# Patient Record
Sex: FEMALE | Race: WHITE | NOT HISPANIC OR LATINO | Employment: OTHER | URBAN - METROPOLITAN AREA
[De-identification: names, ages, dates, MRNs, and addresses within clinical notes are randomized per-mention and may not be internally consistent; named-entity substitution may affect disease eponyms.]

---

## 2021-04-09 PROCEDURE — U0005 INFEC AGEN DETEC AMPLI PROBE: HCPCS

## 2021-04-09 PROCEDURE — U0003 INFECTIOUS AGENT DETECTION BY NUCLEIC ACID (DNA OR RNA); SEVERE ACUTE RESPIRATORY SYNDROME CORONAVIRUS 2 (SARS-COV-2) (CORONAVIRUS DISEASE [COVID-19]), AMPLIFIED PROBE TECHNIQUE, MAKING USE OF HIGH THROUGHPUT TECHNOLOGIES AS DESCRIBED BY CMS-2020-01-R: HCPCS

## 2021-04-10 ENCOUNTER — TELEPHONE (OUTPATIENT)
Dept: LAB | Facility: CLINIC | Age: 81
End: 2021-04-10

## 2021-04-10 LAB
LABORATORY COMMENT REPORT: ABNORMAL
SARS-COV-2 RNA RESP QL NAA+PROBE: NORMAL
SARS-COV-2 RNA RESP QL NAA+PROBE: POSITIVE
SPECIMEN SOURCE: ABNORMAL
SPECIMEN SOURCE: NORMAL

## 2021-04-11 NOTE — TELEPHONE ENCOUNTER
"-Coronavirus (COVID-19) Notification    Caller Name (Patient, parent, daughter/son, grandparent, etc)  Alma Rosa, patient  Reason for call  Notify of Positive Coronavirus (COVID-19) lab results, assess symptoms,  review  [employer] [place] recommendations    Lab Result    Lab test:  2019-nCoV rRt-PCR or SARS-CoV-2 PCR    Oropharyngeal AND/OR nasopharyngeal swabs is POSITIVE for 2019-nCoV RNA/SARS-COV-2 PCR (COVID-19 virus)    RN Recommendations/Instructions per [facility] Coronavirus COVID-19 recommendations    Brief introduction script  Introduce self then review script:  \"I am calling on behalf of [employer].  We were notified that your Coronavirus test (COVID-19) for was POSITIVE for the virus.  I have some information to relay to you but first I wanted to mention that the [place] Dept of Health will be contacting you shortly [it's possible MD already called Patient] to talk to you more about how you are feeling and other people you have had contact with who might now also have the virus.  Also,  [employer] [place] is Partnering with the [facility] for Covid-19 research, you may be contacted directly by research staff.\"    Assessment (Inquire about Patient's current symptoms)   Assessment   Current Symptoms at time of phone call: (if no symptoms, document No symptoms] Cough, body aches, headache,   Fatigue, shortness of breath,   Symptoms onset (if applicable) 4/8/2021     If at time of call, Patients symptoms hare worsened, the Patient should contact 911 or have someone drive them to Emergency Dept promptly:      If Patient calling 911, inform 911 personal that you have tested positive for the Coronavirus (COVID-19).  Place mask on and await 911 to arrive.    If Emergency Dept, If possible, please have another adult drive you to the Emergency Dept but you need to wear mask when in contact with other people.      Monoclonal Antibody Administration    You may be eligible to receive a new treatment with a " "monoclonal antibody for preventing hospitalization in patients at high risk for complications from COVID-19.   This medication is still experimental and available on a limited basis; it is given through an IV and must be given at an infusion center. Please note that not all people who are eligible will receive the medication since it is in limited supply.     Are you interested in being considered for this medication?  No.   Does the patient fit the criteria: Patient declined    If patient qualifies based on above criteria:  \"You will be contacted if you are selected to receive this treatment in the next 1-2 business days.   This is time sensitive and if you are not selected in the next 1-2 business days, you will not receive the medication.  If you do not receive a call to schedule, you have not been selected.\"      Review information with Patient    Your result was positive. This means you have COVID-19 (coronavirus).  We have sent you a letter that reviews the information that I'll be reviewing with you now.    How can I protect others?    If you have symptoms: stay home and away from others (self-isolate) until:    You've had no fever--and no medicine that reduces fever--for 1 full day (24 hours). And       Your other symptoms have gotten better. For example, your cough or breathing has improved. And     At least 10 days have passed since your symptoms started. (If you've been told by a doctor that you have a weak immune system, wait 20 days.)     If you don't have symptoms: Stay home and away from others (self-isolate) until at least 10 days have passed since your first positive COVID-19 test. (Date test collected)    During this time:    Stay in your own room, including for meals. Use your own bathroom if you can.    Stay away from others in your home. No hugging, kissing or shaking hands. No visitors.     Don't go to work, school or anywhere else.     Clean  high touch  surfaces often (doorknobs, counters, " handles, etc.). Use a household cleaning spray or wipes. You'll find a full list on the EPA website at www.epa.gov/pesticide-registration/list-n-disinfectants-use-against-sars-cov-2.     Cover your mouth and nose with a mask, tissue or other face covering to avoid spreading germs.    Wash your hands and face often with soap and water.    Make a list of people you have been in close contact with recently, even if either of you wore a face covering.   ; Start your list from 2 days before you became ill or had a positive test.  ; Include anyone that was within 6 feet of you for a cumulative total of 15 minutes or more in 24 hours. (Example: if you sat next to Will for 5 minutes in the morning and 10 minutes in the afternoon, then you were in close contact for 15 minutes total that day. Will would be added to your list.)    A public health worker will call or text you. It is important that you answer. They will ask you questions about possible exposures to COVID-19, such as people you have been in direct contact with and places you have visited.    Tell the people on your list that you have COVID-19; they should stay away from others for 14 days starting from the last time they were in contact with you (unless you are told something different from a public health worker).     Caregivers in these groups are at risk for severe illness due to COVID-19:  o People 65 years and older  o People who live in a nursing home or long-term care facility  o People with chronic disease (lung, heart, cancer, diabetes, kidney, liver, immunologic)  o People who have a weakened immune system, including those who:  - Are in cancer treatment  - Take medicine that weakens the immune system, such as corticosteroids  - Had a bone marrow or organ transplant  - Have an immune deficiency  - Have poorly controlled HIV or AIDS  - Are obese (body mass index of 40 or higher)  - Smoke regularly    Caregivers should wear gloves while washing dishes,  handling laundry and cleaning bedrooms and bathrooms.    Wash and dry laundry with special caution. Don't shake dirty laundry, and use the warmest water setting you can.    If you have a weakened immune system, ask your doctor about other actions you should take.    For more tips, go to www.cdc.gov/coronavirus/2019-ncov/downloads/10Things.pdf.    You should not go back to work until you meet the guidelines above for ending your home isolation. You don't need to be retested for COVID-19 before going back to work--studies show that you won't spread the virus if it's been at least 10 days since your symptoms started (or 20 days, if you have a weak immune system).    Employers: This document serves as formal notice of your employee's medical guidelines for going back to work. They must meet the above guidelines before going back to work in person.    How can I take care of myself?    1. Get lots of rest. Drink extra fluids (unless a doctor has told you not to).    2. Take Tylenol (acetaminophen) for fever or pain. If you have liver or kidney problems, ask your family doctor if it's okay to take Tylenol.     Take either:     650 mg (two 325 mg pills) every 4 to 6 hours, or     1,000 mg (two 500 mg pills) every 8 hours as needed.     Note: Don't take more than 3,000 mg in one day. Acetaminophen is found in many medicines (both prescribed and over-the-counter medicines). Read all labels to be sure you don't take too much.    For children, check the Tylenol bottle for the right dose (based on their age or weight).    3. If you have other health problems (like cancer, heart failure, an organ transplant or severe kidney disease): Call your specialty clinic if you don't feel better in the next 2 days.    4. Know when to call 911: Emergency warning signs include:    Trouble breathing or shortness of breath    Pain or pressure in the chest that doesn't go away    Feeling confused like you haven't felt before, or not being able  to wake up    Bluish-colored lips or face    5. Sign up for [employer]. We know it's scary to hear that you have COVID-19. We want to track your symptoms to make sure you're okay over the next 2 weeks. Please look for an email from [employer]--this is a free, online program that we'll use to keep in touch. To sign up, follow the link in the email. Learn more at [email or web address].    Where can I get more information?    [facility]: [email or web address]    Coronavirus Basics: [email or web address]/coronavirus/basics.html    What to Do If You're Sick: www.cdc.gov/coronavirus/2019-ncov/about/steps-when-sick.html    Ending Home Isolation: www.cdc.gov/coronavirus/2019-ncov/hcp/disposition-in-home-patients.html     Caring for Someone with COVID-19: www.cdc.gov/coronavirus/2019-ncov/if-you-are-sick/care-for-someone.html     [facility] clinical trials (COVID-19 research studies): [email or web address].[facility]-clinical-trials     A Positive COVID-19 letter will be sent via [employer] or the mail. (Exception, no letters sent to Presurgerical/Preprocedure Patients)    Susie Carter LPN

## 2021-04-19 ENCOUNTER — APPOINTMENT (OUTPATIENT)
Dept: NUCLEAR MEDICINE | Facility: CLINIC | Age: 81
DRG: 178 | End: 2021-04-19
Attending: EMERGENCY MEDICINE
Payer: MEDICARE

## 2021-04-19 ENCOUNTER — APPOINTMENT (OUTPATIENT)
Dept: GENERAL RADIOLOGY | Facility: CLINIC | Age: 81
DRG: 178 | End: 2021-04-19
Attending: EMERGENCY MEDICINE
Payer: MEDICARE

## 2021-04-19 ENCOUNTER — HOSPITAL ENCOUNTER (INPATIENT)
Facility: CLINIC | Age: 81
LOS: 2 days | Discharge: HOME | DRG: 178 | End: 2021-04-21
Attending: EMERGENCY MEDICINE | Admitting: INTERNAL MEDICINE
Payer: MEDICARE

## 2021-04-19 DIAGNOSIS — M89.9 DISORDER OF BONE AND CARTILAGE: Primary | ICD-10-CM

## 2021-04-19 DIAGNOSIS — R11.2 NON-INTRACTABLE VOMITING WITH NAUSEA, UNSPECIFIED VOMITING TYPE: ICD-10-CM

## 2021-04-19 DIAGNOSIS — I10 ESSENTIAL HYPERTENSION, BENIGN: ICD-10-CM

## 2021-04-19 DIAGNOSIS — J96.01 ACUTE RESPIRATORY FAILURE WITH HYPOXIA (H): ICD-10-CM

## 2021-04-19 DIAGNOSIS — U07.1 2019 NOVEL CORONAVIRUS DISEASE (COVID-19): ICD-10-CM

## 2021-04-19 DIAGNOSIS — M94.9 DISORDER OF BONE AND CARTILAGE: Primary | ICD-10-CM

## 2021-04-19 LAB
ALBUMIN SERPL-MCNC: 3.1 G/DL (ref 3.4–5)
ALP SERPL-CCNC: 65 U/L (ref 40–150)
ALT SERPL W P-5'-P-CCNC: 37 U/L (ref 0–50)
ANION GAP SERPL CALCULATED.3IONS-SCNC: 3 MMOL/L (ref 3–14)
AST SERPL W P-5'-P-CCNC: 29 U/L (ref 0–45)
BASOPHILS # BLD AUTO: 0.1 10E9/L (ref 0–0.2)
BASOPHILS NFR BLD AUTO: 0.3 %
BILIRUB SERPL-MCNC: 1 MG/DL (ref 0.2–1.3)
BUN SERPL-MCNC: 21 MG/DL (ref 7–30)
CALCIUM SERPL-MCNC: 8.6 MG/DL (ref 8.5–10.1)
CHLORIDE SERPL-SCNC: 110 MMOL/L (ref 94–109)
CO2 SERPL-SCNC: 28 MMOL/L (ref 20–32)
CREAT SERPL-MCNC: 1.05 MG/DL (ref 0.52–1.04)
CRP SERPL-MCNC: <2.9 MG/L (ref 0–8)
D DIMER PPP FEU-MCNC: 1.8 UG/ML FEU (ref 0–0.5)
DIFFERENTIAL METHOD BLD: ABNORMAL
EOSINOPHIL # BLD AUTO: 0.1 10E9/L (ref 0–0.7)
EOSINOPHIL NFR BLD AUTO: 0.5 %
ERYTHROCYTE [DISTWIDTH] IN BLOOD BY AUTOMATED COUNT: 16.8 % (ref 10–15)
GFR SERPL CREATININE-BSD FRML MDRD: 51 ML/MIN/{1.73_M2}
GLUCOSE BLDC GLUCOMTR-MCNC: 138 MG/DL (ref 70–99)
GLUCOSE SERPL-MCNC: 105 MG/DL (ref 70–99)
HBA1C MFR BLD: 5.7 % (ref 0–5.6)
HCT VFR BLD AUTO: 37.9 % (ref 35–47)
HGB BLD-MCNC: 11.6 G/DL (ref 11.7–15.7)
IMM GRANULOCYTES # BLD: 0.1 10E9/L (ref 0–0.4)
IMM GRANULOCYTES NFR BLD: 0.6 %
LDH SERPL L TO P-CCNC: 221 U/L (ref 81–234)
LIPASE SERPL-CCNC: 84 U/L (ref 73–393)
LYMPHOCYTES # BLD AUTO: 1.1 10E9/L (ref 0.8–5.3)
LYMPHOCYTES NFR BLD AUTO: 7.3 %
MCH RBC QN AUTO: 31 PG (ref 26.5–33)
MCHC RBC AUTO-ENTMCNC: 30.6 G/DL (ref 31.5–36.5)
MCV RBC AUTO: 101 FL (ref 78–100)
MONOCYTES # BLD AUTO: 2.1 10E9/L (ref 0–1.3)
MONOCYTES NFR BLD AUTO: 13.7 %
NEUTROPHILS # BLD AUTO: 12 10E9/L (ref 1.6–8.3)
NEUTROPHILS NFR BLD AUTO: 77.6 %
NRBC # BLD AUTO: 0 10*3/UL
NRBC BLD AUTO-RTO: 0 /100
PLATELET # BLD AUTO: 190 10E9/L (ref 150–450)
POTASSIUM SERPL-SCNC: 4.7 MMOL/L (ref 3.4–5.3)
PROCALCITONIN SERPL-MCNC: <0.05 NG/ML
PROT SERPL-MCNC: 6 G/DL (ref 6.8–8.8)
RBC # BLD AUTO: 3.74 10E12/L (ref 3.8–5.2)
SODIUM SERPL-SCNC: 141 MMOL/L (ref 133–144)
TROPONIN I SERPL-MCNC: <0.015 UG/L (ref 0–0.04)
TROPONIN I SERPL-MCNC: <0.015 UG/L (ref 0–0.04)
WBC # BLD AUTO: 15.4 10E9/L (ref 4–11)

## 2021-04-19 PROCEDURE — 343N000001 HC RX 343: Performed by: EMERGENCY MEDICINE

## 2021-04-19 PROCEDURE — 96375 TX/PRO/DX INJ NEW DRUG ADDON: CPT

## 2021-04-19 PROCEDURE — 250N000011 HC RX IP 250 OP 636: Performed by: INTERNAL MEDICINE

## 2021-04-19 PROCEDURE — 78580 LUNG PERFUSION IMAGING: CPT

## 2021-04-19 PROCEDURE — 99223 1ST HOSP IP/OBS HIGH 75: CPT | Mod: AI | Performed by: INTERNAL MEDICINE

## 2021-04-19 PROCEDURE — 80053 COMPREHEN METABOLIC PANEL: CPT | Performed by: EMERGENCY MEDICINE

## 2021-04-19 PROCEDURE — 85379 FIBRIN DEGRADATION QUANT: CPT | Performed by: EMERGENCY MEDICINE

## 2021-04-19 PROCEDURE — 250N000013 HC RX MED GY IP 250 OP 250 PS 637: Performed by: INTERNAL MEDICINE

## 2021-04-19 PROCEDURE — 85025 COMPLETE CBC W/AUTO DIFF WBC: CPT | Performed by: EMERGENCY MEDICINE

## 2021-04-19 PROCEDURE — 84145 PROCALCITONIN (PCT): CPT | Performed by: EMERGENCY MEDICINE

## 2021-04-19 PROCEDURE — 71045 X-RAY EXAM CHEST 1 VIEW: CPT

## 2021-04-19 PROCEDURE — 83036 HEMOGLOBIN GLYCOSYLATED A1C: CPT | Performed by: INTERNAL MEDICINE

## 2021-04-19 PROCEDURE — 94640 AIRWAY INHALATION TREATMENT: CPT

## 2021-04-19 PROCEDURE — 93005 ELECTROCARDIOGRAM TRACING: CPT

## 2021-04-19 PROCEDURE — 84484 ASSAY OF TROPONIN QUANT: CPT | Performed by: EMERGENCY MEDICINE

## 2021-04-19 PROCEDURE — 96361 HYDRATE IV INFUSION ADD-ON: CPT

## 2021-04-19 PROCEDURE — 83690 ASSAY OF LIPASE: CPT | Performed by: EMERGENCY MEDICINE

## 2021-04-19 PROCEDURE — 120N000001 HC R&B MED SURG/OB

## 2021-04-19 PROCEDURE — 36415 COLL VENOUS BLD VENIPUNCTURE: CPT | Performed by: INTERNAL MEDICINE

## 2021-04-19 PROCEDURE — 999N001017 HC STATISTIC GLUCOSE BY METER IP

## 2021-04-19 PROCEDURE — A9540 TC99M MAA: HCPCS | Performed by: EMERGENCY MEDICINE

## 2021-04-19 PROCEDURE — 84484 ASSAY OF TROPONIN QUANT: CPT | Performed by: INTERNAL MEDICINE

## 2021-04-19 PROCEDURE — 83615 LACTATE (LD) (LDH) ENZYME: CPT | Performed by: INTERNAL MEDICINE

## 2021-04-19 PROCEDURE — 86140 C-REACTIVE PROTEIN: CPT | Performed by: EMERGENCY MEDICINE

## 2021-04-19 PROCEDURE — 258N000003 HC RX IP 258 OP 636: Performed by: EMERGENCY MEDICINE

## 2021-04-19 PROCEDURE — 96374 THER/PROPH/DIAG INJ IV PUSH: CPT

## 2021-04-19 PROCEDURE — 250N000013 HC RX MED GY IP 250 OP 250 PS 637: Performed by: EMERGENCY MEDICINE

## 2021-04-19 PROCEDURE — 250N000009 HC RX 250: Performed by: EMERGENCY MEDICINE

## 2021-04-19 PROCEDURE — 250N000011 HC RX IP 250 OP 636: Performed by: EMERGENCY MEDICINE

## 2021-04-19 PROCEDURE — 99285 EMERGENCY DEPT VISIT HI MDM: CPT | Mod: 25

## 2021-04-19 RX ORDER — NICOTINE POLACRILEX 4 MG
15-30 LOZENGE BUCCAL
Status: DISCONTINUED | OUTPATIENT
Start: 2021-04-19 | End: 2021-04-21 | Stop reason: HOSPADM

## 2021-04-19 RX ORDER — ATORVASTATIN CALCIUM 40 MG/1
40 TABLET, FILM COATED ORAL AT BEDTIME
Status: DISCONTINUED | OUTPATIENT
Start: 2021-04-19 | End: 2021-04-21 | Stop reason: HOSPADM

## 2021-04-19 RX ORDER — ACETAMINOPHEN 325 MG/1
650 TABLET ORAL EVERY 6 HOURS PRN
Status: DISCONTINUED | OUTPATIENT
Start: 2021-04-19 | End: 2021-04-21 | Stop reason: HOSPADM

## 2021-04-19 RX ORDER — LIDOCAINE 40 MG/G
CREAM TOPICAL
Status: DISCONTINUED | OUTPATIENT
Start: 2021-04-19 | End: 2021-04-21 | Stop reason: HOSPADM

## 2021-04-19 RX ORDER — METOPROLOL SUCCINATE 50 MG/1
50 TABLET, EXTENDED RELEASE ORAL DAILY
Status: DISCONTINUED | OUTPATIENT
Start: 2021-04-20 | End: 2021-04-21 | Stop reason: HOSPADM

## 2021-04-19 RX ORDER — DEXAMETHASONE SODIUM PHOSPHATE 10 MG/ML
6 INJECTION, SOLUTION INTRAMUSCULAR; INTRAVENOUS ONCE
Status: COMPLETED | OUTPATIENT
Start: 2021-04-19 | End: 2021-04-19

## 2021-04-19 RX ORDER — CETIRIZINE HYDROCHLORIDE 10 MG/1
10 TABLET ORAL DAILY
Status: DISCONTINUED | OUTPATIENT
Start: 2021-04-20 | End: 2021-04-21 | Stop reason: HOSPADM

## 2021-04-19 RX ORDER — CITALOPRAM HYDROBROMIDE 20 MG/1
40 TABLET ORAL DAILY
Status: DISCONTINUED | OUTPATIENT
Start: 2021-04-20 | End: 2021-04-21 | Stop reason: HOSPADM

## 2021-04-19 RX ORDER — METOPROLOL SUCCINATE 50 MG/1
50 TABLET, EXTENDED RELEASE ORAL DAILY
Status: ON HOLD | COMMUNITY
End: 2021-04-21

## 2021-04-19 RX ORDER — IBUPROFEN 600 MG/1
600 TABLET, FILM COATED ORAL AT BEDTIME
COMMUNITY
End: 2021-05-16

## 2021-04-19 RX ORDER — DEXAMETHASONE SODIUM PHOSPHATE 10 MG/ML
6 INJECTION, SOLUTION INTRAMUSCULAR; INTRAVENOUS DAILY
Status: DISCONTINUED | OUTPATIENT
Start: 2021-04-20 | End: 2021-04-21 | Stop reason: HOSPADM

## 2021-04-19 RX ORDER — ONDANSETRON 2 MG/ML
4 INJECTION INTRAMUSCULAR; INTRAVENOUS EVERY 6 HOURS PRN
Status: DISCONTINUED | OUTPATIENT
Start: 2021-04-19 | End: 2021-04-21 | Stop reason: HOSPADM

## 2021-04-19 RX ORDER — CETIRIZINE HYDROCHLORIDE 10 MG/1
10 TABLET ORAL DAILY
COMMUNITY
End: 2021-07-01

## 2021-04-19 RX ORDER — ONDANSETRON 4 MG/1
4 TABLET, ORALLY DISINTEGRATING ORAL EVERY 6 HOURS PRN
Status: DISCONTINUED | OUTPATIENT
Start: 2021-04-19 | End: 2021-04-21 | Stop reason: HOSPADM

## 2021-04-19 RX ORDER — LIDOCAINE 40 MG/G
CREAM TOPICAL
Status: DISCONTINUED | OUTPATIENT
Start: 2021-04-19 | End: 2021-04-19

## 2021-04-19 RX ORDER — DONEPEZIL HYDROCHLORIDE 5 MG/1
5 TABLET, FILM COATED ORAL AT BEDTIME
Status: DISCONTINUED | OUTPATIENT
Start: 2021-04-19 | End: 2021-04-21 | Stop reason: HOSPADM

## 2021-04-19 RX ORDER — ACETAMINOPHEN 500 MG
500-1000 TABLET ORAL 3 TIMES DAILY PRN
COMMUNITY
End: 2023-12-27

## 2021-04-19 RX ORDER — CYANOCOBALAMIN 1000 UG/ML
1000 INJECTION, SOLUTION INTRAMUSCULAR; SUBCUTANEOUS
Status: DISCONTINUED | OUTPATIENT
Start: 2021-04-20 | End: 2021-04-21 | Stop reason: HOSPADM

## 2021-04-19 RX ORDER — PREDNISONE 5 MG/1
5 TABLET ORAL DAILY
Status: ON HOLD | COMMUNITY
End: 2021-04-21

## 2021-04-19 RX ORDER — AMLODIPINE BESYLATE 5 MG/1
5 TABLET ORAL DAILY
Status: DISCONTINUED | OUTPATIENT
Start: 2021-04-20 | End: 2021-04-21 | Stop reason: HOSPADM

## 2021-04-19 RX ORDER — ASPIRIN 81 MG/1
81 TABLET ORAL DAILY
Status: DISCONTINUED | OUTPATIENT
Start: 2021-04-20 | End: 2021-04-21 | Stop reason: HOSPADM

## 2021-04-19 RX ORDER — ALENDRONATE SODIUM 70 MG/1
70 TABLET ORAL WEEKLY
COMMUNITY

## 2021-04-19 RX ORDER — ALBUTEROL SULFATE 90 UG/1
6 AEROSOL, METERED RESPIRATORY (INHALATION) ONCE
Status: COMPLETED | OUTPATIENT
Start: 2021-04-19 | End: 2021-04-19

## 2021-04-19 RX ORDER — ATORVASTATIN CALCIUM 40 MG/1
40 TABLET, FILM COATED ORAL DAILY
COMMUNITY

## 2021-04-19 RX ORDER — DEXTROSE MONOHYDRATE 25 G/50ML
25-50 INJECTION, SOLUTION INTRAVENOUS
Status: DISCONTINUED | OUTPATIENT
Start: 2021-04-19 | End: 2021-04-21 | Stop reason: HOSPADM

## 2021-04-19 RX ORDER — VITAMIN B COMPLEX
100 TABLET ORAL DAILY
COMMUNITY

## 2021-04-19 RX ORDER — FOLIC ACID 1 MG/1
1 TABLET ORAL
COMMUNITY

## 2021-04-19 RX ORDER — GUAIFENESIN 200 MG/10ML
10 LIQUID ORAL EVERY 4 HOURS PRN
COMMUNITY
End: 2023-12-27

## 2021-04-19 RX ORDER — AMLODIPINE BESYLATE 5 MG/1
5 TABLET ORAL DAILY
COMMUNITY

## 2021-04-19 RX ORDER — BUSPIRONE HYDROCHLORIDE 5 MG/1
5 TABLET ORAL 2 TIMES DAILY
COMMUNITY

## 2021-04-19 RX ORDER — CYANOCOBALAMIN 1000 UG/ML
1 INJECTION, SOLUTION INTRAMUSCULAR; SUBCUTANEOUS
COMMUNITY

## 2021-04-19 RX ORDER — CITALOPRAM HYDROBROMIDE 40 MG/1
40 TABLET ORAL DAILY
COMMUNITY
End: 2021-07-01

## 2021-04-19 RX ORDER — GABAPENTIN 300 MG/1
600 CAPSULE ORAL AT BEDTIME
Status: DISCONTINUED | OUTPATIENT
Start: 2021-04-19 | End: 2021-04-21 | Stop reason: HOSPADM

## 2021-04-19 RX ORDER — DONEPEZIL HYDROCHLORIDE 5 MG/1
5 TABLET, FILM COATED ORAL
COMMUNITY

## 2021-04-19 RX ORDER — VITAMIN B COMPLEX
50 TABLET ORAL DAILY
Status: DISCONTINUED | OUTPATIENT
Start: 2021-04-20 | End: 2021-04-21 | Stop reason: HOSPADM

## 2021-04-19 RX ORDER — ONDANSETRON 2 MG/ML
4 INJECTION INTRAMUSCULAR; INTRAVENOUS ONCE
Status: COMPLETED | OUTPATIENT
Start: 2021-04-19 | End: 2021-04-19

## 2021-04-19 RX ORDER — GABAPENTIN 300 MG/1
600 CAPSULE ORAL AT BEDTIME
COMMUNITY

## 2021-04-19 RX ORDER — BUSPIRONE HYDROCHLORIDE 5 MG/1
5 TABLET ORAL 2 TIMES DAILY PRN
Status: DISCONTINUED | OUTPATIENT
Start: 2021-04-19 | End: 2021-04-21 | Stop reason: HOSPADM

## 2021-04-19 RX ORDER — ALBUTEROL SULFATE 90 UG/1
2 AEROSOL, METERED RESPIRATORY (INHALATION) EVERY 4 HOURS PRN
Status: DISCONTINUED | OUTPATIENT
Start: 2021-04-19 | End: 2021-04-21 | Stop reason: HOSPADM

## 2021-04-19 RX ADMIN — DONEPEZIL HYDROCHLORIDE 5 MG: 5 TABLET ORAL at 21:34

## 2021-04-19 RX ADMIN — DEXAMETHASONE SODIUM PHOSPHATE 6 MG: 10 INJECTION, SOLUTION INTRAMUSCULAR; INTRAVENOUS at 16:46

## 2021-04-19 RX ADMIN — ATORVASTATIN CALCIUM 40 MG: 40 TABLET, FILM COATED ORAL at 22:18

## 2021-04-19 RX ADMIN — BUSPIRONE HYDROCHLORIDE 5 MG: 5 TABLET ORAL at 21:34

## 2021-04-19 RX ADMIN — ONDANSETRON 4 MG: 2 INJECTION INTRAMUSCULAR; INTRAVENOUS at 14:37

## 2021-04-19 RX ADMIN — LIDOCAINE HYDROCHLORIDE 30 ML: 20 SOLUTION ORAL; TOPICAL at 16:50

## 2021-04-19 RX ADMIN — ONDANSETRON 4 MG: 4 TABLET, ORALLY DISINTEGRATING ORAL at 21:34

## 2021-04-19 RX ADMIN — KIT FOR THE PREPARATION OF TECHNETIUM TC 99M ALBUMIN AGGREGATED 3.3 MCI.: 2.5 INJECTION, POWDER, FOR SOLUTION INTRAVENOUS at 19:05

## 2021-04-19 RX ADMIN — GABAPENTIN 600 MG: 300 CAPSULE ORAL at 21:34

## 2021-04-19 RX ADMIN — SODIUM CHLORIDE, POTASSIUM CHLORIDE, SODIUM LACTATE AND CALCIUM CHLORIDE 1000 ML: 600; 310; 30; 20 INJECTION, SOLUTION INTRAVENOUS at 14:37

## 2021-04-19 RX ADMIN — ALBUTEROL SULFATE 6 PUFF: 90 AEROSOL, METERED RESPIRATORY (INHALATION) at 14:37

## 2021-04-19 ASSESSMENT — ENCOUNTER SYMPTOMS
VOMITING: 1
COUGH: 1
FEVER: 0
SHORTNESS OF BREATH: 1
NAUSEA: 1
BLOOD IN STOOL: 0
DYSURIA: 0

## 2021-04-19 ASSESSMENT — ACTIVITIES OF DAILY LIVING (ADL)
TOILETING_ISSUES: NO
FALL_HISTORY_WITHIN_LAST_SIX_MONTHS: NO
WEAR_GLASSES_OR_BLIND: YES
DIFFICULTY_EATING/SWALLOWING: NO
DOING_ERRANDS_INDEPENDENTLY_DIFFICULTY: NO
DIFFICULTY_COMMUNICATING: NO
VISION_MANAGEMENT: GLASSES
PATIENT_/_FAMILY_COMMUNICATION_STYLE: SPOKEN LANGUAGE (ENGLISH OR BILINGUAL)
HEARING_DIFFICULTY_OR_DEAF: NO
WALKING_OR_CLIMBING_STAIRS_DIFFICULTY: NO
DRESSING/BATHING_DIFFICULTY: NO
EQUIPMENT_CURRENTLY_USED_AT_HOME: WALKER, STANDARD;WALKER, ROLLING
CONCENTRATING,_REMEMBERING_OR_MAKING_DECISIONS_DIFFICULTY: NO

## 2021-04-19 ASSESSMENT — MIFFLIN-ST. JEOR: SCORE: 1395.33

## 2021-04-19 NOTE — ED NOTES
[facility]  ED Nurse Handoff Report    Alma Rosa Fishman is a 80 year old female   ED Chief complaint: Covid Concern  . ED Diagnosis:   Final diagnoses:   2019 novel coronavirus disease (COVID-19)   Acute respiratory failure with hypoxia (H)   Non-intractable vomiting with nausea, unspecified vomiting type     Allergies:   Allergies   Allergen Reactions     Acetaminophen      Vicodin/Darvocet/Swelling throat     Codeine      Percocet/Swelling tongue     Contrast Dye      Darvocet [Propoxyphene N-Apap]      Lisinopril      Tongue Swelling     Metformin Hydrochloride      Glucophage caused severe diarrhea     Morphine      MISAEL     Penicillins      Swelling throat     Percocet [Oxycodone-Acetaminophen]        Code Status: Full Code  Activity level - Baseline/Home:  Independent. Activity Level - Current:   Stand by Assist. Lift room needed: No. Bariatric: No   Needed: No   Isolation: Yes. Infection:COVID + 4/9/21  Patient tested for COVID 19 prior to admission: + 4/9/21  SARS-CoV-2 Virus Specimen Source Mid-turbinate    SARS-CoV-2 PCR Result POSITIVEPanic     Comment: SARS-CoV2 (COVID-19) RNA detected, presumed positive.     Vital Signs:   Vitals:    04/19/21 1615 04/19/21 1630 04/19/21 1645 04/19/21 1700   BP: (!) 146/57 137/51 (!) 142/63 (!) 157/62   Pulse: 64 65 67 65   Resp:       Temp:       TempSrc:       SpO2: 93% 96% 95% 100%     Cardiac Rhythm:  ,       Sinus bradycardia  Otherwise normal ECG  No previous ECGs available  Pain level:    Patient confused: No. Patient Falls Risk: Yes.   Elimination Status: Has not voided   Patient Report - Initial Complaint: Pt is COVID positive. Family reports that her O2 level drops below 90% when moving. Pt also reports chest pressure, nausea, vomiting and decreased intake. A+OX4, no acute signs of distress.   Focused Assessment: Cardiac - Cardiac WDL: .WDL except; chest pain   Chest Pain Assessment - Chest Pain Location: anterior chest, right; anterior  chest, left (reports dull aching to right side of chest and sharp pain on left chest that radiates to throat) Associated Signs/Symptoms: nausea   Respiratory - Respiratory WDL: .WDL except; rhythm/pattern  Rhythm/Pattern, Respiratory: shortness of breath; dyspnea on exertion  Breath Sounds: All Fields   Head To Toe Assessment - All Lung Fields Breath Sounds: Posterior:; clear   Tests Performed: 12 lead EKG, Labs, CXR  Abnormal Results:   Abnormal Labs Reviewed   CBC WITH PLATELETS DIFFERENTIAL - Abnormal; Notable for the following components:       Result Value    WBC 15.4 (*)     RBC Count 3.74 (*)     Hemoglobin 11.6 (*)      (*)     MCHC 30.6 (*)     RDW 16.8 (*)     Absolute Neutrophil 12.0 (*)     Absolute Monocytes 2.1 (*)     All other components within normal limits   COMPREHENSIVE METABOLIC PANEL - Abnormal; Notable for the following components:    Chloride 110 (*)     Glucose 105 (*)     Creatinine 1.05 (*)     GFR Estimate 51 (*)     GFR Estimate If Black 59 (*)     Albumin 3.1 (*)     Protein Total 6.0 (*)     All other components within normal limits     XR Chest Port 1 View   Final Result   IMPRESSION: The heart appears enlarged. The lungs are clear. No   pneumothorax or pleural effusion. Surgical clips in the left upper   quadrant.      TAWANDA MCLEOD MD          Treatments provided: 1L LR, Albuterol, zofran, Decadron, GI cocktail  Family Comments: Daughter updated via phone.  OBS brochure/video discussed/provided to patient:  N/A  ED Medications:   Medications   albuterol (PROAIR HFA/PROVENTIL HFA/VENTOLIN HFA) 108 (90 Base) MCG/ACT inhaler 6 puff (6 puffs Inhalation Given 4/19/21 1437)   ondansetron (ZOFRAN) injection 4 mg (4 mg Intravenous Given 4/19/21 1437)   lactated ringers BOLUS 1,000 mL (0 mLs Intravenous Stopped 4/19/21 1643)   dexamethasone PF (DECADRON) injection 6 mg (6 mg Intravenous Given 4/19/21 1646)   lidocaine (XYLOCAINE) 2 % 15 mL, alum & mag hydroxide-simethicone (MAALOX)  15 mL GI Cocktail (30 mLs Oral Given 4/19/21 3560)     Drips infusing:  No  For the majority of the shift, the patient's behavior Green. Interventions performed were N/A.    Sepsis treatment initiated: No     Patient tested for COVID 19 prior to admission: + 4/9/21  SARS-CoV-2 Virus Specimen Source Mid-turbinate    SARS-CoV-2 PCR Result POSITIVEPanic     Comment: SARS-CoV2 (COVID-19) RNA detected, presumed positive.         ED Nurse Name/Phone Number: Gayle Bhatia RN,   5:18 PM    RECEIVING UNIT ED HANDOFF REVIEW    Above ED Nurse Handoff Report was reviewed: Yes  Reviewed by: Chyna Stephens RN on April 19, 2021 at 7:03 PM

## 2021-04-19 NOTE — PHARMACY-ADMISSION MEDICATION HISTORY
Admission medication history interview status for this patient is complete. See [facility] admission navigator for allergy information, prior to admission medications and immunization status.     Medication history interview done, indicate source(s): Patient and Caregiver  Medication history resources (including written lists, pill bottles, clinic record):None  Pharmacy: -    Changes made to PTA medication list:  Added: all listed  Deleted: aciphex, amitrptyline, atenolol, avandia, avapro, zithromax, iron, maxzide, mirapex, ritalin, paxil, serevent, vioxx  Changed: B12 to monthly, lipitor to 40 mg qd    Actions taken by pharmacist (provider contacted, etc):called [facility] home [facility] and spoke to on call RN Bruna who confirmed the meds     Additional medication history information:None    Medication reconciliation/reorder completed by provider prior to medication history?  no   (Y/N)     For patients on insulin therapy:   Do you use sliding scale insulin based on blood sugars?   What is your pre-meal insulin coverage?    Do you typically eat three meals a day?   How many times do you check your blood glucose per day?   How many episodes of hypoglycemia do you typically have per month?   Do you have a Continuous Glucose Monitor (CGM)?      Prior to Admission medications    Medication Sig Last Dose Taking? Auth Provider   acetaminophen (TYLENOL) 325 MG tablet Take 650 mg by mouth every 6 hours as needed for mild pain  Yes Unknown, Entered By History   alendronate (FOSAMAX) 70 MG tablet Take 70 mg by mouth once a week On Wednesday 4/14/2021 Yes Unknown, Entered By History   amLODIPine (NORVASC) 5 MG tablet Take 5 mg by mouth daily 4/19/2021 at Unknown time Yes Unknown, Entered By History   ASPIRIN 81 MG OR TABS 1 tab po QD (Once per day) 4/19/2021 at Unknown time Yes    atorvastatin (LIPITOR) 40 MG tablet Take 40 mg by mouth daily 4/18/2021 at hs Yes Unknown, Entered By History   busPIRone (BUSPAR) 5 MG tablet Take 5 mg by mouth 2  times daily as needed  Yes Unknown, Entered By History   CALCIUM 500 MG OR TABS 1 tab tid 4/19/2021 at Unknown time Yes    cetirizine (ZYRTEC) 10 MG tablet Take 10 mg by mouth daily 4/19/2021 at Unknown time Yes Unknown, Entered By History   citalopram (CELEXA) 40 MG tablet Take 40 mg by mouth daily 4/19/2021 at Unknown time Yes Unknown, Entered By History   cyanocobalamin (CYANOCOBALAMIN) 1000 MCG/ML injection Inject 1 mL into the muscle every 30 days  Yes Unknown, Entered By History   diclofenac (VOLTAREN) 1 % topical gel Apply 4 g topically 4 times daily as needed for moderate pain  Yes Unknown, Entered By History   donepezil (ARICEPT) 5 MG tablet Take 5 mg by mouth At Bedtime 4/18/2021 at Unknown time Yes Unknown, Entered By History   EPINEPHrine (EPIPEN) 0.3 MG/0.3ML injection Inject 0.3 mLs (0.3 mg) into the muscle once as needed for anaphylaxis  Yes Trierweiler, Chad A, MD   folic acid (FOLVITE) 1 MG tablet Take 1 mg by mouth 6 times a week on M,T,Th, Fr, Sat,Sun 4/19/2021 at Unknown time Yes Unknown, Entered By History   gabapentin (NEURONTIN) 300 MG capsule Take 600 mg by mouth At Bedtime 4/18/2021 at Unknown time Yes Unknown, Entered By History   guaiFENesin (ROBITUSSIN) 100 MG/5ML liquid Take 200 mg by mouth every 4 hours as needed for cough  Yes Unknown, Entered By History   ibuprofen (ADVIL/MOTRIN) 600 MG tablet Take 600 mg by mouth At Bedtime 4/18/2021 at Unknown time Yes Unknown, Entered By History   methotrexate 2.5 MG tablet Take 12.5 mg by mouth Every Wednesday AM 4/14/2021 at AM Yes Unknown, Entered By History   methotrexate 2.5 MG tablet Take 10 mg by mouth Every Wednesday PM 4/14/2021 at PM Yes Unknown, Entered By History   metoprolol succinate ER (TOPROL-XL) 50 MG 24 hr tablet Take 50 mg by mouth daily 4/19/2021 at Unknown time Yes Unknown, Entered By History   omeprazole (PRILOSEC) 20 MG DR capsule Take 20 mg by mouth daily 4/19/2021 at Unknown time Yes Unknown, Entered By History   predniSONE  (DELTASONE) 5 MG tablet Take 5 mg by mouth daily 4/19/2021 at Unknown time Yes Unknown, Entered By History   Vitamin D3 (CHOLECALCIFEROL) 25 mcg (1000 units) tablet Take 50 mcg by mouth daily 4/19/2021 at Unknown time Yes Unknown, Entered By History
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
## 2021-04-19 NOTE — ED PROVIDER NOTES
History   Chief Complaint:  Covid Concern     HPI   Alma Rosa Fishman is a 80 year old female with history of type II diabetes, hypertension, hyperlipidemia, and anxiety who presents with chest pain and shortness of breath. The patient was diagnosed with COVID-19 on 4/9. She is here today with nausea, vomiting, and decreased P.O intake in addition to continued chest pain and shortness of breath. She was hospitalized at [facility] from 4/11 to 4/13, during that time she had a VQ scan which was read as low probability for PE. Has a home oximeter that family stated was less that 90%. Was not sent home with a home oxygen. No ongoing fever, but does have an ongoing non productive cough. Last night around 5 pm she noticed some chest discomfort on the right and left sides that has continued to be present. No black or bloody stools. Normal urinary pattern without dysuria.    Review of Systems   Constitutional: Negative for fever.   Respiratory: Positive for cough and shortness of breath.    Cardiovascular: Positive for chest pain.   Gastrointestinal: Positive for nausea and vomiting. Negative for blood in stool.   Genitourinary: Negative for dysuria.   All other systems reviewed and are negative.        Allergies:  Acetaminophen  Codeine  Contrast Dye  Darvocet   Lisinopril  Metformin Hydrochloride  Morphine  Penicillins  Percocet     Medications:  Amitriptyline  Aspirin  Atenolol  Avapro  Epipen  Lipitor  Maxzide  Mirapex  Paxil  Ritalin    Past Medical History:    Calculus of kidney  Depressive disorder  Disorder of bone and cartilage  GERD  Hypertension  Iron deficiency anemia  Cataracts  Hyperlipidemia  Extrapyramidal disease  Type 2 Dm  Asthma  Gastritis and Gastroduodenitis    COVID-19  Cognitive dysfunction  Nonmelanoma skin cancer  Osteoporosis   OA  Anxiety  PTSD  Degeneration of lumbar  CKD stage 3  Raynaud's syndrome  Narcolepsy   Obesity  Colon polyps  Atrophic vaginitis   Fibromyalgia    Past Surgical History:     Appendectomy  Cholecystectomy  ENT surgery x2  Gastric bypass surgery   Left ankle ORIF  Colonoscopy  Bilateral TKA  SUSIE/BSO  Bilateral carpal tunnel repair   Hysterectomy  Bladder suspension  MOHS surgery  Cataract removal     Family History:    MI  Blood and bone cancer  AAA   Hypertension   Type 2 DM    Social History:  Patient presents alone.    Physical Exam     Patient Vitals for the past 24 hrs:   BP Temp Temp src Pulse Resp SpO2   04/19/21 1700 (!) 157/62 -- -- 65 -- 100 %   04/19/21 1645 (!) 142/63 -- -- 67 -- 95 %   04/19/21 1630 137/51 -- -- 65 -- 96 %   04/19/21 1615 (!) 146/57 -- -- 64 -- 93 %   04/19/21 1600 (!) 153/58 -- -- 66 -- 93 %   04/19/21 1545 (!) 146/60 -- -- 65 -- 94 %   04/19/21 1500 (!) 155/51 -- -- 65 -- 92 %   04/19/21 1430 (!) 176/61 -- -- 53 -- 95 %   04/19/21 1237 130/62 98  F (36.7  C) Temporal 56 16 99 %       Physical Exam    HENT:  mmm, no rhinorrhea  Eyes: periorbital tissues and sclera normal   Neck: supple, no abnormal swelling  Lungs:  CTAB,  no resp distress  CV: rrr, no m/r/g, ppi  Abd: soft, nontender, nondistended, no rebound/masses/guarding/hsm  Ext: no peripheral edema  Skin: warm, dry, well perfused, no rashes/bruising/lesions on exposed skin  Neuro: alert, MAEE, no gross motor or sensory deficits, gait stable  Psych: Normal mood, normal affect      Emergency Department Course     ECG  ECG taken at 1426, ECG read at 1439  Sinus bradycardia. Otherwise normal ECG.   Rate 53 bpm. UT interval 144 ms. QRS duration 84 ms. QT/QTc 488/457 ms. P-R-T axes 56 24 43.     Imaging:  Chest XR Port 1 View:  IMPRESSION: The heart appears enlarged. The lungs are clear. No  pneumothorax or pleural effusion. Surgical clips in the left upper  quadrant.  Reading per radiology.      Lung vent and perf (NM)  Pending    Laboratory:  CBC: WBC: 15.4 (H), HGB: 11.6 (L), PLT: 190    CMP: Chloride: 110 (H), Glucose 105 (H), Creatinine: 1.05 (H), GFR: 51 (L), Albumin: 3.1 (L), Protein total: 6.0 (L)  o/w WNL    Troponin (Collected 1420): <0.015    Lipase: 84    D-dimer: 1.8 (H)     CRP inflammation: <2.9     Procalcitonin: In process     Emergency Department Course:    Reviewed:  I reviewed the patient's nursing notes, vitals, past medical records, Care Everywhere.     Assessments:  1402    I evaluated the patient and performed an exam as above.  1645    I updated the patient on results and discussed plan of care.    Consults:   1657  I consulted with Dr. Nicole of the hospitalist services. They are in agreement to accept the patient for admission.   1748 I consulted with Dr. Nicole of the hospitalist services.    Interventions:  1437 Lactated ringers bolus 1000 mL IV  1437 Albuterol inhaler, 6 puffs inhalation given  1437 Zofran 4 mg IV  1646 Decadron 6 mg IV  1650 GI cocktail 30 mL PO     Disposition:  The patient was admitted to the hospital under the care of Dr. Nicole.     Impression & Plan       Medical Decision Makin-year-old female with known COVID-19 presenting with continued shortness of breath and new nausea and vomiting.  Abdominal examination is benign suspicion for significant intra-abdominal pathology is low.  On ambulation trial saturations as low as 90%.  Given clinical status recommend admission start her on Decadron and continue intravenous fluids and antiemetics.  D-dimer pro calcitonin and CRP added on per hospitalist requested D-dimer is elevated compared to that done at [facility] recently we will repeat the VQ scan.    Covid-19  Alma Rosa Fishman was evaluated during a global COVID-19 pandemic, which necessitated consideration that the patient might be at risk for infection with the SARS-CoV-2 virus that causes COVID-19.   Applicable protocols for evaluation were followed during the patient's care.     Diagnosis:    ICD-10-CM    1. 2019 novel coronavirus disease (COVID-19)  U07.1 CRP inflammation     CRP inflammation     D dimer quantitative     D dimer quantitative      Procalcitonin     Procalcitonin     CANCELED: CRP inflammation     CANCELED: D dimer quantitative     CANCELED: Procalcitonin   2. Acute respiratory failure with hypoxia (H)  J96.01    3. Non-intractable vomiting with nausea, unspecified vomiting type  R11.2        Scribe Disclosure:  ELISABETH, Aundrea Keenan, am serving as a scribe at 1:57 PM on 4/19/2021 to document services personally performed by Inderjit Darling MD based on my observations and the provider's statements to me.         Inderjit Darling MD  04/19/21 [id number]

## 2021-04-19 NOTE — ED TRIAGE NOTES
Pt is COVID positive. Family reports that her O2 level drops below 90% when moving. Pt also reports chest pressure, nausea, vomiting and decreased intake. A+OX4, no acute signs of distress.
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
## 2021-04-20 LAB
ALBUMIN UR-MCNC: NEGATIVE MG/DL
APPEARANCE UR: CLEAR
BACTERIA #/AREA URNS HPF: ABNORMAL /HPF
BILIRUB UR QL STRIP: NEGATIVE
COLOR UR AUTO: ABNORMAL
ERYTHROCYTE [DISTWIDTH] IN BLOOD BY AUTOMATED COUNT: 16.4 % (ref 10–15)
GLUCOSE BLDC GLUCOMTR-MCNC: 112 MG/DL (ref 70–99)
GLUCOSE BLDC GLUCOMTR-MCNC: 112 MG/DL (ref 70–99)
GLUCOSE BLDC GLUCOMTR-MCNC: 125 MG/DL (ref 70–99)
GLUCOSE BLDC GLUCOMTR-MCNC: 132 MG/DL (ref 70–99)
GLUCOSE BLDC GLUCOMTR-MCNC: 133 MG/DL (ref 70–99)
GLUCOSE UR STRIP-MCNC: NEGATIVE MG/DL
HCT VFR BLD AUTO: 35.7 % (ref 35–47)
HGB BLD-MCNC: 11.4 G/DL (ref 11.7–15.7)
HGB UR QL STRIP: NEGATIVE
INTERPRETATION ECG - MUSE: NORMAL
KETONES UR STRIP-MCNC: NEGATIVE MG/DL
LEUKOCYTE ESTERASE UR QL STRIP: ABNORMAL
MCH RBC QN AUTO: 31.6 PG (ref 26.5–33)
MCHC RBC AUTO-ENTMCNC: 31.9 G/DL (ref 31.5–36.5)
MCV RBC AUTO: 99 FL (ref 78–100)
NITRATE UR QL: NEGATIVE
PH UR STRIP: 6 PH (ref 5–7)
PLATELET # BLD AUTO: 174 10E9/L (ref 150–450)
RBC # BLD AUTO: 3.61 10E12/L (ref 3.8–5.2)
RBC #/AREA URNS AUTO: 1 /HPF (ref 0–2)
SOURCE: ABNORMAL
SP GR UR STRIP: 1.01 (ref 1–1.03)
SQUAMOUS #/AREA URNS AUTO: 3 /HPF (ref 0–1)
TROPONIN I SERPL-MCNC: <0.015 UG/L (ref 0–0.04)
TROPONIN I SERPL-MCNC: <0.015 UG/L (ref 0–0.04)
UROBILINOGEN UR STRIP-MCNC: 2 MG/DL (ref 0–2)
WBC # BLD AUTO: 8.5 10E9/L (ref 4–11)
WBC #/AREA URNS AUTO: 3 /HPF (ref 0–5)

## 2021-04-20 PROCEDURE — 84484 ASSAY OF TROPONIN QUANT: CPT | Performed by: INTERNAL MEDICINE

## 2021-04-20 PROCEDURE — 99233 SBSQ HOSP IP/OBS HIGH 50: CPT | Performed by: INTERNAL MEDICINE

## 2021-04-20 PROCEDURE — 250N000013 HC RX MED GY IP 250 OP 250 PS 637: Performed by: INTERNAL MEDICINE

## 2021-04-20 PROCEDURE — 85027 COMPLETE CBC AUTOMATED: CPT | Performed by: INTERNAL MEDICINE

## 2021-04-20 PROCEDURE — 36415 COLL VENOUS BLD VENIPUNCTURE: CPT | Performed by: INTERNAL MEDICINE

## 2021-04-20 PROCEDURE — 250N000011 HC RX IP 250 OP 636: Performed by: INTERNAL MEDICINE

## 2021-04-20 PROCEDURE — 250N000009 HC RX 250: Performed by: INTERNAL MEDICINE

## 2021-04-20 PROCEDURE — 81001 URINALYSIS AUTO W/SCOPE: CPT | Performed by: INTERNAL MEDICINE

## 2021-04-20 PROCEDURE — 120N000001 HC R&B MED SURG/OB

## 2021-04-20 PROCEDURE — 999N001017 HC STATISTIC GLUCOSE BY METER IP

## 2021-04-20 RX ORDER — IBUPROFEN 400 MG/1
800 TABLET, FILM COATED ORAL ONCE
Status: COMPLETED | OUTPATIENT
Start: 2021-04-20 | End: 2021-04-20

## 2021-04-20 RX ADMIN — OMEPRAZOLE 20 MG: 20 CAPSULE, DELAYED RELEASE ORAL at 09:05

## 2021-04-20 RX ADMIN — METOPROLOL SUCCINATE 50 MG: 50 TABLET, EXTENDED RELEASE ORAL at 09:05

## 2021-04-20 RX ADMIN — DICLOFENAC SODIUM 4 G: 10 GEL TOPICAL at 13:18

## 2021-04-20 RX ADMIN — ENOXAPARIN SODIUM 40 MG: 40 INJECTION SUBCUTANEOUS at 03:08

## 2021-04-20 RX ADMIN — Medication 50 MCG: at 09:05

## 2021-04-20 RX ADMIN — GABAPENTIN 600 MG: 300 CAPSULE ORAL at 22:56

## 2021-04-20 RX ADMIN — ASPIRIN 81 MG: 81 TABLET ORAL at 09:04

## 2021-04-20 RX ADMIN — DONEPEZIL HYDROCHLORIDE 5 MG: 5 TABLET ORAL at 22:56

## 2021-04-20 RX ADMIN — CITALOPRAM HYDROBROMIDE 40 MG: 20 TABLET ORAL at 09:04

## 2021-04-20 RX ADMIN — AMLODIPINE BESYLATE 5 MG: 5 TABLET ORAL at 09:05

## 2021-04-20 RX ADMIN — DEXAMETHASONE SODIUM PHOSPHATE 6 MG: 10 INJECTION, SOLUTION INTRAMUSCULAR; INTRAVENOUS at 13:18

## 2021-04-20 RX ADMIN — CETIRIZINE HYDROCHLORIDE 10 MG: 10 TABLET, FILM COATED ORAL at 09:05

## 2021-04-20 RX ADMIN — LIDOCAINE HYDROCHLORIDE 30 ML: 20 SOLUTION ORAL; TOPICAL at 23:52

## 2021-04-20 RX ADMIN — CYANOCOBALAMIN 1000 MCG: 1000 INJECTION, SOLUTION INTRAMUSCULAR at 09:16

## 2021-04-20 RX ADMIN — GUAIFENESIN 200 MG: 100 SOLUTION ORAL at 00:20

## 2021-04-20 RX ADMIN — Medication 1 MG: at 23:43

## 2021-04-20 RX ADMIN — DICLOFENAC SODIUM 4 G: 10 GEL TOPICAL at 23:50

## 2021-04-20 RX ADMIN — DICLOFENAC SODIUM 4 G: 10 GEL TOPICAL at 03:08

## 2021-04-20 RX ADMIN — ATORVASTATIN CALCIUM 40 MG: 40 TABLET, FILM COATED ORAL at 22:56

## 2021-04-20 RX ADMIN — IBUPROFEN 800 MG: 400 TABLET, FILM COATED ORAL at 03:08

## 2021-04-20 ASSESSMENT — ACTIVITIES OF DAILY LIVING (ADL)
ADLS_ACUITY_SCORE: 23
ADLS_ACUITY_SCORE: 24

## 2021-04-20 NOTE — PROGRESS NOTES
[facility]    Hospitalist Progress Note  Provider : Emely Macdonald MD  Date of Service (when I saw the patient): 04/20/2021    Assessment & Plan   Alma Rosa Fishman is a 80 year old female with past medical history significant for diabetes mellitus, hypertension, hyperlipidemia, asthma, rheumatoid arthritis on methotrexate and daily prednisone, chronic kidney disease stage III, recent diagnosis of COVID-19 on 4/9/2021 presented to the emergency room with worsening nausea chest pain and shortness of breath.  In the emergency room she is desatting with minimal exertion.     She was hospitalized on 4/11 for observation for respiratory failure and was weaned off supplemental O2 at that time.  On discharge her methotrexate was held she was not treated with steroids or remdesivir at that time.  Patient continues to have fluctuating oxygen saturation but not requiring supplemental oxygen at this moment     COVID-19 viral infection  -Hypoxic with exertion maintaining sats on room air. Fluctuating O2 saturation. Has frequent cough. Not requiring supplemental oxygen for now. Will closely monitor.   -VQ scan negative  -Will continue dexamethasone.   Continue to hold methotrexate-  -holding of remdesivir as not hypoxic at this time. Will monitor O2 sat  -Continue DVT prophylaxis.   -Continue PPI for GI protection    Chest pain nausea  -Differential includes gastritis versus ACS given patient's cardiac risk factors  -Patient has cardiac risk factors diabetes hypertension hyperlipidemia  -EKG shows normal sinus bradycardia  -We will monitor on telemetry rule out ACS  Serial troponins-  -improved in ED with GI cocktail     Diabetes mellitus  -We will check hemoglobin A1c  -Sliding scale insulin now  -Concern for hypoglycemia while on Decadron     Nausea and vomiting  -Symptomatic treatment with Zofran  -We will check LFTs     Leukocytosis  -Chest x-ray with no evidence of pneumonia  -Unable to get CT  chest due to contrast allergies  -UA and CXR negative.   -Procalcitonin <0.05.  -No indication for antibiotics at this moment     Asthma  Stable. Continue home inh.      RA  Immunocompromised state  Pt on weekly MTX (wednesdays) and daily prednisone 5mg.  Continue dexamethasone for Covid we will hold prednisone 5mg for now.   -Hold MTX likely this week.   -Continue her voltaren gel, gabapentin.      HTN  Sinus bradycardia, asymptomatic  Stable, continue norvasc, toprol. Will decrease toprol dose to 50 daily given sinus bradycardia.      HLD  On statin.     Anxiety  On celexa, buspar.      Cognitive impairment  Continue aricept.      CKD stage 3  Cr at baseline.     Disposition: Anticipate discharge in 1-2 days if breathing stable and not requiring oxygen.     Spent > 35 minutes on     Code Status: Full Code    Emely Macdonald MD    Interval History   Patient seen and examined. She stated that she has frequent cough and feels short of breath with ambulation. Denies fever. Has no nausea or vomiting. No abdominal pain. No diarrhea.    -Data reviewed today: I reviewed all new labs and imaging results over the last 24 hours. I personally reviewed  Physical Exam   Temp: 97.8  F (36.6  C) Temp src: Oral BP: 124/69 Pulse: 61   Resp: 18 SpO2: 97 % O2 Device: None (Room air)    Vitals:    04/19/21 2009   Weight: 94.6 kg (208 lb 9.6 oz)     Vital Signs with Ranges  Temp:  [97.4  F (36.3  C)-98.2  F (36.8  C)] 97.8  F (36.6  C)  Pulse:  [53-67] 61  Resp:  [16-21] 18  BP: (124-176)/(51-87) 124/69  SpO2:  [92 %-100 %] 97 %  I/O last 3 completed shifts:  In: 240 [P.O.:240]  Out: 600 [Urine:600]    GEN:  Alert, oriented x 3, appears comfortable, NAD.  HEENT:  Normocephalic/atraumatic, no scleral icterus, no nasal discharge, mouth moist.  CV:  Regular rate and rhythm, no murmur or JVD.  S1 + S2 noted, no S3 or S4.  LUNGS:  Clear to auscultation bilaterally without rales/rhonchi/wheezing/retractions.  Symmetric chest rise on  inhalation noted.  ABD:  Active bowel sounds, soft, non-tender/non-distended.  No rebound/guarding/rigidity.  EXT:  No edema or cyanosis.  Hands/feet warm to touch with good signs of peripheral perfusion.  No joint synovitis noted.  SKIN:  Dry to touch, no exanthems noted in the visualized areas.  NEURO:  Symmetric muscle strength, sensation to touch grossly intact.  No new focal deficits appreciated.    Medications     - MEDICATION INSTRUCTIONS -       - MEDICATION INSTRUCTIONS -         amLODIPine  5 mg Oral Daily     aspirin  81 mg Oral Daily     atorvastatin  40 mg Oral At Bedtime     cetirizine  10 mg Oral Daily     citalopram  40 mg Oral Daily     cyanocobalamin  1,000 mcg Intramuscular Q30 Days     dexamethasone  6 mg Intravenous Daily     donepezil  5 mg Oral At Bedtime     enoxaparin ANTICOAGULANT  40 mg Subcutaneous Q24H     gabapentin  600 mg Oral At Bedtime     insulin aspart  1-7 Units Subcutaneous TID AC     insulin aspart  1-5 Units Subcutaneous At Bedtime     metoprolol succinate ER  50 mg Oral Daily     omeprazole  20 mg Oral Daily     sodium chloride (PF)  3 mL Intracatheter Q8H     Vitamin D3  50 mcg Oral Daily       Data   Recent Labs   Lab 04/20/21  0642 04/20/21  0135 04/19/21  2212 04/19/21  1420   WBC 8.5  --   --  15.4*   HGB 11.4*  --   --  11.6*   MCV 99  --   --  101*     --   --  190   NA  --   --   --  141   POTASSIUM  --   --   --  4.7   CHLORIDE  --   --   --  110*   CO2  --   --   --  28   BUN  --   --   --  21   CR  --   --   --  1.05*   ANIONGAP  --   --   --  3   ELAN  --   --   --  8.6   GLC  --   --   --  105*   ALBUMIN  --   --   --  3.1*   PROTTOTAL  --   --   --  6.0*   BILITOTAL  --   --   --  1.0   ALKPHOS  --   --   --  65   ALT  --   --   --  37   AST  --   --   --  29   LIPASE  --   --   --  84   TROPI <0.015 <0.015 <0.015 <0.015       Recent Results (from the past 24 hour(s))   XR Chest Port 1 View    Narrative    CHEST ONE VIEW PORTABLE   4/19/2021 3:27 PM      HISTORY:  Cough, COVID, shortness of breath.    COMPARISON: None.      Impression    IMPRESSION: The heart appears enlarged. The lungs are clear. No  pneumothorax or pleural effusion. Surgical clips in the left upper  quadrant.    TAWANDA MCLEOD MD   NM Lung Scan Perfusion Particulate    Narrative    EXAM: NM LUNG SCAN PERFUSION PARTICULATE  LOCATION: [facility]  DATE/TIME: 4/19/2021 6:41 PM    INDICATION: PE suspected, low/intermediate prob, positive D-dimer  COMPARISON: Chest x-ray 04/19/2021   TECHNIQUE: 3.3 mCi technetium-99m MAA, IV. Standard lung perfusion imaging.    FINDINGS: Normal perfusion to both lungs. No segmental perfusion defects.      Impression    IMPRESSION:   1.  Normal lung perfusion scan.

## 2021-04-20 NOTE — PROGRESS NOTES
Cross Cx:     Patient requesting pain medications for a headache. However, has allergies to a number of medications including acetaminophen. Currently no anticoagulation ordered (admitting rounder is awaiting results of NM lung perfusion scan which was normal) but is on steroids. Ibuprofen 800 mg once given risk factors for ALFREDO and bleeding. In addition, reviewed NM lung scan which was normal. Lovenox 40 mg daily ordered give hypercoagulability with COVID-19. Patient is on a PPI.

## 2021-04-20 NOTE — PLAN OF CARE
AO x4. VSS. TELE SR/SB. LS clear. Up with assist of 1 and gait belt with walker. Given ibuprofen for pain- see MAR. Mod carb diet-  at 0200. Blanchable dark red area on buttocks, barrier cream applied. Iso precautions maintained. Will continue to follow POC.

## 2021-04-20 NOTE — PROVIDER NOTIFICATION
md notified: pt reports unchanged chest pain (admitted from ED) 4/10 dull, was offered GI cocktail in ED with decrease in pain noted. Trop neg x1 Would you like to place orders for additional meds or serial trops?     Addendum: orders placed for trop q4 x3 and prn gi cocktail.

## 2021-04-20 NOTE — PLAN OF CARE
Pt arrived to floor from ED around 2000. AxOx4. VSS. TELE: SR/SB. Up with Ax1 gb and walker. Reports joint pain/tenderness throughout from rheumatoid arthritis; heating pad applied. Voltaren gel offered. Describes CP rated as 4/10, worsens with eating per pt, and felt relief after GI cocktail in ED. Prn zofran given x1 for nausea with relief noted. Incontinent at times, disposable brief applied. Blanchable redness with area of dark red on buttocks; pt states they first noticed this area one week ago; barrier cream applied. Special iso precautions initiated. Will continue with POC.

## 2021-04-20 NOTE — PROGRESS NOTES
[facility]  Patient is currently open to home care services with [facility]. The patient is currently receiving RN PT OT SW HHA services.  [facility]  and team have been notified of patient admission.  [facility] liaison will continue to follow patient during stay.  If appropriate provide orders to resume home care at time of discharge.

## 2021-04-20 NOTE — PLAN OF CARE
"Orientation: A/O x4  VS: /69   Pulse 61   Temp 97.8  F (36.6  C) (Oral)   Resp 18   Ht 1.6 m (5' 3\")   Wt 94.6 kg (208 lb 9.6 oz)   SpO2 97%   BMI 36.95 kg/m    Tele: SR/SB w/ long QT  LS: clear/dim bilat. Dry non-productive cough  GI: tolerating Mod CHO diet  : dribbling/incont at times  Skin: blanchable redness   Activity: 1 w/ walker/BG  Pain: constant back pain- heat applied  Lines: PIV SL  Plan: discharge date TBD  Saima Bailey RN on 4/20/2021 at 10:39 AM      "

## 2021-04-20 NOTE — PROVIDER NOTIFICATION
md notified:pt requesting medication for headache. Allergic to Tylenol; other various allergies. Please place orders as you see appropriate, thanks.    Edit: orders placed for ibuprofen. Also, orders added for lovenox.

## 2021-04-20 NOTE — UTILIZATION REVIEW
Admission Status; Secondary Review Determination       Under the authority of the Utilization Management Committee, the utilization review process indicated a secondary review on the above patient. The review outcome is based on review of the medical records, discussions with staff, and applying clinical experience noted on the date of the review.     (x) Inpatient Status Appropriate - This patient's medical care is consistent with medical management for inpatient care and reasonable inpatient medical practice.     RATIONALE FOR DETERMINATION   80 year old female with past medical history significant for diabetes mellitus, hypertension, hyperlipidemia, asthma, rheumatoid arthritis on methotrexate and daily prednisone, chronic kidney disease stage III, recent diagnosis of COVID-19 on 4/9/2021 presented to the emergency room with worsening nausea chest pain and shortness of breath. In the emergency room she is desatting with minimal exertion.   Discussed with the attending physician patient continues to have significant cough, also frequently dropping her oxygen saturation to low 90s percent, with episodes of tachypnea which per attending physician described as concerning to discharge.  The expected length of stay at the time of admission was more than 2 nights because of the severity of illness, intensity of service provided, and risk for adverse outcome. Inpatient admission is appropriate.     This document was produced using voice recognition software       The information on this document is developed by the utilization review team in order for the business office to ensure compliance. This only denotes the appropriateness of proper admission status and does not reflect the quality of care rendered.   The definitions of Inpatient Status and Observation Status used in making the determination above are those provided in the CMS Coverage Manual, Chapter 1 and Chapter 6, section 70.4.   Sincerely,   NEHA HALL  MD AVINASH   System Medical Director   Utilization Management   [facility].

## 2021-04-21 VITALS
OXYGEN SATURATION: 97 % | WEIGHT: 208.6 LBS | TEMPERATURE: 97.7 F | RESPIRATION RATE: 18 BRPM | DIASTOLIC BLOOD PRESSURE: 84 MMHG | BODY MASS INDEX: 36.96 KG/M2 | HEART RATE: 67 BPM | SYSTOLIC BLOOD PRESSURE: 147 MMHG | HEIGHT: 63 IN

## 2021-04-21 LAB
ANION GAP SERPL CALCULATED.3IONS-SCNC: 4 MMOL/L (ref 3–14)
BUN SERPL-MCNC: 26 MG/DL (ref 7–30)
CALCIUM SERPL-MCNC: 8.5 MG/DL (ref 8.5–10.1)
CHLORIDE SERPL-SCNC: 110 MMOL/L (ref 94–109)
CO2 SERPL-SCNC: 26 MMOL/L (ref 20–32)
CREAT SERPL-MCNC: 0.92 MG/DL (ref 0.52–1.04)
ERYTHROCYTE [DISTWIDTH] IN BLOOD BY AUTOMATED COUNT: 16.4 % (ref 10–15)
GFR SERPL CREATININE-BSD FRML MDRD: 59 ML/MIN/{1.73_M2}
GLUCOSE BLDC GLUCOMTR-MCNC: 100 MG/DL (ref 70–99)
GLUCOSE BLDC GLUCOMTR-MCNC: 105 MG/DL (ref 70–99)
GLUCOSE BLDC GLUCOMTR-MCNC: 121 MG/DL (ref 70–99)
GLUCOSE BLDC GLUCOMTR-MCNC: 133 MG/DL (ref 70–99)
GLUCOSE SERPL-MCNC: 110 MG/DL (ref 70–99)
HCT VFR BLD AUTO: 35.5 % (ref 35–47)
HGB BLD-MCNC: 11.4 G/DL (ref 11.7–15.7)
MCH RBC QN AUTO: 31.4 PG (ref 26.5–33)
MCHC RBC AUTO-ENTMCNC: 32.1 G/DL (ref 31.5–36.5)
MCV RBC AUTO: 98 FL (ref 78–100)
PLATELET # BLD AUTO: 195 10E9/L (ref 150–450)
POTASSIUM SERPL-SCNC: 4.3 MMOL/L (ref 3.4–5.3)
RBC # BLD AUTO: 3.63 10E12/L (ref 3.8–5.2)
SODIUM SERPL-SCNC: 140 MMOL/L (ref 133–144)
WBC # BLD AUTO: 12.1 10E9/L (ref 4–11)

## 2021-04-21 PROCEDURE — 36415 COLL VENOUS BLD VENIPUNCTURE: CPT | Performed by: INTERNAL MEDICINE

## 2021-04-21 PROCEDURE — 250N000011 HC RX IP 250 OP 636: Performed by: INTERNAL MEDICINE

## 2021-04-21 PROCEDURE — 85027 COMPLETE CBC AUTOMATED: CPT | Performed by: INTERNAL MEDICINE

## 2021-04-21 PROCEDURE — 99239 HOSP IP/OBS DSCHRG MGMT >30: CPT | Performed by: INTERNAL MEDICINE

## 2021-04-21 PROCEDURE — 250N000013 HC RX MED GY IP 250 OP 250 PS 637: Performed by: INTERNAL MEDICINE

## 2021-04-21 PROCEDURE — 999N001017 HC STATISTIC GLUCOSE BY METER IP

## 2021-04-21 PROCEDURE — 80048 BASIC METABOLIC PNL TOTAL CA: CPT | Performed by: INTERNAL MEDICINE

## 2021-04-21 RX ORDER — DEXAMETHASONE 6 MG/1
6 TABLET ORAL DAILY
Qty: 8 TABLET | Refills: 0 | Status: SHIPPED | OUTPATIENT
Start: 2021-04-21 | End: 2021-05-16

## 2021-04-21 RX ORDER — METOPROLOL SUCCINATE 50 MG/1
25 TABLET, EXTENDED RELEASE ORAL DAILY
Start: 2021-04-21

## 2021-04-21 RX ORDER — PREDNISONE 5 MG/1
5 TABLET ORAL DAILY
Status: ON HOLD
Start: 2021-04-21 | End: 2023-12-29

## 2021-04-21 RX ORDER — ALBUTEROL SULFATE 90 UG/1
2 AEROSOL, METERED RESPIRATORY (INHALATION) EVERY 4 HOURS PRN
Qty: 6.7 G | Refills: 0 | Status: SHIPPED | OUTPATIENT
Start: 2021-04-21

## 2021-04-21 RX ADMIN — CETIRIZINE HYDROCHLORIDE 10 MG: 10 TABLET, FILM COATED ORAL at 08:34

## 2021-04-21 RX ADMIN — ASPIRIN 81 MG: 81 TABLET ORAL at 08:34

## 2021-04-21 RX ADMIN — CITALOPRAM HYDROBROMIDE 40 MG: 20 TABLET ORAL at 08:34

## 2021-04-21 RX ADMIN — OMEPRAZOLE 20 MG: 20 CAPSULE, DELAYED RELEASE ORAL at 08:34

## 2021-04-21 RX ADMIN — AMLODIPINE BESYLATE 5 MG: 5 TABLET ORAL at 08:34

## 2021-04-21 RX ADMIN — ENOXAPARIN SODIUM 40 MG: 40 INJECTION SUBCUTANEOUS at 05:59

## 2021-04-21 RX ADMIN — Medication 50 MCG: at 08:34

## 2021-04-21 RX ADMIN — DEXAMETHASONE SODIUM PHOSPHATE 6 MG: 10 INJECTION, SOLUTION INTRAMUSCULAR; INTRAVENOUS at 12:30

## 2021-04-21 ASSESSMENT — ACTIVITIES OF DAILY LIVING (ADL)
ADLS_ACUITY_SCORE: 19
ADLS_ACUITY_SCORE: 20

## 2021-04-21 NOTE — DISCHARGE SUMMARY
[facility]    Discharge Summary  Hospitalist    Date of Admission:  4/19/2021  Date of Discharge:  4/21/2021  Discharging Provider: Emely Macdonald MD  Date of Service (when I saw the patient): 04/21/21    Discharge Diagnoses      COVID-19 viral infection     Chest pain nausea  -Differential includes gastritis versus ACS given patient's cardiac risk factors     Diabetes mellitus     Nausea and vomiting     Leukocytosis     Asthma     Rheumatoid arthritis     HTN    Hyperlipidemia     Anxiety    Cognitive impairment.      CKD stage 3    History of Present Illness   Alma Rosa Fishman is an 80 year old female who presented with Covid 19 infection    Hospital Course   Alma Rosa Fishman is a 80 year old female with past medical history significant for diabetes mellitus, hypertension, hyperlipidemia, asthma, rheumatoid arthritis on methotrexate and daily prednisone, chronic kidney disease stage III, recent diagnosis of COVID-19 on 4/9/2021 presented to the emergency room with worsening nausea chest pain and shortness of breath.  In the emergency room she is desatting with minimal exertion.     She was hospitalized on 4/11 for observation for respiratory failure and was weaned off supplemental O2 at that time.  On discharge her methotrexate was held she was not treated with steroids or remdesivir at that time.  Patient continues to have fluctuating oxygen saturation but not requiring supplemental oxygen at this moment     COVID-19 viral infection  -Hypoxic with exertion maintaining sats on room air. Fluctuating O2 saturation. Has frequent cough. Not requiring supplemental oxygen for now. Will closely monitor.   -VQ scan negative  -Will continue dexamethasone to complete 10 day course of treatment   Continue to hold methotrexate-  -holding of remdesivir as not hypoxic at this time. Oxygen saturation remained stable  -Continue DVT prophylaxis. She was discharged on Eliquis for 1 month  -Continued  PPI for GI protection     Chest pain nausea  -Differential includes gastritis versus ACS given patient's cardiac risk factors  -Patient has cardiac risk factors diabetes hypertension hyperlipidemia  -EKG shows normal sinus bradycardia  -We will monitor on telemetry rule out ACS  Serial troponins-  -improved in ED with GI cocktail     Diabetes mellitus  -Sliding scale insulin now  -Concern for hyperglycemia while on Decadron. Advised to monitor her blood sugar at home     Nausea and vomiting  -Symptomatic treatment with Zofran  -We will check LFTs     Leukocytosis  -Chest x-ray with no evidence of pneumonia  -Unable to get CT chest due to contrast allergies  -UA and CXR negative.   -Procalcitonin <0.05.  -No indication for antibiotics at this moment     Asthma  Stable. Continue home inh.      RA  Immunocompromised state  Pt on weekly MTX (wednesdays) and daily prednisone 5mg.  Continue dexamethasone for Covid we will hold prednisone 5mg for now.   -Hold MTX likely this week.   -Continue her voltaren gel, gabapentin.      HTN  Sinus bradycardia, asymptomatic  Stable, continue norvasc, toprol. Will decrease toprol dose to 50 daily given sinus bradycardia.      HLD  On statin.     Anxiety  On celexa, buspar.      Cognitive impairment  Continue aricept.      CKD stage 3     Spent > 35 minutes     Significant Results and Procedures   Results for orders placed or performed during the hospital encounter of 04/19/21   XR Chest Port 1 View    Narrative    CHEST ONE VIEW PORTABLE   4/19/2021 3:27 PM     HISTORY:  Cough, COVID, shortness of breath.    COMPARISON: None.      Impression    IMPRESSION: The heart appears enlarged. The lungs are clear. No  pneumothorax or pleural effusion. Surgical clips in the left upper  quadrant.    TAWANDA MCLEOD MD   NM Lung Scan Perfusion Particulate    Narrative    EXAM: NM LUNG SCAN PERFUSION PARTICULATE  LOCATION: [facility]  DATE/TIME: 4/19/2021 6:41 PM    INDICATION: PE  suspected, low/intermediate prob, positive D-dimer  COMPARISON: Chest x-ray 04/19/2021   TECHNIQUE: 3.3 mCi technetium-99m MAA, IV. Standard lung perfusion imaging.    FINDINGS: Normal perfusion to both lungs. No segmental perfusion defects.      Impression    IMPRESSION:   1.  Normal lung perfusion scan.         Pending Results   None  Code Status   Full Code       Primary Care Physician   Gabbie Whitman        Discharge Disposition   Discharged to home  Condition at discharge: Stable    Consultations This Hospital Stay   None    Time Spent on this Encounter   I, Emely Macdonald MD, MD, personally saw the patient today and spent greater than 30 minutes discharging this patient.    Discharge Orders      Reason for your hospital stay    Covid 19 infection     Follow-up and recommended labs and tests     Follow up with primary care provider, Gabbie Whitman, within 7 days     Activity    Your activity upon discharge: activity as tolerated     Discharge - Quarantine/Isolation Instruction    Date of symptom onset:     Date of first positive test:    If you tested positive COVID-19 and show symptoms (fever, cough, body aches or trouble breathing):        Stay home and away from others (self-isolate) until:        At least 10 days have passed since your symptoms started. AND...        You've had no fever-and no medicine that reduces fever for 1 full day (24 hours). AND...         Your other symptoms have resolved (gotten better).  If you tested positive for COVID-19 but don't show symptoms:       Stay home and away from others (self-isolate) until at least 10 days have passed since the date of your first positive COVID-19 test.     Diet    Follow this diet upon discharge: Orders Placed This Encounter      Moderate Consistent CHO Diet     Discharge Medications   Current Discharge Medication List      START taking these medications    Details   albuterol (PROAIR HFA/PROVENTIL HFA/VENTOLIN HFA) 108 (90 Base)  MCG/ACT inhaler Inhale 2 puffs into the lungs every 4 hours as needed for shortness of breath / dyspnea or wheezing  Qty: 6.7 g, Refills: 0    Comments: Pharmacy may dispense brand covered by insurance (Proair, or proventil or ventolin or generic albuterol inhaler)  Associated Diagnoses: Acute respiratory failure with hypoxia (H)      apixaban ANTICOAGULANT (ELIQUIS) 2.5 MG tablet Take 1 tablet (2.5 mg) by mouth 2 times daily  Qty: 60 tablet, Refills: 0    Associated Diagnoses: 2019 novel coronavirus disease (COVID-19); DVT prophylaxis      dexamethasone (DECADRON) 6 MG tablet Take 1 tablet (6 mg) by mouth daily for 8 days  Qty: 8 tablet, Refills: 0    Associated Diagnoses: 2019 novel coronavirus disease (COVID-19); Acute respiratory failure with hypoxia (H)         CONTINUE these medications which have CHANGED    Details   predniSONE (DELTASONE) 5 MG tablet Take 1 tablet (5 mg) by mouth daily  Qty:      Comments: Restart after completing dexamethasone  Associated Diagnoses: Disorder of bone and cartilage         CONTINUE these medications which have NOT CHANGED    Details   acetaminophen (TYLENOL) 325 MG tablet Take 650 mg by mouth every 6 hours as needed for mild pain      alendronate (FOSAMAX) 70 MG tablet Take 70 mg by mouth once a week On Wednesday      amLODIPine (NORVASC) 5 MG tablet Take 5 mg by mouth daily      ASPIRIN 81 MG OR TABS 1 tab po QD (Once per day)  Qty: 100, Refills: 3      atorvastatin (LIPITOR) 40 MG tablet Take 40 mg by mouth daily      busPIRone (BUSPAR) 5 MG tablet Take 5 mg by mouth 2 times daily as needed      CALCIUM 500 MG OR TABS 1 tab tid  Refills: 0      cetirizine (ZYRTEC) 10 MG tablet Take 10 mg by mouth daily      citalopram (CELEXA) 40 MG tablet Take 40 mg by mouth daily      cyanocobalamin (CYANOCOBALAMIN) 1000 MCG/ML injection Inject 1 mL into the muscle every 30 days      diclofenac (VOLTAREN) 1 % topical gel Apply 4 g topically 4 times daily as needed for moderate pain       donepezil (ARICEPT) 5 MG tablet Take 5 mg by mouth At Bedtime      EPINEPHrine (EPIPEN) 0.3 MG/0.3ML injection Inject 0.3 mLs (0.3 mg) into the muscle once as needed for anaphylaxis  Qty: 1 each, Refills: 0      folic acid (FOLVITE) 1 MG tablet Take 1 mg by mouth 6 times a week on M,T,Th, Fr, Sat,Sun      gabapentin (NEURONTIN) 300 MG capsule Take 600 mg by mouth At Bedtime      guaiFENesin (ROBITUSSIN) 100 MG/5ML liquid Take 200 mg by mouth every 4 hours as needed for cough      ibuprofen (ADVIL/MOTRIN) 600 MG tablet Take 600 mg by mouth At Bedtime      !! methotrexate 2.5 MG tablet Take 12.5 mg by mouth Every Wednesday AM      !! methotrexate 2.5 MG tablet Take 10 mg by mouth Every Wednesday PM      metoprolol succinate ER (TOPROL-XL) 50 MG 24 hr tablet Take 50 mg by mouth daily      omeprazole (PRILOSEC) 20 MG DR capsule Take 20 mg by mouth daily      Vitamin D3 (CHOLECALCIFEROL) 25 mcg (1000 units) tablet Take 50 mcg by mouth daily       !! - Potential duplicate medications found. Please discuss with provider.          Allergies   Allergies   Allergen Reactions     Acetaminophen      Vicodin/Darvocet/Swelling throat     Codeine      Percocet/Swelling tongue     Contrast Dye      Darvocet [Propoxyphene N-Apap]      Lisinopril      Tongue Swelling     Metformin Hydrochloride      Glucophage caused severe diarrhea     Morphine      MISAEL     Penicillins      Swelling throat     Percocet [Oxycodone-Acetaminophen]      Data   Most Recent 3 CBC's:  Recent Labs   Lab Test 04/21/21  0757 04/20/21  0642 04/19/21  1420   WBC 12.1* 8.5 15.4*   HGB 11.4* 11.4* 11.6*   MCV 98 99 101*    174 190      Most Recent 3 BMP's:  Recent Labs   Lab Test 04/21/21  0757 04/19/21  1420    141   POTASSIUM 4.3 4.7   CHLORIDE 110* 110*   CO2 26 28   BUN 26 21   CR 0.92 1.05*   ANIONGAP 4 3   ELAN 8.5 8.6   * 105*     Most Recent 2 LFT's:  Recent Labs   Lab Test 04/19/21  1420   AST 29   ALT 37   ALKPHOS 65   BILITOTAL 1.0      Most Recent INR's and Anticoagulation Dosing History:  Anticoagulation Dose History     There is no flowsheet data to display.        Most Recent 3 Troponin's:  Recent Labs   Lab Test 04/20/21  0642 04/20/21  0135 04/19/21  2212   TROPI <0.015 <0.015 <0.015     Most Recent Cholesterol Panel:No lab results found.  Most Recent 6 Bacteria Isolates From Any Culture (See EPIC Reports for Culture Details):No lab results found.  Most Recent TSH, T4 and A1c Labs:  Recent Labs   Lab Test 04/19/21 2032   A1C 5.7*

## 2021-04-21 NOTE — PLAN OF CARE
AO x4. VSS. TELE SR/SB Prolonged QT. LS clear. Denies pain. Denies SOB. Up with assist of 1 and gait belt with walker. Mod carb diet-  at 0200. Blanchable dark red area on buttocks, barrier cream applied. PRN melatonin and GI cocktail given- see MAR. Iso precautions maintained. Will continue to follow POC

## 2021-04-21 NOTE — PROVIDER NOTIFICATION
2.0 second pause. VSS, No complaints of CP, dizziness or nausea. Place advise. Thanks.    Addendum: no new orders placed

## 2021-04-21 NOTE — PROGRESS NOTES
Care Management Discharge Note    Discharge Date: 04/21/21       Discharge Disposition: Home Care, Home    Discharge Services: None    Discharge DME: Walker - pt has    Discharge Transportation: family or friend will provide    Private pay costs discussed: Not applicable    PAS Confirmation Code:  N/A  Patient/family educated on Medicare website which has current facility and service quality ratings: no (Pt is resuming current services.)    Education Provided on the Discharge Plan: yes   Persons Notified of Discharge Plans: [facility]- HC  Patient/Family in Agreement with the Plan: yes    Handoff Referral Completed: No    Additional Information:  [facility]/LAURENA.  Sw emailed [facility]- [facility] to update them on the pt's discharge today.  The pt's AVS was updated to include HC's contact information.    Sw will continue to be available as needed until discharge.    YASMINE Shay, [facility]  Inpatient Care Coordination  [facility]  [phone]

## 2021-04-21 NOTE — DISCHARGE INSTRUCTIONS
Your home care referral was sent to [facility].  If you haven't heard from them within the next 24-48 hours,  please call them at [phone].

## 2021-04-21 NOTE — PLAN OF CARE
[id number]    A&Ox4. VSS ex htn. HS , no insulin given. Mod carb diet. PIV saline locked. SBA with gb and walker. Blanchable redness to coccyx, frequent repo encouraged. Denies pain. Sating well on RA. Tabatha pad on for occasional dribbling/inc. Special precautions maintained for +covid. Will continue POC    Rick Espino RN on 4/20/2021 at 10:30 PM

## 2021-04-21 NOTE — PLAN OF CARE
RN discussed discharge instruction to patient, verbalizes understanding, left facility with daughter at 1820.

## 2021-04-21 NOTE — PLAN OF CARE
On RA 98%. Denies pain,CP. AxO x4. Stand by assist with gait belt and walker. Discharge instructions given. Special precautions maintained. Talked to daughter regarding discharging pt to be taken care of by other sister, talked to Dr Macdonald regarding if this was okay/decreasing metoprolol. Dr Macdonald said he would talk to pt. Continue to monitor and with plan of care.

## 2021-05-05 ENCOUNTER — APPOINTMENT (OUTPATIENT)
Dept: GENERAL RADIOLOGY | Facility: CLINIC | Age: 81
End: 2021-05-05
Attending: EMERGENCY MEDICINE
Payer: MEDICARE

## 2021-05-05 ENCOUNTER — HOSPITAL ENCOUNTER (EMERGENCY)
Facility: CLINIC | Age: 81
Discharge: ANOTHER HOSPITAL | End: 2021-05-06
Attending: EMERGENCY MEDICINE | Admitting: EMERGENCY MEDICINE
Payer: MEDICARE

## 2021-05-05 ENCOUNTER — APPOINTMENT (OUTPATIENT)
Dept: CT IMAGING | Facility: CLINIC | Age: 81
End: 2021-05-05
Attending: EMERGENCY MEDICINE
Payer: MEDICARE

## 2021-05-05 DIAGNOSIS — R11.2 INTRACTABLE VOMITING WITH NAUSEA, UNSPECIFIED VOMITING TYPE: ICD-10-CM

## 2021-05-05 DIAGNOSIS — K56.609 SMALL BOWEL OBSTRUCTION (H): Primary | ICD-10-CM

## 2021-05-05 DIAGNOSIS — Z98.84 HX OF GASTRIC BYPASS: ICD-10-CM

## 2021-05-05 LAB
ALBUMIN SERPL-MCNC: 2.9 G/DL (ref 3.4–5)
ALP SERPL-CCNC: 54 U/L (ref 40–150)
ALT SERPL W P-5'-P-CCNC: 37 U/L (ref 0–50)
ANION GAP SERPL CALCULATED.3IONS-SCNC: 4 MMOL/L (ref 3–14)
AST SERPL W P-5'-P-CCNC: 23 U/L (ref 0–45)
BASOPHILS # BLD AUTO: 0 10E9/L (ref 0–0.2)
BASOPHILS NFR BLD AUTO: 0.2 %
BILIRUB DIRECT SERPL-MCNC: 0.3 MG/DL (ref 0–0.2)
BILIRUB SERPL-MCNC: 0.7 MG/DL (ref 0.2–1.3)
BUN SERPL-MCNC: 26 MG/DL (ref 7–30)
CALCIUM SERPL-MCNC: 8.6 MG/DL (ref 8.5–10.1)
CHLORIDE SERPL-SCNC: 113 MMOL/L (ref 94–109)
CO2 SERPL-SCNC: 24 MMOL/L (ref 20–32)
CREAT SERPL-MCNC: 0.98 MG/DL (ref 0.52–1.04)
DIFFERENTIAL METHOD BLD: ABNORMAL
EOSINOPHIL # BLD AUTO: 0.2 10E9/L (ref 0–0.7)
EOSINOPHIL NFR BLD AUTO: 1.4 %
ERYTHROCYTE [DISTWIDTH] IN BLOOD BY AUTOMATED COUNT: 16.3 % (ref 10–15)
GFR SERPL CREATININE-BSD FRML MDRD: 55 ML/MIN/{1.73_M2}
GLUCOSE SERPL-MCNC: 116 MG/DL (ref 70–99)
HCT VFR BLD AUTO: 40.9 % (ref 35–47)
HGB BLD-MCNC: 12.5 G/DL (ref 11.7–15.7)
IMM GRANULOCYTES # BLD: 0.1 10E9/L (ref 0–0.4)
IMM GRANULOCYTES NFR BLD: 0.7 %
LABORATORY COMMENT REPORT: ABNORMAL
LIPASE SERPL-CCNC: 279 U/L (ref 73–393)
LYMPHOCYTES # BLD AUTO: 1 10E9/L (ref 0.8–5.3)
LYMPHOCYTES NFR BLD AUTO: 7.4 %
MCH RBC QN AUTO: 30.7 PG (ref 26.5–33)
MCHC RBC AUTO-ENTMCNC: 30.6 G/DL (ref 31.5–36.5)
MCV RBC AUTO: 101 FL (ref 78–100)
MONOCYTES # BLD AUTO: 1.1 10E9/L (ref 0–1.3)
MONOCYTES NFR BLD AUTO: 8.5 %
NEUTROPHILS # BLD AUTO: 10.8 10E9/L (ref 1.6–8.3)
NEUTROPHILS NFR BLD AUTO: 81.8 %
NRBC # BLD AUTO: 0 10*3/UL
NRBC BLD AUTO-RTO: 0 /100
PLATELET # BLD AUTO: 105 10E9/L (ref 150–450)
POTASSIUM SERPL-SCNC: 5.1 MMOL/L (ref 3.4–5.3)
PROT SERPL-MCNC: 5.6 G/DL (ref 6.8–8.8)
RBC # BLD AUTO: 4.07 10E12/L (ref 3.8–5.2)
SARS-COV-2 RNA RESP QL NAA+PROBE: POSITIVE
SODIUM SERPL-SCNC: 141 MMOL/L (ref 133–144)
SPECIMEN SOURCE: ABNORMAL
TROPONIN I SERPL-MCNC: <0.015 UG/L (ref 0–0.04)
WBC # BLD AUTO: 13.2 10E9/L (ref 4–11)

## 2021-05-05 PROCEDURE — 84484 ASSAY OF TROPONIN QUANT: CPT | Performed by: EMERGENCY MEDICINE

## 2021-05-05 PROCEDURE — 87635 SARS-COV-2 COVID-19 AMP PRB: CPT | Performed by: EMERGENCY MEDICINE

## 2021-05-05 PROCEDURE — 250N000009 HC RX 250: Performed by: EMERGENCY MEDICINE

## 2021-05-05 PROCEDURE — 74018 RADEX ABDOMEN 1 VIEW: CPT

## 2021-05-05 PROCEDURE — 96375 TX/PRO/DX INJ NEW DRUG ADDON: CPT

## 2021-05-05 PROCEDURE — 96361 HYDRATE IV INFUSION ADD-ON: CPT

## 2021-05-05 PROCEDURE — C9803 HOPD COVID-19 SPEC COLLECT: HCPCS

## 2021-05-05 PROCEDURE — 250N000011 HC RX IP 250 OP 636: Performed by: EMERGENCY MEDICINE

## 2021-05-05 PROCEDURE — 83690 ASSAY OF LIPASE: CPT | Performed by: EMERGENCY MEDICINE

## 2021-05-05 PROCEDURE — 36415 COLL VENOUS BLD VENIPUNCTURE: CPT | Performed by: EMERGENCY MEDICINE

## 2021-05-05 PROCEDURE — 74176 CT ABD & PELVIS W/O CONTRAST: CPT

## 2021-05-05 PROCEDURE — 258N000003 HC RX IP 258 OP 636: Performed by: EMERGENCY MEDICINE

## 2021-05-05 PROCEDURE — 99285 EMERGENCY DEPT VISIT HI MDM: CPT | Mod: 25

## 2021-05-05 PROCEDURE — 96376 TX/PRO/DX INJ SAME DRUG ADON: CPT

## 2021-05-05 PROCEDURE — 80048 BASIC METABOLIC PNL TOTAL CA: CPT | Performed by: EMERGENCY MEDICINE

## 2021-05-05 PROCEDURE — 80076 HEPATIC FUNCTION PANEL: CPT | Performed by: EMERGENCY MEDICINE

## 2021-05-05 PROCEDURE — 96374 THER/PROPH/DIAG INJ IV PUSH: CPT

## 2021-05-05 PROCEDURE — 93005 ELECTROCARDIOGRAM TRACING: CPT

## 2021-05-05 PROCEDURE — 85025 COMPLETE CBC W/AUTO DIFF WBC: CPT | Performed by: EMERGENCY MEDICINE

## 2021-05-05 RX ORDER — ONDANSETRON 2 MG/ML
4 INJECTION INTRAMUSCULAR; INTRAVENOUS ONCE
Status: COMPLETED | OUTPATIENT
Start: 2021-05-05 | End: 2021-05-05

## 2021-05-05 RX ORDER — FENTANYL CITRATE 50 UG/ML
50 INJECTION, SOLUTION INTRAMUSCULAR; INTRAVENOUS ONCE
Status: COMPLETED | OUTPATIENT
Start: 2021-05-05 | End: 2021-05-05

## 2021-05-05 RX ORDER — DIPHENHYDRAMINE HYDROCHLORIDE 50 MG/ML
25 INJECTION INTRAMUSCULAR; INTRAVENOUS ONCE
Status: COMPLETED | OUTPATIENT
Start: 2021-05-05 | End: 2021-05-05

## 2021-05-05 RX ADMIN — FENTANYL CITRATE 50 MCG: 50 INJECTION, SOLUTION INTRAMUSCULAR; INTRAVENOUS at 17:25

## 2021-05-05 RX ADMIN — FAMOTIDINE 20 MG: 10 INJECTION, SOLUTION INTRAVENOUS at 17:23

## 2021-05-05 RX ADMIN — ONDANSETRON 4 MG: 2 INJECTION INTRAMUSCULAR; INTRAVENOUS at 17:22

## 2021-05-05 RX ADMIN — ONDANSETRON 4 MG: 2 INJECTION INTRAMUSCULAR; INTRAVENOUS at 16:30

## 2021-05-05 RX ADMIN — DIPHENHYDRAMINE HYDROCHLORIDE 25 MG: 50 INJECTION, SOLUTION INTRAMUSCULAR; INTRAVENOUS at 17:39

## 2021-05-05 RX ADMIN — SODIUM CHLORIDE 1000 ML: 9 INJECTION, SOLUTION INTRAVENOUS at 17:04

## 2021-05-05 RX ADMIN — FENTANYL CITRATE 50 MCG: 50 INJECTION, SOLUTION INTRAMUSCULAR; INTRAVENOUS at 22:01

## 2021-05-05 ASSESSMENT — ENCOUNTER SYMPTOMS
NAUSEA: 1
VOMITING: 1
ABDOMINAL PAIN: 1
BLOOD IN STOOL: 0
DIARRHEA: 1

## 2021-05-05 NOTE — ED TRIAGE NOTES
Yesterday had pain across stomach, pain lasted all night. Today having burning mid upper abdominal pain with vomiting. Nothing makes it worse or better, unable to get comfortable. Denies blood in vomit. Diarrhea that just started on arrival.

## 2021-05-05 NOTE — ED PROVIDER NOTES
History     Chief Complaint:  Vomiting       The history is provided by the patient and a relative.     Alma Rosa Fishman is a 80 year old female currently taking Eliquis with a history of CKD, chronic pain, hypertension, rheumatoid arthritis, diabetes mellitus - type 2, and asthma among others who presents for evaluation of abdominal pain. She first began to have abdominal pain yesterday morning. This pain radiates from right to left across her central abdomen and is largely generalized. She feels that this pain originates from her substernal region. This pain feels like a burning sensation and is currently a 8-9/10 in severity. She also had an episode of diarrhea this afternoon and continues to have nausea. She denies new back pain, dysuria, urinary frequency, hematochezia, or other symptoms. Of note, she does have a buttock wound and noticed some blood on toilet paper after wiping a few weeks ago. She feels well recovered from her COVID illness in early April.       Review of Systems   Gastrointestinal: Positive for abdominal pain, diarrhea, nausea and vomiting. Negative for blood in stool.   All other systems reviewed and are negative.    Allergies:  Acetaminophen   Codeine  Contrast dye  Darvocet   Lisinopril   Metformin hydrochloride  Morphine   Penicillins   Percocet   Albuterol   Atenolol  Bacitracin-neomycin-polymyxin  Doxycyline   Hydrocodone acetaminophen   Propoxyphene     Medications:   Albuterol inhaler  Alendronate  Amlodipine  Aspirin 81 mg  Eliquis  Buspirone  Atorvastatin   Cetirizine   Citalopram   EpiPen   Gabapentin  Donepezil  Dexamethasone   Methotrexate  Metoprolol succinate  Omeprazole  Prednisone     Medical History:   Calculus of kidney   Depression   GERD  Osteopenia  Anemia  Hypertension   Cataracts  RLS   Chronic PTSD  Bilateral sensorineural hearing loss   Chronic pain syndrome   Urge incontinence  Chronic bilateral low back pain   Spinal stenosis   CKD - stage III  Hypersomnia    GERD  Memory loss  Raynaud's syndrome   Rheumatoid arthritis   Syncope and collapse  Diabetes mellitus - type 2   Asthma  Respiratory failure   Hyperlipidemia   Skin cancer     Surgical History:  Appendectomy   Cholecystectomy  Cataract removal   Mohs procedure   Bladder suspension   Gastric bypass  ORIF - left ankle  TKA - bilateral   SUSIE with BSO  Colonoscopy    Carpal tunnel repair - bilateral     Family History:   Father -  MI x3, cancer, diabetes mellitus - type 2   Mother -  AAA, hypertension   Brother - lung cancer   Sister -  Depression     Social History:  The patient was accompanied to the ED by her daughter.  PCP: Gabbie Whitman        Physical Exam     Patient Vitals for the past 24 hrs:   BP Temp Temp src Pulse Resp SpO2   05/05/21 2230 134/61 -- -- 66 16 98 %   05/05/21 2215 132/55 -- -- 65 13 98 %   05/05/21 2200 -- -- -- 65 14 --   05/05/21 2015 (!) 172/77 -- -- 61 -- 98 %   05/05/21 2000 -- -- -- -- -- 91 %   05/05/21 1945 (!) 149/65 -- -- 63 -- 93 %   05/05/21 1930 (!) 162/65 -- -- 60 -- 94 %   05/05/21 1915 (!) 165/65 -- -- 59 -- 97 %   05/05/21 1900 (!) 159/68 -- -- 63 -- (!) 82 %   05/05/21 1845 (!) 154/69 -- -- 61 -- --   05/05/21 1830 (!) 154/67 -- -- 61 -- --   05/05/21 1815 -- -- -- 60 -- --   05/05/21 1800 -- -- -- -- -- 92 %   05/05/21 1745 -- -- -- -- -- 96 %   05/05/21 1730 (!) 196/83 -- -- 58 -- 91 %   05/05/21 1715 129/75 -- -- 60 -- 97 %   05/05/21 1700 123/71 -- -- 54 -- 97 %   05/05/21 1615 130/75 97.9  F (36.6  C) Temporal 53 18 100 %   05/05/21 1613 -- 98.6  F (37  C) Temporal -- -- --      Physical Exam  General: Alert, appears elderly, otherwise well-developed and well-nourished. Cooperative.     In moderate distress, actively vomiting in bed.   HEENT:  Head:  Atraumatic  Ears:  External ears are normal  Mouth/Throat:  Oropharynx is without erythema or exudate and mucous membranes are moist.   Eyes:   Conjunctivae normal and EOM are normal. No scleral icterus.  CV:  Normal  rate, regular rhythm, normal heart sounds and radial pulses are 2+ and symmetric.  No murmur.  Resp:  Breath sounds are clear bilaterally    Non-labored, no retractions or accessory muscle use  GI:  Abdomen is soft, mild distension with diffuse abdominal tenderness, worst in epigastrum. No rebound or guarding.  No CVA tenderness bilaterally  Rectal: No sore to buttocks.  No hemorrhoids noticed.  Exam performed in presence of female ED technician.  MS:  Normal range of motion. No edema.    Normal strength in all 4 extremities.     Back atraumatic.    No midline cervical, thoracic, or lumbar tenderness  Skin:  Warm and dry.  No rash or lesions noted.  Neuro: Alert. Normal strength.  GCS: 15  Psych:  Normal mood and affect.    Emergency Department Course     ECG:  ECG taken at 1707, ECG read at 1710  Sinus bradycardia   Otherwise normal ECG   No significant changes as compared to prior, dated 04/19/2012.  Rate 54 bpm. WV interval 134 ms. QRS duration 82 ms. QT/QTc 476/451 ms. P-R-T axes 75 30 50.     Imaging:    CT Abdomen Pelvis w/o Contrast:   IMPRESSION:   1.  Evidence for high-grade small bowel obstruction in a patient with gastric bypass procedure. There is marked dilatation of the remnant portion of the stomach and the Rasheed limb extending from the gastrojejunostomy toward the level of the   jejunojejunostomy. This could be due to an underlying adhesion or internal hernia. There is decompressed bowel distally near the jejunojejunostomy with some mild bowel wall thickening and significant mesenteric edema. Surgical consultation recommended.   No perforation or free air.   2.  Distal small bowel loops decompressed. Colon demonstrates colonic diverticulosis without evidence for dilatation or inflammatory change.   3.  Remainder of findings as above.   Reading per radiology.     Abdomen XR 1 view:   IMPRESSION: NG tube extending below the level of the EG junction present in the body the stomach. Dilated loop of small  bowel in the left upper quadrant compatible with known obstruction. Surgical clips left upper quadrant. Degenerative changes in the   spine.   Reading per radiology.     Laboratory:    CBC: WBC 13.2 (H), HGB 12.5,  (L)   BMP: Chloride 113 (H), glucose 116 (H), GFR 55 (L), o/w WNL (Creatinine 0.98)   Troponin (Collected 1855): <0.015    Hepatic Panel: Bilirubin Direct 0.3 (H), Albumin 2.9 (L), Protein Total 5.6 (L), o/w WNL  Lipase: 279    Asymptomatic COVID-19 (Coronavirus) PCR by Nasopharyngeal Swab: Positive (A)    Emergency Department Course:    Reviewed:  I reviewed the patient's nursing notes, vitals, past medical records, Care Everywhere.     Assessments:  1712 I obtained history and performed an exam of the patient, as documented above.   1735 I rechecked the patient as she is having tingling to the front of her tongue after receiving interventions here in the ED. On exam she has no intraoral swelling.   1845 I rechecked the patient and we discussed imaging and laboratory results including plan for consult with surgery and plan for admission.   2008 I again updated the patient and her daughter on the plan for transfer given no bed availability within the [place] system. They prefer transfer to [facility] at this time if there is availability.   2029 I updated the patient and her daughter on continuing attempt to transfer.   2040 I rechecked the patient as NG tube is being placed by the nurse. Patient updated on plan for continuing search for hospital bed availability at a facility with bariatric surgery.     Consults:   1853 I spoke with Dr. Orellana of the general surgery service regarding patient's presentation, findings, and plan of care. They recommend not placing an NG tube at this time given the location of the bowel obstruction and her previous gastric bypass. They recommend consulting either [facility] or [facility].   1953 I spoke with Dr. Campos of the Bariatric surgery service from  [facility] regarding patient's presentation, findings, and plan of care. They do think the patient is safe for NG tube placement. However, there are no beds available and 20+ patients on the wait list. Will plan to consult elsewhere for admission.    2025 No beds at [facility].   2105 I spoke with Dr. Jorge of the hospitalist service at [facility] regarding patient's presentation, findings, and plan of care. They are in agreement to accept the patient for admission to a bed for further monitoring, care, and treatment.    2127 I spoke with Dr. Beyer of the General surgery service at [facility]. Bariatric surgery is only involved in these case when bariatric surgery occurs within last 12 months.   2330 We heard from [facility] that due to the patient's positive covid test (although recently recovered from COVID) she no longer has an available bed at [facility].    2345 I spoke with ANS at [facility].  We agreed to admit at [facility] for further pain control, NG tube, and tx for SBO, with plans for inter-hospital transfer tomorrow as needs necessitate ([facility] is aware of the patient).  No beds were available at [facility], [facility], [facility], [facility], [place], [facility], & [facility].   0015 [facility] was recontacted us alerting us that a bed would be available for the patient around 4 AM this morning.  I discontinued discussion with the hospitalist service here at [facility] and I did discuss the case again with Dr. Orellana of general surgery at [facility] who understand that the patient will be transferring to [facility].  0030 Patient care signed out to my partner Dr. Torres awaiting transfer to [facility].    Interventions:  1630 Zofran 4 mg IV   1704 Normal Saline 1000 mL IV   1722 Zofran 4 mg IV   1723 Famotidine 20 mg IV  1723 Fentanyl 50 mcg IV  1739 Benadryl 25 mg IV  2201 Fentanyl 50 mcg IV    Disposition:  The patient will be transferred and admitted under the care of Dr. Jorge at [facility] via EMS.  Signed out to my partner Dr. Torres awaiting transfer.    Impression & Plan     CMS Diagnoses:      Medical Decision Making:  Patient is a 80-year-old female with a history of a Rasheed-en-Y gastric bypass in the 1990s who presents with abdominal distention, pain, and persistent vomiting symptoms.  She has had symptoms ongoing for 48 hours of this time.  CT imaging concerning for high-grade bowel obstruction with marked dilatation of the remnant portion of the stomach and the Rasheed limb extending from the gastrojejunostomy toward the level of the jejunojejunostomy.  There may be a underlying adhesion or an internal hernia and so I did speak with general surgery here at [facility].  Unfortunately we do not have bariatric surgery at [facility] and Dr. Orellana felt patient would be better served at a hospital with bariatric surgery availability.  I spoke briefly with Dr. Campos of general surgery at [facility] who agreed to accept the patient although the [facility] currently has no available inpatient beds (>20 bed waiting list).  We spoke briefly with [facility] and they also have no current available beds.  Patient has requested to transfer to [facility], no beds available.  We spoke with Dr. Jorge of the hospitalist service at [facility] who agreed to transfer and admission at this time after speaking with Dr. Nichols with general surgery at [facility] who agreed to transfer and admission to the hospitalist service.  Unfortunately the patient's Covid swab returned positive, even though she has been asymptomatic from a Covid perspective having recovered late last month.  Due to the positive testing we initially had lost the bed at [facility] although after further multiple phone conversations between nursing supervisors nurse staff and bed placement we did secure a bed again at [facility] and patient will be transferred at approximately 4 AM  this morning for admission.  Patient care signed out to my partner Dr. Torres pending EMS transfer and admission.     Covid-19  Alma Rosa Fishman was evaluated during a global COVID-19 pandemic, which necessitated consideration that the patient might be at risk for infection with the SARS-CoV-2 virus that causes COVID-19.   Applicable protocols for evaluation were followed during the patient's care.   COVID-19 was considered as part of the patient's evaluation. The plan for testing is:  a test was obtained during this visit.     Diagnosis:     ICD-10-CM    1. Small bowel obstruction (H)  K56.609    2. Hx of gastric bypass  Z98.84 Hepatic panel     Lipase     Troponin I     Asymptomatic SARS-CoV-2 COVID-19 Virus (Coronavirus) by PCR   3. Intractable vomiting with nausea, unspecified vomiting type  R11.2         Scribe Disclosure:  Maren BARBER, am serving as a scribe at 4:55 PM on 5/5/2021 to document services personally performed by Ilan Deng MD based on my observations and the provider's statements to me.      Ilan Deng MD  05/06/21 0024

## 2021-05-06 VITALS
RESPIRATION RATE: 14 BRPM | DIASTOLIC BLOOD PRESSURE: 71 MMHG | SYSTOLIC BLOOD PRESSURE: 161 MMHG | OXYGEN SATURATION: 94 % | TEMPERATURE: 97.9 F | HEART RATE: 72 BPM

## 2021-05-06 LAB — INTERPRETATION ECG - MUSE: NORMAL

## 2021-05-06 NOTE — ED NOTES
Patient put on call light to let staff know that she was done with the bedpan.  She had voided approx 200 ml.   Patient able to assist with rolling side to side to have mireya care done.  Staff repositioned patient is bed.  She denies other needs at this time and is awaiting transfer.

## 2021-05-12 ENCOUNTER — NURSING HOME VISIT (OUTPATIENT)
Dept: GERIATRICS | Facility: CLINIC | Age: 81
End: 2021-05-12
Payer: MEDICARE

## 2021-05-12 VITALS
SYSTOLIC BLOOD PRESSURE: 148 MMHG | OXYGEN SATURATION: 94 % | BODY MASS INDEX: 38.27 KG/M2 | TEMPERATURE: 98 F | DIASTOLIC BLOOD PRESSURE: 72 MMHG | RESPIRATION RATE: 16 BRPM | HEART RATE: 76 BPM | HEIGHT: 63 IN | WEIGHT: 216 LBS

## 2021-05-12 DIAGNOSIS — E83.51 HYPOCALCEMIA: ICD-10-CM

## 2021-05-12 DIAGNOSIS — F32.A ANXIETY AND DEPRESSION: ICD-10-CM

## 2021-05-12 DIAGNOSIS — I10 ESSENTIAL HYPERTENSION: ICD-10-CM

## 2021-05-12 DIAGNOSIS — K21.9 GASTROESOPHAGEAL REFLUX DISEASE WITHOUT ESOPHAGITIS: ICD-10-CM

## 2021-05-12 DIAGNOSIS — J45.909 UNCOMPLICATED ASTHMA, UNSPECIFIED ASTHMA SEVERITY, UNSPECIFIED WHETHER PERSISTENT: ICD-10-CM

## 2021-05-12 DIAGNOSIS — E87.6 HYPOKALEMIA: ICD-10-CM

## 2021-05-12 DIAGNOSIS — E78.5 HYPERLIPIDEMIA LDL GOAL <100: ICD-10-CM

## 2021-05-12 DIAGNOSIS — K56.609 SBO (SMALL BOWEL OBSTRUCTION) (H): Primary | ICD-10-CM

## 2021-05-12 DIAGNOSIS — E87.1 HYPONATREMIA: ICD-10-CM

## 2021-05-12 DIAGNOSIS — M06.9 RHEUMATOID ARTHRITIS, INVOLVING UNSPECIFIED SITE, UNSPECIFIED WHETHER RHEUMATOID FACTOR PRESENT (H): ICD-10-CM

## 2021-05-12 DIAGNOSIS — E11.9 TYPE 2 DIABETES MELLITUS WITHOUT COMPLICATION, WITHOUT LONG-TERM CURRENT USE OF INSULIN (H): ICD-10-CM

## 2021-05-12 DIAGNOSIS — F41.9 ANXIETY AND DEPRESSION: ICD-10-CM

## 2021-05-12 PROCEDURE — 99305 1ST NF CARE MODERATE MDM 35: CPT | Performed by: NURSE PRACTITIONER

## 2021-05-12 PROCEDURE — 99207 PR CDG-CODE CATEGORY CHANGED: CPT | Performed by: NURSE PRACTITIONER

## 2021-05-12 RX ORDER — SENNOSIDES 8.6 MG
1 TABLET ORAL 2 TIMES DAILY PRN
COMMUNITY

## 2021-05-12 RX ORDER — POLYETHYLENE GLYCOL 3350 17 G/17G
1 POWDER, FOR SOLUTION ORAL 2 TIMES DAILY PRN
COMMUNITY

## 2021-05-12 RX ORDER — CEFUROXIME AXETIL 500 MG/1
500 TABLET ORAL 2 TIMES DAILY
COMMUNITY
End: 2021-05-19

## 2021-05-12 RX ORDER — NYSTATIN 100000 [USP'U]/G
POWDER TOPICAL 2 TIMES DAILY
COMMUNITY

## 2021-05-12 ASSESSMENT — MIFFLIN-ST. JEOR: SCORE: 1428.9

## 2021-05-12 NOTE — LETTER
5/12/2021        RE: Alma Rosa Fishman  [address]  [facility] [address]        [facility]  PRIMARY CARE PROVIDER AND CLINIC:  Suzanne Engstrom, [facility] [address] [address] / [place] [address]  Chief Complaint   Patient presents with     Hospital F/U     [place] Medical Record Number:  [id number]  Place of Service where encounter took place:  [facility]  ([facility]) [[id number]]    Alma Rosa Fishman  is a 80 year old  (1940), admitted to the above facility from  [facility] . Hospital stay 5/6/21 through 5/11/21..  Admitted to this facility for  rehab, medical management and nursing care.    HPI:    HPI information obtained from: facility chart records, facility staff, patient report and [facility] chart review.   Brief Summary of Hospital Course:     80  y.o female admitted to U for acute rehab and medical management following hospitalization as above for abdominal pain and w/u revealed high grade small bowel obstruction- general surgery consulted and recommended surgery.   Dense adhesions noted on laparoscopy precluding further exploration via the laparoscopic approach, so procedure was converted to open midline laparotomy. Extensive lysis of adhesions done and no ongoing area of obstruction noted after lysis of adhesions. No bowel ischemia or threatened bowel noted. 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 Intra operative decision made to remove spleen given multiple surface fractures and bleeding. Hilum suture ligated, spleen removed. Biopsy taken of spleen. Pathology showed normal splenic parenchyma with ruptured capsule  NG remained in place initially. Diet advanced without issue. Patient reported issues with swallowing and found to have supraglottic swelling on CT- ENT recommended a dose of steroids and monitoring. Patient  found to have UTI and treated with IV ceftriaxone and then oral cefuroxime. PMH reviewed.     Updates on Status Since Skilled nursing Admission:     Today patient is found in room. Alert, calm, and NAD. Denies pain a this time. Reports appetite good and tolerating diet. Denies n/v. Reports some insomnia as is a side sleeper and that has been difficulty postop. Discussed hospital course, clinical status, rehab routines and goals and discharge planning. Discussed surgical follow up and answered patient's questions.     CODE STATUS/ADVANCE DIRECTIVES DISCUSSION:   CPR/Full code   Patient's living condition: lives with spouse  ALLERGIES: Acetaminophen, Codeine, Contrast dye, Darvocet [propoxyphene n-apap], Lisinopril, Metformin hydrochloride, Morphine, Penicillins, and Percocet [oxycodone-acetaminophen]  PAST MEDICAL HISTORY:  has a past medical history of Calculus of kidney (6/02), Depressive disorder, not elsewhere classified, Disorder of bone and cartilage, unspecified (1/04), Esophageal reflux (2/18/04), Essential hypertension, benign, Iron deficiency anemia, unspecified (2000), Nonsenile cataract, Other and unspecified hyperlipidemia, Other extrapyramidal disease and abnormal movement disorder, Other sleep disturbances, Shortness of breath (12/02), Syncope and collapse (6/02), Type II or unspecified type diabetes mellitus without mention of complication, not stated as uncontrolled, Uncomplicated asthma, and Unspecified gastritis and gastroduodenitis without mention of hemorrhage.  PAST SURGICAL HISTORY:   has a past surgical history that includes NONSPECIFIC PROCEDURE; NONSPECIFIC PROCEDURE (2000); NONSPECIFIC PROCEDURE (2000); NONSPECIFIC PROCEDURE; NONSPECIFIC PROCEDURE (1990's); NONSPECIFIC PROCEDURE (1/03); Eye surgery; appendectomy; Cholecystectomy; GYN surgery; ENT surgery; orthopedic surgery; and GI surgery.  FAMILY HISTORY: family history includes Cancer in her father, maternal grandmother, and paternal  grandmother; Cardiovascular in her father; Circulatory in her mother; Diabetes in her father; Hypertension in her mother.  SOCIAL HISTORY:   reports that she has quit smoking. She does not have any smokeless tobacco history on file. She reports current alcohol use. She reports that she does not use drugs.    Post Discharge Medication Reconciliation Status: discharge medications reconciled and changed, per note/orders    Current Outpatient Medications   Medication Sig Dispense Refill     acetaminophen (TYLENOL) 325 MG tablet Take 650 mg by mouth every 6 hours as needed for mild pain       albuterol (PROAIR HFA/PROVENTIL HFA/VENTOLIN HFA) 108 (90 Base) MCG/ACT inhaler Inhale 2 puffs into the lungs every 4 hours as needed for shortness of breath / dyspnea or wheezing 6.7 g 0     alendronate (FOSAMAX) 70 MG tablet Take 70 mg by mouth once a week On Mondays       amLODIPine (NORVASC) 5 MG tablet Take 5 mg by mouth daily       aspirin (ASA) 325 MG EC tablet Take 325 mg by mouth daily With meal       atorvastatin (LIPITOR) 40 MG tablet Take 40 mg by mouth daily       BACILLUS COAGULANS-INULIN PO Take 1 capsule by mouth daily       busPIRone (BUSPAR) 5 MG tablet Take 5 mg by mouth 2 times daily as needed       Calcium Carb-Cholecalciferol 500-200 MG-UNIT PACK Take 1 tablet by mouth 3 times daily       cefuroxime (CEFTIN) 500 MG tablet Take 500 mg by mouth 2 times daily       cetirizine (ZYRTEC) 10 MG tablet Take 10 mg by mouth daily       citalopram (CELEXA) 40 MG tablet Take 40 mg by mouth daily       cyanocobalamin (CYANOCOBALAMIN) 1000 MCG/ML injection Inject 1 mL into the muscle every 30 days       diclofenac (VOLTAREN) 1 % topical gel Apply 4 g topically 4 times daily as needed for moderate pain       donepezil (ARICEPT) 5 MG tablet Take 5 mg by mouth At Bedtime       EPINEPHrine (EPIPEN) 0.3 MG/0.3ML injection Inject 0.3 mLs (0.3 mg) into the muscle once as needed for anaphylaxis 1 each 0     folic acid (FOLVITE) 1 MG  "tablet Take 1 mg by mouth 6 times a week on M,T,Th, Fr, Sat,Sun       gabapentin (NEURONTIN) 300 MG capsule Take 600 mg by mouth At Bedtime       guaiFENesin (ROBITUSSIN) 100 MG/5ML liquid Take 10 mLs by mouth every 4 hours as needed for cough        methotrexate 2.5 MG tablet Take 12.5 mg by mouth Every Wednesday AM       methotrexate 2.5 MG tablet Take 10 mg by mouth Every Wednesday PM       metoprolol succinate ER (TOPROL-XL) 50 MG 24 hr tablet Take 0.5 tablets (25 mg) by mouth daily       nystatin (MYCOSTATIN) 634215 UNIT/GM external powder Apply to affected area topically as needed for redness BID       omeprazole (PRILOSEC) 20 MG DR capsule Take 20 mg by mouth daily       polyethylene glycol (MIRALAX) 17 g packet Take 1 packet by mouth 2 times daily as needed for constipation       predniSONE (DELTASONE) 5 MG tablet Take 1 tablet (5 mg) by mouth daily       sennosides (SENOKOT) 8.6 MG tablet Take 1 tablet by mouth 2 times daily       Vitamin D3 (CHOLECALCIFEROL) 25 mcg (1000 units) tablet Take 100 mcg by mouth daily        apixaban ANTICOAGULANT (ELIQUIS) 2.5 MG tablet Take 1 tablet (2.5 mg) by mouth 2 times daily 60 tablet 0     ASPIRIN 81 MG OR TABS 1 tab po QD (Once per day) 100 3     CALCIUM 500 MG OR TABS 1 tab tid  0     dexamethasone (DECADRON) 6 MG tablet Take 1 tablet (6 mg) by mouth daily for 8 days 8 tablet 0     ibuprofen (ADVIL/MOTRIN) 600 MG tablet Take 600 mg by mouth At Bedtime             ROS:  10 point ROS of systems including Constitutional, Eyes, Respiratory, Cardiovascular, Gastroenterology, Genitourinary, Integumentary, Musculoskeletal, Psychiatric were all negative except for pertinent positives noted in my HPI.    Vitals:  BP (!) 148/72   Pulse 76   Temp 98  F (36.7  C)   Resp 16   Ht 1.6 m (5' 3\")   Wt 94.6 kg (208 lb 9.6 oz)   SpO2 94%   BMI 36.95 kg/m    Exam:  GENERAL APPEARANCE: Alert, in no distress   ENT: Mouth and posterior oropharynx normal, moist mucous membranes "   EYES: EOM, conjunctivae, lids, pupils and irises normal   NECK: No adenopathy,masses or thyromegaly   RESP: respiratory effort and palpation of chest normal, Lungs clear to auscultation  CV: VS as above peripheral edema - mild general puffiness  ABDOMEN: normal bowel sounds, soft, nontender, no hepatosplenomegaly or other masses   : palpation of bladder WNL   M/S: Gait and station normal   Digits and nails normal - ROD  SKIN: Inspection of skin and subcutaneous tissue baseline, Palpation of skin and subcutaneous tissue baseline, surgicall incision well approximated with staples, no redness or warmth noted. No drainage  NEURO: Cranial nerves 2-12 are normal tested and grossly at patient's baseline, Examination of sensation by touch normal   PSYCH: oriented X 3, affect and mood normal             Lab/Diagnostic data:  Recent labs in [facility] reviewed by me today.     ASSESSMENT/PLAN:  SBO (small bowel obstruction) (H)  - as above, s/p lysis of adhesions and splenectomy  - keep surgical incision clean and dry  - observe GI status, bowel pattern, intakes, weights  - continue prn acetaminophen for discomfort  - continue bowel regimen with Senna and miralax  - f/w surgeon for recheck 10 days as directed      Hypocalcemia  Hypokalemia  Hyponatremia  - 2/2 above, resolved  - recheck to ensure stability    Uncomplicated asthma, unspecified asthma severity, unspecified whether persistent  - no active s/s   - pulmonary toilet/IS/mobilize  - continue prn albuterol     Supraglottic edema/swelling  - ENT consulted, tx with dexamethasone  - follow clinically    Essential hypertension  - chronic- limited data  - continue on Norvasc and metoprolol  - VS per protocol    Gastroesophageal reflux disease without esophagitis  - no active s/s   - continue on PPI  - follow clinically    Type 2 diabetes mellitus without complication, without long-term current use of insulin (H)  Lab Results   Component Value Date    A1C 5.7 04/19/2021     A1C 6.5 08/06/2004    A1C 6.7 05/22/2004    A1C 6.3 01/09/2004    A1C 6.2 08/18/2003     - diet controlled      Hyperlipidemia LDL goal <100  - by history  - continue on PTA statin    RA (rheumatoid arthritis) (H)  - chronic  - methotrexate and prednisone resumed at discharge  - follow clinically    Anxiety and depression  - chronic  - situational stressors in play  - continue on Buspar and Celexa  - monitor mood and behaviors  - ACP prn    Insomnia  - add melatonin  - follow clinically     UTI  -voiding without issue  - complete Cefuroxime course  - VS per unit protocol      Orders written on unit        Electronically signed by:  PRISCILLA Kasper CNP                             Sincerely,        PRISCILLA Kasper CNP

## 2021-05-12 NOTE — PROGRESS NOTES
[facility]  PRIMARY CARE PROVIDER AND CLINIC:  Suzanne Engstrom, [facility] [address] [place]  / [place] [address]  Chief Complaint   Patient presents with     Hospital F/U     [place] Medical Record Number:  [id number]  Place of Service where encounter took place:  [facility]  ([facility]) [[id number]]    Alma Rosa Fishman  is a 80 year old  (1940), admitted to the above facility from  [facility] . Hospital stay 5/6/21 through 5/11/21..  Admitted to this facility for  rehab, medical management and nursing care.    HPI:    HPI information obtained from: facility chart records, facility staff, patient report and [facility] chart review.   Brief Summary of Hospital Course:     80  y.o female admitted to U for acute rehab and medical management following hospitalization as above for abdominal pain and w/u revealed high grade small bowel obstruction- general surgery consulted and recommended surgery.   Dense adhesions noted on laparoscopy precluding further exploration via the laparoscopic approach, so procedure was converted to open midline laparotomy. Extensive lysis of adhesions done and no ongoing area of obstruction noted after lysis of adhesions. No bowel ischemia or threatened bowel noted. 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 Intra operative decision made to remove spleen given multiple surface fractures and bleeding. Hilum suture ligated, spleen removed. Biopsy taken of spleen. Pathology showed normal splenic parenchyma with ruptured capsule  NG remained in place initially. Diet advanced without issue. Patient reported issues with swallowing and found to have supraglottic swelling on CT- ENT recommended a dose of steroids and monitoring. Patient found to have UTI and treated with IV ceftriaxone and then oral cefuroxime. PMH reviewed.      Updates on Status Since Skilled nursing Admission:     Today patient is found in room. Alert, calm, and NAD. Denies pain a this time. Reports appetite good and tolerating diet. Denies n/v. Reports some insomnia as is a side sleeper and that has been difficulty postop. Discussed hospital course, clinical status, rehab routines and goals and discharge planning. Discussed surgical follow up and answered patient's questions.     CODE STATUS/ADVANCE DIRECTIVES DISCUSSION:   CPR/Full code   Patient's living condition: lives with spouse  ALLERGIES: Acetaminophen, Codeine, Contrast dye, Darvocet [propoxyphene n-apap], Lisinopril, Metformin hydrochloride, Morphine, Penicillins, and Percocet [oxycodone-acetaminophen]  PAST MEDICAL HISTORY:  has a past medical history of Calculus of kidney (6/02), Depressive disorder, not elsewhere classified, Disorder of bone and cartilage, unspecified (1/04), Esophageal reflux (2/18/04), Essential hypertension, benign, Iron deficiency anemia, unspecified (2000), Nonsenile cataract, Other and unspecified hyperlipidemia, Other extrapyramidal disease and abnormal movement disorder, Other sleep disturbances, Shortness of breath (12/02), Syncope and collapse (6/02), Type II or unspecified type diabetes mellitus without mention of complication, not stated as uncontrolled, Uncomplicated asthma, and Unspecified gastritis and gastroduodenitis without mention of hemorrhage.  PAST SURGICAL HISTORY:   has a past surgical history that includes NONSPECIFIC PROCEDURE; NONSPECIFIC PROCEDURE (2000); NONSPECIFIC PROCEDURE (2000); NONSPECIFIC PROCEDURE; NONSPECIFIC PROCEDURE (1990's); NONSPECIFIC PROCEDURE (1/03); Eye surgery; appendectomy; Cholecystectomy; GYN surgery; ENT surgery; orthopedic surgery; and GI surgery.  FAMILY HISTORY: family history includes Cancer in her father, maternal grandmother, and paternal grandmother; Cardiovascular in her father; Circulatory in her mother; Diabetes in her father;  Hypertension in her mother.  SOCIAL HISTORY:   reports that she has quit smoking. She does not have any smokeless tobacco history on file. She reports current alcohol use. She reports that she does not use drugs.    Post Discharge Medication Reconciliation Status: discharge medications reconciled and changed, per note/orders    Current Outpatient Medications   Medication Sig Dispense Refill     acetaminophen (TYLENOL) 325 MG tablet Take 650 mg by mouth every 6 hours as needed for mild pain       albuterol (PROAIR HFA/PROVENTIL HFA/VENTOLIN HFA) 108 (90 Base) MCG/ACT inhaler Inhale 2 puffs into the lungs every 4 hours as needed for shortness of breath / dyspnea or wheezing 6.7 g 0     alendronate (FOSAMAX) 70 MG tablet Take 70 mg by mouth once a week On Mondays       amLODIPine (NORVASC) 5 MG tablet Take 5 mg by mouth daily       aspirin (ASA) 325 MG EC tablet Take 325 mg by mouth daily With meal       atorvastatin (LIPITOR) 40 MG tablet Take 40 mg by mouth daily       BACILLUS COAGULANS-INULIN PO Take 1 capsule by mouth daily       busPIRone (BUSPAR) 5 MG tablet Take 5 mg by mouth 2 times daily as needed       Calcium Carb-Cholecalciferol 500-200 MG-UNIT PACK Take 1 tablet by mouth 3 times daily       cefuroxime (CEFTIN) 500 MG tablet Take 500 mg by mouth 2 times daily       cetirizine (ZYRTEC) 10 MG tablet Take 10 mg by mouth daily       citalopram (CELEXA) 40 MG tablet Take 40 mg by mouth daily       cyanocobalamin (CYANOCOBALAMIN) 1000 MCG/ML injection Inject 1 mL into the muscle every 30 days       diclofenac (VOLTAREN) 1 % topical gel Apply 4 g topically 4 times daily as needed for moderate pain       donepezil (ARICEPT) 5 MG tablet Take 5 mg by mouth At Bedtime       EPINEPHrine (EPIPEN) 0.3 MG/0.3ML injection Inject 0.3 mLs (0.3 mg) into the muscle once as needed for anaphylaxis 1 each 0     folic acid (FOLVITE) 1 MG tablet Take 1 mg by mouth 6 times a week on M,T,Th, Fr, Sat,Sun       gabapentin (NEURONTIN)  "300 MG capsule Take 600 mg by mouth At Bedtime       guaiFENesin (ROBITUSSIN) 100 MG/5ML liquid Take 10 mLs by mouth every 4 hours as needed for cough        methotrexate 2.5 MG tablet Take 12.5 mg by mouth Every Wednesday AM       methotrexate 2.5 MG tablet Take 10 mg by mouth Every Wednesday PM       metoprolol succinate ER (TOPROL-XL) 50 MG 24 hr tablet Take 0.5 tablets (25 mg) by mouth daily       nystatin (MYCOSTATIN) 540225 UNIT/GM external powder Apply to affected area topically as needed for redness BID       omeprazole (PRILOSEC) 20 MG DR capsule Take 20 mg by mouth daily       polyethylene glycol (MIRALAX) 17 g packet Take 1 packet by mouth 2 times daily as needed for constipation       predniSONE (DELTASONE) 5 MG tablet Take 1 tablet (5 mg) by mouth daily       sennosides (SENOKOT) 8.6 MG tablet Take 1 tablet by mouth 2 times daily       Vitamin D3 (CHOLECALCIFEROL) 25 mcg (1000 units) tablet Take 100 mcg by mouth daily        apixaban ANTICOAGULANT (ELIQUIS) 2.5 MG tablet Take 1 tablet (2.5 mg) by mouth 2 times daily 60 tablet 0     ASPIRIN 81 MG OR TABS 1 tab po QD (Once per day) 100 3     CALCIUM 500 MG OR TABS 1 tab tid  0     dexamethasone (DECADRON) 6 MG tablet Take 1 tablet (6 mg) by mouth daily for 8 days 8 tablet 0     ibuprofen (ADVIL/MOTRIN) 600 MG tablet Take 600 mg by mouth At Bedtime             ROS:  10 point ROS of systems including Constitutional, Eyes, Respiratory, Cardiovascular, Gastroenterology, Genitourinary, Integumentary, Musculoskeletal, Psychiatric were all negative except for pertinent positives noted in my HPI.    Vitals:  BP (!) 148/72   Pulse 76   Temp 98  F (36.7  C)   Resp 16   Ht 1.6 m (5' 3\")   Wt 94.6 kg (208 lb 9.6 oz)   SpO2 94%   BMI 36.95 kg/m    Exam:  GENERAL APPEARANCE: Alert, in no distress   ENT: Mouth and posterior oropharynx normal, moist mucous membranes   EYES: EOM, conjunctivae, lids, pupils and irises normal   NECK: No adenopathy,masses or " thyromegaly   RESP: respiratory effort and palpation of chest normal, Lungs clear to auscultation  CV: VS as above peripheral edema - mild general puffiness  ABDOMEN: normal bowel sounds, soft, nontender, no hepatosplenomegaly or other masses   : palpation of bladder WNL   M/S: Gait and station normal   Digits and nails normal - ROD  SKIN: Inspection of skin and subcutaneous tissue baseline, Palpation of skin and subcutaneous tissue baseline, surgicall incision well approximated with staples, no redness or warmth noted. No drainage  NEURO: Cranial nerves 2-12 are normal tested and grossly at patient's baseline, Examination of sensation by touch normal   PSYCH: oriented X 3, affect and mood normal             Lab/Diagnostic data:  Recent labs in [facility] reviewed by me today.     ASSESSMENT/PLAN:  SBO (small bowel obstruction) (H)  - as above, s/p lysis of adhesions and splenectomy  - keep surgical incision clean and dry  - observe GI status, bowel pattern, intakes, weights  - continue prn acetaminophen for discomfort  - continue bowel regimen with Senna and miralax  - f/w surgeon for recheck 10 days as directed      Hypocalcemia  Hypokalemia  Hyponatremia  - 2/2 above, resolved  - recheck to ensure stability    Uncomplicated asthma, unspecified asthma severity, unspecified whether persistent  - no active s/s   - pulmonary toilet/IS/mobilize  - continue prn albuterol     Supraglottic edema/swelling  - ENT consulted, tx with dexamethasone  - follow clinically    Essential hypertension  - chronic- limited data  - continue on Norvasc and metoprolol  - VS per protocol    Gastroesophageal reflux disease without esophagitis  - no active s/s   - continue on PPI  - follow clinically    Type 2 diabetes mellitus without complication, without long-term current use of insulin (H)  Lab Results   Component Value Date    A1C 5.7 04/19/2021    A1C 6.5 08/06/2004    A1C 6.7 05/22/2004    A1C 6.3 01/09/2004    A1C 6.2 08/18/2003     -  diet controlled      Hyperlipidemia LDL goal <100  - by history  - continue on PTA statin    RA (rheumatoid arthritis) (H)  - chronic  - methotrexate and prednisone resumed at discharge  - follow clinically    Anxiety and depression  - chronic  - situational stressors in play  - continue on Buspar and Celexa  - monitor mood and behaviors  - ACP prn    Insomnia  - add melatonin  - follow clinically     UTI  -voiding without issue  - complete Cefuroxime course  - VS per unit protocol      Orders written on unit        Electronically signed by:  PRISCILLA Kasper CNP

## 2021-05-13 ENCOUNTER — HOSPITAL LABORATORY (OUTPATIENT)
Dept: OTHER | Facility: CLINIC | Age: 81
End: 2021-05-13

## 2021-05-14 LAB
ALBUMIN SERPL-MCNC: 2.6 G/DL (ref 3.4–5)
ALP SERPL-CCNC: 70 U/L (ref 40–150)
ALT SERPL W P-5'-P-CCNC: 40 U/L (ref 0–50)
ANION GAP SERPL CALCULATED.3IONS-SCNC: 7 MMOL/L (ref 3–14)
AST SERPL W P-5'-P-CCNC: 28 U/L (ref 0–45)
BILIRUB SERPL-MCNC: 0.9 MG/DL (ref 0.2–1.3)
BUN SERPL-MCNC: 16 MG/DL (ref 7–30)
CALCIUM SERPL-MCNC: 8.2 MG/DL (ref 8.5–10.1)
CHLORIDE SERPL-SCNC: 108 MMOL/L (ref 94–109)
CO2 SERPL-SCNC: 26 MMOL/L (ref 20–32)
CREAT SERPL-MCNC: 0.98 MG/DL (ref 0.52–1.04)
ERYTHROCYTE [DISTWIDTH] IN BLOOD BY AUTOMATED COUNT: 17.4 % (ref 10–15)
GFR SERPL CREATININE-BSD FRML MDRD: 55 ML/MIN/{1.73_M2}
GLUCOSE SERPL-MCNC: 88 MG/DL (ref 70–99)
HCT VFR BLD AUTO: 31.6 % (ref 35–47)
HGB BLD-MCNC: 9.3 G/DL (ref 11.7–15.7)
MCH RBC QN AUTO: 30.3 PG (ref 26.5–33)
MCHC RBC AUTO-ENTMCNC: 29.4 G/DL (ref 31.5–36.5)
MCV RBC AUTO: 103 FL (ref 78–100)
PLATELET # BLD AUTO: 302 10E9/L (ref 150–450)
POTASSIUM SERPL-SCNC: 4 MMOL/L (ref 3.4–5.3)
PROT SERPL-MCNC: 5.8 G/DL (ref 6.8–8.8)
RBC # BLD AUTO: 3.07 10E12/L (ref 3.8–5.2)
SODIUM SERPL-SCNC: 141 MMOL/L (ref 133–144)
WBC # BLD AUTO: 12.7 10E9/L (ref 4–11)

## 2021-05-18 ENCOUNTER — NURSING HOME VISIT (OUTPATIENT)
Dept: GERIATRICS | Facility: CLINIC | Age: 81
End: 2021-05-18
Payer: MEDICARE

## 2021-05-18 VITALS
DIASTOLIC BLOOD PRESSURE: 78 MMHG | HEIGHT: 63 IN | HEART RATE: 64 BPM | TEMPERATURE: 97.7 F | WEIGHT: 216.6 LBS | SYSTOLIC BLOOD PRESSURE: 133 MMHG | BODY MASS INDEX: 38.38 KG/M2 | OXYGEN SATURATION: 96 % | RESPIRATION RATE: 16 BRPM

## 2021-05-18 DIAGNOSIS — R41.89 COGNITIVE IMPAIRMENT: ICD-10-CM

## 2021-05-18 DIAGNOSIS — R53.81 PHYSICAL DECONDITIONING: ICD-10-CM

## 2021-05-18 DIAGNOSIS — I10 ESSENTIAL HYPERTENSION: ICD-10-CM

## 2021-05-18 DIAGNOSIS — K56.609 SBO (SMALL BOWEL OBSTRUCTION) (H): Primary | ICD-10-CM

## 2021-05-18 DIAGNOSIS — J45.909 UNCOMPLICATED ASTHMA, UNSPECIFIED ASTHMA SEVERITY, UNSPECIFIED WHETHER PERSISTENT: ICD-10-CM

## 2021-05-18 DIAGNOSIS — M79.89 CALF SWELLING: ICD-10-CM

## 2021-05-18 DIAGNOSIS — F41.9 ANXIETY AND DEPRESSION: ICD-10-CM

## 2021-05-18 DIAGNOSIS — R60.0 BILATERAL LEG EDEMA: ICD-10-CM

## 2021-05-18 DIAGNOSIS — D62 ANEMIA DUE TO BLOOD LOSS, ACUTE: ICD-10-CM

## 2021-05-18 DIAGNOSIS — F32.A ANXIETY AND DEPRESSION: ICD-10-CM

## 2021-05-18 DIAGNOSIS — M06.9 RHEUMATOID ARTHRITIS, INVOLVING UNSPECIFIED SITE, UNSPECIFIED WHETHER RHEUMATOID FACTOR PRESENT (H): ICD-10-CM

## 2021-05-18 PROCEDURE — 99309 SBSQ NF CARE MODERATE MDM 30: CPT | Mod: AI | Performed by: INTERNAL MEDICINE

## 2021-05-18 ASSESSMENT — MIFFLIN-ST. JEOR: SCORE: 1431.62
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
## 2021-05-18 NOTE — PATIENT INSTRUCTIONS
New order for Alma Rosa Fishman    May 18, 2021   Bilateral LE Doppler ultrasound to rule out DVT     *STAT    Voltaren gel 1% apply 2 gms to hand joints tid for pain      Vashti Ashraf MD   May 18, 2021
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
## 2021-05-19 DIAGNOSIS — I82.451 ACUTE DEEP VEIN THROMBOSIS (DVT) OF RIGHT PERONEAL VEIN (H): Primary | ICD-10-CM

## 2021-05-19 NOTE — PROGRESS NOTES
Patient: Alma Rosa Fishman     1940    ORDERS: (RLE DVT)    Discontinue Aspirin now    Start Apixiban 10 mg po BID-( first dose tonight) x 7 days then decrease dose to 5 mg po BID     Hold Methotrexate and Fosamax indefinitely    PRISCILLA Kasper CNP on 2021 at 4:38 PM

## 2021-05-20 ENCOUNTER — NURSING HOME VISIT (OUTPATIENT)
Dept: GERIATRICS | Facility: CLINIC | Age: 81
End: 2021-05-20
Payer: MEDICARE

## 2021-05-20 VITALS
HEART RATE: 64 BPM | BODY MASS INDEX: 38.41 KG/M2 | HEIGHT: 63 IN | OXYGEN SATURATION: 95 % | RESPIRATION RATE: 18 BRPM | TEMPERATURE: 97.7 F | SYSTOLIC BLOOD PRESSURE: 123 MMHG | WEIGHT: 216.8 LBS | DIASTOLIC BLOOD PRESSURE: 73 MMHG

## 2021-05-20 DIAGNOSIS — R53.81 PHYSICAL DECONDITIONING: ICD-10-CM

## 2021-05-20 DIAGNOSIS — I10 ESSENTIAL HYPERTENSION: ICD-10-CM

## 2021-05-20 DIAGNOSIS — K56.609 SBO (SMALL BOWEL OBSTRUCTION) (H): ICD-10-CM

## 2021-05-20 DIAGNOSIS — I82.451 ACUTE DEEP VEIN THROMBOSIS (DVT) OF RIGHT PERONEAL VEIN (H): Primary | ICD-10-CM

## 2021-05-20 DIAGNOSIS — M06.9 RHEUMATOID ARTHRITIS, INVOLVING UNSPECIFIED SITE, UNSPECIFIED WHETHER RHEUMATOID FACTOR PRESENT (H): ICD-10-CM

## 2021-05-20 DIAGNOSIS — D62 ANEMIA DUE TO BLOOD LOSS, ACUTE: ICD-10-CM

## 2021-05-20 PROCEDURE — 99309 SBSQ NF CARE MODERATE MDM 30: CPT | Performed by: NURSE PRACTITIONER

## 2021-05-20 ASSESSMENT — MIFFLIN-ST. JEOR: SCORE: 1432.53

## 2021-05-20 NOTE — PROGRESS NOTES
[facility]  [place] Medical Record Number:  [id number]  Place of Service where encounter took place:  [facility]  ([facility]) [[id number]]  Chief Complaint   Patient presents with     Nursing Home Acute       HPI:    Alma Rosa Fishman  is a 80 year old (1940), who is being seen today for an episodic care visit.  HPI information obtained from: facility chart records, facility staff, patient report and [facility] chart review.   She came to this facility 5/11/2021 for short term rehab and medical management following hospitalization for small bowel obstruction, s/p exp lap with lysis of adhesions. Her spleen was friable and fractured and was removed Pathology showed normal splenic parenchyma with ruptured capsule.       Today's concern is:  Bilateral LE Doppler US was obtained yesterday and was positive for DVT of the RLE. Apixaban was started last night.Patient was not told results of the US. Reports her LE edema is slightly less.Right calf is tender, no pain of left calf. Denies cough,shortness of breath or chest pain. Good appetite. Reports regular BMs. Minimal incisional pain. Feeling stronger and hoping to discharge home soon.         Past Medical and Surgical History reviewed in Epic today.    MEDICATIONS:    Current Outpatient Medications   Medication Sig Dispense Refill     acetaminophen (TYLENOL) 325 MG tablet Take 650 mg by mouth every 6 hours as needed for mild pain       albuterol (PROAIR HFA/PROVENTIL HFA/VENTOLIN HFA) 108 (90 Base) MCG/ACT inhaler Inhale 2 puffs into the lungs every 4 hours as needed for shortness of breath / dyspnea or wheezing 6.7 g 0     alendronate (FOSAMAX) 70 MG tablet Take 70 mg by mouth once a week On Mondays (ON HOLD)       amLODIPine (NORVASC) 5 MG tablet Take 5 mg by mouth daily       apixaban ANTICOAGULANT (ELIQUIS) 5 MG tablet Take 10 mg PO BID x 7 days ( first dose tonight)    Then decrease dose to 5 mg po BID. 30 tablet 0     atorvastatin  (LIPITOR) 40 MG tablet Take 40 mg by mouth daily       BACILLUS COAGULANS-INULIN PO Take 1 capsule by mouth daily       busPIRone (BUSPAR) 5 MG tablet Take 5 mg by mouth 2 times daily as needed       Calcium Carb-Cholecalciferol 500-200 MG-UNIT PACK Take 1 tablet by mouth 3 times daily       cetirizine (ZYRTEC) 10 MG tablet Take 10 mg by mouth daily       citalopram (CELEXA) 40 MG tablet Take 40 mg by mouth daily       cyanocobalamin (CYANOCOBALAMIN) 1000 MCG/ML injection Inject 1 mL into the muscle every 30 days       diclofenac (VOLTAREN) 1 % topical gel Apply 4 g topically 4 times daily as needed for moderate pain       donepezil (ARICEPT) 5 MG tablet Take 5 mg by mouth At Bedtime       EPINEPHrine (EPIPEN) 0.3 MG/0.3ML injection Inject 0.3 mLs (0.3 mg) into the muscle once as needed for anaphylaxis 1 each 0     folic acid (FOLVITE) 1 MG tablet Take 1 mg by mouth 6 times a week on M,T,Th, Fr, Sat,Sun       gabapentin (NEURONTIN) 300 MG capsule Take 600 mg by mouth At Bedtime       guaiFENesin (ROBITUSSIN) 100 MG/5ML liquid Take 10 mLs by mouth every 4 hours as needed for cough        methotrexate 2.5 MG tablet Take 12.5 mg by mouth Every Wednesday AM  (ON HOLD)       methotrexate 2.5 MG tablet Take 10 mg by mouth Every Wednesday PM (ON HOLD)       metoprolol succinate ER (TOPROL-XL) 50 MG 24 hr tablet Take 0.5 tablets (25 mg) by mouth daily       nystatin (MYCOSTATIN) 025519 UNIT/GM external powder Apply to affected area topically as needed for redness BID       omeprazole (PRILOSEC) 20 MG DR capsule Take 20 mg by mouth daily       polyethylene glycol (MIRALAX) 17 g packet Take 1 packet by mouth 2 times daily as needed for constipation       predniSONE (DELTASONE) 5 MG tablet Take 1 tablet (5 mg) by mouth daily       sennosides (SENOKOT) 8.6 MG tablet Take 1 tablet by mouth 2 times daily       Vitamin D3 (CHOLECALCIFEROL) 25 mcg (1000 units) tablet Take 100 mcg by mouth daily            REVIEW OF SYSTEMS:  4  "point ROS including Respiratory, CV, GI and , other than that noted in the HPI,  is negative    Objective:  /73   Pulse 64   Temp 97.7  F (36.5  C)   Resp 18   Ht 1.6 m (5' 3\")   Wt 98.3 kg (216 lb 12.8 oz)   SpO2 95%   BMI 38.40 kg/m    Exam:  GENERAL APPEARANCE:  Alert, in no distress  ENT:  [place], oropharynx clear  EYES:  EOM normal, conjunctiva and lids normal  NECK:  No adenopathy,masses or thyromegaly  RESP:  lungs clear to auscultation , no respiratory distress  CV:  regular rate and rhythm, no murmur, +2 pedal pulses, peripheral edema 2+ in both LE. Calves soft, no warmth or erythema. Right calf mild tenderness to palpation   ABDOMEN:  soft, non-tender, no distension, no masses  M/S:   up in chair. ROD with good strength  SKIN:  abdominal incision with staples clean, dry, intact, no erythema. No rashes or open areas  PSYCH:  oriented X 3, normal insight, judgement and memory, affect and mood normal    Labs:   Recent labs in [employer] reviewed by me today.     ASSESSMENT / PLAN:  (I82.451) Acute deep vein thrombosis (DVT) of right peroneal vein (H)  (primary encounter diagnosis)  Comment: acute DVT occurring in the post op setting. Mild calf pain, edema slightly improved per patient. Apixaban was started last night.   Plan: continue apixaban 10 mg bid for 7 days total, then 5 mg bid. Elevate legs.     (K56.609) SBO (small bowel obstruction) (H)  Comment: s/p exp lap with lysis of adhesions. Incision healing without signs of infection. Minimal surgical pain, having regular BMs.   Plan: continue tylenol. Staples to be removed at Surgical follow up appointment.     (M06.9) Rheumatoid arthritis, involving unspecified site, unspecified whether rheumatoid factor present (H)  Comment: pain appears controlled. Methotrexate on hold due to acute DVT  Plan: continue prednisone, diclofenac gel, tylenol. Continue calcium/vitamin D.     (D62) Anemia due to blood loss, acute  Comment: Hgb improved at 9.3   Plan: Hgb " 5/21/2021 as ordered     (I10) Essential hypertension  Comment: controlled with BPs: 131/74, 128/72, 128/73  HR: 64-70   Plan: continue amlodipine, metoprolol     (R53.81) Physical deconditioning  Comment: progressing in therapies   Plan: continue PHYSICAL THERAPY/OT. Goal is to return home with her  and home care services.  is involved for discharge planning       Electronically signed by:  PRISCILLA Cornell CNP

## 2021-05-20 NOTE — LETTER
5/20/2021        RE: Alma Rosa Fishman  [address]  [facility] [address]        [facility]  [place] Medical Record Number:  [id number]  Place of Service where encounter took place:  [facility]  ([facility]) [[id number]]  Chief Complaint   Patient presents with     Nursing Home Acute       HPI:    Alma Rosa Fishman  is a 80 year old (1940), who is being seen today for an episodic care visit.  HPI information obtained from: facility chart records, facility staff, patient report and [facility] chart review.   She came to this facility 5/11/2021 for short term rehab and medical management following hospitalization for small bowel obstruction, s/p exp lap with lysis of adhesions. Her spleen was friable and fractured and was removed Pathology showed normal splenic parenchyma with ruptured capsule.       Today's concern is:  Bilateral LE Doppler US was obtained yesterday and was positive for DVT of the RLE. Apixaban was started last night.Patient was not told results of the US. Reports her LE edema is slightly less.Right calf is tender, no pain of left calf. Denies cough,shortness of breath or chest pain. Good appetite. Reports regular BMs. Minimal incisional pain. Feeling stronger and hoping to discharge home soon.         Past Medical and Surgical History reviewed in Epic today.    MEDICATIONS:    Current Outpatient Medications   Medication Sig Dispense Refill     acetaminophen (TYLENOL) 325 MG tablet Take 650 mg by mouth every 6 hours as needed for mild pain       albuterol (PROAIR HFA/PROVENTIL HFA/VENTOLIN HFA) 108 (90 Base) MCG/ACT inhaler Inhale 2 puffs into the lungs every 4 hours as needed for shortness of breath / dyspnea or wheezing 6.7 g 0     alendronate (FOSAMAX) 70 MG tablet Take 70 mg by mouth once a week On Mondays (ON HOLD)       amLODIPine (NORVASC) 5 MG tablet Take 5 mg by mouth daily       apixaban ANTICOAGULANT (ELIQUIS) 5 MG tablet Take 10 mg PO BID x 7  days ( first dose tonight)    Then decrease dose to 5 mg po BID. 30 tablet 0     atorvastatin (LIPITOR) 40 MG tablet Take 40 mg by mouth daily       BACILLUS COAGULANS-INULIN PO Take 1 capsule by mouth daily       busPIRone (BUSPAR) 5 MG tablet Take 5 mg by mouth 2 times daily as needed       Calcium Carb-Cholecalciferol 500-200 MG-UNIT PACK Take 1 tablet by mouth 3 times daily       cetirizine (ZYRTEC) 10 MG tablet Take 10 mg by mouth daily       citalopram (CELEXA) 40 MG tablet Take 40 mg by mouth daily       cyanocobalamin (CYANOCOBALAMIN) 1000 MCG/ML injection Inject 1 mL into the muscle every 30 days       diclofenac (VOLTAREN) 1 % topical gel Apply 4 g topically 4 times daily as needed for moderate pain       donepezil (ARICEPT) 5 MG tablet Take 5 mg by mouth At Bedtime       EPINEPHrine (EPIPEN) 0.3 MG/0.3ML injection Inject 0.3 mLs (0.3 mg) into the muscle once as needed for anaphylaxis 1 each 0     folic acid (FOLVITE) 1 MG tablet Take 1 mg by mouth 6 times a week on M,T,Th, Fr, Sat,Sun       gabapentin (NEURONTIN) 300 MG capsule Take 600 mg by mouth At Bedtime       guaiFENesin (ROBITUSSIN) 100 MG/5ML liquid Take 10 mLs by mouth every 4 hours as needed for cough        methotrexate 2.5 MG tablet Take 12.5 mg by mouth Every Wednesday AM  (ON HOLD)       methotrexate 2.5 MG tablet Take 10 mg by mouth Every Wednesday PM (ON HOLD)       metoprolol succinate ER (TOPROL-XL) 50 MG 24 hr tablet Take 0.5 tablets (25 mg) by mouth daily       nystatin (MYCOSTATIN) 561429 UNIT/GM external powder Apply to affected area topically as needed for redness BID       omeprazole (PRILOSEC) 20 MG DR capsule Take 20 mg by mouth daily       polyethylene glycol (MIRALAX) 17 g packet Take 1 packet by mouth 2 times daily as needed for constipation       predniSONE (DELTASONE) 5 MG tablet Take 1 tablet (5 mg) by mouth daily       sennosides (SENOKOT) 8.6 MG tablet Take 1 tablet by mouth 2 times daily       Vitamin D3  "(CHOLECALCIFEROL) 25 mcg (1000 units) tablet Take 100 mcg by mouth daily            REVIEW OF SYSTEMS:  4 point ROS including Respiratory, CV, GI and , other than that noted in the HPI,  is negative    Objective:  /73   Pulse 64   Temp 97.7  F (36.5  C)   Resp 18   Ht 1.6 m (5' 3\")   Wt 98.3 kg (216 lb 12.8 oz)   SpO2 95%   BMI 38.40 kg/m    Exam:  GENERAL APPEARANCE:  Alert, in no distress  ENT:  [place], oropharynx clear  EYES:  EOM normal, conjunctiva and lids normal  NECK:  No adenopathy,masses or thyromegaly  RESP:  lungs clear to auscultation , no respiratory distress  CV:  regular rate and rhythm, no murmur, +2 pedal pulses, peripheral edema 2+ in both LE. Calves soft, no warmth or erythema. Right calf mild tenderness to palpation   ABDOMEN:  soft, non-tender, no distension, no masses  M/S:   up in chair. ROD with good strength  SKIN:  abdominal incision with staples clean, dry, intact, no erythema. No rashes or open areas  PSYCH:  oriented X 3, normal insight, judgement and memory, affect and mood normal    Labs:   Recent labs in [employer] reviewed by me today.     ASSESSMENT / PLAN:  (I82.451) Acute deep vein thrombosis (DVT) of right peroneal vein (H)  (primary encounter diagnosis)  Comment: acute DVT occurring in the post op setting. Mild calf pain, edema slightly improved per patient. Apixaban was started last night.   Plan: continue apixaban 10 mg bid for 7 days total, then 5 mg bid. Elevate legs.     (K56.609) SBO (small bowel obstruction) (H)  Comment: s/p exp lap with lysis of adhesions. Incision healing without signs of infection. Minimal surgical pain, having regular BMs.   Plan: continue tylenol. Staples to be removed at Surgical follow up appointment.     (M06.9) Rheumatoid arthritis, involving unspecified site, unspecified whether rheumatoid factor present (H)  Comment: pain appears controlled. Methotrexate on hold due to acute DVT  Plan: continue prednisone, diclofenac gel, tylenol. " Continue calcium/vitamin D.     (D62) Anemia due to blood loss, acute  Comment: Hgb improved at 9.3   Plan: Hgb 5/21/2021 as ordered     (I10) Essential hypertension  Comment: controlled with BPs: 131/74, 128/72, 128/73  HR: 64-70   Plan: continue amlodipine, metoprolol     (R53.81) Physical deconditioning  Comment: progressing in therapies   Plan: continue PHYSICAL THERAPY/OT. Goal is to return home with her  and home care services.  is involved for discharge planning       Electronically signed by:  PRISCILLA Cornell CNP                 Sincerely,        PRISCILLA Cornell CNP

## 2021-05-21 ENCOUNTER — DISCHARGE SUMMARY NURSING HOME (OUTPATIENT)
Dept: GERIATRICS | Facility: CLINIC | Age: 81
End: 2021-05-21
Payer: MEDICARE

## 2021-05-21 ENCOUNTER — HOSPITAL LABORATORY (OUTPATIENT)
Dept: OTHER | Facility: CLINIC | Age: 81
End: 2021-05-21

## 2021-05-21 VITALS
HEIGHT: 63 IN | SYSTOLIC BLOOD PRESSURE: 115 MMHG | TEMPERATURE: 97.9 F | OXYGEN SATURATION: 96 % | WEIGHT: 216.8 LBS | RESPIRATION RATE: 16 BRPM | HEART RATE: 69 BPM | DIASTOLIC BLOOD PRESSURE: 75 MMHG | BODY MASS INDEX: 38.41 KG/M2

## 2021-05-21 DIAGNOSIS — K56.609 SBO (SMALL BOWEL OBSTRUCTION) (H): Primary | ICD-10-CM

## 2021-05-21 DIAGNOSIS — F32.A ANXIETY AND DEPRESSION: ICD-10-CM

## 2021-05-21 DIAGNOSIS — I10 ESSENTIAL HYPERTENSION: ICD-10-CM

## 2021-05-21 DIAGNOSIS — E83.51 HYPOCALCEMIA: ICD-10-CM

## 2021-05-21 DIAGNOSIS — J45.909 UNCOMPLICATED ASTHMA, UNSPECIFIED ASTHMA SEVERITY, UNSPECIFIED WHETHER PERSISTENT: ICD-10-CM

## 2021-05-21 DIAGNOSIS — F41.9 ANXIETY AND DEPRESSION: ICD-10-CM

## 2021-05-21 DIAGNOSIS — E87.6 HYPOKALEMIA: ICD-10-CM

## 2021-05-21 DIAGNOSIS — K21.9 GASTROESOPHAGEAL REFLUX DISEASE WITHOUT ESOPHAGITIS: ICD-10-CM

## 2021-05-21 DIAGNOSIS — E87.1 HYPONATREMIA: ICD-10-CM

## 2021-05-21 DIAGNOSIS — D62 ANEMIA DUE TO BLOOD LOSS, ACUTE: ICD-10-CM

## 2021-05-21 DIAGNOSIS — E11.9 TYPE 2 DIABETES MELLITUS WITHOUT COMPLICATION, WITHOUT LONG-TERM CURRENT USE OF INSULIN (H): ICD-10-CM

## 2021-05-21 DIAGNOSIS — I82.451 ACUTE DEEP VEIN THROMBOSIS (DVT) OF RIGHT PERONEAL VEIN (H): ICD-10-CM

## 2021-05-21 DIAGNOSIS — M06.9 RHEUMATOID ARTHRITIS, INVOLVING UNSPECIFIED SITE, UNSPECIFIED WHETHER RHEUMATOID FACTOR PRESENT (H): ICD-10-CM

## 2021-05-21 DIAGNOSIS — E78.5 HYPERLIPIDEMIA LDL GOAL <100: ICD-10-CM

## 2021-05-21 LAB
ERYTHROCYTE [DISTWIDTH] IN BLOOD BY AUTOMATED COUNT: 17.2 % (ref 10–15)
HCT VFR BLD AUTO: 27.6 % (ref 35–47)
HGB BLD-MCNC: 8.4 G/DL (ref 11.7–15.7)
MCH RBC QN AUTO: 30.2 PG (ref 26.5–33)
MCHC RBC AUTO-ENTMCNC: 30.4 G/DL (ref 31.5–36.5)
MCV RBC AUTO: 99 FL (ref 78–100)
PLATELET # BLD AUTO: 538 10E9/L (ref 150–450)
RBC # BLD AUTO: 2.78 10E12/L (ref 3.8–5.2)
WBC # BLD AUTO: 9.5 10E9/L (ref 4–11)

## 2021-05-21 PROCEDURE — 99316 NF DSCHRG MGMT 30 MIN+: CPT | Performed by: NURSE PRACTITIONER

## 2021-05-21 ASSESSMENT — MIFFLIN-ST. JEOR: SCORE: 1432.53

## 2021-05-21 NOTE — LETTER
5/21/2021        RE: Alma Rosa Fishman  [address]  [facility] [address]        [place] [facility] DISCHARGE SUMMARY  PATIENT'S NAME: Alma Rosa Fishman  YOB: 1940  MEDICAL RECORD NUMBER:  [id number]  Place of Service where encounter took place:  [facility]  ([facility]) [[id number]]    PRIMARY CARE PROVIDER AND CLINIC RESPONSIBLE AFTER TRANSFER:   Suzanne Engstrom, [facility] [address] [facility] [address] [place] [address]    Non-[facility] Provider     Transferring providers: Nora Biswas, PRISCILLA DSOUZA, Caryl Stewart MD  Recent Hospitalization/ED:  [facility]  stay 5/6/21 to 5/11/21.  Date of SNF Admission: May 11, 2021  Date of SNF (anticipated) Discharge: May 22, 2021  Discharged to: previous independent home  Cognitive Scores: SLUMS: 26/30  Physical Function: .Ambulating 150 feet with 4WW with contact guard assist. Independent with dressing and grooming.       CODE STATUS/ADVANCE DIRECTIVES DISCUSSION:  Full Code     ALLERGIES: Acetaminophen, Codeine, Contrast dye, Darvocet [propoxyphene n-apap], Lisinopril, Metformin hydrochloride, Morphine, Penicillins, and Percocet [oxycodone-acetaminophen]    DISCHARGE DIAGNOSIS/NURSING FACILITY COURSE:     80  y.o female admitted to [facility] for acute rehab and medical management following hospitalization as above for abdominal pain and w/u revealed high grade small bowel obstruction- general surgery consulted and recommended surgery.   Dense adhesions noted on laparoscopy precluding further exploration via the laparoscopic approach, so procedure was converted to open midline laparotomy. Extensive lysis of adhesions done and no ongoing area of obstruction noted after lysis of adhesions. No bowel ischemia or threatened bowel noted. 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 Intra operative decision made to  remove spleen given multiple surface fractures and bleeding. Hilum suture ligated, spleen removed. Biopsy taken of spleen. Pathology showed normal splenic parenchyma with ruptured capsule  NG remained in place initially. Diet advanced without issue. Patient reported issues with swallowing and found to have supraglottic swelling on CT- ENT recommended a dose of steroids and monitoring. Patient found to have UTI and treated with IV ceftriaxone and then oral cefuroxime. Patient has remained medically stable and has participated in rehab and made good functional gains. Discharge functional status is as above.     Issues addressed in TCU:    SBO (small bowel obstruction) (H)  - as above, s/p lysis of adhesions and splenectomy  - surgical incision is healing without issues- appt with surgeon today for recheck and to have staples removed.   - stable GI status - I.e bowel pattern, intakes, weights   - continue prn acetaminophen for discomfort  - continue bowel regimen with Senna and miralax  - f/w surgeon for as directed at today's appointment        Hypocalcemia  Hypokalemia  Hyponatremia  - 2/2 above, resolved    Acute blood loss anemia  Postop- HGB 12.5 --> 9.3 --> 8.4 on 5/21  - of note is new on apixaban for DVT  - Recommend recheck of CBC with PCP at time of f/u.        Right lower extremity DVT  -Thrombus in perioneal veins proximally  - discussed with MD and patient started on Apixaban. Full strength aspirin stopped       Uncomplicated asthma, unspecified asthma severity, unspecified whether persistent  - no active s/s   - pulmonary toilet/IS/mobilize  - continue prn albuterol      Supraglottic edema/swelling  - ENT consulted, tx with dexamethasone- no issues breathing/eating  - follow clinically     Essential hypertension  - chronic- controlled  - continue on Norvasc and metoprolol       Gastroesophageal reflux disease without esophagitis  - no active s/s   - continue on PPI       Type 2 diabetes meitus without  complication, without long-term current use of insulin (H)        Lab Results   Component Value Date     A1C 5.7 04/19/2021     A1C 6.5 08/06/2004     A1C 6.7 05/22/2004     A1C 6.3 01/09/2004     A1C 6.2 08/18/2003      - diet controlled        Hyperlipidemia LDL goal <100  - by history  - continue on PTA statin     RA (rheumatoid arthritis) (H)  - chronic  - methotrexate and prednisone resumed at discharge but methotrexate put on hold d/t new on apixaban.  - follow with PCP and Rheumatology for further recommendations    Osteoporosis  Fosamax placed on hold 2/2 starting apixaban  - would defer to PCP for further recommendations     Anxiety and depression  - chronic  - situational stressors in play- mood stable  - continue on Buspar and Celexa         Insomnia  - added melatonin  - follow clinically     UTI  -voiding without issue  - completed Cefuroxime course                  Past Medical History:  has a past medical history of Calculus of kidney (6/02), Depressive disorder, not elsewhere classified, Disorder of bone and cartilage, unspecified (1/04), Esophageal reflux (2/18/04), Essential hypertension, benign, Iron deficiency anemia, unspecified (2000), Nonsenile cataract, Other and unspecified hyperlipidemia, Other extrapyramidal disease and abnormal movement disorder, Other sleep disturbances, Shortness of breath (12/02), Syncope and collapse (6/02), Type II or unspecified type diabetes mellitus without mention of complication, not stated as uncontrolled, Uncomplicated asthma, and Unspecified gastritis and gastroduodenitis without mention of hemorrhage.    Discharge Medications:      Current Outpatient Medications   Medication Sig Dispense Refill     apixaban ANTICOAGULANT (ELIQUIS) 5 MG tablet Take 10 mg by mouth twice daily x 7 days (last dose will be 5/26 morning dose). Then starting on 5/27/21 start taking 5 mg by mouth twice daily. 60 tablet      melatonin 3 MG tablet Take 1 tablet (3 mg) by mouth At  Bedtime       acetaminophen (TYLENOL) 325 MG tablet Take 650 mg by mouth every 6 hours as needed for mild pain       albuterol (PROAIR HFA/PROVENTIL HFA/VENTOLIN HFA) 108 (90 Base) MCG/ACT inhaler Inhale 2 puffs into the lungs every 4 hours as needed for shortness of breath / dyspnea or wheezing 6.7 g 0     alendronate (FOSAMAX) 70 MG tablet Take 70 mg by mouth once a week On Mondays (ON HOLD)       amLODIPine (NORVASC) 5 MG tablet Take 5 mg by mouth daily       atorvastatin (LIPITOR) 40 MG tablet Take 40 mg by mouth daily       BACILLUS COAGULANS-INULIN PO Take 1 capsule by mouth daily       busPIRone (BUSPAR) 5 MG tablet Take 5 mg by mouth 2 times daily as needed       Calcium Carb-Cholecalciferol 500-200 MG-UNIT PACK Take 1 tablet by mouth 3 times daily       cetirizine (ZYRTEC) 10 MG tablet Take 10 mg by mouth daily       citalopram (CELEXA) 40 MG tablet Take 40 mg by mouth daily       cyanocobalamin (CYANOCOBALAMIN) 1000 MCG/ML injection Inject 1 mL into the muscle every 30 days       diclofenac (VOLTAREN) 1 % topical gel Apply 4 g topically 4 times daily as needed for moderate pain Plus 2 g to hands tid       donepezil (ARICEPT) 5 MG tablet Take 5 mg by mouth At Bedtime       EPINEPHrine (EPIPEN) 0.3 MG/0.3ML injection Inject 0.3 mLs (0.3 mg) into the muscle once as needed for anaphylaxis 1 each 0     folic acid (FOLVITE) 1 MG tablet Take 1 mg by mouth 6 times a week on M,T,Th, Fr, Sat,Sun       gabapentin (NEURONTIN) 300 MG capsule Take 600 mg by mouth At Bedtime       guaiFENesin (ROBITUSSIN) 100 MG/5ML liquid Take 10 mLs by mouth every 4 hours as needed for cough        methotrexate 2.5 MG tablet Take 12.5 mg by mouth Every Wednesday AM  (ON HOLD)       methotrexate 2.5 MG tablet Take 10 mg by mouth Every Wednesday PM (ON HOLD)       metoprolol succinate ER (TOPROL-XL) 50 MG 24 hr tablet Take 0.5 tablets (25 mg) by mouth daily       nystatin (MYCOSTATIN) 093071 UNIT/GM external powder Apply to affected area  "topically as needed for redness BID       omeprazole (PRILOSEC) 20 MG DR capsule Take 20 mg by mouth daily       polyethylene glycol (MIRALAX) 17 g packet Take 1 packet by mouth 2 times daily as needed for constipation       predniSONE (DELTASONE) 5 MG tablet Take 1 tablet (5 mg) by mouth daily       sennosides (SENOKOT) 8.6 MG tablet Take 1 tablet by mouth 2 times daily       Vitamin D3 (CHOLECALCIFEROL) 25 mcg (1000 units) tablet Take 100 mcg by mouth daily          Medication Changes/Rationale:   As above  Controlled medications sent with patient:   not applicable/none       Physical Exam:   Vitals: /75   Pulse 69   Temp 97.9  F (36.6  C)   Resp 16   Ht 1.6 m (5' 3\")   Wt 98.3 kg (216 lb 12.8 oz)   SpO2 96%   BMI 38.40 kg/m    BMI= Body mass index is 38.4 kg/m .     Resp: Effort WNL  CV: VS as above  Musc- ROD- ambulating with 4WW  Psych- alert, calm, pleasant    SNF labs:   Most Recent 3 CBC's:  Recent Labs   Lab Test 05/21/21  0800 05/13/21 2027 05/05/21  1632   WBC 9.5 12.7* 13.2*   HGB 8.4* 9.3* 12.5   MCV 99 103* 101*   * 302 105*     Most Recent 3 BMP's:  Recent Labs   Lab Test 05/13/21 2027 05/05/21  1632 04/21/21  0757    141 140   POTASSIUM 4.0 5.1 4.3   CHLORIDE 108 113* 110*   CO2 26 24 26   BUN 16 26 26   CR 0.98 0.98 0.92   ANIONGAP 7 4 4   ELAN 8.2* 8.6 8.5   GLC 88 116* 110*       DISCHARGE PLAN:    Follow up labs: No labs orders/due    Medical Follow Up:      Follow up with primary care provider in 1 weeks  Follow up with specialist as directed    MTM referral needed and placed by this provider: No    Current [place] scheduled appointments:       Discharge Services: Home Care:  Occupational Therapy, Physical Therapy, Registered Nurse, Home Health Aide and From:  [facility]    Discharge Instructions Verbalized to Patient at Discharge:     None      TOTAL DISCHARGE TIME:   Greater than 30 minutes  Electronically signed by:  PRISCILLA Kasper CNP "     Home care Face to Face documentation done in [facility] attached to Home care orders for [facility].                         Sincerely,        PRISCILLA Kasper CNP

## 2021-05-21 NOTE — PROGRESS NOTES
[facility] DISCHARGE SUMMARY  PATIENT'S NAME: Alma Rosa Fishman  YOB: 1940  MEDICAL RECORD NUMBER:  [id number]  Place of Service where encounter took place:  [facility]  ([facility]) [[id number]]    PRIMARY CARE PROVIDER AND CLINIC RESPONSIBLE AFTER TRANSFER:   Suzanne Engstrom, [facility] [address]  / [place] [address]    Non-[facility] Provider     Transferring providers: PRISCILLA Kasper CNP, Caryl Stewart MD  Recent Hospitalization/ED:  [facility]  stay 5/6/21 to 5/11/21.  Date of SNF Admission: May 11, 2021  Date of SNF (anticipated) Discharge: May 22, 2021  Discharged to: previous independent home  Cognitive Scores: SLUMS: 26/30  Physical Function: .Ambulating 150 feet with 4WW with contact guard assist. Independent with dressing and grooming.       CODE STATUS/ADVANCE DIRECTIVES DISCUSSION:  Full Code     ALLERGIES: Acetaminophen, Codeine, Contrast dye, Darvocet [propoxyphene n-apap], Lisinopril, Metformin hydrochloride, Morphine, Penicillins, and Percocet [oxycodone-acetaminophen]    DISCHARGE DIAGNOSIS/NURSING FACILITY COURSE:     80  y.o female admitted to [facility] for acute rehab and medical management following hospitalization as above for abdominal pain and w/u revealed high grade small bowel obstruction- general surgery consulted and recommended surgery.   Dense adhesions noted on laparoscopy precluding further exploration via the laparoscopic approach, so procedure was converted to open midline laparotomy. Extensive lysis of adhesions done and no ongoing area of obstruction noted after lysis of adhesions. No bowel ischemia or threatened bowel noted. 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 Intra operative decision made to remove spleen given multiple surface fractures and bleeding. Hilum suture ligated, spleen  removed. Biopsy taken of spleen. Pathology showed normal splenic parenchyma with ruptured capsule  NG remained in place initially. Diet advanced without issue. Patient reported issues with swallowing and found to have supraglottic swelling on CT- ENT recommended a dose of steroids and monitoring. Patient found to have UTI and treated with IV ceftriaxone and then oral cefuroxime. Patient has remained medically stable and has participated in rehab and made good functional gains. Discharge functional status is as above.     Issues addressed in TCU:    SBO (small bowel obstruction) (H)  - as above, s/p lysis of adhesions and splenectomy  - surgical incision is healing without issues- appt with surgeon today for recheck and to have staples removed.   - stable GI status - I.e bowel pattern, intakes, weights   - continue prn acetaminophen for discomfort  - continue bowel regimen with Senna and miralax  - f/w surgeon for as directed at today's appointment        Hypocalcemia  Hypokalemia  Hyponatremia  - 2/2 above, resolved    Acute blood loss anemia  Postop- HGB 12.5 --> 9.3 --> 8.4 on 5/21  - of note is new on apixaban for DVT  - Recommend recheck of CBC with PCP at time of f/u.        Right lower extremity DVT  -Thrombus in perioneal veins proximally  - discussed with MD and patient started on Apixaban. Full strength aspirin stopped       Uncomplicated asthma, unspecified asthma severity, unspecified whether persistent  - no active s/s   - pulmonary toilet/IS/mobilize  - continue prn albuterol      Supraglottic edema/swelling  - ENT consulted, tx with dexamethasone- no issues breathing/eating  - follow clinically     Essential hypertension  - chronic- controlled  - continue on Norvasc and metoprolol       Gastroesophageal reflux disease without esophagitis  - no active s/s   - continue on PPI       Type 2 diabetes meitus without complication, without long-term current use of insulin (H)        Lab Results   Component  Value Date     A1C 5.7 04/19/2021     A1C 6.5 08/06/2004     A1C 6.7 05/22/2004     A1C 6.3 01/09/2004     A1C 6.2 08/18/2003      - diet controlled        Hyperlipidemia LDL goal <100  - by history  - continue on PTA statin     RA (rheumatoid arthritis) (H)  - chronic  - methotrexate and prednisone resumed at discharge but methotrexate put on hold d/t new on apixaban.  - follow with PCP and Rheumatology for further recommendations    Osteoporosis  Fosamax placed on hold 2/2 starting apixaban  - would defer to PCP for further recommendations     Anxiety and depression  - chronic  - situational stressors in play- mood stable  - continue on Buspar and Celexa         Insomnia  - added melatonin  - follow clinically     UTI  -voiding without issue  - completed Cefuroxime course                  Past Medical History:  has a past medical history of Calculus of kidney (6/02), Depressive disorder, not elsewhere classified, Disorder of bone and cartilage, unspecified (1/04), Esophageal reflux (2/18/04), Essential hypertension, benign, Iron deficiency anemia, unspecified (2000), Nonsenile cataract, Other and unspecified hyperlipidemia, Other extrapyramidal disease and abnormal movement disorder, Other sleep disturbances, Shortness of breath (12/02), Syncope and collapse (6/02), Type II or unspecified type diabetes mellitus without mention of complication, not stated as uncontrolled, Uncomplicated asthma, and Unspecified gastritis and gastroduodenitis without mention of hemorrhage.    Discharge Medications:      Current Outpatient Medications   Medication Sig Dispense Refill     apixaban ANTICOAGULANT (ELIQUIS) 5 MG tablet Take 10 mg by mouth twice daily x 7 days (last dose will be 5/26 morning dose). Then starting on 5/27/21 start taking 5 mg by mouth twice daily. 60 tablet      melatonin 3 MG tablet Take 1 tablet (3 mg) by mouth At Bedtime       acetaminophen (TYLENOL) 325 MG tablet Take 650 mg by mouth every 6 hours as  needed for mild pain       albuterol (PROAIR HFA/PROVENTIL HFA/VENTOLIN HFA) 108 (90 Base) MCG/ACT inhaler Inhale 2 puffs into the lungs every 4 hours as needed for shortness of breath / dyspnea or wheezing 6.7 g 0     alendronate (FOSAMAX) 70 MG tablet Take 70 mg by mouth once a week On Mondays (ON HOLD)       amLODIPine (NORVASC) 5 MG tablet Take 5 mg by mouth daily       atorvastatin (LIPITOR) 40 MG tablet Take 40 mg by mouth daily       BACILLUS COAGULANS-INULIN PO Take 1 capsule by mouth daily       busPIRone (BUSPAR) 5 MG tablet Take 5 mg by mouth 2 times daily as needed       Calcium Carb-Cholecalciferol 500-200 MG-UNIT PACK Take 1 tablet by mouth 3 times daily       cetirizine (ZYRTEC) 10 MG tablet Take 10 mg by mouth daily       citalopram (CELEXA) 40 MG tablet Take 40 mg by mouth daily       cyanocobalamin (CYANOCOBALAMIN) 1000 MCG/ML injection Inject 1 mL into the muscle every 30 days       diclofenac (VOLTAREN) 1 % topical gel Apply 4 g topically 4 times daily as needed for moderate pain Plus 2 g to hands tid       donepezil (ARICEPT) 5 MG tablet Take 5 mg by mouth At Bedtime       EPINEPHrine (EPIPEN) 0.3 MG/0.3ML injection Inject 0.3 mLs (0.3 mg) into the muscle once as needed for anaphylaxis 1 each 0     folic acid (FOLVITE) 1 MG tablet Take 1 mg by mouth 6 times a week on M,T,Th, Fr, Sat,Sun       gabapentin (NEURONTIN) 300 MG capsule Take 600 mg by mouth At Bedtime       guaiFENesin (ROBITUSSIN) 100 MG/5ML liquid Take 10 mLs by mouth every 4 hours as needed for cough        methotrexate 2.5 MG tablet Take 12.5 mg by mouth Every Wednesday AM  (ON HOLD)       methotrexate 2.5 MG tablet Take 10 mg by mouth Every Wednesday PM (ON HOLD)       metoprolol succinate ER (TOPROL-XL) 50 MG 24 hr tablet Take 0.5 tablets (25 mg) by mouth daily       nystatin (MYCOSTATIN) 588517 UNIT/GM external powder Apply to affected area topically as needed for redness BID       omeprazole (PRILOSEC) 20 MG DR capsule Take 20  "mg by mouth daily       polyethylene glycol (MIRALAX) 17 g packet Take 1 packet by mouth 2 times daily as needed for constipation       predniSONE (DELTASONE) 5 MG tablet Take 1 tablet (5 mg) by mouth daily       sennosides (SENOKOT) 8.6 MG tablet Take 1 tablet by mouth 2 times daily       Vitamin D3 (CHOLECALCIFEROL) 25 mcg (1000 units) tablet Take 100 mcg by mouth daily          Medication Changes/Rationale:   As above  Controlled medications sent with patient:   not applicable/none       Physical Exam:   Vitals: /75   Pulse 69   Temp 97.9  F (36.6  C)   Resp 16   Ht 1.6 m (5' 3\")   Wt 98.3 kg (216 lb 12.8 oz)   SpO2 96%   BMI 38.40 kg/m    BMI= Body mass index is 38.4 kg/m .     Resp: Effort WNL  CV: VS as above  Musc- ROD- ambulating with 4WW  Psych- alert, calm, pleasant    SNF labs:   Most Recent 3 CBC's:  Recent Labs   Lab Test 05/21/21  0800 05/13/21 2027 05/05/21  1632   WBC 9.5 12.7* 13.2*   HGB 8.4* 9.3* 12.5   MCV 99 103* 101*   * 302 105*     Most Recent 3 BMP's:  Recent Labs   Lab Test 05/13/21 2027 05/05/21  1632 04/21/21  0757    141 140   POTASSIUM 4.0 5.1 4.3   CHLORIDE 108 113* 110*   CO2 26 24 26   BUN 16 26 26   CR 0.98 0.98 0.92   ANIONGAP 7 4 4   ELAN 8.2* 8.6 8.5   GLC 88 116* 110*       DISCHARGE PLAN:    Follow up labs: No labs orders/due    Medical Follow Up:      Follow up with primary care provider in 1 weeks  Follow up with specialist as directed    MTM referral needed and placed by this provider: No    Current [facility] scheduled appointments:       Discharge Services: Home Care:  Occupational Therapy, Physical Therapy, Registered Nurse, Home Health Aide and From:  [facility]    Discharge Instructions Verbalized to Patient at Discharge:     None      TOTAL DISCHARGE TIME:   Greater than 30 minutes  Electronically signed by:  PRISCILLA Kasper CNP     Home care Face to Face documentation done in [facility] attached to Home care orders for [facility].

## 2021-05-22 RX ORDER — LANOLIN ALCOHOL/MO/W.PET/CERES
3 CREAM (GRAM) TOPICAL AT BEDTIME
Start: 2021-05-22 | End: 2021-07-01

## 2021-05-22 NOTE — PATIENT INSTRUCTIONS
Alma Rosa Fishman  YOB: 1940                                 SSN: [id number]  [facility] MRN#:[id number]  Medical Center Admission Date: 5/11/2021 Discharge Date: 5/22/2021   PHYSICIAN's DISCHARGE SUMMARY / ORDER SHEET  1. Discharge to: Home  2. Medications:      Current Outpatient Medications   Medication Sig     apixaban ANTICOAGULANT (ELIQUIS) 5 MG tablet Take 10 mg by mouth twice daily x 7 days (last dose will be 5/26 morning dose). Then starting on 5/27/21 start taking 5 mg by mouth twice daily.     melatonin 3 MG tablet Take 1 tablet (3 mg) by mouth At Bedtime     acetaminophen (TYLENOL) 325 MG tablet Take 650 mg by mouth every 6 hours as needed for mild pain     albuterol (PROAIR HFA/PROVENTIL HFA/VENTOLIN HFA) 108 (90 Base) MCG/ACT inhaler Inhale 2 puffs into the lungs every 4 hours as needed for shortness of breath / dyspnea or wheezing     alendronate (FOSAMAX) 70 MG tablet Take 70 mg by mouth once a week On Mondays (ON HOLD)     amLODIPine (NORVASC) 5 MG tablet Take 5 mg by mouth daily     atorvastatin (LIPITOR) 40 MG tablet Take 40 mg by mouth daily     BACILLUS COAGULANS-INULIN PO Take 1 capsule by mouth daily     busPIRone (BUSPAR) 5 MG tablet Take 5 mg by mouth 2 times daily as needed     Calcium Carb-Cholecalciferol 500-200 MG-UNIT PACK Take 1 tablet by mouth 3 times daily     cetirizine (ZYRTEC) 10 MG tablet Take 10 mg by mouth daily     citalopram (CELEXA) 40 MG tablet Take 40 mg by mouth daily     cyanocobalamin (CYANOCOBALAMIN) 1000 MCG/ML injection Inject 1 mL into the muscle every 30 days     diclofenac (VOLTAREN) 1 % topical gel Apply 4 g topically 4 times daily as needed for moderate pain Plus 2 g to hands tid     donepezil (ARICEPT) 5 MG tablet Take 5 mg by mouth At Bedtime     EPINEPHrine (EPIPEN) 0.3 MG/0.3ML injection Inject 0.3 mLs (0.3 mg) into the muscle once as needed for anaphylaxis     folic acid (FOLVITE) 1 MG tablet Take 1 mg by mouth 6 times a week on M,T,Th, Fr, Sat,Sun      gabapentin (NEURONTIN) 300 MG capsule Take 600 mg by mouth At Bedtime     guaiFENesin (ROBITUSSIN) 100 MG/5ML liquid Take 10 mLs by mouth every 4 hours as needed for cough      methotrexate 2.5 MG tablet Take 12.5 mg by mouth Every Wednesday AM  (ON HOLD)     methotrexate 2.5 MG tablet Take 10 mg by mouth Every Wednesday PM (ON HOLD)     metoprolol succinate ER (TOPROL-XL) 50 MG 24 hr tablet Take 0.5 tablets (25 mg) by mouth daily     nystatin (MYCOSTATIN) 477425 UNIT/GM external powder Apply to affected area topically as needed for redness BID     omeprazole (PRILOSEC) 20 MG DR capsule Take 20 mg by mouth daily     polyethylene glycol (MIRALAX) 17 g packet Take 1 packet by mouth 2 times daily as needed for constipation     predniSONE (DELTASONE) 5 MG tablet Take 1 tablet (5 mg) by mouth daily     sennosides (SENOKOT) 8.6 MG tablet Take 1 tablet by mouth 2 times daily     Vitamin D3 (CHOLECALCIFEROL) 25 mcg (1000 units) tablet Take 100 mcg by mouth daily        --see Home Medication List/Physician Order    Clarify when to resume the medications that are on hold with your primary MD. They have been held due to starting new on a blood thinner (Apixaban) due to the blood clot in your right leg. Report any signs or symptoms of bleeding to your primary MD immediately    3. Diet: regular    4. Condition on Discharge: Stable    5. Level of Activity:      6. Appointment:  Follow up with primary MD within 7 days from discharge.                             Recommend CBC recheck at this appointment                                  [facility]     7. Referrals: Home Care RN, PT, OT, [facility]   8. Other orders/comments:    steri strips for 10 days, wash abdomen with warm/soapy water daily. Ok to shower.     Wash abdomen with warm/soapy water daily.     Steri strips for 10 days.    FACILITY DISCHARGE ORDERS    Discharge patient to home with current medications and  treatments  Discharge Home with Home care as above  - Nursing call in 30 days supply of needed meds to pharmacy of choice upon discharge  - please send home with original of these discharge instructions and copy for chart.     Nursing send with a few doses of apixaban and be certain to call in in the 30 day supply of this new medication.    ______________________________  PRISCILLA Kasper [facility]                   5/22/2021

## 2021-05-23 LAB
LABORATORY COMMENT REPORT: NORMAL
SARS-COV-2 RNA RESP QL NAA+PROBE: NEGATIVE
SARS-COV-2 RNA RESP QL NAA+PROBE: NORMAL
SPECIMEN SOURCE: NORMAL
SPECIMEN SOURCE: NORMAL

## 2021-06-09 ENCOUNTER — APPOINTMENT (OUTPATIENT)
Dept: CT IMAGING | Facility: CLINIC | Age: 81
End: 2021-06-09
Attending: EMERGENCY MEDICINE
Payer: MEDICARE

## 2021-06-09 ENCOUNTER — HOSPITAL ENCOUNTER (EMERGENCY)
Facility: CLINIC | Age: 81
Discharge: HOME | End: 2021-06-09
Attending: EMERGENCY MEDICINE | Admitting: EMERGENCY MEDICINE
Payer: MEDICARE

## 2021-06-09 VITALS
HEART RATE: 66 BPM | OXYGEN SATURATION: 94 % | RESPIRATION RATE: 18 BRPM | TEMPERATURE: 97.5 F | SYSTOLIC BLOOD PRESSURE: 142 MMHG | DIASTOLIC BLOOD PRESSURE: 72 MMHG

## 2021-06-09 DIAGNOSIS — R42 LIGHTHEADEDNESS: ICD-10-CM

## 2021-06-09 LAB
ALBUMIN UR-MCNC: 10 MG/DL
ANION GAP SERPL CALCULATED.3IONS-SCNC: 5 MMOL/L (ref 3–14)
APPEARANCE UR: ABNORMAL
BACTERIA #/AREA URNS HPF: ABNORMAL /HPF
BASOPHILS # BLD AUTO: 0.1 10E9/L (ref 0–0.2)
BASOPHILS NFR BLD AUTO: 0.7 %
BILIRUB UR QL STRIP: NEGATIVE
BUN SERPL-MCNC: 14 MG/DL (ref 7–30)
CALCIUM SERPL-MCNC: 8.7 MG/DL (ref 8.5–10.1)
CHLORIDE SERPL-SCNC: 112 MMOL/L (ref 94–109)
CO2 SERPL-SCNC: 26 MMOL/L (ref 20–32)
COLOR UR AUTO: ABNORMAL
CREAT BLD-MCNC: 1.2 MG/DL (ref 0.52–1.04)
CREAT SERPL-MCNC: 1.07 MG/DL (ref 0.52–1.04)
DIFFERENTIAL METHOD BLD: ABNORMAL
EOSINOPHIL # BLD AUTO: 0.6 10E9/L (ref 0–0.7)
EOSINOPHIL NFR BLD AUTO: 5 %
ERYTHROCYTE [DISTWIDTH] IN BLOOD BY AUTOMATED COUNT: 17.4 % (ref 10–15)
GFR SERPL CREATININE-BSD FRML MDRD: 43 ML/MIN/{1.73_M2}
GFR SERPL CREATININE-BSD FRML MDRD: 49 ML/MIN/{1.73_M2}
GLUCOSE SERPL-MCNC: 91 MG/DL (ref 70–99)
GLUCOSE UR STRIP-MCNC: NEGATIVE MG/DL
HCT VFR BLD AUTO: 32.9 % (ref 35–47)
HGB BLD-MCNC: 9.9 G/DL (ref 11.7–15.7)
HGB UR QL STRIP: NEGATIVE
IMM GRANULOCYTES # BLD: 0.1 10E9/L (ref 0–0.4)
IMM GRANULOCYTES NFR BLD: 0.4 %
INTERPRETATION ECG - MUSE: NORMAL
KETONES UR STRIP-MCNC: NEGATIVE MG/DL
LEUKOCYTE ESTERASE UR QL STRIP: ABNORMAL
LYMPHOCYTES # BLD AUTO: 1.7 10E9/L (ref 0.8–5.3)
LYMPHOCYTES NFR BLD AUTO: 15 %
MCH RBC QN AUTO: 28.5 PG (ref 26.5–33)
MCHC RBC AUTO-ENTMCNC: 30.1 G/DL (ref 31.5–36.5)
MCV RBC AUTO: 95 FL (ref 78–100)
MONOCYTES # BLD AUTO: 1.8 10E9/L (ref 0–1.3)
MONOCYTES NFR BLD AUTO: 16.4 %
MUCOUS THREADS #/AREA URNS LPF: PRESENT /LPF
NEUTROPHILS # BLD AUTO: 6.9 10E9/L (ref 1.6–8.3)
NEUTROPHILS NFR BLD AUTO: 62.5 %
NITRATE UR QL: NEGATIVE
NRBC # BLD AUTO: 0 10*3/UL
NRBC BLD AUTO-RTO: 0 /100
PH UR STRIP: 6 PH (ref 5–7)
PLATELET # BLD AUTO: 331 10E9/L (ref 150–450)
POTASSIUM SERPL-SCNC: 4.3 MMOL/L (ref 3.4–5.3)
RBC # BLD AUTO: 3.47 10E12/L (ref 3.8–5.2)
RBC #/AREA URNS AUTO: 6 /HPF (ref 0–2)
SODIUM SERPL-SCNC: 143 MMOL/L (ref 133–144)
SOURCE: ABNORMAL
SP GR UR STRIP: 1.03 (ref 1–1.03)
SQUAMOUS #/AREA URNS AUTO: 13 /HPF (ref 0–1)
TROPONIN I SERPL-MCNC: <0.015 UG/L (ref 0–0.04)
UROBILINOGEN UR STRIP-MCNC: NORMAL MG/DL (ref 0–2)
WBC # BLD AUTO: 11.1 10E9/L (ref 4–11)
WBC #/AREA URNS AUTO: 22 /HPF (ref 0–5)

## 2021-06-09 PROCEDURE — 81001 URINALYSIS AUTO W/SCOPE: CPT | Performed by: EMERGENCY MEDICINE

## 2021-06-09 PROCEDURE — 258N000003 HC RX IP 258 OP 636: Performed by: EMERGENCY MEDICINE

## 2021-06-09 PROCEDURE — 80048 BASIC METABOLIC PNL TOTAL CA: CPT | Performed by: EMERGENCY MEDICINE

## 2021-06-09 PROCEDURE — 99285 EMERGENCY DEPT VISIT HI MDM: CPT | Mod: 25

## 2021-06-09 PROCEDURE — 250N000011 HC RX IP 250 OP 636: Performed by: EMERGENCY MEDICINE

## 2021-06-09 PROCEDURE — 84484 ASSAY OF TROPONIN QUANT: CPT | Performed by: EMERGENCY MEDICINE

## 2021-06-09 PROCEDURE — 93005 ELECTROCARDIOGRAM TRACING: CPT

## 2021-06-09 PROCEDURE — 71275 CT ANGIOGRAPHY CHEST: CPT

## 2021-06-09 PROCEDURE — 250N000009 HC RX 250: Performed by: EMERGENCY MEDICINE

## 2021-06-09 PROCEDURE — 87086 URINE CULTURE/COLONY COUNT: CPT | Mod: GZ | Performed by: EMERGENCY MEDICINE

## 2021-06-09 PROCEDURE — 96361 HYDRATE IV INFUSION ADD-ON: CPT

## 2021-06-09 PROCEDURE — 96375 TX/PRO/DX INJ NEW DRUG ADDON: CPT | Mod: 59

## 2021-06-09 PROCEDURE — 96374 THER/PROPH/DIAG INJ IV PUSH: CPT | Mod: 59

## 2021-06-09 PROCEDURE — 82565 ASSAY OF CREATININE: CPT | Mod: 91

## 2021-06-09 PROCEDURE — 85025 COMPLETE CBC W/AUTO DIFF WBC: CPT | Performed by: EMERGENCY MEDICINE

## 2021-06-09 RX ORDER — IOPAMIDOL 755 MG/ML
500 INJECTION, SOLUTION INTRAVASCULAR ONCE
Status: COMPLETED | OUTPATIENT
Start: 2021-06-09 | End: 2021-06-09

## 2021-06-09 RX ORDER — METHYLPREDNISOLONE SODIUM SUCCINATE 125 MG/2ML
80 INJECTION, POWDER, LYOPHILIZED, FOR SOLUTION INTRAMUSCULAR; INTRAVENOUS ONCE
Status: COMPLETED | OUTPATIENT
Start: 2021-06-09 | End: 2021-06-09

## 2021-06-09 RX ORDER — DIPHENHYDRAMINE HYDROCHLORIDE 50 MG/ML
25 INJECTION INTRAMUSCULAR; INTRAVENOUS ONCE
Status: COMPLETED | OUTPATIENT
Start: 2021-06-09 | End: 2021-06-09

## 2021-06-09 RX ADMIN — SODIUM CHLORIDE 500 ML: 9 INJECTION, SOLUTION INTRAVENOUS at 14:04

## 2021-06-09 RX ADMIN — IOPAMIDOL 82 ML: 755 INJECTION, SOLUTION INTRAVENOUS at 12:52

## 2021-06-09 RX ADMIN — DIPHENHYDRAMINE HYDROCHLORIDE 25 MG: 50 INJECTION INTRAMUSCULAR; INTRAVENOUS at 12:36

## 2021-06-09 RX ADMIN — SODIUM CHLORIDE 99 ML: 9 INJECTION, SOLUTION INTRAVENOUS at 12:52

## 2021-06-09 RX ADMIN — METHYLPREDNISOLONE SODIUM SUCCINATE 81.25 MG: 125 INJECTION, POWDER, FOR SOLUTION INTRAMUSCULAR; INTRAVENOUS at 12:36

## 2021-06-09 RX ADMIN — SODIUM CHLORIDE 500 ML: 9 INJECTION, SOLUTION INTRAVENOUS at 13:15

## 2021-06-09 ASSESSMENT — ENCOUNTER SYMPTOMS
DYSURIA: 0
FATIGUE: 1
LIGHT-HEADEDNESS: 1
FEVER: 0
NUMBNESS: 1

## 2021-06-09 NOTE — ED TRIAGE NOTES
"Pt arrives via EMS after syncopal episode while doing an assessment with occupational therapy in pt's home. Pt lives at home with . EMS reports pt was going from the sitting position to the standing position when the syncopal episode occurred. H/o bowel obstruction, removal of spleen, and DVT in right calf in May. Has been taking eliquis since then. EMS reports pt states \"not taking her medications, but is taking the blood thinner regularly.\" Since the DVT, pt reports increased SOB on exertion, fatigue, right sided chest pain, and tingling in all extremities.Blood sugar 121. VSS. ABC's intact. A/Ox4.   "

## 2021-06-09 NOTE — ED NOTES
Pt is rechecked for a urine sample, still unable to give one at this time. Verbalizes acceptance to stay on bedpan a little longer. Will recheck soon.

## 2021-06-09 NOTE — ED PROVIDER NOTES
History   Chief Complaint:  Pre-Syncope    The history is provided by the patient.      Alma Rosa Fishman is a 80 year old female on Eliquis for prior DVT with history of diabetes, hypertension, syncope, and hyperlipidemia who presents with lightheadedness and chest pain. For the past three days, the patient reports having right sided chest pain. When taking in a breath, the patient reports feeling pain and a dull pain upon breath release. Today while the patient was with her occupational therapist, she was in the bathroom with her walker when she began to experience lightheadedness which prompted her ED visit today. Additionally, she reports having fatigue and tingling in the hands and feet in the last 3 days. She also reports that she has felt lightheaded in the past while walking but that she knew she would feel better if she sat down. The patient denies any pain or burning with urination, fever, or recent falls.  Recent surgery for SBO.  Recent hx of DVT on eliquis.  Had COVID over the winter.    Review of Systems   Constitutional: Positive for fatigue. Negative for fever.   Cardiovascular: Positive for chest pain.   Genitourinary: Negative for dysuria.   Neurological: Positive for light-headedness and numbness (tingling in feet and hands).   All other systems reviewed and are negative.        Allergies:  Acetaminophen  Codeine  Contrast Dye  Darvocet [Propoxyphene N-Apap]  Lisinopril  Metformin Hydrochloride  Morphine  Penicillins  Percocet [Oxycodone-Acetaminophen]    Medications:  Albuterol inhaler  Alendronate  Amlodipine  Eliquis  Atorvastatin  Bacillus coagulans  Buspar  Celexa  Aricept  Gabapentin  Methotrexate  Metoprolol succinate ER  Omeprazole  Prednisone    Past Medical History:    Kidney calculus  Depression  Esophageal reflux  Hypertension  Iron deficiency anemia  Nonsenile cataract  Hyperlipidemia  Extrapyramidal disease  T2D  Asthma  Gastritis   Pernicious anemia     Past Surgical History:     Appendectomy  Cholecystectomy  ENT surgery  Eye surgery  SUSIE and BSO  Gastric bypass  Left ankle ORIF  Bilateral TKA  Bilateral carpal tunnel repair      Family History:    Father - MI, blood and bone cancer  Mother - hypertension, stomach aneurysm    Social History:  The patient lives in an assisted living facility. The patient is currently doing work with an occupational therapist.    Physical Exam     Patient Vitals for the past 24 hrs:   BP Temp Temp src Pulse Resp SpO2   06/09/21 1610 -- -- -- 66 -- 94 %   06/09/21 1600 (!) 142/72 -- -- 64 -- 92 %   06/09/21 1535 -- -- -- 66 -- 95 %   06/09/21 1510 138/66 -- -- 77 -- --   06/09/21 1500 130/60 -- -- 63 -- 93 %   06/09/21 1445 -- -- -- 64 -- 92 %   06/09/21 1415 -- -- -- 62 -- 94 %   06/09/21 1400 134/64 -- -- 63 -- 93 %   06/09/21 1350 -- -- -- 62 -- 95 %   06/09/21 1320 -- -- -- 61 -- 94 %   06/09/21 1310 (!) 143/59 -- -- 61 -- 97 %   06/09/21 1245 -- -- -- 58 -- 93 %   06/09/21 1235 -- -- -- 57 -- 95 %   06/09/21 1230 -- -- -- 57 -- 95 %   06/09/21 1216 103/47 -- -- -- -- --   06/09/21 1210 133/59 -- -- 56 -- 93 %   06/09/21 1139 (!) 143/60 97.5  F (36.4  C) Oral 55 18 100 %       Orthostatic vitals:  Sitting Orthostatic BP: 133/59 Sitting Orthostatic Pulse: 55 bpm   Lying Orthostatic BP: 152/65 Lying Orthostatic Pulse: 57 bpm   Standing Orthostatic BP: 103/47 Standing Orthostatic Pulse: 62 bpm    Physical Exam    Gen: alert  HEENT: PERRL, oropharynx clear  Neck: normal ROM  CV: RRR, no murmurs  Pulm: breath sounds equal, lungs clear  Abd: Soft, nontender  Back: no evidence of injury, no cva tenderness  MSK: no deformity, moves all extremities, trace BLE edema  Skin: no rash  Neuro: alert, appropriate conversation and interaction    Emergency Department Course   ECG  ECG taken at 1204, ECG read at 1207  Sinus bradycardia. Otherwise normal ECG.   Rate 57 bpm. MI interval 140 ms. QRS duration 86 ms. QT/QTc 500/486 ms. P-R-T axes 68 45 51.     Imaging:  CT  chest PE abdomen pelvis w contrast:  IMPRESSION:  1.  No pulmonary emboli.  2.  Small bilateral pleural effusions, left greater than right, and  bibasilar atelectasis.   3.  Possible mild pulmonary edema.  4.  No acute findings in the abdomen and pelvis.  5.  New since 5/5/2012, are indeterminate two subcapsular deposits  along the right hepatic lobe measuring 0.8-1.0 cm. New stranding in  the peritoneum and small peritoneal nodules. 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 Clinical correlation and further workup  recommended. A follow-up short interval CT of the abdomen and pelvis  or biopsy of the peritoneal nodules can be considered.  6.  Borderline enlarged mesenteric lymph node is nonspecific.  Report per radiology     Laboratory:  BMP: chloride 112 (H), creatinine 1.07 (H), GFR estimate 49 (L) o/w WNL  CBC: WBC 11.1 (H), HGB 9.9 (L),      Troponin (Collected 1219): <0.015    Creatinine POCT (Collected 1216): creatinine 1.2 (H), GFR estimate 43 (L)    UA with Microscopic: protein albumin 10, leukocyte esterase large, WBC 22 (H), RBC 6 (H), bacteria few, squamous epithelial 13 (H), mucous present o/w WNL    Emergency Department Course:    Reviewed:  1149 I reviewed nursing notes, vitals, past medical history and care everywhere    Assessments:  1155 I obtained history and examined the patient as noted above.   1529 I rechecked the patient and explained findings.     Interventions:  1236 Solu-medrol 81.25 mg IV  1236 Benadryl 25 mg IV  1315  mL IV Bolus  1404  mL IV Bolus    Disposition:  The patient was discharged to home.       Impression & Plan       Medical Decision Making:  The patient presents for an episode of lightheadedness and near syncope. Full evaluation was completed today without nefarious cause for her symptoms. Likely slightly hypovalemic as patient is orthostatic here and does describe somewhat poor PO intake of solids. Encouraged her to continue to push fluids.  Feeling better after IV fluids here. No obvious infection. History of recent DVT on Eliquis and does have some right sided pleuritic intermittent pains. No signs of PE on CT chest.  EKG showed sinus bradycardia without ischemia and troponin negative in the setting of greater than 12 hours of ongoing right sided pain.  The peritoneal nodule is noted and discussed with the patient. Recommended close follow up with her primary care to decide if any biopsy or other evaluation is needed. Will culture urine. Recent UTI but no urinary symptoms. Plan for discharge home with close outpatient primary care follow up.       Diagnosis:    ICD-10-CM    1. Lightheadedness  R42 Urine Culture Aerobic Bacterial       Scribe Disclosure:  ELISABETH, Shirley Freeman and Kyleigh Brothers, am serving as a scribe at 11:49 AM on 6/9/2021 to document services personally performed by Melissa Singleton MD based on my observations and the provider's statements to me.          Melissa Singleton MD  06/09/21 2149

## 2021-06-09 NOTE — ED NOTES
Bed: ED28  Expected date: 6/9/21  Expected time: 11:04 AM  Means of arrival: Ambulance  Comments:  ROSETTA [phone]

## 2021-06-09 NOTE — ED NOTES
Spoke with Vadim, pt's spouse. Given update on pt's status and plan of care. Pt is notified.     's number: [phone].

## 2021-06-09 NOTE — ED NOTES
Pt is up with standby assist. Pt walked throughout hallway with walker without any dizziness. Pt passed road test. MD notified.

## 2021-06-10 ENCOUNTER — HOSPITAL ENCOUNTER (EMERGENCY)
Facility: CLINIC | Age: 81
Discharge: HOME | End: 2021-06-10
Attending: EMERGENCY MEDICINE | Admitting: EMERGENCY MEDICINE
Payer: MEDICARE

## 2021-06-10 ENCOUNTER — NURSE TRIAGE (OUTPATIENT)
Dept: NURSING | Facility: CLINIC | Age: 81
End: 2021-06-10

## 2021-06-10 VITALS
RESPIRATION RATE: 18 BRPM | HEART RATE: 66 BPM | OXYGEN SATURATION: 94 % | SYSTOLIC BLOOD PRESSURE: 153 MMHG | TEMPERATURE: 97.9 F | DIASTOLIC BLOOD PRESSURE: 70 MMHG

## 2021-06-10 DIAGNOSIS — R04.2 HEMOPTYSIS: ICD-10-CM

## 2021-06-10 DIAGNOSIS — R09.A2 FOREIGN BODY SENSATION IN THROAT: Primary | ICD-10-CM

## 2021-06-10 LAB
ANION GAP SERPL CALCULATED.3IONS-SCNC: 6 MMOL/L (ref 3–14)
BACTERIA SPEC CULT: NORMAL
BACTERIA SPEC CULT: NORMAL
BASOPHILS # BLD AUTO: 0 10E9/L (ref 0–0.2)
BASOPHILS NFR BLD AUTO: 0.2 %
BUN SERPL-MCNC: 16 MG/DL (ref 7–30)
CALCIUM SERPL-MCNC: 8.5 MG/DL (ref 8.5–10.1)
CHLORIDE SERPL-SCNC: 111 MMOL/L (ref 94–109)
CO2 SERPL-SCNC: 24 MMOL/L (ref 20–32)
CREAT SERPL-MCNC: 1.06 MG/DL (ref 0.52–1.04)
DIFFERENTIAL METHOD BLD: ABNORMAL
EOSINOPHIL # BLD AUTO: 0 10E9/L (ref 0–0.7)
EOSINOPHIL NFR BLD AUTO: 0 %
ERYTHROCYTE [DISTWIDTH] IN BLOOD BY AUTOMATED COUNT: 17 % (ref 10–15)
GFR SERPL CREATININE-BSD FRML MDRD: 50 ML/MIN/{1.73_M2}
GLUCOSE SERPL-MCNC: 108 MG/DL (ref 70–99)
HCT VFR BLD AUTO: 31.2 % (ref 35–47)
HGB BLD-MCNC: 9.2 G/DL (ref 11.7–15.7)
IMM GRANULOCYTES # BLD: 0.1 10E9/L (ref 0–0.4)
IMM GRANULOCYTES NFR BLD: 0.7 %
LYMPHOCYTES # BLD AUTO: 1.3 10E9/L (ref 0.8–5.3)
LYMPHOCYTES NFR BLD AUTO: 12.3 %
MCH RBC QN AUTO: 28 PG (ref 26.5–33)
MCHC RBC AUTO-ENTMCNC: 29.5 G/DL (ref 31.5–36.5)
MCV RBC AUTO: 95 FL (ref 78–100)
MONOCYTES # BLD AUTO: 1.2 10E9/L (ref 0–1.3)
MONOCYTES NFR BLD AUTO: 11.2 %
NEUTROPHILS # BLD AUTO: 8.2 10E9/L (ref 1.6–8.3)
NEUTROPHILS NFR BLD AUTO: 75.6 %
NRBC # BLD AUTO: 0 10*3/UL
NRBC BLD AUTO-RTO: 0 /100
PLATELET # BLD AUTO: 331 10E9/L (ref 150–450)
POTASSIUM SERPL-SCNC: 4.1 MMOL/L (ref 3.4–5.3)
RBC # BLD AUTO: 3.28 10E12/L (ref 3.8–5.2)
SODIUM SERPL-SCNC: 141 MMOL/L (ref 133–144)
SPECIMEN SOURCE: NORMAL
WBC # BLD AUTO: 10.9 10E9/L (ref 4–11)

## 2021-06-10 PROCEDURE — 80048 BASIC METABOLIC PNL TOTAL CA: CPT | Performed by: EMERGENCY MEDICINE

## 2021-06-10 PROCEDURE — 31575 DIAGNOSTIC LARYNGOSCOPY: CPT

## 2021-06-10 PROCEDURE — 99283 EMERGENCY DEPT VISIT LOW MDM: CPT | Mod: 25

## 2021-06-10 PROCEDURE — 94640 AIRWAY INHALATION TREATMENT: CPT

## 2021-06-10 PROCEDURE — 250N000009 HC RX 250: Performed by: EMERGENCY MEDICINE

## 2021-06-10 PROCEDURE — 85025 COMPLETE CBC W/AUTO DIFF WBC: CPT | Performed by: EMERGENCY MEDICINE

## 2021-06-10 RX ORDER — LIDOCAINE HYDROCHLORIDE 20 MG/ML
SOLUTION OROPHARYNGEAL
Status: DISCONTINUED
Start: 2021-06-10 | End: 2021-06-10 | Stop reason: HOSPADM

## 2021-06-10 RX ORDER — OXYMETAZOLINE HYDROCHLORIDE 0.05 G/100ML
SPRAY NASAL
Status: DISCONTINUED
Start: 2021-06-10 | End: 2021-06-10 | Stop reason: HOSPADM

## 2021-06-10 RX ADMIN — LIDOCAINE HYDROCHLORIDE 3 ML: 40 INJECTION, SOLUTION RETROBULBAR; TOPICAL at 10:33

## 2021-06-10 ASSESSMENT — ENCOUNTER SYMPTOMS
LIGHT-HEADEDNESS: 0
TROUBLE SWALLOWING: 1
NAUSEA: 1
VOMITING: 1
DIZZINESS: 0

## 2021-06-10 NOTE — TELEPHONE ENCOUNTER
"They  Found a nodule in her upper stomach. She came home, ate dinner. Now during the night, twice she got up and had upset stomach so vomited. Chunk of red blood with the first time vomiting. The second time it was stringy. They will go to the ER for evaluation.  Meeta Uribe RN  [facility]    Reason for Disposition    [1] Vomited blood AND [2] more than a few streaks of blood    (Exception: few streaks and only occurred once)    (Exception: swallowed blood from a nosebleed or cut in the mouth)    Additional Information    Negative: Shock suspected (e.g., cold/pale/clammy skin, too weak to stand, low BP, rapid pulse)    Negative: Difficult to awaken or acting confused (e.g., disoriented, slurred speech)    Negative: Unable to walk, or can only walk with assistance (e.g., requires support)    Negative: Fainted    Negative: [1] Vomited blood AND [2] large amount (example: \"a cup of blood\")    Negative: Rectal bleeding (i.e., bloody stool, blood in stool)    Negative: Sounds like a life-threatening emergency to the triager    Negative: Coughing up blood  (i.e., from lungs)    Negative: Weak, dizzy or lightheaded    Protocols used: VOMITING BLOOD-A-AH      "

## 2021-06-10 NOTE — ED TRIAGE NOTES
"Pt arrives with c/o hemoptysis since 0330 today. Pt reports a clot \"the size of a hazelnut and some stringy blood\". Patient is on eliquis. Pt denies SOB and chest pain. ABCs intact.  "

## 2021-06-10 NOTE — DISCHARGE INSTRUCTIONS
"What do you do next:   Continue your home medications unless we have specifically changed them  The bedside \"nasopharyngoscope\" (scope in your nose to the level of your vocal cords) did not reveal any specific foreign body nor specific abrasion or bleeding in your throat.  I wonder if you may have scratched your throat somewhat while taking your Tylenol last night and based on being on blood thinners, I wonder if this could have pooled and led to nausea leading to vomiting in which you noted the blood seen today.  Your blood work appears reassuring so I think outpatient follow-up is reasonable.  Follow up as indicated below (you requested to transfer care to the [place] [facility] system)    When do you return: If you have intractable vomiting, severe lightheadedness or fainting, persistent bloody emesis, trouble swallowing or breathing, or any other symptoms that concern you, please return to the ED for reevaluation.    Thank you for allowing us to care for you today.    "

## 2021-06-10 NOTE — ED PROVIDER NOTES
History   Chief Complaint:  Hematemesis       The history is provided by the patient and the spouse.      Alma Rosa Fishman is a 80 year old female, who is taking Eliquis, with history of gastric bypass, appendectomy, cholecystectomy, GI hemorrhage, hypertension, hyperlipidemia, and type II diabetes who presents with hematemesis. She says this morning around 0330 she began vomiting multiple episodes with the first being a clot with white spots the size of a hazelnut. After that, she would notice red streaks of blood in her vomit. She denies any lightheadedness or dizziness today. She additionally complains of a sensation in her throat that she describes as feeling like a lump in her throat. She also provides that she now has trouble swallowing her pills.    I reviewed labs and imaging from 6/9/2021. CT results are below:    CT CHEST PE ABDOMEN PELVIS W CONTRAST 6/9/2021:  1.  No pulmonary emboli.  2.  Small bilateral pleural effusions, left greater than right, and bibasilar atelectasis.   3.  Possible mild pulmonary edema.  4.  No acute findings in the abdomen and pelvis.  5.  New since 5/5/2012, are indeterminate two subcapsular deposits along the right hepatic lobe measuring 0.8-1.0 cm. New stranding in the peritoneum and small peritoneal nodules. 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 Clinical correlation and further workup recommended. A follow-up short interval CT of the abdomen and pelvis or biopsy of the peritoneal nodules can be considered.  6.  Borderline enlarged mesenteric lymph node is nonspecific.    Review of Systems   HENT: Positive for trouble swallowing.    Gastrointestinal: Positive for nausea and vomiting (blood).   Neurological: Negative for dizziness and light-headedness.   All other systems reviewed and are negative.      Allergies:  Acetaminophen  Albuterol  Codeine  Darvocet   Lisinopril  Metformin Hydrochloride  Morphine  Penicillins  Percocet   Contrast  Dye  Atenolol  Propoxyphene-Acetaminophen  Doxycycline  Fluticasone Propion-Salmeterol  Neomycin-Bacitracin-Polymyxin  Oxycodone-Acetaminophen    Medications:  Fosamax  Eliquis  Lipitor  Buspar  Cholecalciferol  Zyrtec  Celexa  Cyanocobalamin  Aricept  Epinephrine  Folvite  Neurontin  Robitussin  Methotrexate  Toprol-XL  Mycostatin  Prilosec  Miralax  Deltasone  Senokot    Past Medical History:    Calculus of kidney   Depression  Disorder of bone and cartilage   Esophageal reflux   Hypertension   Iron deficiency anemia  Nonsenile cataract   Hyperlipidemia   Other extrapyramidal disease and abnormal movement disorder     Syncope  Type II diabetes  Asthma   Unspecified gastritis and gastroduodenitis without mention of hemorrhage    Acute respiratory failure with hypoxia  Non-intractable vomiting  Covid-19   SBO    Past Surgical History:    Appendectomy  Cholecystectomy  ENT surgery  Eye surgery  GYN surgery  Orthopedic surgery  Gastric bypass surgery  Colonoscopy  Bilateral TKA  S/P SUSIE/BSO  Bilateral carpal tunnel repair     Family History:    Myocardial infarction  Blood cancer  Bone cancer  Stomach aneurysm  Hypertension  Bladder cancer  Stomach cancer  Type II diabetes  AAA  Depression  Bipolar disorder    Social History:  Patient was accompanied by her .  Patient came from home.    Physical Exam     Patient Vitals for the past 24 hrs:   BP Temp Temp src Pulse Resp SpO2   06/10/21 1115 -- -- -- -- -- 94 %   06/10/21 0942 (!) 171/77 97.9  F (36.6  C) Temporal 60 18 100 %       Physical Exam  Constitutional: Vital signs reviewed as above.   Head: No external signs of trauma. No lesions noted.  Eyes: PEERL, EOMI B/L, no pain or limitation of superior gaze  ENT:   Nose: Noncongested, no exudates. No rhinorrhea. No FB noted   Mouth/Throat:     Mucous membranes are moist and normal.     No Oropharyngeal exudate. No oropharyngeal erythema noted.    No tonsilar swelling noted.     No uvular deviation noted.    No  swelling noted on the floor of the mouth  Neck: FROM. Neck is supple  Lymphatic: No posterior cervical LAD noted.   Cardiovascular: Normal rate, regular rhythm and normal heart sounds.  No murmur heard. Equal B/L peripheral pulses.  Pulmonary/Chest: Effort normal and breath sounds normal. No respiratory distress. Patient has no wheezes. Patient has no rales.   Gastrointestinal: Soft. There is no tenderness.   Musculoskeletal/Extremities: No edema noted. Normal tone.  Neurological: Patient is alert and oriented to person, place, and time.   Skin: Skin is warm and dry. There is no diaphoresis noted.   Psychiatric: The patient appears calm.      Emergency Department Course       Procedures      Laryngoscopy     PHYSICIAN: Mcallister     INDICATION: Hematemesis and foreign body sensation in throat    MEDICATIONS: Afrin (both naris) and Nebulized lidocaine 4%    CONSENT: Verbal consent was obtained from the patient.  The fiberoptic scope was advanced through the left nare down to the level of the glottic structures.    FINDINGS:    1.  The epiglottis is normal  2.  The glottic tissues, cords, and pyriform recesses appear normal.  3.  No foreign body is noted.  4.  No bleeding appreciated.    OUTCOME: There were no complications to the procedure.  The patient tolerated the procedure relatively well.       Emergency Department Course:    Reviewed:  I reviewed nursing notes, vitals, past medical history and care everywhere    Assessments/Consults:     ED Course as of Rd 10 1121   Thu Rd 10, 2021   1001 Assessed the patient (obtained history and performed exam).      1033 Afrin applied and 4% lidocaine neb started      1033 Rechecked the patient.      1054 Rechecked the patient and attempted to scope the patient with a nasopharyngoscope.          Interventions:    Medications   oxymetazoline (AFRIN) 0.05 % spray (has no administration in time range)   lidocaine (XYLOCAINE) 2 % solution (has no administration in time range)  "  lidocaine inhalant 4% nebulizer solution 3 mL (3 mLs Nebulization Given 6/10/21 1033)         Disposition:  The patient was discharged to home.       Impression & Plan       CMS Diagnoses: None    Medical Decision Making:  This 80-year-old female patient presents to the ED due to hematemesis.  Please see the HPI and exam for specifics.  A broad differential was considered including potential esophageal abrasion.  The patient does note that she has trouble with pills getting stuck sometimes but does not specifically recall anything getting stuck.  She states that she does take Tylenol at night and she thinks that was a \"big pill\".    Because of her sensation of something in her throat, I elected to perform a bedside nasopharyngoscope.  Please see findings above.     As the patient's hemoglobin appears stable from yesterday, and as there is no specific foreign body noted on my limited exam (eluding bedside flexible nasopharyngoscopy), I think the patient can be discharged.  I think it would be reasonable for her to follow-up with gastroenterology and they can consider potential outpatient EGD.  Patient and her  note that they would like to consider transferring care to [facility].  I will place orders for the [facility] primary care follow-up as well as GI referral.  Anticipatory guidance given prior to discharge.    Covid-19  Alma Rosa Fishman was evaluated during a global COVID-19 pandemic, which necessitated consideration that the patient might be at risk for infection with the SARS-CoV-2 virus that causes COVID-19.   Applicable protocols for evaluation were followed during the patient's care.     Diagnosis:    ICD-10-CM    1. Foreign body sensation in throat  R09.89 Primary Care Referral     GASTROENTEROLOGY ADULT REF CONSULT ONLY   2. Hemoptysis  R04.2 Primary Care Referral     GASTROENTEROLOGY ADULT REF CONSULT ONLY       Discharge Medications:  New Prescriptions    No medications on file       Scribe " Disclosure:  I, Jacob Martins, am serving as a scribe at 9:54 AM on 6/10/2021 to document services personally performed by Oswaldo Mcallister DO based on my observations and the provider's statements to me.            Oswaldo Mcallister DO  06/10/21 1129

## 2021-06-11 NOTE — RESULT ENCOUNTER NOTE
Final urine culture report is negative.  Adult Negative Urine culture parameters per protocol: Any # Urogenital single or mixed organism, <10,000 col/ml single organism (cath specimen), and <50,000 col/ml single organism (midstream or cath specimen).  [facility] Emergency Dept discharge antibiotic prescribed (If applicable): None  Treatment recommendations per [facility] ED Lab Result Urine Culture protocol.

## 2021-06-22 ENCOUNTER — HOSPITAL ENCOUNTER (INPATIENT)
Facility: CLINIC | Age: 81
LOS: 2 days | Discharge: HOME | DRG: 391 | End: 2021-06-25
Attending: PHYSICIAN ASSISTANT | Admitting: INTERNAL MEDICINE
Payer: MEDICARE

## 2021-06-22 ENCOUNTER — APPOINTMENT (OUTPATIENT)
Dept: CT IMAGING | Facility: CLINIC | Age: 81
DRG: 391 | End: 2021-06-22
Attending: PHYSICIAN ASSISTANT
Payer: MEDICARE

## 2021-06-22 DIAGNOSIS — K22.4 ESOPHAGEAL DYSMOTILITY: ICD-10-CM

## 2021-06-22 DIAGNOSIS — K56.609 SMALL BOWEL OBSTRUCTION (H): ICD-10-CM

## 2021-06-22 LAB
ALBUMIN SERPL-MCNC: 3.1 G/DL (ref 3.4–5)
ALP SERPL-CCNC: 68 U/L (ref 40–150)
ALT SERPL W P-5'-P-CCNC: 19 U/L (ref 0–50)
ANION GAP SERPL CALCULATED.3IONS-SCNC: 7 MMOL/L (ref 3–14)
AST SERPL W P-5'-P-CCNC: 23 U/L (ref 0–45)
BASOPHILS # BLD AUTO: 0.1 10E9/L (ref 0–0.2)
BASOPHILS NFR BLD AUTO: 0.8 %
BILIRUB SERPL-MCNC: 0.5 MG/DL (ref 0.2–1.3)
BUN SERPL-MCNC: 11 MG/DL (ref 7–30)
CALCIUM SERPL-MCNC: 8.9 MG/DL (ref 8.5–10.1)
CHLORIDE SERPL-SCNC: 111 MMOL/L (ref 94–109)
CO2 SERPL-SCNC: 24 MMOL/L (ref 20–32)
CREAT SERPL-MCNC: 0.96 MG/DL (ref 0.52–1.04)
DIFFERENTIAL METHOD BLD: ABNORMAL
EOSINOPHIL # BLD AUTO: 0.4 10E9/L (ref 0–0.7)
EOSINOPHIL NFR BLD AUTO: 3 %
ERYTHROCYTE [DISTWIDTH] IN BLOOD BY AUTOMATED COUNT: 17.7 % (ref 10–15)
GFR SERPL CREATININE-BSD FRML MDRD: 56 ML/MIN/{1.73_M2}
GLUCOSE SERPL-MCNC: 107 MG/DL (ref 70–99)
HCT VFR BLD AUTO: 32.5 % (ref 35–47)
HGB BLD-MCNC: 10.2 G/DL (ref 11.7–15.7)
IMM GRANULOCYTES # BLD: 0 10E9/L (ref 0–0.4)
IMM GRANULOCYTES NFR BLD: 0.3 %
LIPASE SERPL-CCNC: 127 U/L (ref 73–393)
LYMPHOCYTES # BLD AUTO: 2.1 10E9/L (ref 0.8–5.3)
LYMPHOCYTES NFR BLD AUTO: 18.1 %
MCH RBC QN AUTO: 27.9 PG (ref 26.5–33)
MCHC RBC AUTO-ENTMCNC: 31.4 G/DL (ref 31.5–36.5)
MCV RBC AUTO: 89 FL (ref 78–100)
MONOCYTES # BLD AUTO: 1.8 10E9/L (ref 0–1.3)
MONOCYTES NFR BLD AUTO: 15.7 %
NEUTROPHILS # BLD AUTO: 7.3 10E9/L (ref 1.6–8.3)
NEUTROPHILS NFR BLD AUTO: 62.1 %
NRBC # BLD AUTO: 0 10*3/UL
NRBC BLD AUTO-RTO: 0 /100
PLATELET # BLD AUTO: 313 10E9/L (ref 150–450)
POTASSIUM SERPL-SCNC: 4.4 MMOL/L (ref 3.4–5.3)
PROT SERPL-MCNC: 6.4 G/DL (ref 6.8–8.8)
RBC # BLD AUTO: 3.65 10E12/L (ref 3.8–5.2)
SODIUM SERPL-SCNC: 142 MMOL/L (ref 133–144)
WBC # BLD AUTO: 11.7 10E9/L (ref 4–11)

## 2021-06-22 PROCEDURE — 80053 COMPREHEN METABOLIC PANEL: CPT | Performed by: PHYSICIAN ASSISTANT

## 2021-06-22 PROCEDURE — 250N000011 HC RX IP 250 OP 636: Performed by: PHYSICIAN ASSISTANT

## 2021-06-22 PROCEDURE — 85025 COMPLETE CBC W/AUTO DIFF WBC: CPT | Performed by: EMERGENCY MEDICINE

## 2021-06-22 PROCEDURE — 83690 ASSAY OF LIPASE: CPT | Performed by: PHYSICIAN ASSISTANT

## 2021-06-22 PROCEDURE — 99285 EMERGENCY DEPT VISIT HI MDM: CPT | Mod: 25

## 2021-06-22 PROCEDURE — 74176 CT ABD & PELVIS W/O CONTRAST: CPT

## 2021-06-22 PROCEDURE — 96374 THER/PROPH/DIAG INJ IV PUSH: CPT

## 2021-06-22 PROCEDURE — C9803 HOPD COVID-19 SPEC COLLECT: HCPCS

## 2021-06-22 RX ORDER — ONDANSETRON 2 MG/ML
4 INJECTION INTRAMUSCULAR; INTRAVENOUS ONCE
Status: COMPLETED | OUTPATIENT
Start: 2021-06-22 | End: 2021-06-22

## 2021-06-22 RX ADMIN — ONDANSETRON 4 MG: 2 INJECTION INTRAMUSCULAR; INTRAVENOUS at 22:11

## 2021-06-22 ASSESSMENT — ENCOUNTER SYMPTOMS
SHORTNESS OF BREATH: 0
NAUSEA: 1
WHEEZING: 0
FEVER: 0
DIARRHEA: 1
VOMITING: 1
COUGH: 0
CHILLS: 1
DYSURIA: 0

## 2021-06-22 ASSESSMENT — MIFFLIN-ST. JEOR: SCORE: 1324.57

## 2021-06-23 ENCOUNTER — APPOINTMENT (OUTPATIENT)
Dept: ULTRASOUND IMAGING | Facility: CLINIC | Age: 81
DRG: 391 | End: 2021-06-23
Attending: INTERNAL MEDICINE
Payer: MEDICARE

## 2021-06-23 ENCOUNTER — APPOINTMENT (OUTPATIENT)
Dept: GENERAL RADIOLOGY | Facility: CLINIC | Age: 81
DRG: 391 | End: 2021-06-23
Attending: INTERNAL MEDICINE
Payer: MEDICARE

## 2021-06-23 ENCOUNTER — HOSPITAL ENCOUNTER (OUTPATIENT)
Age: 81
End: 2021-06-23
Attending: INTERNAL MEDICINE | Admitting: INTERNAL MEDICINE
Payer: COMMERCIAL

## 2021-06-23 PROBLEM — K22.4 ESOPHAGEAL DYSMOTILITY: Status: ACTIVE | Noted: 2021-06-23

## 2021-06-23 PROBLEM — K56.609 SMALL BOWEL OBSTRUCTION (H): Status: ACTIVE | Noted: 2021-06-23

## 2021-06-23 LAB
GLUCOSE BLDC GLUCOMTR-MCNC: 102 MG/DL (ref 70–99)
GLUCOSE BLDC GLUCOMTR-MCNC: 85 MG/DL (ref 70–99)
GLUCOSE BLDC GLUCOMTR-MCNC: 94 MG/DL (ref 70–99)
LABORATORY COMMENT REPORT: NORMAL
SARS-COV-2 RNA RESP QL NAA+PROBE: NEGATIVE
SPECIMEN SOURCE: NORMAL

## 2021-06-23 PROCEDURE — 99207 PR NO BILLABLE SERVICE THIS VISIT: CPT | Performed by: INTERNAL MEDICINE

## 2021-06-23 PROCEDURE — 250N000011 HC RX IP 250 OP 636: Performed by: INTERNAL MEDICINE

## 2021-06-23 PROCEDURE — C9113 INJ PANTOPRAZOLE SODIUM, VIA: HCPCS | Performed by: INTERNAL MEDICINE

## 2021-06-23 PROCEDURE — 250N000012 HC RX MED GY IP 250 OP 636 PS 637: Performed by: INTERNAL MEDICINE

## 2021-06-23 PROCEDURE — 74240 X-RAY XM UPR GI TRC 1CNTRST: CPT

## 2021-06-23 PROCEDURE — 250N000013 HC RX MED GY IP 250 OP 250 PS 637: Performed by: INTERNAL MEDICINE

## 2021-06-23 PROCEDURE — 120N000001 HC R&B MED SURG/OB

## 2021-06-23 PROCEDURE — 99233 SBSQ HOSP IP/OBS HIGH 50: CPT | Performed by: INTERNAL MEDICINE

## 2021-06-23 PROCEDURE — 258N000003 HC RX IP 258 OP 636: Performed by: INTERNAL MEDICINE

## 2021-06-23 PROCEDURE — 87635 SARS-COV-2 COVID-19 AMP PRB: CPT | Performed by: PHYSICIAN ASSISTANT

## 2021-06-23 PROCEDURE — 999N001017 HC STATISTIC GLUCOSE BY METER IP

## 2021-06-23 PROCEDURE — 250N000009 HC RX 250: Performed by: INTERNAL MEDICINE

## 2021-06-23 PROCEDURE — 93970 EXTREMITY STUDY: CPT

## 2021-06-23 RX ORDER — BISACODYL 5 MG
15 TABLET, DELAYED RELEASE (ENTERIC COATED) ORAL DAILY PRN
Status: DISCONTINUED | OUTPATIENT
Start: 2021-06-23 | End: 2021-06-25 | Stop reason: HOSPADM

## 2021-06-23 RX ORDER — LIDOCAINE 40 MG/G
CREAM TOPICAL
Status: DISCONTINUED | OUTPATIENT
Start: 2021-06-23 | End: 2021-06-25 | Stop reason: HOSPADM

## 2021-06-23 RX ORDER — CITALOPRAM HYDROBROMIDE 20 MG/1
40 TABLET ORAL DAILY
Status: DISCONTINUED | OUTPATIENT
Start: 2021-06-23 | End: 2021-06-25 | Stop reason: HOSPADM

## 2021-06-23 RX ORDER — METOPROLOL TARTRATE 1 MG/ML
5 INJECTION, SOLUTION INTRAVENOUS 2 TIMES DAILY
Status: DISCONTINUED | OUTPATIENT
Start: 2021-06-23 | End: 2021-06-23

## 2021-06-23 RX ORDER — BISACODYL 5 MG
5 TABLET, DELAYED RELEASE (ENTERIC COATED) ORAL DAILY PRN
Status: DISCONTINUED | OUTPATIENT
Start: 2021-06-23 | End: 2021-06-25 | Stop reason: HOSPADM

## 2021-06-23 RX ORDER — POLYETHYLENE GLYCOL 3350 17 G/17G
17 POWDER, FOR SOLUTION ORAL 2 TIMES DAILY PRN
Status: DISCONTINUED | OUTPATIENT
Start: 2021-06-23 | End: 2021-06-25 | Stop reason: HOSPADM

## 2021-06-23 RX ORDER — ONDANSETRON 2 MG/ML
4 INJECTION INTRAMUSCULAR; INTRAVENOUS EVERY 6 HOURS PRN
Status: DISCONTINUED | OUTPATIENT
Start: 2021-06-23 | End: 2021-06-25 | Stop reason: HOSPADM

## 2021-06-23 RX ORDER — BISACODYL 5 MG
10 TABLET, DELAYED RELEASE (ENTERIC COATED) ORAL DAILY PRN
Status: DISCONTINUED | OUTPATIENT
Start: 2021-06-23 | End: 2021-06-25 | Stop reason: HOSPADM

## 2021-06-23 RX ORDER — SODIUM CHLORIDE 9 MG/ML
INJECTION, SOLUTION INTRAVENOUS CONTINUOUS
Status: ACTIVE | OUTPATIENT
Start: 2021-06-23 | End: 2021-06-23

## 2021-06-23 RX ORDER — DONEPEZIL HYDROCHLORIDE 5 MG/1
5 TABLET, FILM COATED ORAL
Status: DISCONTINUED | OUTPATIENT
Start: 2021-06-24 | End: 2021-06-25 | Stop reason: HOSPADM

## 2021-06-23 RX ORDER — BUSPIRONE HYDROCHLORIDE 5 MG/1
5 TABLET ORAL 2 TIMES DAILY
Status: DISCONTINUED | OUTPATIENT
Start: 2021-06-23 | End: 2021-06-25 | Stop reason: HOSPADM

## 2021-06-23 RX ORDER — ALBUTEROL SULFATE 90 UG/1
2 AEROSOL, METERED RESPIRATORY (INHALATION) EVERY 4 HOURS PRN
Status: DISCONTINUED | OUTPATIENT
Start: 2021-06-23 | End: 2021-06-25 | Stop reason: HOSPADM

## 2021-06-23 RX ORDER — PREDNISONE 5 MG/1
5 TABLET ORAL DAILY
Status: DISCONTINUED | OUTPATIENT
Start: 2021-06-23 | End: 2021-06-25 | Stop reason: HOSPADM

## 2021-06-23 RX ORDER — ONDANSETRON 4 MG/1
4 TABLET, ORALLY DISINTEGRATING ORAL EVERY 6 HOURS PRN
Status: DISCONTINUED | OUTPATIENT
Start: 2021-06-23 | End: 2021-06-25 | Stop reason: HOSPADM

## 2021-06-23 RX ORDER — AMLODIPINE BESYLATE 5 MG/1
5 TABLET ORAL DAILY
Status: DISCONTINUED | OUTPATIENT
Start: 2021-06-23 | End: 2021-06-25 | Stop reason: HOSPADM

## 2021-06-23 RX ORDER — METOPROLOL SUCCINATE 25 MG/1
25 TABLET, EXTENDED RELEASE ORAL DAILY
Status: DISCONTINUED | OUTPATIENT
Start: 2021-06-23 | End: 2021-06-25 | Stop reason: HOSPADM

## 2021-06-23 RX ORDER — GABAPENTIN 300 MG/1
600 CAPSULE ORAL AT BEDTIME
Status: DISCONTINUED | OUTPATIENT
Start: 2021-06-23 | End: 2021-06-25 | Stop reason: HOSPADM

## 2021-06-23 RX ORDER — DIAZEPAM 5 MG
TABLET ORAL
COMMUNITY
Start: 2021-01-26

## 2021-06-23 RX ADMIN — SODIUM CHLORIDE: 9 INJECTION, SOLUTION INTRAVENOUS at 02:30

## 2021-06-23 RX ADMIN — PANTOPRAZOLE SODIUM 40 MG: 40 INJECTION, POWDER, FOR SOLUTION INTRAVENOUS at 08:10

## 2021-06-23 RX ADMIN — CITALOPRAM HYDROBROMIDE 40 MG: 20 TABLET ORAL at 16:34

## 2021-06-23 RX ADMIN — AMLODIPINE BESYLATE 5 MG: 5 TABLET ORAL at 16:34

## 2021-06-23 RX ADMIN — METOPROLOL SUCCINATE 25 MG: 25 TABLET, FILM COATED, EXTENDED RELEASE ORAL at 16:34

## 2021-06-23 RX ADMIN — PREDNISONE 5 MG: 5 TABLET ORAL at 16:34

## 2021-06-23 RX ADMIN — METOPROLOL TARTRATE 5 MG: 5 INJECTION INTRAVENOUS at 05:33

## 2021-06-23 RX ADMIN — ENOXAPARIN SODIUM 40 MG: 40 INJECTION SUBCUTANEOUS at 20:43

## 2021-06-23 RX ADMIN — BUSPIRONE HYDROCHLORIDE 5 MG: 5 TABLET ORAL at 20:43

## 2021-06-23 RX ADMIN — SODIUM CHLORIDE: 9 INJECTION, SOLUTION INTRAVENOUS at 15:45

## 2021-06-23 RX ADMIN — GABAPENTIN 600 MG: 300 CAPSULE ORAL at 21:58

## 2021-06-23 ASSESSMENT — ACTIVITIES OF DAILY LIVING (ADL)
ADLS_ACUITY_SCORE: 17
ADLS_ACUITY_SCORE: 19
ADLS_ACUITY_SCORE: 17
ADLS_ACUITY_SCORE: 19
ADLS_ACUITY_SCORE: 16

## 2021-06-23 ASSESSMENT — MIFFLIN-ST. JEOR: SCORE: 1325.93

## 2021-06-23 NOTE — CONSULTS
Care Management Initial Consult    General Information  Assessment completed with: Patient,    Type of CM/SW Visit: Initial Assessment    Primary Care Provider verified and updated as needed: Yes   Readmission within the last 30 days:        Reason for Consult: care coordination/care conference, discharge planning  Advance Care Planning:            Communication Assessment  Patient's communication style: spoken language (English or Bilingual)    Hearing Difficulty or Deaf: no   Wear Glasses or Blind: yes    Cognitive  Cognitive/Neuro/Behavioral: WDL                      Living Environment:   People in home: spouse     Current living Arrangements: condominium      Able to return to prior arrangements: yes       Family/Social Support:  Care provided by: self, spouse/significant other  Provides care for: no one  Marital Status:   Significant Other, Children       Vadim  Description of Support System:   x4 supportive children. 3 living in [place] and she sees regularly        Current Resources:   Patient receiving home care services: Yes  Skilled Home Care Services: Skilled Nursing([facility] RN )  Community Resources: Home Care  Equipment currently used at home: cane, straight, walker, rolling, walker, standard  Supplies currently used at home:      Employment/Financial:  Employment Status: retired        Financial Concerns: No concerns identified       Lifestyle & Psychosocial Needs:        Socioeconomic History     Marital status:      Spouse name: Not on file     Number of children: Not on file     Years of education: Not on file     Highest education level: Not on file     Tobacco Use     Smoking status: Former Smoker   Substance and Sexual Activity     Alcohol use: Yes     Comment: 2 drinks per week     Drug use: No       Functional Status:  Prior to admission patient needed assistance:   Dependent ADLs:: Ambulation-walker       Mental Health Status:  Mental Health Status: No Current Concerns       Chemical  Dependency Status:  Chemical Dependency Status: No Current Concerns             Values/Beliefs:  Spiritual, Cultural Beliefs, Anglican Practices, Values that affect care: no               Additional Information:  Patient identifies with high unplanned readmission risk. Met with patient at bedside and spoke with her daughter, Rachele, via phone. She lives in [facility]  with her , no stairs, elevator accessible building. Patient is currently open with [facility]  HC RN SW HHA services. States she ambulates with 4ww, has handbars and reg walker at home. States she is independent with meals and HHA assisting with baths. She has supportive children, who visit regularly. Daughter states patient can benefit from reminders to take medications. Information discussed on medication dispensing machines or phone programs that can provide medication reminders. Pt would like to discharge home with resumption of HC services.     Patient planning to discharge home. Will need resumption of Home care services RN SW HHA.     Family plans to provide transportation.      Pt has upcoming appointment tomorrow, 6/24, establishing care with [facility] PCP, Dr. Aida Suresh.  Appointment rescheduled for Thursday, July 1st at 2:25 pm with Dr. Connie Suresh.     Ayla Brooks RN Case Manager  Inpatient Care Coordination   [facility]   [phone]      Ayla Brooks RN

## 2021-06-23 NOTE — PHARMACY-ADMISSION MEDICATION HISTORY
Admission medication history interview status for this patient is complete. See [facility] admission navigator for allergy information, prior to admission medications and immunization status.     Medication history interview done, indicate source(s): Patient  Medication history resources (including written lists, pill bottles, clinic record): Patient's own med list, [employer] dispensing records, previous Med Rec notes  Pharmacy: [id number] - [place], [place] - [address] [place] [facility] [place] [address]      Changes made to PTA medication list:  Added: diazepam   Deleted: none  Changed:   - Buspirone BID PRN to BID  - Melatonin scheduled to PRN  - Updated instruction for Eliquis    Actions taken by pharmacist (provider contacted, etc): left a sticky note for MD     Additional medication history information:  - Per patient, the 3 medications for her rheumatoid arthritis (alendronate, methotrexate, and prednisone) were on hold since beginning of June 2021.  - The current medication list was reconciled against the one brought in by patient.     Medication reconciliation/reorder completed by provider prior to medication history?  N      Prior to Admission medications    Medication Sig Last Dose Taking? Auth Provider   acetaminophen (TYLENOL) 500 MG tablet Take 500-1,000 mg by mouth 3 times daily as needed for mild pain   at PRN Yes Unknown, Entered By History   albuterol (PROAIR HFA/PROVENTIL HFA/VENTOLIN HFA) 108 (90 Base) MCG/ACT inhaler Inhale 2 puffs into the lungs every 4 hours as needed for shortness of breath / dyspnea or wheezing  at PRN Yes Emely Macdonald MD   amLODIPine (NORVASC) 5 MG tablet Take 5 mg by mouth daily 6/22/2021 at AM Yes Unknown, Entered By History   apixaban ANTICOAGULANT (ELIQUIS) 5 MG tablet Take 10 mg by mouth twice daily x 7 days (last dose will be 5/26 morning dose). Then starting on 5/27/21 start taking 5 mg by mouth twice daily.  Patient taking differently: 5 mg  by mouth twice daily 6/22/2021 at Unknown time Yes Nora Biswas APRN CNP   atorvastatin (LIPITOR) 40 MG tablet Take 40 mg by mouth daily 6/22/2021 at AM Yes Unknown, Entered By History   BACILLUS COAGULANS-INULIN PO Take 1 capsule by mouth daily 6/22/2021 at Unknown time Yes Reported, Patient   busPIRone (BUSPAR) 5 MG tablet Take 5 mg by mouth 2 times daily  6/22/2021 at Unknown time Yes Unknown, Entered By History   Calcium Carb-Cholecalciferol 500-200 MG-UNIT PACK Take 1 tablet by mouth daily  6/22/2021 at Unknown time Yes Reported, Patient   cetirizine (ZYRTEC) 10 MG tablet Take 10 mg by mouth daily 6/22/2021 at Unknown time Yes Unknown, Entered By History   citalopram (CELEXA) 40 MG tablet Take 40 mg by mouth daily 6/22/2021 at Unknown time Yes Unknown, Entered By History   cyanocobalamin (CYANOCOBALAMIN) 1000 MCG/ML injection Inject 1 mL into the muscle every 30 days Past Month at Unknown time Yes Unknown, Entered By History   diazepam (VALIUM) 5 MG tablet Take 1 tablet by mouth one time for 1 dose prior to dental work  at PRN prior to dental work Yes Unknown, Entered By History   diclofenac (VOLTAREN) 1 % topical gel Apply 4 g topically 4 times daily as needed for moderate pain Plus 2 g to hands tid  at PRN Yes Unknown, Entered By History   donepezil (ARICEPT) 5 MG tablet Take 5 mg by mouth daily before breakfast  6/22/2021 at Unknown time Yes Unknown, Entered By History   EPINEPHrine (EPIPEN) 0.3 MG/0.3ML injection Inject 0.3 mLs (0.3 mg) into the muscle once as needed for anaphylaxis  at PRN Yes Trierweiler, Chad A, MD   folic acid (FOLVITE) 1 MG tablet Take 1 mg by mouth 6 times a week on M,T,Th, Fr, Sat,Sun (NOT WED) 6/22/2021 at Unknown time Yes Unknown, Entered By History   gabapentin (NEURONTIN) 300 MG capsule Take 600 mg by mouth At Bedtime 6/21/2021 at HS Yes Unknown, Entered By History   guaiFENesin (ROBITUSSIN) 100 MG/5ML liquid Take 10 mLs by mouth every 4 hours as needed for cough   at  prn Yes Unknown, Entered By History   melatonin 3 MG tablet Take 1 tablet (3 mg) by mouth At Bedtime  Patient taking differently: Take 3 mg by mouth nightly as needed   at PRN Yes Nora Biswas APRN CNP   metoprolol succinate ER (TOPROL-XL) 50 MG 24 hr tablet Take 0.5 tablets (25 mg) by mouth daily 6/22/2021 at Unknown time Yes Emely Macdonald MD   nystatin (MYCOSTATIN) 547070 UNIT/GM external powder Apply topically 2 times daily Apply to affected area topically BID for redness 6/22/2021 at Unknown time Yes Reported, Patient   omeprazole (PRILOSEC) 20 MG DR capsule Take 20 mg by mouth daily 6/22/2021 at Unknown time Yes Unknown, Entered By History   sennosides (SENOKOT) 8.6 MG tablet Take 1 tablet by mouth 2 times daily as needed   Yes Reported, Patient   Vitamin D3 (CHOLECALCIFEROL) 25 mcg (1000 units) tablet Take 100 mcg by mouth daily  6/22/2021 at Unknown time Yes Unknown, Entered By History   alendronate (FOSAMAX) 70 MG tablet Take 70 mg by mouth once a week On Mondays (ON HOLD)  at NOT TAKING; ON HOLD FOR ABOUT A MONTH  Unknown, Entered By History   methotrexate 2.5 MG tablet Take 12.5 mg by mouth Every Wednesday AM  (ON HOLD)  at NOT TAKING; ON HOLD FOR ABOUT A MONTH  Unknown, Entered By History   methotrexate 2.5 MG tablet Take 10 mg by mouth Every Wednesday PM (ON HOLD)  at NOT TAKING; ON HOLD FOR ABOUT A MONTH  Unknown, Entered By History   polyethylene glycol (MIRALAX) 17 g packet Take 1 packet by mouth 2 times daily as needed for constipation   Reported, Patient   predniSONE (DELTASONE) 5 MG tablet Take 1 tablet (5 mg) by mouth daily  at NOT TAKING; ON HOLD FOR ABOUT A MONTH  Emely Macdonald MD

## 2021-06-23 NOTE — PLAN OF CARE
Pt admitted to 6th floor at 0135. Oriented to room and call light. Alert and oriented. Up with SBA and walker. LS clear, BS present, passing flatus. Abdominal scarring from previous surgery. Pt mentions that whenever she tried to drink or eat anything she felt like it was stuck in her throat. Pt NPO since admission. IV infusing NS@75/hr. Metoprolol (scheduled) given X 1. Voiding adequately. No nausea. Glucose checks were 107, 94 - note left on chart to address glucose checks. Plan is for a GI consult today.

## 2021-06-23 NOTE — ED PROVIDER NOTES
History   Chief Complaint:  Vomiting      HPI   Alma Rosa Fishman is a 80 year old female, who is taking Eliquis, with history of gastric bypass, appendectomy, cholecystectomy, GI hemorrhage, hypertension, hyperlipidemia, and type II diabetes who presents with nausea and vomiting. The patient reports that she has not been able to eat and drink since last night. She tried to eat jello last night and threw that up. She tried V8 juice this morning and threw up. She ate this evening and threw up. She notes that her pills get caught. She reports esophageal pain. She notes that talking makes her nauseous. She denies a fever, cough, wheezing, shortness of breath, and dysuria. She notes a stuffy nose. Her  notes that she has had chills for weeks. She notes diarrhea and notes that she has had it since her bypass surgery. She recently had small bowel obstruction surgery and had her spleen out. She notes a recent blood clots and is taking Eliquis. She is scheduled for an EGD next week.     Review of Systems   Constitutional: Positive for chills. Negative for fever.   HENT: Positive for congestion.    Respiratory: Negative for cough, shortness of breath and wheezing.    Gastrointestinal: Positive for diarrhea, nausea and vomiting.   Genitourinary: Negative for dysuria.   All other systems reviewed and are negative.    Allergies:  Acetaminophen  Atenolol   Iodinated contrast media   Codeine  Darvocet   Propoxyphene N-Apap  Lisinopril  Metformin Hydrochloride  Morphine  Neomycin-bacitracin-polymyxin  Penicillins  Percocet   Oxycodone-Acetaminophen  Contrast Dye  Fluticasone-Salmeterol   Albuterol  Doxycycline     Medications:  Senna   Xanax   Valium   Diphenhydramine   Gabapentin   Xarelto   Bactrim   Ultram  Trimpex  Tylenol   Fosamax   Norvasc   Eliquis   Lipitor   Buspar   Zyrtec  Celexa  Aricept   Robitussin   Methotrexate   Toprol-XL  Omeprazole   Miralax   Prednisone    Senokot   Melatonin  Motrin     Past Medical  "History:    Calculus of kidney   Depressive disorder   Disorder of bone and cartilage  Esophageal reflux   Hypertension  Iron deficiency anemia  Nonsenile cataract   Hyperlipidemia    Extrapyramidal disease and abnormal movement disorder   Sleep disturbances   Shortness of breath   Syncope and collapse   Type II diabetes mellitus  Asthma   Gastritis and gastroduodenitis without mention of hemorrhage   Acute respiratory failure with hypoxia   COVID-19   Pernicious anemia   Disorder of bone and cartilage   Hypocalcemia   Hypokalemia   Hyponatremia   SBO  Gastric bypass  Anxiety   Arthritis   B-complex deficiencies   Factor V Leiden   CKD  Hyperlipidemia  Memory loss   Raynaud's syndrome     Past Surgical History:    Appendectomy   Cholecystectomy   Tonsillectomy   Hysterectomy   Knee replacement   Bladder repair   Eye surgery  Gi surgery   Gyn surgery  Orthopedic surgery  Gastric bypass surgery  Left ankle procedure  Colonoscopy  Bilateral TKA   SUSIE/BSO  Bilateral carpal tunnel repair      Family History:    MI  Blood and bone cancer   Stomach aneurysm   Hypertension  Type 2 diabetes     Social History:  The patient presents with her spouse. Primary care with Dr. Gabbie Whitman.     Physical Exam     Patient Vitals for the past 24 hrs:   BP Temp Temp src Pulse Resp SpO2 Height Weight   06/22/21 2215 (!) 166/86 -- -- 68 -- 100 % -- --   06/22/21 2044 (!) 146/78 98.3  F (36.8  C) Oral 78 18 100 % 1.6 m (5' 3\") 87.5 kg (193 lb)     Physical Exam  Vitals signs and nursing note reviewed.   Constitutional:       General: She is not in acute distress.  HENT:      Head: Normocephalic and atraumatic.   Eyes:      General: No scleral icterus.     Extraocular Movements: Extraocular movements intact.   Cardiovascular:      Rate and Rhythm: Normal rate and regular rhythm.      Pulses: Normal pulses.      Heart sounds: Normal heart sounds.   Pulmonary:      Effort: Pulmonary effort is normal. No respiratory distress.      Breath " sounds: Normal breath sounds.   Abdominal:      General: Bowel sounds are normal. There is distension.      Palpations: Abdomen is soft.      Tenderness: There is abdominal tenderness.   Musculoskeletal:         General: No tenderness.   Skin:     General: Skin is warm.      Findings: No rash.   Neurological:      Mental Status: She is alert.       Emergency Department Course     Imaging:  Abdo/pelvis CT  w/o IV contrast - stone protcol:   1.  Status post gastric bypass surgery. Dilated loops of small bowel proximal to the Rasheed-en-Y anastomosis with distended stomach, query partial versus developing obstruction versus postsurgical change.  2.  Diverticulosis. No diverticulitis, colitis, or obstruction. Status post appendectomy.  3.  Status post cholecystectomy and hysterectomy.  4.  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   as per radiology.    Laboratory:   CBC: WBC 11.7 (H), HGB 10.2 (L),    CMP: Glucose 107 (H), Chloride: 111 (H), GFR: 56 (L), Albumin: 3.1 (L), Protein Total: 6.4 (L), o/w WNL (Creatinine: 0.96)  Lipase: 127    Emergency Department Course:    Reviewed:  I reviewed nursing notes, vitals, past medical history and care everywhere    Assessments:  2152 I obtained history and examined the patient as noted above.   2325 I rechecked the patient and explained findings.     Consults:   2349 I spoke with Dr. Mujica of the hospitalist services regarding admission at [facility].     Interventions:  2211 Zofran 4 mg, IV    Disposition:  The patient was transferred to [facility] via EMS. Dr. Mujica accepted the patient for transfer.       Impression & Plan   Medical Decision Making:  She presents for evaluation of throat problems, nausea/vomiting. She initially describes findings most c/w esophogeal obstruction/food impaction. She does have a history of gastric bypass and there is consideration for bowel obstruction as well. Given her lack of PO tolerance for 24 hours, CT imaging  appears reasonable for evaluation of an acute process warranting intervention. She demonstrates findings of possible early bowel obstruction versus developing obstruction versus post operative changes. Clinically there is consideration for SBO prompting decision to admit at this time. Her symptoms initially did appear most c/w esophogeal obstruction however this may be 2/2 her SBO. Patient admitted to [facility] for further care due to lack of bed capacity at this facility.       Covid-19  Alma Rosa Fishman was evaluated during a global COVID-19 pandemic, which necessitated consideration that the patient might be at risk for infection with the SARS-CoV-2 virus that causes COVID-19.   Applicable protocols for evaluation were followed during the patient's care.   COVID-19 was considered as part of the patient's evaluation. The plan for testing is:  a test was obtained during this visit.    Diagnosis:    ICD-10-CM    1. Small bowel obstruction (H)  K56.609        Scribe Disclosure:  I, Claudia Montano, am serving as a scribe at 9:46 PM on 6/22/2021 to document services personally performed by Zeferino Crowder based on my observations and the provider's statements to me.        Zeferino Crowder PA-C  06/23/21 0012    Space became available here and patient admitted for further cares at this facility.      Zeferino Crowder PA-C  06/23/21 0052
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
## 2021-06-23 NOTE — CONSULTS
"CLINICAL NUTRITION SERVICES  -  ASSESSMENT NOTE      MALNUTRITION:  % Weight Loss:  > 7.5% in 3 months (severe malnutrition)  % Intake:  </= 75% for >/= 1 month (severe malnutrition)  Subcutaneous Fat Loss:  Orbital region mild depletion and Upper arm region moderate depletion  Muscle Loss:  Temporal region mild to moderate depletion, Clavicle bone region moderate depletion and Acromion bone region moderate depletion --> did not observe LEs today  Fluid Retention:  None noted or documented    Malnutrition Diagnosis: Severe malnutrition  In Context of:  Acute illness or injury with underlying chronic illness or disease        REASON FOR ASSESSMENT  Alma Rosa Fishman is a 80 year old female seen by Registered Dietitian for Admission Nutrition Risk Screen for positive.    PMH of: DMII, RA, depression, anxiety, remote history of gastric bypass, COVID recovered, dementia, recent SBO s/p exploratory lap.    Admit 2/2: Difficulty swallowing.      NUTRITION HISTORY  - Information obtained from patient and chart.  Some initial documentation that patient is poor historian or forgetful w/ dementia hx, per this writer's discussion with RN, patient is able to give a meaningful nutrition history (and hx provided matches wt hx available).    - Resides with  in home setting.    - Diet at home: Regular reported.  Used to eat meals TID + 1 bedtime snack, now eating 2 \"good meals\" daily, though has been unable to tolerate d/t dysphagia over the past few weeks and is losing weight as below.    - Barriers to PO intakes: Dysphagia, fear of eating, ?baseline dentition.  - Use of oral supplements: None PTA.    - Allergies: NKFA.      CURRENT NUTRITION ORDERS  Diet Order:     Mechanical soft    Current Intake/Tolerance:  Previously NPO with GI consulted - refer to note regarding differential diagnosis of cervical dysphagia and regurgitation (esophagitis vs stricture vs narrowing of gastrojejunal anastomosis).  Recommended EGD on " "Friday and esophagram, SLP involvement - showed esophageal dysmotility with possible VFSS pending.  Per discussion with SLP, current diet is d/t poor dentition (many missing teeth).  Helped patient to order a lunch meal and she wants to receive Ensure (chocolate) during admit as an added protein/calorie source.      NUTRITION FOCUSED PHYSICAL ASSESSMENT FOR DIAGNOSING MALNUTRITION)  Yes     Obtained from Chart/Interdisciplinary Team:  - No documentation of PI  - Stooling patterns reviewed    ANTHROPOMETRICS  Height: 5' 3\"  Weight: 193 lbs 4.8 oz  Body mass index is 34.24 kg/m .  Weight Status:  Obesity Grade I BMI 30-34.9  Weight History:  Wt Readings from Last 10 Encounters:   06/23/21 87.7 kg (193 lb 4.8 oz)   05/21/21 98.3 kg (216 lb 12.8 oz)   05/20/21 98.3 kg (216 lb 12.8 oz)   05/18/21 98.2 kg (216 lb 9.6 oz)   05/12/21 98 kg (216 lb)   04/19/21 94.6 kg (208 lb 9.6 oz)   08/09/04 110.7 kg (244 lb)   05/20/04 112 kg (247 lb)   03/15/04 113.4 kg (250 lb)   02/09/04 111.1 kg (245 lb)     - At least 7.5% wt loss in the past 2 months?  Unclear if wt of 208# vs 216# is more accurate, as well as a little unclear timeframe.  - Patient herself reports she weighed 213# prior to her surgery in early May.  Would indicate 9% wt loss in <2 month.    LABS  Labs reviewed:  Electrolytes  Potassium (mmol/L)   Date Value   06/22/2021 4.4   06/10/2021 4.1   06/09/2021 4.3    Blood Glucose  Glucose (mg/dL)   Date Value   06/22/2021 107 (H)   06/10/2021 108 (H)   06/09/2021 91   05/13/2021 88   05/05/2021 116 (H)     Hemoglobin A1C (%)   Date Value   04/19/2021 5.7 (H)   08/06/2004 6.5 (H)   05/22/2004 6.7 (H)   01/09/2004 6.3 (H)   08/18/2003 6.2 (H)    Inflammatory Markers  CRP Inflammation (mg/L)   Date Value   04/19/2021 <2.9     WBC (10e9/L)   Date Value   06/22/2021 11.7 (H)   06/10/2021 10.9   06/09/2021 11.1 (H)     Albumin (g/dL)   Date Value   06/22/2021 3.1 (L)   05/13/2021 2.6 (L)   05/05/2021 2.9 (L)      Sodium " "(mmol/L)   Date Value   06/22/2021 142   06/10/2021 141   06/09/2021 143    Renal  Urea Nitrogen (mg/dL)   Date Value   06/22/2021 11   06/10/2021 16   06/09/2021 14     Creatinine (mg/dL)   Date Value   06/22/2021 0.96   06/10/2021 1.06 (H)   06/09/2021 1.07 (H)     Additional  Triglycerides (mg/dL)   Date Value   05/22/2004 108   01/09/2004 79   04/14/2003 73     Ketones Urine (mg/dL)   Date Value   06/09/2021 Negative        B/P: 167/59[R arm[, T: 98, P: 65, R: 18      MEDICATIONS  Medications reviewed:    metoprolol  5 mg Intravenous BID     pantoprazole (PROTONIX) IV  40 mg Intravenous Daily with breakfast     sodium chloride (PF)  3 mL Intracatheter Q8H        - MEDICATION INSTRUCTIONS -       sodium chloride 75 mL/hr at 06/23/21 0230          ASSESSED NUTRITION NEEDS PER APPROVED PRACTICE GUIDELINES:    Dosing Weight 88 kg   Estimated Energy Needs: 20-25 Kcal/Kg  Justification: maintenance  Estimated Protein Needs: 1-1.2 g pro/Kg  Justification: Repletion and preservation of lean body mass  Estimated Fluid Needs: per MD      NUTRITION DIAGNOSIS:  Inadequate oral intake related to previous acute illness requiring hospital admit (SBO) and new dysphagia with fear of eating as evidenced by meeting <75% needs in the past ~2 months PTA, 9% wt loss during this time, fat/muscle loss and coding of malnutrition.    NUTRITION INTERVENTIONS  Recommendations / Nutrition Prescription  Diet per GI/SLP.    Add chocolate Ensure at 2 pm.       Implementation  Nutrition education: Provided education on above.  Patient with many questions on what her diet will be and if she should be on soft/low fiber \"forever\".  Deferred diet recs to above teams and encouraged her likely more to come after potential VFSS tomorrow.      Medical Food Supplement: As above.     Collaboration and Referral of Nutrition care: Discussed POC with RN.    Nutrition Goals  Patient to consume at least 50-75% of meals or supplements TID while etiology of " dysphagia determined.        MONITORING AND EVALUATION:  Progress towards goals will be monitored and evaluated per protocol and Practice Guidelines          Inna Mosher RDN, LD  Clinical Dietitian  3rd floor/ICU: [phone]  All other floors: [phone]  Weekend/holiday: [phone]

## 2021-06-23 NOTE — ED TRIAGE NOTES
Here for n/v started yesterday evening, unable to keep anything down. Stated history of SBO and blood clots, currently eliquis. Has EGD schedule in about 1 week. ABCs intact.

## 2021-06-23 NOTE — PROGRESS NOTES
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 bowel distention at 30  minutes post contrast ingestion. No definite reflux into the native  stomach.      Impression    IMPRESSION:  Postoperative changes from gastric bypass surgery. No  evidence of obstruction. Esophageal dysmotility. No constricting or  obstructing esophageal lesions.    GIGI CABELLO MD

## 2021-06-23 NOTE — H&P
Admitted: 06/22/2021    PRESENTING COMPLAINT:  Pills getting stuck in her throat.    HISTORY OF PRESENT ILLNESS:  Ms. Fishman is a 80-year-old female.  She has a past medical history of type 2 diabetes diet-controlled, rheumatoid arthritis, hyperlipidemia, depression with anxiety, hypertension, remote history of a gastric bypass surgery.    Her most pertinent recent past medical history is that of a small-bowel obstruction for which she originally presented to our facility but had to be transferred to [facility] due to lack of bed availability.  She ultimately was taken to the OR a couple of days later and was found to have dense adhesions, at which time her exploratory laparotomy was converted to an open laparotomy.  They did extensive lysis of adhesions at that time.  They noted that her spleen was extremely friable and appeared to be fractured in multiple places with extensive surface bleeding and had to go ahead and complete a splenectomy.  Of note, pathology showed a ruptured splenic capsule.  Etiology for that is unclear.    On the day of that discharge, which was 05/11/2021, she began complaining of throat swelling and difficulty swallowing.  She had a CT of her neck that showed some mild swelling in the supraglottic area, and the on-call ENT physician felt that the edema was likely esophageal.  She was given a dose of dexamethasone and discharged home.    Since that time, she has had mostly difficulty in her ability to take capsules.  She feels like it catches, and it seems like it is higher up in her throat.  She actually went to the ER on 06/10/2021 with similar complaints, at which time a chest x-ray was obtained and what she describes as a pleural effusion that needed to be drained, and they referred her back down here but discharged her without management.    She comes in here, and she states that her biggest issue is that she is having worsening swallowing problems.  She states that 2 nights  ago, she was eating jello with fruit, and she could not swallow it.  It basically got stuck in her throat, and she just regurgitated it out.  Then, yesterday morning she tried to drink some V8, and the exact same thing happened.  She did have some difficulty with pain with swallowing as well.  She clearly describes these as regurgitant events and not like a vomiting episode.  She recalls having something like this many, many years ago, but she cannot remember the details, but thinks she had an EGD, although cannot tell me if she had any type of dilation with that.    She otherwise does not have any abdominal pain.  She states that she never passes gas, or it is a very, rare and thinks she probably passed gas about a week ago.  Her last BM was loose and nonbloody somewhere around 1-2 days ago.    It has also come to light that postoperatively, she developed some right leg swelling and pain and was found to have a DVT for which she is on apixaban.    In the Emergency Room, her vital signs are notable for persistent hypertension in the 160s to 170s over 80s.  Her heart rates are in the 70s.  She is afebrile at 98.3, and her respiratory status is stable.  Laboratory evaluation shows pretty normal comprehensive metabolic panel, normal lipase.  CBC shows a mild leukocytosis with an absolute monocytosis.  COVID testing is negative, and CT of the abdomen and pelvis has been completed, and it shows prior bypass surgery with dilated loops of small bowel proximal to the Rasheed-en-Y anastomosis with distended stomach, and they wonder about a partial versus developing obstruction versus post-surgical change.    I am asked to admit Ms. Fishman for possible small-bowel obstruction.    Please note that the history is obtained in discussion with the ER provider, IRWIN Crowder,as well as the patient, with fair reliability.    PAST MEDICAL HISTORY:    1.  Type 2 diabetes, apparently diet-controlled.  2.  Rheumatoid  arthritis.  3.  Hypertension.  4.  Hyperlipidemia.  5.  Depression with anxiety.  6.  postoperative DVT on 05/20/2021.  7.  COVID positivity in 04/2021.    PAST SURGICAL HISTORY:    1.  Gastric bypass in the remote past.  2.  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    FAMILY HISTORY:  Reviewed and unremarkable, not pertinent to this admission.     SOCIAL HISTORY:  She is .  She lives with her  in a condo.  She ambulates with the use of a walker with a chair.  She does not smoke.  She drinks occasionally, and her code status is FULL, but she would not want to be hooked up to life-sustaining therapy without a chance of a meaningful recovery.    REVIEW OF SYSTEMS:  A 10-point review of systems is completed and is otherwise negative except as listed in the HPI.    MEDICATIONS:  This is strictly via [facility] Everywhere.  We are awaiting formal medication reconciliation with our pharmacist.  They appear to be:  1.  Alendronate 70 mg weekly.  2.  Amlodipine 5 mg p.o. every day.  3.  Atorvastatin 40 mg p.o. every day.  4.  Buspirone 5 mg p.o. b.i.d.  5.  Citalopram 40 mg p.o. every day.  6.  Donepezil 5 mg p.o. every day.  7.  Apixaban 5 mg p.o. b.i.d.  8.  Gabapentin 600 mg p.o. at bedtime.  9.  Methotrexate 2.5 mg b.i.d. on Wednesdays.  10.  Metoprolol 25 mg p.o. every day.  11.  Omeprazole 20 mg p.o. every day.  12.  P.r.n. albuterol inhaler.    ALLERGIES:    1.  PENICILLIN, WHICH CAUSES THROAT AND TONGUE SWELLING.  2.  PERCOCET CAUSES FACE, TONGUE AND THROAT SWELLING.  3.  DARVOCET CAUSES FACE, TONGUE AND THROAT SWELLING.  4.  VICODIN CAUSES THROAT AND TONGUE SWELLING.  5.  NEOSPORIN CAUSES HIVES.  6.  CONTRAST DYE CAUSES HIVES AND THROAT SWELLING.  7.  MORPHINE CAUSES NAUSEA.  8.  DOXYCYCLINE CAUSES TONGUE SWELLING.  9.  LISINOPRIL CAUSES SHORTNESS OF BREATH.  10.  PRINIVIL CAUSES TONGUE SWELLING.    PHYSICAL  EXAMINATION:    VITAL SIGNS:  Blood pressures are in the 170s/80s, heart rates in the 70s.  She is afebrile at 98.3.  respiratory rate is 18, and saturations are 100% on room air.  GENERAL:  This is a very pleasant female.  She is in no acute distress.  HEENT:  Head is normocephalic, atraumatic, and sclerae are clear.  She looks like she has got bilateral cataracts to me.  Memory is slightly slow during my interview and examination.  Oropharynx shows mildly dry mucous membranes without posterior pharyngeal erythema or exudate.    NECK:  Supple.  LUNGS:  Clear to auscultation.  She exhibits normal respiratory effort.  HEART:  Regular rate and rhythm without murmurs, rubs or gallops, and there is no lower extremity edema.    ABDOMEN:  Her belly is soft and nontender to palpation with some mild distention.  Bowel sounds are active.  SKIN:  Warm and dry.  There is no cyanosis or clubbing.  NEUROLOGIC:  Cranial nerves II through XII are grossly intact, and she is moving all extremities.  Affect is appropriate.  She is alert and oriented.    LABORATORY EVALUATION:  CMP is essentially within normal limits.  Lipase is 127.  CBC shows mild leukocytosis of 11.7 with a monocytosis.  Hemoglobin is low at 10.2.  Platelet count is 313.  COVID testing is negative.  CT of the abdomen is as described in HPI.    IMPRESSION AND PLAN:  Ms. Fishman is a 80-year-old female.  She has a past medical history of diet-controlled type 2 diabetes, hypertension, rheumatoid arthritis, depression with anxiety, COVID-19 infection in 04/2021 requiring brief hospitalization, remote history of gastric bypass surgery.    Most recently, she presented to this facility with a small-bowel obstruction in 05/2021 and had to be transferred to [facility].  At that facility, she underwent exploratory laparoscopy that was converted to an open procedure due to need for lysis of dense adhesions, incidentally noted ruptured spleen that required splenectomy.   Postoperatively, she had suffered a deep venous thrombosis and is currently on anticoagulation with apixaban.    She comes to this facility with primary complaints of difficulty swallowing with throat pain that looks to have been present since discharge from [facility] back on 05/11/2021.  (Note:  ENT felt like it was most likely esophageal.)  She actually is set up for endoscopy the first week of 07/2021, but her symptoms were very discouraging.  She comes in with 2 episodes of regurgitation and inability to keep food down.  She is incidentally found to have some dilated loops of bowel that were concerning for the possibility of an evolving small-bowel obstruction, which I do not feel that is the case.    She is admitted for difficulty swallowing concern for evolving esophageal stenosis versus some other process.    1.  Difficulty swallowing:  Seems like it has been going on for a couple of months.  Her history, I think, is not clearly accurate.  At this point in time, we will admit her and keep her n.p.o. and ask for GI for possible endoscopy in the morning.  2.  Recent deep venous thrombosis:  On apixaban.  We will hold her apixaban for now in light of possible need for procedure and intervention tomorrow.  3.  Recent splenectomy:  She reports that she has been able to get all of the proper vaccines.  4.  Dementia, mild:  We will continue the patient on her typical donepezil once medication reconciliation is completed.  5.  Hypertension:  Per records, she appears to be on metoprolol 25 mg daily, amlodipine 5 mg daily.  Both should be resumed.  I will actually give her a dose of metoprolol now in light of her current hypertension.  6.  Hyperlipidemia:  Would resume her statin once medication reconciliation is complete.  7.  Depression with anxiety:  She appears to be on citalopram 40 mg daily along with buspirone 5 mg b.i.d., which should be resumed as soon as possible.  8.  Rheumatoid arthritis:  On methotrexate on  , and she will be due for that tomorrow.  9.  Leukocytosis with monocytosis, but no fevers or suggestion of mono per se, although she is having throat symptoms:  To be complete, we will check a Monospot test.  10.  Questionable small-bowel obstruction:  There is no evidence that this is a small-bowel obstruction.  To me, she has absolutely no symptoms.  Her belly exam is completely benign, and she has positive bowel sounds.  11.  Deep venous thrombosis prophylaxis:  She is already on apixaban.  12.  Code status:  FULL, but she would not want to be hooked up to life-sustaining therapy without a chance of meaningful recovery.  13.  Functional status:  She lives with her  in the [facility] and ambulates with the use of a wheeled walker with a seat.  14.  Disposition:  Admit to observation for difficulty swallowing.        Nette Bright MD        D: 2021   T: 2021   MT: [facility]    Name:     BRIAN JUNE  MRN:      [id number]        Account:     [id number]   :      1940           Admitted:    2021       Document: [id number]

## 2021-06-23 NOTE — ED NOTES
[facility]  ED Nurse Handoff Report    Alma Rosa Fishman is a 80 year old female   ED Chief complaint: Nausea & Vomiting  . ED Diagnosis:   Final diagnoses:   Small bowel obstruction (H)     Allergies:   Allergies   Allergen Reactions     Acetaminophen      Vicodin/Darvocet/Swelling throat     Codeine      Percocet/Swelling tongue     Darvocet [Propoxyphene N-Apap]      Lisinopril      Tongue Swelling     Metformin Hydrochloride      Glucophage caused severe diarrhea     Morphine      MISAEL     Penicillins      Swelling throat     Percocet [Oxycodone-Acetaminophen]      Contrast Dye Rash     Tolerated CT chest with contrast 6/2021 after pre-treatment with benadryl and solumedrol       Code Status: Full Code  Activity level - Baseline/Home:  Independent with walker. Activity Level - Current:   Stand by Assist with walker. Lift room needed: No. Bariatric: No   Needed: No   Isolation: No. Infection: Not Applicable.     Vital Signs:   Vitals:    06/23/21 0000 06/23/21 0015 06/23/21 0030 06/23/21 0045   BP: (!) 173/76  (!) 178/70    Pulse: 73  72    Resp:       Temp:       TempSrc:       SpO2: 98% 97% 97% 96%   Weight:       Height:           Cardiac Rhythm:  ,      Pain level:    Patient confused: No. Patient Falls Risk: Yes.   Elimination Status: Unable to void   Patient Report - Initial Complaint:   20:42 ED Triage Notes Filed Here for n/v started yesterday evening, unable to keep anything down. Stated history of SBO and blood clots, currently eliquis. Has EGD schedule in about 1 week. ABCs intact.     . Focused Assessment:    22:30 Gastrointestinal Gastrointestinal - Gastrointestinal WDL: .WDL except; nausea and vomiting  All Quadrants Abdominal Palpation: tender  Gastrointestinal Comment: nausea denies pain        Tests Performed: Labs, CT. Abnormal Results:   Abd/pelvis CT no contrast - Stone Protocol   Final Result   IMPRESSION:    1.  Status post gastric bypass surgery. Dilated loops of  small bowel proximal to the Rasheed-en-Y anastomosis with distended stomach, query partial versus developing obstruction versus postsurgical change.   2.  Diverticulosis. No diverticulitis, colitis, or obstruction. Status post appendectomy.   3.  Status post cholecystectomy and hysterectomy.   4.  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           Labs Ordered and Resulted from Time of ED Arrival Up to the Time of Departure from the ED   CBC WITH PLATELETS DIFFERENTIAL - Abnormal; Notable for the following components:       Result Value    WBC 11.7 (*)     RBC Count 3.65 (*)     Hemoglobin 10.2 (*)     Hematocrit 32.5 (*)     MCHC 31.4 (*)     RDW 17.7 (*)     Absolute Monocytes 1.8 (*)     All other components within normal limits   COMPREHENSIVE METABOLIC PANEL - Abnormal; Notable for the following components:    Chloride 111 (*)     Glucose 107 (*)     GFR Estimate 56 (*)     Albumin 3.1 (*)     Protein Total 6.4 (*)     All other components within normal limits   LIPASE   SARS-COV-2 (COVID-19) VIRUS RT-PCR     .   Treatments provided: See MAR  Family Comments: None  OBS brochure/video discussed/provided to patient:  N/A  ED Medications:   Medications   glucagon injection 1 mg (has no administration in time range)   ondansetron (ZOFRAN) injection 4 mg (4 mg Intravenous Given 6/22/21 2211)     Drips infusing:  No  For the majority of the shift, the patient's behavior Green.     Sepsis treatment initiated: No     Patient tested for COVID 19 prior to admission: Yes    ED Nurse Name/Phone Number: Hannah Baker RN,   12:52 AM    RECEIVING UNIT ED HANDOFF REVIEW    Above ED Nurse Handoff Report was reviewed: Yes  Reviewed by: Laura Holloway RN on June 23, 2021 at 1:22 AM

## 2021-06-23 NOTE — PROGRESS NOTES
[facility]  Patient is currently open to home care services with [facility]. The patient is currently receiving RN [facility] services.  [facility]  and team have been notified of patient admission.  [facility] liaison will continue to follow patient during stay.  If appropriate provide orders to resume home care at time of discharge.

## 2021-06-23 NOTE — H&P
H and P dicated    Admit with difficulty swallowing and things getting stuck in throat since SBO surgery with splenectomy 5/6/2021.  Progressed to point that she is now regurgitating things and can't swallow    Post op RLE DVT on abixaban (held for ? EGD in am)    Incidentally noted to have ? SBO vs post op changes related to prior Gastric bypass    Admit, NPO, IV meds and IVF, GI consult for possible EGD

## 2021-06-24 ENCOUNTER — APPOINTMENT (OUTPATIENT)
Dept: SPEECH THERAPY | Facility: CLINIC | Age: 81
DRG: 391 | End: 2021-06-24
Payer: MEDICARE

## 2021-06-24 ENCOUNTER — APPOINTMENT (OUTPATIENT)
Dept: SPEECH THERAPY | Facility: CLINIC | Age: 81
DRG: 391 | End: 2021-06-24
Attending: INTERNAL MEDICINE
Payer: MEDICARE

## 2021-06-24 ENCOUNTER — APPOINTMENT (OUTPATIENT)
Dept: GENERAL RADIOLOGY | Facility: CLINIC | Age: 81
DRG: 391 | End: 2021-06-24
Attending: INTERNAL MEDICINE
Payer: MEDICARE

## 2021-06-24 PROCEDURE — 92611 MOTION FLUOROSCOPY/SWALLOW: CPT | Mod: GN

## 2021-06-24 PROCEDURE — 250N000013 HC RX MED GY IP 250 OP 250 PS 637: Performed by: INTERNAL MEDICINE

## 2021-06-24 PROCEDURE — 120N000001 HC R&B MED SURG/OB

## 2021-06-24 PROCEDURE — 250N000012 HC RX MED GY IP 250 OP 636 PS 637: Performed by: INTERNAL MEDICINE

## 2021-06-24 PROCEDURE — 74230 X-RAY XM SWLNG FUNCJ C+: CPT

## 2021-06-24 PROCEDURE — 92610 EVALUATE SWALLOWING FUNCTION: CPT | Mod: GN | Performed by: SPEECH-LANGUAGE PATHOLOGIST

## 2021-06-24 PROCEDURE — 250N000011 HC RX IP 250 OP 636: Performed by: INTERNAL MEDICINE

## 2021-06-24 PROCEDURE — C9113 INJ PANTOPRAZOLE SODIUM, VIA: HCPCS | Performed by: INTERNAL MEDICINE

## 2021-06-24 PROCEDURE — 99232 SBSQ HOSP IP/OBS MODERATE 35: CPT | Performed by: INTERNAL MEDICINE

## 2021-06-24 RX ORDER — BARIUM SULFATE 400 MG/ML
SUSPENSION ORAL ONCE
Status: DISCONTINUED | OUTPATIENT
Start: 2021-06-24 | End: 2021-06-24 | Stop reason: CLARIF

## 2021-06-24 RX ORDER — PANTOPRAZOLE SODIUM 40 MG/1
40 TABLET, DELAYED RELEASE ORAL
Status: DISCONTINUED | OUTPATIENT
Start: 2021-06-25 | End: 2021-06-25 | Stop reason: HOSPADM

## 2021-06-24 RX ADMIN — PANTOPRAZOLE SODIUM 40 MG: 40 INJECTION, POWDER, FOR SOLUTION INTRAVENOUS at 07:45

## 2021-06-24 RX ADMIN — BUSPIRONE HYDROCHLORIDE 5 MG: 5 TABLET ORAL at 07:45

## 2021-06-24 RX ADMIN — CITALOPRAM HYDROBROMIDE 40 MG: 20 TABLET ORAL at 07:45

## 2021-06-24 RX ADMIN — PREDNISONE 5 MG: 5 TABLET ORAL at 07:45

## 2021-06-24 RX ADMIN — DONEPEZIL HYDROCHLORIDE 5 MG: 5 TABLET ORAL at 07:45

## 2021-06-24 RX ADMIN — AMLODIPINE BESYLATE 5 MG: 5 TABLET ORAL at 07:44

## 2021-06-24 RX ADMIN — METOPROLOL SUCCINATE 25 MG: 25 TABLET, FILM COATED, EXTENDED RELEASE ORAL at 08:21

## 2021-06-24 RX ADMIN — BUSPIRONE HYDROCHLORIDE 5 MG: 5 TABLET ORAL at 20:25

## 2021-06-24 RX ADMIN — GABAPENTIN 600 MG: 300 CAPSULE ORAL at 21:55

## 2021-06-24 ASSESSMENT — ACTIVITIES OF DAILY LIVING (ADL)
ADLS_ACUITY_SCORE: 16

## 2021-06-24 NOTE — PLAN OF CARE
High max SBPs 180s, gave BP oral meds, rechecked #s trended down.  US legs neg.  Mild interm. abd. discomfort & chronic back pain managed with repositions/rest.  No nausea, tolerated diet, speech consulted.  LS clear bilaterally, RA.  CMS+.  Up with SBA belt + walker, sat recliner, ambulating to bathroom.  Voiding.  GI following.

## 2021-06-24 NOTE — PROGRESS NOTES
"Video Fluoroscopic Swallow Study (VFSS)     06/24/21 9520   General Information   Onset of Illness/Injury or Date of Surgery 06/22/21   Referring Physician Dr. Nichols   Patient/Family Therapy Goal Statement (SLP) Figure out why I can't swallow pills.   Pertinent History of Current Problem Alma Rosa Fishman is a 80 year old female admitted on 6/22/2021 with complaints of regurgitating and dysphagia for solids that seems to be progressing.  Esophagram 6/23/21 \"Postoperative changes from gastric bypass surgery. No evidence of obstruction. Esophageal dysmotility. No constricting or obstructing esophageal lesions.\" VFSS for further assessment. Likely EGD tomorrow per GI.   General Observations alert, pleasant   Type of Evaluation   Type of Evaluation Swallow Evaluation   General Swallowing Observations   Swallowing Evaluation Videofluoroscopic swallow study (VFSS)   VFSS Evaluation   Radiologist Dr. Crane   Views Taken left lateral   Physical Location of Procedure [facility], Radiology Dept, Fluoroscopy Suite   VFSS Textures Trialed Thin Liquids;Purees;Solid  (barium tablet)   VFSS Eval: Thin Liquid Texture Trial   Mode of Presentation, Thin Liquid cup;straw;self-fed   Order of Presentation 1, 2, 3, 7 (8=barium tablet)   Preparatory Phase Poor bolus control   Oral Phase, Thin Liquid Premature pharyngeal entry  (piecemeal swallow)   Pharyngeal Phase, Thin Liquid WFL   Rosenbek's Penetration Aspiration Scale: Thin Liquid Trial Results 2 - contrast enters airway, remains above the vocal cords, no residue remains (penetration)   VFSS Evaluation: Puree Solid Texture Trial   Mode of Presentation, Puree self-fed   Order of Presentation 4   Preparatory Phase WFL   Oral Phase, Puree WFL  (piecemeal swallow)   Pharyngeal Phase, Puree WFL   Rosenbek's Penetration Aspiration Scale: Puree Food Trial Results 1 - no aspiration, contrast does not enter airway   VFSS Evaluation: Solid Food Texture Trial " "  Mode of Presentation, Solid self-fed   Order of Presentation 5, 6   Preparatory Phase WFL   Oral Phase, Solid WFL  (piecemeal swallow)   Pharyngeal Phase, Solid WFL   Rosenbek's Penetration Aspiration Scale: Solid Food Trial Results 1 - no aspiration, contrast does not enter airway   Esophageal Phase of Swallow   Patient reports or presents with symptoms of esophageal dysphagia Yes   Esophageal comments Esophagram was completed yesterday 6/23/21: \"Postoperative changes from gastric bypass surgery. No evidence of obstruction. Esophageal dysmotility. No constricting or obstructing esophageal lesions\"     On VFSS today: prominent cricopharyngeus, but it did not appear to impede bolus transfer into upper esophagus during this assessment; barium tablet caught at this level for 1-2 seconds but did transfer into esophagus with pt independent extra swallow.    Swallowing Recommendations   Diet Consistency Recommendations regular diet  (mechanical dental soft )   Supervision Level for Intake patient independent   Recommended Feeding/Eating Techniques (Swallow Eval) patient is independent, no specific recommendations   Medication Administration Recommendations, Swallowing (SLP) as tolerated   SLP Therapy Assessment/Plan   Criteria for Skilled Therapeutic Interventions Met (SLP Eval) no problems identified which require skilled intervention   SLP Diagnosis functional oropharyngeal swallow   Comment, Therapy Assessment/Plan (SLP) Pt presents with a functional oropharyngeal swallow on video fluoroscopic swallow study. Piecemeal swallows across consistencies (likely cause of the double swallow noted on clinical swallow evaluation this AM). Premature bolus spillage to the pyriform sinuses without significant delay with thin liquids. Base of tongue retraction, hyolaryngeal elevation/excursion, epiglottic inversion all WFL. Flash penetration with thin liquids (functional), no other pentration or aspiration noted. No significant " oropharyngeal bolus retention.     Pt does present with a prominent cricopharyngeus (see image below), but it did not appear to impede bolus transfer into upper esophagus during this assessment; barium tablet caught at this level for 1-2 seconds but did transfer into esophagus with pt independent extra swallow. Of note, pt with globus sensation pointing to level of sternal notch long following pill passage.            Therapy Plan Review/Discharge Plan (SLP)   Therapy Plan Review (SLP) evaluation/treatment results reviewed;participants included;patient   SLP Discharge Planning    SLP Discharge Recommendation (DC Rec) home   SLP Rationale for DC Rec defer to GI for further management of esophageal dysphagia as oropharyngeal swallow WFL.   SLP Brief overview of current status  Pt is appropriate for a regular/thin liquid diet from an oropharyngeal standpoint     Total Evaluation Time   Total Evaluation Time (Minutes) 25

## 2021-06-24 NOTE — PLAN OF CARE
NPO @ midnight, possible EGD tomorrow per GI.  Video study eval today, speech consulted.   Rajesh HRs lowest mid-50s, asymptomatic.  Denies any abd. pain, chr. lower back pain managed with rest, repositions & heat.  No nausea.  LS clear bilaterally, RA.  CMS+.  Up with SBA belt + walker, sat recliner, ambulating to bathroom.  Voiding.

## 2021-06-24 NOTE — PROGRESS NOTES
[facility]    Medicine Progress Note - Hospitalist Service       Date of Admission:  6/22/2021  Length of stay: 1 days    Assessment & Plan   Alma Rosa Fishman is a 80 year old female admitted on 6/22/2021 with complaints of regurgitating and dysphagia for solids that seems to be progressing.       Her most pertinent recent past medical history is that of a small-bowel obstruction for which she originally presented to our facility but had to be transferred to [facility] due to lack of bed availability.  She ultimately was taken to the OR a couple of days later and was found to have dense adhesions, at which time her exploratory laparotomy was converted to an open laparotomy.  They did extensive lysis of adhesions at that time.  They noted that her spleen was extremely friable and appeared to be fractured in multiple places with extensive surface bleeding and had to go ahead and complete a splenectomy.  Of note, pathology showed a ruptured splenic capsule.  Etiology for that is unclear.     On the day of that discharge, which was 05/11/2021, she began complaining of throat swelling and difficulty swallowing.  She had a CT of her neck that showed some mild swelling in the supraglottic area, and the on-call ENT physician felt that the edema was likely esophageal.  She was given a dose of dexamethasone and discharged home.     Since that time, she has had mostly difficulty in her ability to take capsules.  She feels like it catches, and it seems like it is higher up in her throat.  She actually went to the ER on 06/10/2021 with similar complaints, at which time a chest x-ray was obtained and what she describes as a pleural effusion that needed to be drained, and they referred her back down here but discharged her without management.  On admission here, GI and SLP were consulted.    Today  - Underwent upper GI with esophagram yesterday.  This showed esophageal dysmotility but no obvious obstruction  "or esophageal lesions.  - Next step is to have video swallow with SLP.    Dysphagia  - Appreciate GI and SLP consultations.  Symptoms seem to have started in the postoperative phase after her recent surgery.  Possibly related to intubation.  Does not seem to be esophageal origin at this point but await further work-up.  - Of note, patient has a hx of Rasheed-en-Y gastric bypass    Recent provoked DVT  - Records are not fully available.  Patient apparently was diagnosed with a DVT while in the nursing home after her hospitalization.  That would put the diagnosis somewhere between 5/11 and 5/22.  - Patient was on apixaban prior to admission, this is currently being held in case she needs an EGD.  - Would resume apixaban on discharge to complete three months of therapy     Other chronic conditions  Mild cognitive impairment-resume donepezil.  HTN-resume home medications of metoprolol and amlodipine.  Anxiety/depression-Resume BuSpar and Celexa.  Neuropathy-resume gabapentin.  GERD-resume Protonix.  RA-resume chronic prednisone 5 mg daily.    Diet: per SLP  DVT Prophylaxis: Enoxaparin (Lovenox) SQ  Malnutrition: Not yet assessed  Quiroz Catheter: No  Code Status: Full Code     Disposition Plan   Expected discharge:   Anticipate 1-2 more nights in the hospital.     Jamison Nichols MD  Hospitalist Service  [facility]  ______________________________________________________________________    Interval History   No acute overnight events.  Patient feels fine.  Seen with SLP who plans for video swallow.  No new issues.    Data reviewed today: I reviewed all medications, new labs and imaging results over the last 24 hours.     Physical Exam   /54 (BP Location: Right arm)   Pulse 58   Temp 97.3  F (36.3  C) (Temporal)   Resp 16   Ht 1.6 m (5' 3\")   Wt 87.7 kg (193 lb 4.8 oz)   SpO2 97%   BMI 34.24 kg/m    193 lbs 4.8 oz       General: Looks well, no distress.    HEENT: No scleral icterus. " Oropharynx moist.     Neck: Supple. Normal range of motion.     Pulmonary: Normal work of breathing. Clear to auscultation bilaterally.    Cardiovascular: Regular rate and rhythm without murmur or extra heart sounds.    Abdomen: Soft and non-tender.    Extremities: No peripheral edema. No clubbing or cyanosis.     Neurologic: Awake, alert, appropriate.    Skin: Warm and dry.    Psychiatric: Normal affect and mood.     Data    Recent Labs   Lab 06/22/21  2210   WBC 11.7*   HGB 10.2*   MCV 89         POTASSIUM 4.4   CHLORIDE 111*   CO2 24   BUN 11   CR 0.96   ANIONGAP 7   ELAN 8.9   *   ALBUMIN 3.1*   PROTTOTAL 6.4*   BILITOTAL 0.5   ALKPHOS 68   ALT 19   AST 23   LIPASE 127     Recent Results (from the past 24 hour(s))   US Lower Extremity Venous Duplex Bilateral    Narrative    VENOUS ULTRASOUND BOTH LEGS  6/23/2021 2:31 PM     HISTORY: No evidence for DVT.    COMPARISON: None.    FINDINGS:  Examination of the deep veins with graded compression and  color flow Doppler with spectral wave form analysis was performed.   There is no evidence for DVT in the lower extremities.      Impression    IMPRESSION: No evidence of deep venous thrombosis.    TANA MARIE MD   XR Esophagram w Upper GI    Narrative    ESOPHAGRAM WITH UPPER GI 6/23/2021 2:47 PM     HISTORY: Regurgitation of solids and liquids. Anticoagulated.     COMPARISON: CT chest/abdomen/pelvis 6/19/2021.     FLUOROSCOPY TIME: 1.8 minutes.    IMAGES: A total of 10 spot fluoro and/or cine loops are obtained  during exam.    FINDINGS:  Upper GI examination demonstrates a normal appearing  esophagus. There is no  hiatal hernia. No constricting or obstructing  lesions are evident. Esophageal motility is abnormal with poor primary  peristaltic wave. Contrast eventually passes with repeat swallowing.  Postoperative changes related to gastric bypass surgery. Gastric  remnant is unremarkable. Contrast readily flows into the Rasheed-en-Y  loop without  delay. No significant small bowel distention at 30  minutes post contrast ingestion. No definite reflux into the native  stomach.      Impression    IMPRESSION:  Postoperative changes from gastric bypass surgery. No  evidence of obstruction. Esophageal dysmotility. No constricting or  obstructing esophageal lesions.    GIGI CABELLO MD       Medications      Current Facility-Administered Medications   Medication Dose Route Frequency     amLODIPine  5 mg Oral Daily     busPIRone  5 mg Oral BID     citalopram  40 mg Oral Daily     donepezil  5 mg Oral QAM AC     enoxaparin ANTICOAGULANT  40 mg Subcutaneous Q24H     gabapentin  600 mg Oral At Bedtime     metoprolol succinate ER  25 mg Oral Daily     [START ON 6/25/2021] pantoprazole  40 mg Oral QAM AC     predniSONE  5 mg Oral Daily     sodium chloride (PF)  3 mL Intracatheter Q8H

## 2021-06-24 NOTE — PLAN OF CARE
Pt is alert and orient. V/S WDL. Slept throughout the night. Denies pain and nausea, tolerating soft diet okay. Up with 1 assist with walker. Whole pills with applesauce. SLP consult pending.

## 2021-06-24 NOTE — PROGRESS NOTES
"GASTROENTEROLOGY PROGRESS NOTE        SUBJECTIVE:  Continues to report dysphagia and regurgitation.  No nausea or vomiting.  No abdominal pain.  No melena.     OBJECTIVE:    /54 (BP Location: Right arm)   Pulse 58   Temp 97.3  F (36.3  C) (Temporal)   Resp 16   Ht 1.6 m (5' 3\")   Wt 87.7 kg (193 lb 4.8 oz)   SpO2 97%   BMI 34.24 kg/m    Temp (24hrs), Av.1  F (36.7  C), Min:97.3  F (36.3  C), Max:98.5  F (36.9  C)    Patient Vitals for the past 72 hrs:   Weight   21 0135 87.7 kg (193 lb 4.8 oz)   21 2044 87.5 kg (193 lb)       Intake/Output Summary (Last 24 hours) at 2021 1150  Last data filed at 2021 0916  Gross per 24 hour   Intake 1378 ml   Output --   Net 1378 ml        PHYSICAL EXAM     Constitutional: NAD    Abdomen: soft, NTTP         Additional Comments:  ROS, FH, SH: See initial GI consult for details.     I have reviewed the patient's new clinical lab results:     Recent Labs   Lab Test 06/22/21  2210 06/10/21  0953 21  1219   WBC 11.7* 10.9 11.1*   HGB 10.2* 9.2* 9.9*   MCV 89 95 95    331 331     Recent Labs   Lab Test 06/22/21  2210 06/10/21  0953 21  1219   POTASSIUM 4.4 4.1 4.3   CHLORIDE 111* 111* 112*   CO2 24 24 26   BUN 11 16 14   ANIONGAP 7 6 5     Recent Labs   Lab Test 21  1450 21  1855 21  0005 21  1420   ALBUMIN 3.1*  --  2.6* 2.9*  --  3.1*   BILITOTAL 0.5  --  0.9 0.7  --  1.0   ALT 19  --  40 37  --  37   AST 23  --  28 23  --  29   PROTEIN  --  10*  --   --  Negative  --    LIPASE 127  --   --  279  --  84        ASSESSMENT/ PLAN  Alma Rosa Fishman is a 80-year-old female with cervical dysphagia and regurgitation.      1. Dysphagia :  Esophagram with upper GI completed yesterday which reveals esophageal dysmotility.  There is no evidence of obstruction or constricting esophageal lesions.  Normal Rasheed-en-Y gastric bypass anatomy.  Awaiting video swallow study with speech pathology " this afternoon.  The tentative plan was to hold Eliquis for 3 days, this would be tomorrow, could then proceed with EGD.     -- We will await swallow study this afternoon  -- We will evaluate tomorrow morning for potential EGD tomorrow.  I do note that the patient is receiving DVT prophylaxis with Lovenox.  Eliquis has been held.  -- Continue with IV Protonix.  -- N.p.o. at midnight.     Discussed with Dr. Rama Poole PA-C  [facility] ( [facility])

## 2021-06-24 NOTE — PROGRESS NOTES
Clinical Swallow Evaluation  [facility]  06/24/21 1043   General Information   Onset of Illness/Injury or Date of Surgery 06/22/21   Referring Physician Dr. Polo   Patient/Family Therapy Goal Statement (SLP) Figure out why I can't swallow pills.   Pertinent History of Current Problem Per H&P: Ms. Fishman is a 80-year-old female.  She has a past medical history of type 2 diabetes diet-controlled, rheumatoid arthritis, hyperlipidemia, depression with anxiety, hypertension, remote history of a gastric bypass surgery.   General Observations Alert and cooperative.   Past History of Dysphagia Difficutly since recent SBO surgery, specifically trouble with pills and feeling of food sticking and regurgitation.   Type of Evaluation   Type of Evaluation Swallow Evaluation   Oral Motor   Oral Musculature generally intact   Structural Abnormalities none present   Mucosal Quality good   Dentition (Oral Motor)   Dentition (Oral Motor) natural dentition  (missing many upper and lower teeth; upcoming partials)   Facial Symmetry (Oral Motor)   Facial Symmetry (Oral Motor) WNL   Lip Function (Oral Motor)   Lip Range of Motion (Oral Motor) WNL   Lip Strength (Oral Motor) WNL   Tongue Function (Oral Motor)   Tongue ROM (Oral Motor) WNL   Tongue Strength (Oral Motor) WNL   Tongue Coordination/Speed (Oral Motor) WNL   Jaw Function (Oral Motor)   Jaw Function (Oral Motor) WNL   Cough/Swallow/Gag Reflex (Oral Motor)   Volitional Throat Clear/Cough (Oral Motor) WNL   Volitional Swallow (Oral Motor) WNL   Vocal Quality/Secretion Management (Oral Motor)   Vocal Quality (Oral Motor) WNL   Secretion Management (Oral Motor) WNL   General Swallowing Observations   Current Diet/Method of Nutritional Intake (General Swallowing Observations, NIS)   (mechanical/dental soft with thin)   Respiratory Support (General Swallowing Observations) none   Swallowing Evaluation Clinical swallow evaluation   Clinical Swallow Evaluation   Feeding Assistance no  assistance needed   Clinical Swallow Evaluation Textures Trialed Thin Liquids;Puree Textures;Solid Foods   Clinical Swallow Eval: Thin Liquid Texture Trial   Mode of Presentation, Thin Liquids cup;self-fed   Volume of Liquid or Food Presented 2 ounces water   Oral Phase of Swallow WFL   Pharyngeal Phase of Swallow impaired;repeated swallows   Diagnostic Statement No overt aspiration; double swallow with all trials   Clinical Swallow Evaluation: Puree Solid Texture Trial   Mode of Presentation, Puree spoon;self-fed   Volume of Puree Presented x5 bites of applesauce   Oral Phase, Puree WFL   Pharyngeal Phase, Puree impaired;repeated swallows   Diagnostic Statement No overt aspiration; double swallows   Clinical Swallow Evaluation: Solid Food Texture Trial   Mode of Presentation, Solid self-fed   Volume of Solid Food Presented yoan cracker dipped in apple juice   Oral Phase, Solid WFL   Pharyngeal Phase, Solid impaired;repeated swallows   Diagnostic Statement No overt aspiration; double swallows   Esophageal Phase of Swallow   Patient reports or presents with symptoms of esophageal dysphagia Yes   Swallowing Recommendations   Diet Consistency Recommendations   (mechanical dental soft with thin)   Supervision Level for Intake patient independent   Mode of Delivery Recommendations bolus size, small;food moistened;slow rate of intake   Swallowing Maneuver Recommendations alternate food and liquid intake   Recommended Feeding/Eating Techniques (Swallow Eval) patient is independent, no specific recommendations   Medication Administration Recommendations, Swallowing (SLP) whole with applesauce   Instrumental Assessment Recommendations VFSS (videofluroscopic swallowing study)   Comment, Swallowing Recommendations video swallow study to further assess the pharyngeal phase of the swallow   General Therapy Interventions   Planned Therapy Interventions Dysphagia Treatment   Dysphagia treatment Instruction of safe swallow  strategies   Intervention Comments TBD after VFSS   SLP Therapy Assessment/Plan   Criteria for Skilled Therapeutic Interventions Met (SLP Eval) yes;treatment indicated   SLP Diagnosis Suspect pharyngeal dysphagia; video swallow study today   Rehab Potential (SLP Eval) good, to achieve stated therapy goals   Therapy Frequency (SLP Eval) 3 times/wk   Predicted Duration of Therapy Intervention (SLP Eval) One week   Comment, Therapy Assessment/Plan (SLP) Clinical swallow study completed. Oral motor exam unremarkable. No overt s/sx of aspiration during assessment. Consistent double swallows noted across consistencies and a c/o pain/pressure on the left during swallowing. At this time, no c/o food sticking although that is a complaint. Video swallow study in warranted to further assess the pharyngeal phase of the swallow and is scheduled for 1430 today.   Therapy Plan Review/Discharge Plan (SLP)   Therapy Plan Review (SLP) evaluation/treatment results reviewed;care plan/treatment goals reviewed;risks/benefits reviewed;current/potential barriers reviewed;participants voiced agreement with care plan;participants included;patient   SLP Discharge Planning    SLP Discharge Recommendation (DC Rec) home   SLP Rationale for DC Rec video swallow to further assess the pharyngeal phase of the swallow   SLP Brief overview of current status  Clinical swallow completed this AM and video swallow scheduled for 1430 today. Recommendations to follow.    Total Evaluation Time   Total Evaluation Time (Minutes) 36   Santhosh Maldonado MS CCC-SLP

## 2021-06-25 VITALS
HEART RATE: 58 BPM | SYSTOLIC BLOOD PRESSURE: 128 MMHG | OXYGEN SATURATION: 98 % | HEIGHT: 63 IN | TEMPERATURE: 97.6 F | DIASTOLIC BLOOD PRESSURE: 40 MMHG | WEIGHT: 193.3 LBS | RESPIRATION RATE: 16 BRPM | BODY MASS INDEX: 34.25 KG/M2

## 2021-06-25 PROCEDURE — 250N000013 HC RX MED GY IP 250 OP 250 PS 637: Performed by: INTERNAL MEDICINE

## 2021-06-25 PROCEDURE — 250N000012 HC RX MED GY IP 250 OP 636 PS 637: Performed by: INTERNAL MEDICINE

## 2021-06-25 PROCEDURE — 99238 HOSP IP/OBS DSCHRG MGMT 30/<: CPT | Performed by: INTERNAL MEDICINE

## 2021-06-25 RX ADMIN — BUSPIRONE HYDROCHLORIDE 5 MG: 5 TABLET ORAL at 08:17

## 2021-06-25 RX ADMIN — PANTOPRAZOLE SODIUM 40 MG: 40 TABLET, DELAYED RELEASE ORAL at 06:39

## 2021-06-25 RX ADMIN — DONEPEZIL HYDROCHLORIDE 5 MG: 5 TABLET ORAL at 06:39

## 2021-06-25 RX ADMIN — CITALOPRAM HYDROBROMIDE 40 MG: 20 TABLET ORAL at 08:18

## 2021-06-25 RX ADMIN — METOPROLOL SUCCINATE 25 MG: 25 TABLET, FILM COATED, EXTENDED RELEASE ORAL at 08:17

## 2021-06-25 RX ADMIN — PREDNISONE 5 MG: 5 TABLET ORAL at 08:17

## 2021-06-25 RX ADMIN — AMLODIPINE BESYLATE 5 MG: 5 TABLET ORAL at 08:17

## 2021-06-25 ASSESSMENT — ACTIVITIES OF DAILY LIVING (ADL)
ADLS_ACUITY_SCORE: 16

## 2021-06-25 NOTE — PLAN OF CARE
Discharged with  at 1100. A/Ox4. SBA. Tolerated regular diet. Voiding adequately. Denies pain. Discharge instructions reviewed. [facility] will call patient to set up outpatient appointment for an esophageal workup. All valuables and belongings returned to patient.

## 2021-06-25 NOTE — DISCHARGE INSTRUCTIONS
GI Physician's Assistant: Bianca Poole PA-C  Her office will call you to set up a follow up appointment.

## 2021-06-25 NOTE — PLAN OF CARE
RN [id number]  A&Ox4, VSS, up SBA GB/W. NPO since 0000 for possible EGD today. Declined additional interventions for low back pain. Currently tolerating a dental soft diet, some difficulty swallowing pills whole.

## 2021-06-25 NOTE — PROGRESS NOTES
"GASTROENTEROLOGY PROGRESS NOTE        SUBJECTIVE:  Intermittent globus.  No nausea or vomiting.  No abdominal pain.  No melena.     OBJECTIVE:    /40   Pulse 58   Temp 97.6  F (36.4  C) (Temporal)   Resp 16   Ht 1.6 m (5' 3\")   Wt 87.7 kg (193 lb 4.8 oz)   SpO2 98%   BMI 34.24 kg/m    Temp (24hrs), Av.1  F (36.7  C), Min:97.3  F (36.3  C), Max:98.5  F (36.9  C)    Patient Vitals for the past 72 hrs:   Weight   21 0135 87.7 kg (193 lb 4.8 oz)   21 2044 87.5 kg (193 lb)       Intake/Output Summary (Last 24 hours) at 2021 1150  Last data filed at 2021 0916  Gross per 24 hour   Intake 1378 ml   Output --   Net 1378 ml        PHYSICAL EXAM     Constitutional: NAD  Abdomen: soft, NTTP         Additional Comments:  ROS, FH, SH: See initial GI consult for details.     I have reviewed the patient's new clinical lab results:     Recent Labs   Lab Test 06/22/21  2210 06/10/21  0953 21  1219   WBC 11.7* 10.9 11.1*   HGB 10.2* 9.2* 9.9*   MCV 89 95 95    331 331     Recent Labs   Lab Test 06/22/21  2210 06/10/21  0953 21  1219   POTASSIUM 4.4 4.1 4.3   CHLORIDE 111* 111* 112*   CO2 24 24 26   BUN 11 16 14   ANIONGAP 7 6 5     Recent Labs   Lab Test 21  1450 21  1855 21  0005 21  1420   ALBUMIN 3.1*  --  2.6* 2.9*  --  3.1*   BILITOTAL 0.5  --  0.9 0.7  --  1.0   ALT 19  --  40 37  --  37   AST 23  --  28 23  --  29   PROTEIN  --  10*  --   --  Negative  --    LIPASE 127  --   --  279  --  84        ASSESSMENT/ PLAN  Alma Rosa Fishman is a 80-year-old female with cervical dysphagia and regurgitation.      1. Dysphagia :  Esophagram with upper GI completed yesterday which reveals esophageal dysmotility.  There is no evidence of obstruction or constricting esophageal lesions.  Normal Rasheed-en-Y gastric bypass anatomy.  Video without obstructing abnormality ( prominent cricopharyngeus, but it did not appear to impede bolus " transfer into upper esophagus).      -- Reviewed esophagram / upper Gi series and speech video swallow with Dr. Ontiveros. Likely etiology, presbyesophagus. Suggest outpatient evaluation with [facility] esophageal clinic. No role for inpatient EGD at this time.   -- Continue PO Protonix.  -- Stable from a GI standpoint. My office will call the patient to schedule.      Discussed with Dr. Rama Poole PA-C  [facility] ( [facility])

## 2021-06-25 NOTE — DISCHARGE SUMMARY
[facility]  Hospitalist Discharge Summary       Date of Admission:  6/22/2021  Date of Discharge:  6/25/2021  Discharging Provider: Jamison Nichols MD      Discharge Diagnoses   Dysphagia  Severe malnutrition    Follow-ups Needed After Discharge   Follow-up Appointments     Follow-up and recommended labs and tests       Follow up with primary care provider as is already scheduled. Follow up   with esophageal specialist as directed by gastroenterology.               Discharge Disposition   Discharged to home  Condition at discharge: Stable    Hospital Course     Alma Rosa Fishman is a 80 year old female who was admitted on 6/22/2021 with complaints of regurgitating and dysphagia for solids that seems to be progressing.       Her most pertinent recent past medical history is notable for a small-bowel obstruction for which she originally presented to our facility but had to be transferred to [facility] due to lack of bed availability.  She ultimately was taken to the OR a couple of days later and was found to have dense adhesions, at which time her exploratory laparotomy was converted to an open laparotomy.  They did extensive lysis of adhesions at that time.  They noted that her spleen was extremely friable and appeared to be fractured in multiple places with extensive surface bleeding and had to go ahead and complete a splenectomy.  Of note, pathology showed a ruptured splenic capsule.  Etiology for that is unclear.     On the day of that discharge, which was 05/11/2021, she began complaining of throat swelling and difficulty swallowing.  She had a CT of her neck that showed some mild swelling in the supraglottic area, and the on-call ENT physician felt that the edema was likely esophageal.  She was given a dose of dexamethasone and discharged home.     Since that time, she has had mostly difficulty in her ability to take capsules.  She feels like it catches, and it seems like it  is higher up in her throat.  She actually went to the ER on 06/10/2021 with similar complaints, at which time a chest x-ray was obtained and what she describes as a pleural effusion that needed to be drained, and they referred her back down here but discharged her without management.  On admission here, GI and SLP were consulted.  GI recommended esophagram with upper GI which showed postoperative changes from prior gastric bypass but no evidence of obstruction.  Esophageal dysmotility was felt to be abnormal with a poor primary peristaltic wave.  She was then seen by SLP who completed a clinical swallow study with no overt signs or symptoms of aspiration.  This was followed by a video swallow study showing no evidence of penetration or aspiration but a prominent cricopharyngeal bar.  Overall no modifications to her diet were recommended.  It is felt that her issue is primary that of esophageal dysmotility such as presbyesophagus.  She will discharge to home.  She will resume home medications.  She will follow up with esophageal motility specialist at [facility].    Of note, she had lower extremity duplexes here which showed no residual clot (had recently been diagnosed with a provoked DVT after surgery) but the treatment recommendation is still the same, to continue for total of 3 months of therapy for provoked DVT.    Consultations This Hospital Stay   GASTROENTEROLOGY IP CONSULT  GASTROENTEROLOGY IP CONSULT  SPEECH LANGUAGE PATH ADULT IP CONSULT  CARE MANAGEMENT / SOCIAL WORK IP CONSULT    Code Status   Full Code    Time Spent on this Encounter   I, Jamison Nichols MD, personally saw the patient today and spent less than or equal to 30 minutes discharging this patient.       Jamison Nichols MD  [facility]  ______________________________________________________________________    Physical Exam   Vital Signs: Temp: 97.6  F (36.4  C) Temp src: Temporal BP: 128/40 Pulse: 58   Resp: 16  SpO2: 98 % O2 Device: None (Room air)    Weight: 193 lbs 4.8 oz      General: Looks well, no distress.     HEENT: No scleral icterus. Oropharynx moist.     Neck: Supple. Normal range of motion.    Pulmonary: Normal work of breathing. Clear to auscultation bilaterally.    Cardiovascular: Regular rate and rhythm without murmur or extra heart sounds.    Abdomen: Soft and non-tender.    Extremities: No peripheral edema. No clubbing.    Neurologic: Awake, alert, appropriate.    Skin: Warm and dry.    Psychiatric: Normal affect and mood.       Primary Care Physician   [facility]    Discharge Orders      Medication Therapy Management Referral      Home Care Social Service Referral for Hospital Discharge      Home care nursing referral      Reason for your hospital stay    Difficulty swallowing. Thankfully, we found no major issues with the esophagus or with the swallowing muscles.     Follow-up and recommended labs and tests     Follow up with primary care provider as is already scheduled. Follow up with esophageal specialist as directed by gastroenterology.     Activity    Your activity upon discharge: As tolerated     Diet    Follow this diet upon discharge: Regular       Significant Results and Procedures   Most Recent 3 CBC's:  Recent Labs   Lab Test 06/22/21  2210 06/10/21  0953 06/09/21  1219   WBC 11.7* 10.9 11.1*   HGB 10.2* 9.2* 9.9*   MCV 89 95 95    331 331     Most Recent 3 BMP's:  Recent Labs   Lab Test 06/22/21  2210 06/10/21  0953 06/09/21  1219    141 143   POTASSIUM 4.4 4.1 4.3   CHLORIDE 111* 111* 112*   CO2 24 24 26   BUN 11 16 14   CR 0.96 1.06* 1.07*   ANIONGAP 7 6 5   ELAN 8.9 8.5 8.7   * 108* 91     Most Recent 2 LFT's:  Recent Labs   Lab Test 06/22/21 2210 05/13/21 2027   AST 23 28   ALT 19 40   ALKPHOS 68 70   BILITOTAL 0.5 0.9     Most Recent 3 INR's:No lab results found.  Most Recent INR's and Anticoagulation Dosing History:  Anticoagulation Dose History      There is no flowsheet data to display.      ,   Results for orders placed or performed during the hospital encounter of 06/22/21   Abd/pelvis CT no contrast - Stone Protocol    Narrative    EXAM: CT ABDOMEN PELVIS W/O CONTRAST  LOCATION: [facility]  DATE/TIME: 6/22/2021 10:44 PM    INDICATION: Pain, vomiting  COMPARISON: 06/09/2021  TECHNIQUE: CT scan of the abdomen and pelvis was performed without IV contrast. Multiplanar reformats were obtained. Dose reduction techniques were used.  CONTRAST: None.    FINDINGS: Lack of IV contrast degrades sensitivity to visceral and vascular pathology.  LOWER CHEST: Normal.    HEPATOBILIARY: Status post cholecystectomy. Lack of IV contrast makes comparison of previously mentioned hypodensities, difficult to assess.    PANCREAS: Normal.    SPLEEN: Absent.    ADRENAL GLANDS: Normal.    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 No obstructing renal or ureteral stones. No hydroureteronephrosis.    BOWEL: Status post gastric bypass. Interval development of mildly dilated loop of small bowel, proximal to the Rasheed-en-Y anastomosis, query partial versus developing obstruction versus post surgical/degeneration related dilation. Due to beam hardening   and streak artifact related to surgical clips and lack of IV contrast, difficult to compare previously described omental findings.    Status post appendectomy. No obstruction, colitis, or diverticulitis. Scattered diverticulosis.    LYMPH NODES: Normal.    VASCULATURE: Atherosclerotic aorta.    PELVIC ORGANS: Absent uterus.    MUSCULOSKELETAL: Normal.      Impression    IMPRESSION:   1.  Status post gastric bypass surgery. Dilated loops of small bowel proximal to the Rasheed-en-Y anastomosis with distended stomach, query partial versus developing obstruction versus postsurgical change.  2.  Diverticulosis. No diverticulitis, colitis, or obstruction. Status post appendectomy.  3.  Status  post cholecystectomy and hysterectomy.  4.  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     XR Esophagram w Upper GI    Narrative    ESOPHAGRAM WITH UPPER GI 6/23/2021 2:47 PM     HISTORY: Regurgitation of solids and liquids. Anticoagulated.     COMPARISON: CT chest/abdomen/pelvis 6/19/2021.     FLUOROSCOPY TIME: 1.8 minutes.    IMAGES: A total of 10 spot fluoro and/or cine loops are obtained  during exam.    FINDINGS:  Upper GI examination demonstrates a normal appearing  esophagus. There is no  hiatal hernia. No constricting or obstructing  lesions are evident. Esophageal motility is abnormal with poor primary  peristaltic wave. Contrast eventually passes with repeat swallowing.  Postoperative changes related to gastric bypass surgery. Gastric  remnant is unremarkable. Contrast readily flows into the Rasheed-en-Y  loop without delay. No significant small bowel distention at 30  minutes post contrast ingestion. No definite reflux into the native  stomach.      Impression    IMPRESSION:  Postoperative changes from gastric bypass surgery. No  evidence of obstruction. Esophageal dysmotility. No constricting or  obstructing esophageal lesions.    GIGI CABELLO MD   US Lower Extremity Venous Duplex Bilateral    Narrative    VENOUS ULTRASOUND BOTH LEGS  6/23/2021 2:31 PM     HISTORY: No evidence for DVT.    COMPARISON: None.    FINDINGS:  Examination of the deep veins with graded compression and  color flow Doppler with spectral wave form analysis was performed.   There is no evidence for DVT in the lower extremities.      Impression    IMPRESSION: No evidence of deep venous thrombosis.    TANA MARIE MD   XR Video Swallow with SLP or OT    Narrative    VIDEO SWALLOW WITH SLP OR OT   6/24/2021 3:09 PM     HISTORY: Dysphagia.    COMPARISON: None available.    FINDINGS:  A swallow study was performed in coordination with a member  from the speech pathology service. Total fluoroscopy time was 0.9  minutes. 8 spot  fluoroscopic images, and/or cine clips were obtained.  1 view in lateral projection. Various consistencies of barium were  given to the patient to swallow, and the swallowing mechanism was  observed using fluoroscopy.    No evidence of penetration or aspiration with any consistencies of  barium. Prompt passage of 13 mm tablet into the cervical esophagus.  Prominent cricopharyngeal bar.      Impression    IMPRESSION:  1. No evidence of penetration or aspiration with any consistencies of  barium.  2. Prompt passage of 13 mm tablet into the cervical esophagus.  3. Prominent cricopharyngeal bar noted.  4. Please refer to the speech pathology report for further details.    This study includes only the cervical esophagus.    RUBÉN WISEMAN MD       Discharge Medications   Discharge Medication List as of 6/25/2021 10:40 AM      CONTINUE these medications which have NOT CHANGED    Details   acetaminophen (TYLENOL) 500 MG tablet Take 500-1,000 mg by mouth 3 times daily as needed for mild pain , Historical      albuterol (PROAIR HFA/PROVENTIL HFA/VENTOLIN HFA) 108 (90 Base) MCG/ACT inhaler Inhale 2 puffs into the lungs every 4 hours as needed for shortness of breath / dyspnea or wheezing, Disp-6.7 g, R-0, E-PrescribePharmacy may dispense brand covered by insurance (Proair, or proventil or ventolin or generic albuterol inhaler)      amLODIPine (NORVASC) 5 MG tablet Take 5 mg by mouth daily, Historical      apixaban ANTICOAGULANT (ELIQUIS) 5 MG tablet Take 10 mg by mouth twice daily x 7 days (last dose will be 5/26 morning dose). Then starting on 5/27/21 start taking 5 mg by mouth twice daily., Disp-60 tablet, No Print Out      atorvastatin (LIPITOR) 40 MG tablet Take 40 mg by mouth daily, Historical      BACILLUS COAGULANS-INULIN PO Take 1 capsule by mouth daily, Historical      busPIRone (BUSPAR) 5 MG tablet Take 5 mg by mouth 2 times daily , Historical      Calcium Carb-Cholecalciferol 500-200 MG-UNIT PACK Take 1 tablet  by mouth daily , Historical      cetirizine (ZYRTEC) 10 MG tablet Take 10 mg by mouth daily, Historical      citalopram (CELEXA) 40 MG tablet Take 40 mg by mouth daily, Historical      cyanocobalamin (CYANOCOBALAMIN) 1000 MCG/ML injection Inject 1 mL into the muscle every 30 days, Historical      diazepam (VALIUM) 5 MG tablet Take 1 tablet by mouth one time for 1 dose prior to dental work, Historical      diclofenac (VOLTAREN) 1 % topical gel Apply 4 g topically 4 times daily as needed for moderate pain Plus 2 g to hands tid, Historical      donepezil (ARICEPT) 5 MG tablet Take 5 mg by mouth daily before breakfast , Historical      EPINEPHrine (EPIPEN) 0.3 MG/0.3ML injection Inject 0.3 mLs (0.3 mg) into the muscle once as needed for anaphylaxis, Disp-1 each, R-0, Normal      folic acid (FOLVITE) 1 MG tablet Take 1 mg by mouth 6 times a week on M,T,Th, Fr, Sat,Sun (NOT WED), Historical      gabapentin (NEURONTIN) 300 MG capsule Take 600 mg by mouth At Bedtime, Historical      guaiFENesin (ROBITUSSIN) 100 MG/5ML liquid Take 10 mLs by mouth every 4 hours as needed for cough , Historical      melatonin 3 MG tablet Take 1 tablet (3 mg) by mouth At Bedtime, No Print Out      metoprolol succinate ER (TOPROL-XL) 50 MG 24 hr tablet Take 0.5 tablets (25 mg) by mouth daily, No Print Out      nystatin (MYCOSTATIN) 262273 UNIT/GM external powder Apply topically 2 times daily Apply to affected area topically BID for rednessHistorical      omeprazole (PRILOSEC) 20 MG DR capsule Take 20 mg by mouth daily, Historical      sennosides (SENOKOT) 8.6 MG tablet Take 1 tablet by mouth 2 times daily as needed , Historical      Vitamin D3 (CHOLECALCIFEROL) 25 mcg (1000 units) tablet Take 100 mcg by mouth daily , Historical      alendronate (FOSAMAX) 70 MG tablet Take 70 mg by mouth once a week On Mondays (ON HOLD), Historical      !! methotrexate 2.5 MG tablet Take 12.5 mg by mouth Every Wednesday AM  (ON HOLD), Historical      !!  methotrexate 2.5 MG tablet Take 10 mg by mouth Every Wednesday PM (ON HOLD), Historical      polyethylene glycol (MIRALAX) 17 g packet Take 1 packet by mouth 2 times daily as needed for constipation, Historical      predniSONE (DELTASONE) 5 MG tablet Take 1 tablet (5 mg) by mouth daily, No Print OutRestart after completing dexamethasone       !! - Potential duplicate medications found. Please discuss with provider.        Allergies   Allergies   Allergen Reactions     Acetaminophen      Vicodin/Darvocet/Swelling throat     Codeine      Percocet/Swelling tongue     Darvocet [Propoxyphene N-Apap]      Lisinopril      Tongue Swelling     Metformin Hydrochloride      Glucophage caused severe diarrhea     Morphine      MISAEL     Penicillins      Swelling throat     Percocet [Oxycodone-Acetaminophen]      Contrast Dye Rash     Tolerated CT chest with contrast 6/2021 after pre-treatment with benadryl and solumedrol

## 2021-07-01 ENCOUNTER — OFFICE VISIT (OUTPATIENT)
Dept: FAMILY MEDICINE | Facility: CLINIC | Age: 81
End: 2021-07-01
Attending: EMERGENCY MEDICINE
Payer: MEDICARE

## 2021-07-01 ENCOUNTER — TELEPHONE (OUTPATIENT)
Dept: PHARMACY | Facility: CLINIC | Age: 81
End: 2021-07-01

## 2021-07-01 VITALS
BODY MASS INDEX: 34.54 KG/M2 | OXYGEN SATURATION: 97 % | HEIGHT: 63 IN | WEIGHT: 194.9 LBS | TEMPERATURE: 98.2 F | HEART RATE: 62 BPM | SYSTOLIC BLOOD PRESSURE: 134 MMHG | DIASTOLIC BLOOD PRESSURE: 74 MMHG

## 2021-07-01 DIAGNOSIS — K22.4 ESOPHAGEAL DYSMOTILITY: ICD-10-CM

## 2021-07-01 DIAGNOSIS — I82.451 ACUTE DEEP VEIN THROMBOSIS (DVT) OF RIGHT PERONEAL VEIN (H): ICD-10-CM

## 2021-07-01 DIAGNOSIS — F33.1 MODERATE EPISODE OF RECURRENT MAJOR DEPRESSIVE DISORDER (H): ICD-10-CM

## 2021-07-01 DIAGNOSIS — J30.2 SEASONAL ALLERGIC RHINITIS, UNSPECIFIED TRIGGER: ICD-10-CM

## 2021-07-01 DIAGNOSIS — Z09 HOSPITAL DISCHARGE FOLLOW-UP: Primary | ICD-10-CM

## 2021-07-01 DIAGNOSIS — R13.19 OTHER DYSPHAGIA: ICD-10-CM

## 2021-07-01 DIAGNOSIS — Z23 NEED FOR VACCINATION: ICD-10-CM

## 2021-07-01 DIAGNOSIS — F41.9 ANXIETY AND DEPRESSION: ICD-10-CM

## 2021-07-01 DIAGNOSIS — F32.A ANXIETY AND DEPRESSION: ICD-10-CM

## 2021-07-01 PROBLEM — M81.0 AGE-RELATED OSTEOPOROSIS WITHOUT CURRENT PATHOLOGICAL FRACTURE: Status: ACTIVE | Noted: 2020-01-16

## 2021-07-01 PROBLEM — M25.511 CHRONIC RIGHT SHOULDER PAIN: Status: ACTIVE | Noted: 2020-01-14

## 2021-07-01 PROBLEM — J96.01 ACUTE RESPIRATORY FAILURE WITH HYPOXIA (H): Status: RESOLVED | Noted: 2021-04-19 | Resolved: 2021-07-01

## 2021-07-01 PROBLEM — Z79.899 HIGH RISK MEDICATION USE: Status: ACTIVE | Noted: 2020-05-14

## 2021-07-01 PROBLEM — D68.51 FACTOR V LEIDEN (H): Status: ACTIVE | Noted: 2021-05-28

## 2021-07-01 PROBLEM — M25.512 CHRONIC LEFT SHOULDER PAIN: Status: ACTIVE | Noted: 2019-12-28

## 2021-07-01 PROBLEM — G89.4 CHRONIC PAIN DISORDER: Status: ACTIVE | Noted: 2019-10-31

## 2021-07-01 PROBLEM — M06.09 RHEUMATOID ARTHRITIS OF MULTIPLE SITES WITH NEGATIVE RHEUMATOID FACTOR (H): Status: ACTIVE | Noted: 2019-08-01

## 2021-07-01 PROBLEM — F43.12 CHRONIC POST-TRAUMATIC STRESS DISORDER: Status: ACTIVE | Noted: 2019-12-09

## 2021-07-01 PROBLEM — G89.29 CHRONIC LEFT SHOULDER PAIN: Status: ACTIVE | Noted: 2019-12-28

## 2021-07-01 PROBLEM — H90.3 BILATERAL SENSORINEURAL HEARING LOSS: Status: ACTIVE | Noted: 2019-12-30

## 2021-07-01 PROBLEM — G89.29 CHRONIC RIGHT SHOULDER PAIN: Status: ACTIVE | Noted: 2020-01-14

## 2021-07-01 PROBLEM — N39.46 MIXED STRESS AND URGE URINARY INCONTINENCE: Status: ACTIVE | Noted: 2019-11-04

## 2021-07-01 PROBLEM — K56.609 SMALL BOWEL OBSTRUCTION (H): Status: RESOLVED | Noted: 2021-06-23 | Resolved: 2021-07-01

## 2021-07-01 PROBLEM — F09 COGNITIVE DYSFUNCTION: Status: ACTIVE | Noted: 2021-04-01

## 2021-07-01 PROBLEM — M75.122 COMPLETE TEAR OF LEFT ROTATOR CUFF: Status: ACTIVE | Noted: 2019-12-28

## 2021-07-01 PROBLEM — M19.012 OSTEOARTHRITIS OF LEFT SHOULDER: Status: ACTIVE | Noted: 2019-12-28

## 2021-07-01 PROCEDURE — 90471 IMMUNIZATION ADMIN: CPT | Performed by: FAMILY MEDICINE

## 2021-07-01 PROCEDURE — 99214 OFFICE O/P EST MOD 30 MIN: CPT | Mod: 25 | Performed by: FAMILY MEDICINE

## 2021-07-01 PROCEDURE — 90620 MENB-4C VACCINE IM: CPT | Performed by: FAMILY MEDICINE

## 2021-07-01 RX ORDER — CETIRIZINE HYDROCHLORIDE 10 MG/1
10 TABLET ORAL DAILY
Qty: 90 TABLET | Refills: 3 | Status: SHIPPED | OUTPATIENT
Start: 2021-07-01 | End: 2022-07-14

## 2021-07-01 RX ORDER — 1.1% SODIUM FLUORIDE PRESCRIPTION DENTAL CREAM 5 MG/G
CREAM DENTAL
COMMUNITY
Start: 2020-07-30

## 2021-07-01 RX ORDER — BLOOD SUGAR DIAGNOSTIC
STRIP MISCELLANEOUS
COMMUNITY
Start: 2021-04-19 | End: 2023-12-27

## 2021-07-01 RX ORDER — ALPRAZOLAM 0.25 MG
0.25 TABLET ORAL
COMMUNITY

## 2021-07-01 RX ORDER — ONDANSETRON 4 MG/1
TABLET, ORALLY DISINTEGRATING ORAL
COMMUNITY
Start: 2021-06-22 | End: 2023-12-27

## 2021-07-01 RX ORDER — CITALOPRAM HYDROBROMIDE 40 MG/1
40 TABLET ORAL DAILY
Qty: 90 TABLET | Refills: 1 | Status: SHIPPED | OUTPATIENT
Start: 2021-07-01 | End: 2021-12-09

## 2021-07-01 RX ORDER — LANOLIN ALCOHOL/MO/W.PET/CERES
3 CREAM (GRAM) TOPICAL
COMMUNITY
Start: 2021-07-01

## 2021-07-01 RX ORDER — LANCETS 33 GAUGE
EACH MISCELLANEOUS
COMMUNITY
Start: 2021-04-22 | End: 2023-12-27

## 2021-07-01 RX ORDER — NICOTINE POLACRILEX 2 MG
1 MINI LOZENGE BUCCAL DAILY
COMMUNITY
Start: 2021-05-22

## 2021-07-01 RX ORDER — BLOOD-GLUCOSE METER
EACH MISCELLANEOUS
COMMUNITY
Start: 2021-04-22 | End: 2023-12-27

## 2021-07-01 ASSESSMENT — MIFFLIN-ST. JEOR: SCORE: 1326.8

## 2021-07-01 ASSESSMENT — PATIENT HEALTH QUESTIONNAIRE - PHQ9: SUM OF ALL RESPONSES TO PHQ QUESTIONS 1-9: 8

## 2021-07-01 NOTE — PROGRESS NOTES
"    Assessment & Plan     Hospital discharge follow-up  Discharge summary reviewed, med reconciliation completed.  Pt will set up establish care visit for additional concerns.    Other dysphagia  Stable, unchanged.  Has specialist follow up scheduled.    Acute deep vein thrombosis (DVT) of right peroneal vein (H)  Refill medication.  - apixaban ANTICOAGULANT (ELIQUIS) 5 MG tablet; 5 mg by mouth twice daily    Moderate episode of recurrent major depressive disorder (H)  Refill Medication  - citalopram (CELEXA) 40 MG tablet; Take 1 tablet (40 mg) by mouth daily    Seasonal allergic rhinitis, unspecified trigger  Refill medication  - cetirizine (ZYRTEC) 10 MG tablet; Take 1 tablet (10 mg) by mouth daily    Need for vaccination  - MENINGOCOCCAL VACCINE 2 DOSE IM (BEXSERO) [[id number]]    Esophageal dysmotility  As above, unchanged.        Review of prior external note(s) from - CareEverywhere information from [facility]  reviewed  39 minutes spent on the date of the encounter doing chart review, history and exam, documentation and further activities per the note     BMI:   Estimated body mass index is 34.97 kg/m  as calculated from the following:    Height as of this encounter: 1.59 m (5' 2.6\").    Weight as of this encounter: 88.4 kg (194 lb 14.4 oz).       See Patient Instructions    Return in about 2 weeks (around 7/15/2021) for Establish care.    Aida Suresh MD  [facility] [address] is a 80 year old who presents for the following health issues  accompanied by her spouse:    [facility]       Hospital Follow-up Visit:    Hospital/Nursing Home/IP Rehab Facility: [facility]  Date of Admission: 6/22/2021  Date of Discharge: 6/25/2021    Reason(s) for Admission: Dysphagia  Severe malnutrition      Was your hospitalization related to COVID-19? No   Problems taking medications regularly:  Swallowing medication is difficult  Medication changes since " "discharge: None  Problems adhering to non-medication therapy:  None    Summary of hospitalization:  CareEverywhere information obtained and reviewed  Diagnostic Tests/Treatments reviewed.  Follow up needed: GI follow up  Other Healthcare Providers Involved in Patient s Care:         Specialist appointment - GI Follow up scheduled  Update since discharge: stable.    Post Discharge Medication Reconciliation: discharge medications reconciled, continue medications without change.  Plan of care communicated with patient and family        Since discharge, still has trouble swallowing, has appointment with esophageal specialist with [facility].  Was told by a provider that she needs to be on eliquis, NOT xarelto, despite xarelto being on her insurance, for her provoked blood clot after surgery.  Needs two more months of anticoagulation.    Of note, has a lot of fatigue, cold all the time, even when  is hot, house is 73-74 degrees all the time, still cold.  Get lightheaded, but thinks her blood sugar is dropping- will be checking blood sugars the next time this happens.    Due for 2nd Men B vaccine after splenectomy.    Needs Refills on Eliquis, cetirizine, citalopram.    Review of Systems   Constitutional, HEENT, cardiovascular, pulmonary, gi and gu systems are negative, except as otherwise noted.      Objective    /74 (BP Location: Left arm, Patient Position: Sitting, Cuff Size: Adult Regular)   Pulse 62   Temp 98.2  F (36.8  C) (Oral)   Ht 1.59 m (5' 2.6\")   Wt 88.4 kg (194 lb 14.4 oz)   SpO2 97%   BMI 34.97 kg/m    Body mass index is 34.97 kg/m .  Physical Exam   GENERAL: healthy, alert and no distress  PSYCH: mentation appears normal, affect normal/bright            "

## 2021-07-02 ASSESSMENT — ASTHMA QUESTIONNAIRES: ACT_TOTALSCORE: 25

## 2021-07-13 ENCOUNTER — OFFICE VISIT (OUTPATIENT)
Dept: FAMILY MEDICINE | Facility: CLINIC | Age: 81
End: 2021-07-13
Payer: MEDICARE

## 2021-07-13 VITALS
HEART RATE: 62 BPM | OXYGEN SATURATION: 99 % | DIASTOLIC BLOOD PRESSURE: 68 MMHG | SYSTOLIC BLOOD PRESSURE: 128 MMHG | BODY MASS INDEX: 35.71 KG/M2 | WEIGHT: 199 LBS | TEMPERATURE: 98 F | RESPIRATION RATE: 16 BRPM

## 2021-07-13 DIAGNOSIS — Z76.89 ENCOUNTER TO ESTABLISH CARE WITH NEW DOCTOR: Primary | ICD-10-CM

## 2021-07-13 DIAGNOSIS — R59.9 LYMPH NODE ENLARGEMENT: ICD-10-CM

## 2021-07-13 DIAGNOSIS — Z90.81 H/O SPLENECTOMY: ICD-10-CM

## 2021-07-13 DIAGNOSIS — E66.01 MORBID OBESITY (H): ICD-10-CM

## 2021-07-13 DIAGNOSIS — N18.31 STAGE 3A CHRONIC KIDNEY DISEASE (H): ICD-10-CM

## 2021-07-13 DIAGNOSIS — D68.51 FACTOR V LEIDEN (H): ICD-10-CM

## 2021-07-13 DIAGNOSIS — M79.7 FIBROMYALGIA: ICD-10-CM

## 2021-07-13 PROBLEM — R11.2 NON-INTRACTABLE VOMITING WITH NAUSEA, UNSPECIFIED VOMITING TYPE: Status: RESOLVED | Noted: 2021-04-19 | Resolved: 2021-07-13

## 2021-07-13 PROCEDURE — 99215 OFFICE O/P EST HI 40 MIN: CPT | Performed by: FAMILY MEDICINE
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
## 2021-07-19 ENCOUNTER — ALLIED HEALTH/NURSE VISIT (OUTPATIENT)
Dept: FAMILY MEDICINE | Facility: CLINIC | Age: 81
End: 2021-07-19
Payer: MEDICARE

## 2021-07-19 DIAGNOSIS — Z90.81 H/O SPLENECTOMY: Primary | ICD-10-CM

## 2021-07-19 PROCEDURE — 99207 PR NO CHARGE NURSE ONLY: CPT

## 2021-07-19 PROCEDURE — 90472 IMMUNIZATION ADMIN EACH ADD: CPT

## 2021-07-19 PROCEDURE — 90732 PPSV23 VACC 2 YRS+ SUBQ/IM: CPT

## 2021-07-19 PROCEDURE — G0009 ADMIN PNEUMOCOCCAL VACCINE: HCPCS

## 2021-07-19 PROCEDURE — 90734 MENACWYD/MENACWYCRM VACC IM: CPT

## 2021-09-28 ENCOUNTER — ALLIED HEALTH/NURSE VISIT (OUTPATIENT)
Dept: FAMILY MEDICINE | Facility: CLINIC | Age: 81
End: 2021-09-28
Payer: MEDICARE

## 2021-09-28 DIAGNOSIS — E53.8 VITAMIN B12 DEFICIENCY (NON ANEMIC): Primary | ICD-10-CM

## 2021-09-28 DIAGNOSIS — D51.0 PERNICIOUS ANEMIA: Primary | ICD-10-CM

## 2021-09-28 PROCEDURE — 99207 PR NO CHARGE NURSE ONLY: CPT

## 2021-09-28 PROCEDURE — 96372 THER/PROPH/DIAG INJ SC/IM: CPT | Performed by: FAMILY MEDICINE

## 2021-09-28 RX ORDER — CYANOCOBALAMIN 1000 UG/ML
1000 INJECTION, SOLUTION INTRAMUSCULAR; SUBCUTANEOUS
Status: DISCONTINUED | OUTPATIENT
Start: 2021-09-28 | End: 2023-12-27

## 2021-09-28 RX ADMIN — CYANOCOBALAMIN 1000 MCG: 1000 INJECTION, SOLUTION INTRAMUSCULAR; SUBCUTANEOUS at 15:38

## 2021-09-28 NOTE — PROGRESS NOTES
Clinic Administered Medication Documentation    Administrations This Visit     cyanocobalamin injection 1,000 mcg     Admin Date  09/28/2021 Action  Given Dose  1,000 mcg Route  Intramuscular Site  Right Deltoid Administered By  Alonso Lane    Ordering Provider: Aida Short MD    Patient Supplied?: No                  Injectable Medication Documentation    Patient was given Cyanocobalamin (B-12). Prior to medication administration, verified patients identity using patient s name and date of birth. Please see MAR and medication order for additional information. Patient instructed to remain in clinic for 15 minutes.      Was entire vial of medication used? Yes  Vial/Syringe: Single dose vial  Expiration Date:  03/2023  Was this medication supplied by the patient? No

## 2021-11-30 ENCOUNTER — ALLIED HEALTH/NURSE VISIT (OUTPATIENT)
Dept: FAMILY MEDICINE | Facility: CLINIC | Age: 81
End: 2021-11-30
Payer: MEDICARE

## 2021-11-30 DIAGNOSIS — D51.0 PERNICIOUS ANEMIA: Primary | ICD-10-CM

## 2021-11-30 PROCEDURE — 99207 PR NO CHARGE NURSE ONLY: CPT

## 2021-11-30 PROCEDURE — 96372 THER/PROPH/DIAG INJ SC/IM: CPT | Performed by: FAMILY MEDICINE

## 2021-11-30 RX ADMIN — CYANOCOBALAMIN 1000 MCG: 1000 INJECTION, SOLUTION INTRAMUSCULAR; SUBCUTANEOUS at 14:05

## 2021-12-08 DIAGNOSIS — F33.1 MODERATE EPISODE OF RECURRENT MAJOR DEPRESSIVE DISORDER (H): ICD-10-CM

## 2021-12-09 RX ORDER — CITALOPRAM HYDROBROMIDE 40 MG/1
TABLET ORAL
Qty: 90 TABLET | Refills: 0 | Status: SHIPPED | OUTPATIENT
Start: 2021-12-09 | End: 2022-05-09

## 2021-12-09 NOTE — TELEPHONE ENCOUNTER
Medication is being filled for 1 time refill only due to:  Patient needs to be seen because med check due.  Prescription approved per [facility] Refill Protocol.  Routed to  for scheduling  Beatriz Carlos RN, BSN  Message handled by CLINIC NURSE.

## 2021-12-09 NOTE — TELEPHONE ENCOUNTER
Contacted patient and she states will call back to schedule an appt      Savannha Morin/BRAXTON  [place]

## 2022-03-30 NOTE — DISCHARGE INSTRUCTIONS
30 day monitor was worn from 2/26/2022- 3/27/2022 s/p CVA TIA. Monitor showed NSR no arrhythmias seen. Recommendation is for her to come into office to see Dr. Ordonez Senters to discuss ILR implantation. Called patient no answer left message instructing to call back regarding monitor results. The radiologist saw some nodules in your upper abdomen.  These may require biopsy- please discuss these nodules with your primary care doctor.      Discharge Instructions  Dizziness (Lightheaded)  Today you were seen for dizziness.  Dizziness can be caused by many things and it can be very difficult to determine the cause of dizziness.  At this time, your provider has found no signs that your dizziness is due to a serious or life-threatening condition. However, sometimes there is a serious problem that does not show up right away, and it is important for you to follow up with your regular provider as instructed.  Generally, every Emergency Department visit should have a follow-up clinic visit with either a primary or a specialty clinic/provider. Please follow-up as instructed by your emergency provider today.      Return to the Emergency Department if:    You pass out (fainting or falling out), especially during exercise.    You develop chest pain, chest pressure or difficulty breathing.  Your feel an irregular heartbeat.  You have excessive vaginal bleeding, or blood in your stool or vomit (throw up).  You have a high fever.  Your symptoms get worse or more frequent.    If when you begin to feel dizzy or lightheaded, it is important to sit down or lay down immediately to prevent injury from falling.  If you were given a prescription for medicine here today, be sure to read all of the information (including the package insert) that comes with your prescription.  This will include important information about the medicine, its side effects, and any warnings that you need to know about.  The pharmacist who fills the prescription can provide more information and answer questions you may have about the medicine.  If you have questions or concerns that the pharmacist cannot address, please call or return to the Emergency Department.   Remember that you can always come back to the Emergency Department if you are not able  to see your regular provider in the amount of time listed above, if you get any new symptoms, or if there is anything that worries you.

## 2022-03-31 ENCOUNTER — PATIENT OUTREACH (OUTPATIENT)
Dept: FAMILY MEDICINE | Facility: CLINIC | Age: 82
End: 2022-03-31
Payer: MEDICARE

## 2022-03-31 NOTE — LETTER
Alma Rosa [facility]  [address]    [facility] [place] [facility][1] [id number]    Kaylee Malikly,     Thank you for choosing [facility] today for your health care needs.     [facility] is transforming primary care  At [facility], we re dedicated to constantly improve how we serve the health care needs of our patients and communities. We re currently making changes to the way we deliver care.     Changes you ll notice include:    An emphasis on building a relationship with a primary care provider    Access to a PAL (patient advocate and liaison) to help guide you with your care needs    Appointment lengths tailored to your specific needs and greater access to a care team to help you and your provider improve and maintain your health and well-being    Improved online access to your care team    Benefits of a primary care provider  If you don t have a designated primary care provider, we encourage you to get to know our care team online and find a provider you d like to see. Most of our providers have a short video on their online provider page. Visit [email or web address] to explore our providers and locations.    Benefits of having a primary care provider include:      They get to know you - your health history, family history and goals, making it easier to make a health plan together.     You get to know them - making health-related conversations and decisions easier      Primary care doctors help you when you re sick or hurt - but also focus on keeping you healthy with preventive care and screenings.      A doctor who sees you regularly is more likely to notice changes in your health.     You ll be connected to a broad care team who partners with your provider to support you.    Patient Advocate Liaison (PAL)   To help make sure you get the right care, at the right time, we include PALs, or Patient Advocate Liaisons, as part of your care team. Your PAL will be your first line of contact. They ll advocate for your needs and  help you navigate our services, connecting you with care team members and community resources to ensure your care is well coordinated. You ll be introduced to a PAL in an upcoming visit.     Expanded care team access with tailored appointment lengths  Depending on your health care needs, you may have longer or shorter appointments and see additional care team providers - including Medication Therapy Management (MTM) pharmacists, diabetes educators, behavioral health clinicians, or social workers. At times, they may be included in your visit with your provider, or you may see them individually.     Online access to your health care records and care team  [employer] is our online tool that makes it easy to see your health care information and communicate with your care team.     [employer] allows you to:     View your health maintenance plan so you know when you re due for a preventive screening    Send secure messages to your care team    View your health history and visit summaries     Schedule appointments     Complete questionnaires and eCheck-in before appointments      Get care from your provider with an e-visit      View and pay your bill     Sign up at [email or web address]. Once you have an account, you also can download the mobile nile.     Connecting to fast and convenient care  When you need fast, convenient care - consider one of the following options:       Video Visit: A convenient care option for visiting with your provider out of the comfort of your own home. Most of the things you come to the clinic to address with your provider can now be done virtually through a video. This includes your chronic medication follow up, questions or concerns you may have, and even your annual Medicare Wellness Visit.       Phone Visit: Another convenient option for follow up of common problems that may require a more in-depth discussion with your provider.       E-visit: When you need acute care quickly, or have a quick  question about your medication, an E-visit is completed through [employer] and your provider will respond within one business day.      Lizzie KO, RN, BSN, PHN - Patient Advocate Liaison - PAL RN  [facility]  [phone]

## 2022-03-31 NOTE — TELEPHONE ENCOUNTER
SB 3 PAL Welcome Letter Sent  Lizzie KO RN, BSN, PHN - Patient Advocate Liaison - PAL RN  [facility]  [phone]

## 2022-05-09 DIAGNOSIS — F33.1 MODERATE EPISODE OF RECURRENT MAJOR DEPRESSIVE DISORDER (H): ICD-10-CM

## 2022-05-09 RX ORDER — CITALOPRAM HYDROBROMIDE 40 MG/1
TABLET ORAL
Qty: 90 TABLET | Refills: 0 | OUTPATIENT
Start: 2022-05-09

## 2022-05-09 RX ORDER — CITALOPRAM HYDROBROMIDE 40 MG/1
TABLET ORAL
Qty: 90 TABLET | Refills: 0 | Status: SHIPPED | OUTPATIENT
Start: 2022-05-09

## 2022-05-09 NOTE — LETTER
May 12, 2022      [facility]  [address]   [place] [address]      Dear Alma Rosa,    We recently received a call from your pharmacy requesting a refill of your medication.    A review of your chart indicates that an appointment is required with your provider.  Please call the clinic to schedule your appointment.    We have authorized one refill of your medication to allow time for you to schedule.   If you have a history of diabetes or high cholesterol, please come in fasting for the appointment. Fasting entails nothing to eat or drink 8 hours prior to your appointment; with the exception on water. You may take your medication the day of the appointment.    Thank you,      Aida Suresh MD

## 2023-04-07 ENCOUNTER — APPOINTMENT (OUTPATIENT)
Dept: GENERAL RADIOLOGY | Facility: CLINIC | Age: 83
End: 2023-04-07
Attending: EMERGENCY MEDICINE
Payer: MEDICARE

## 2023-04-07 ENCOUNTER — HOSPITAL ENCOUNTER (EMERGENCY)
Facility: CLINIC | Age: 83
Discharge: HOME | End: 2023-04-07
Attending: EMERGENCY MEDICINE | Admitting: EMERGENCY MEDICINE
Payer: MEDICARE

## 2023-04-07 ENCOUNTER — APPOINTMENT (OUTPATIENT)
Dept: CT IMAGING | Facility: CLINIC | Age: 83
End: 2023-04-07
Attending: EMERGENCY MEDICINE
Payer: MEDICARE

## 2023-04-07 VITALS
OXYGEN SATURATION: 99 % | SYSTOLIC BLOOD PRESSURE: 131 MMHG | HEART RATE: 60 BPM | RESPIRATION RATE: 16 BRPM | DIASTOLIC BLOOD PRESSURE: 61 MMHG | TEMPERATURE: 97.4 F

## 2023-04-07 DIAGNOSIS — I95.9 HYPOTENSION, UNSPECIFIED HYPOTENSION TYPE: ICD-10-CM

## 2023-04-07 DIAGNOSIS — R55 NEAR SYNCOPE: ICD-10-CM

## 2023-04-07 LAB
ALBUMIN UR-MCNC: 10 MG/DL
ANION GAP SERPL CALCULATED.3IONS-SCNC: 11 MMOL/L (ref 7–15)
APPEARANCE UR: ABNORMAL
BACTERIA #/AREA URNS HPF: ABNORMAL /HPF
BASOPHILS # BLD AUTO: 0.1 10E3/UL (ref 0–0.2)
BASOPHILS NFR BLD AUTO: 1 %
BILIRUB UR QL STRIP: NEGATIVE
BUN SERPL-MCNC: 19.9 MG/DL (ref 8–23)
CALCIUM SERPL-MCNC: 8.6 MG/DL (ref 8.8–10.2)
CHLORIDE SERPL-SCNC: 106 MMOL/L (ref 98–107)
COLOR UR AUTO: YELLOW
CREAT SERPL-MCNC: 1.19 MG/DL (ref 0.51–0.95)
DEPRECATED HCO3 PLAS-SCNC: 25 MMOL/L (ref 22–29)
EOSINOPHIL # BLD AUTO: 0.6 10E3/UL (ref 0–0.7)
EOSINOPHIL NFR BLD AUTO: 7 %
ERYTHROCYTE [DISTWIDTH] IN BLOOD BY AUTOMATED COUNT: 15.8 % (ref 10–15)
GFR SERPL CREATININE-BSD FRML MDRD: 46 ML/MIN/1.73M2
GLUCOSE SERPL-MCNC: 94 MG/DL (ref 70–99)
GLUCOSE UR STRIP-MCNC: NEGATIVE MG/DL
HCT VFR BLD AUTO: 38.5 % (ref 35–47)
HGB BLD-MCNC: 12.3 G/DL (ref 11.7–15.7)
HGB UR QL STRIP: NEGATIVE
HOLD SPECIMEN: NORMAL
HYALINE CASTS: 40 /LPF
IMM GRANULOCYTES # BLD: 0 10E3/UL
IMM GRANULOCYTES NFR BLD: 1 %
KETONES UR STRIP-MCNC: ABNORMAL MG/DL
LACTATE SERPL-SCNC: 1.5 MMOL/L (ref 0.7–2)
LEUKOCYTE ESTERASE UR QL STRIP: ABNORMAL
LYMPHOCYTES # BLD AUTO: 1.2 10E3/UL (ref 0.8–5.3)
LYMPHOCYTES NFR BLD AUTO: 14 %
MAGNESIUM SERPL-MCNC: 1.8 MG/DL (ref 1.7–2.3)
MCH RBC QN AUTO: 34.8 PG (ref 26.5–33)
MCHC RBC AUTO-ENTMCNC: 31.9 G/DL (ref 31.5–36.5)
MCV RBC AUTO: 109 FL (ref 78–100)
MONOCYTES # BLD AUTO: 1.1 10E3/UL (ref 0–1.3)
MONOCYTES NFR BLD AUTO: 12 %
MUCOUS THREADS #/AREA URNS LPF: PRESENT /LPF
NEUTROPHILS # BLD AUTO: 5.8 10E3/UL (ref 1.6–8.3)
NEUTROPHILS NFR BLD AUTO: 65 %
NITRATE UR QL: NEGATIVE
NRBC # BLD AUTO: 0 10E3/UL
NRBC BLD AUTO-RTO: 0 /100
NT-PROBNP SERPL-MCNC: 720 PG/ML (ref 0–1800)
PH UR STRIP: 5.5 [PH] (ref 5–7)
PLATELET # BLD AUTO: 201 10E3/UL (ref 150–450)
POTASSIUM SERPL-SCNC: 5.1 MMOL/L (ref 3.4–5.3)
RBC # BLD AUTO: 3.53 10E6/UL (ref 3.8–5.2)
RBC URINE: 12 /HPF
SODIUM SERPL-SCNC: 142 MMOL/L (ref 136–145)
SP GR UR STRIP: 1.02 (ref 1–1.03)
SQUAMOUS EPITHELIAL: 21 /HPF
TRANSITIONAL EPI: <1 /HPF
TROPONIN T SERPL HS-MCNC: 15 NG/L
TROPONIN T SERPL HS-MCNC: 18 NG/L
TSH SERPL DL<=0.005 MIU/L-ACNC: 1.83 UIU/ML (ref 0.3–4.2)
UROBILINOGEN UR STRIP-MCNC: 3 MG/DL
WBC # BLD AUTO: 8.8 10E3/UL (ref 4–11)
WBC URINE: 63 /HPF

## 2023-04-07 PROCEDURE — 96361 HYDRATE IV INFUSION ADD-ON: CPT

## 2023-04-07 PROCEDURE — 70450 CT HEAD/BRAIN W/O DYE: CPT | Mod: MA

## 2023-04-07 PROCEDURE — 99285 EMERGENCY DEPT VISIT HI MDM: CPT | Mod: 25

## 2023-04-07 PROCEDURE — 84484 ASSAY OF TROPONIN QUANT: CPT | Performed by: EMERGENCY MEDICINE

## 2023-04-07 PROCEDURE — 83735 ASSAY OF MAGNESIUM: CPT | Performed by: EMERGENCY MEDICINE

## 2023-04-07 PROCEDURE — 80048 BASIC METABOLIC PNL TOTAL CA: CPT | Performed by: EMERGENCY MEDICINE

## 2023-04-07 PROCEDURE — 84484 ASSAY OF TROPONIN QUANT: CPT | Mod: 91 | Performed by: EMERGENCY MEDICINE

## 2023-04-07 PROCEDURE — 87040 BLOOD CULTURE FOR BACTERIA: CPT | Mod: 91 | Performed by: EMERGENCY MEDICINE

## 2023-04-07 PROCEDURE — 96365 THER/PROPH/DIAG IV INF INIT: CPT

## 2023-04-07 PROCEDURE — 83880 ASSAY OF NATRIURETIC PEPTIDE: CPT | Performed by: EMERGENCY MEDICINE

## 2023-04-07 PROCEDURE — 36415 COLL VENOUS BLD VENIPUNCTURE: CPT | Performed by: EMERGENCY MEDICINE

## 2023-04-07 PROCEDURE — 81001 URINALYSIS AUTO W/SCOPE: CPT | Performed by: EMERGENCY MEDICINE

## 2023-04-07 PROCEDURE — 87186 SC STD MICRODIL/AGAR DIL: CPT | Performed by: EMERGENCY MEDICINE

## 2023-04-07 PROCEDURE — 84443 ASSAY THYROID STIM HORMONE: CPT | Performed by: EMERGENCY MEDICINE

## 2023-04-07 PROCEDURE — 83605 ASSAY OF LACTIC ACID: CPT | Performed by: EMERGENCY MEDICINE

## 2023-04-07 PROCEDURE — 96366 THER/PROPH/DIAG IV INF ADDON: CPT

## 2023-04-07 PROCEDURE — 258N000003 HC RX IP 258 OP 636: Performed by: EMERGENCY MEDICINE

## 2023-04-07 PROCEDURE — 250N000011 HC RX IP 250 OP 636: Performed by: EMERGENCY MEDICINE

## 2023-04-07 PROCEDURE — 93005 ELECTROCARDIOGRAM TRACING: CPT

## 2023-04-07 PROCEDURE — 85025 COMPLETE CBC W/AUTO DIFF WBC: CPT | Performed by: EMERGENCY MEDICINE

## 2023-04-07 PROCEDURE — 71046 X-RAY EXAM CHEST 2 VIEWS: CPT

## 2023-04-07 RX ORDER — CEFTRIAXONE 1 G/1
1 INJECTION, POWDER, FOR SOLUTION INTRAMUSCULAR; INTRAVENOUS ONCE
Status: COMPLETED | OUTPATIENT
Start: 2023-04-07 | End: 2023-04-07

## 2023-04-07 RX ORDER — NITROFURANTOIN 25; 75 MG/1; MG/1
100 CAPSULE ORAL 2 TIMES DAILY
Qty: 14 CAPSULE | Refills: 0 | Status: SHIPPED | OUTPATIENT
Start: 2023-04-07 | End: 2023-12-27

## 2023-04-07 RX ADMIN — CEFTRIAXONE 1 G: 1 INJECTION, POWDER, FOR SOLUTION INTRAMUSCULAR; INTRAVENOUS at 15:40

## 2023-04-07 RX ADMIN — SODIUM CHLORIDE 1000 ML: 9 INJECTION, SOLUTION INTRAVENOUS at 14:46

## 2023-04-07 ASSESSMENT — ENCOUNTER SYMPTOMS
DYSURIA: 1
VOMITING: 0
LIGHT-HEADEDNESS: 1
BLOOD IN STOOL: 0
DIARRHEA: 1
SHORTNESS OF BREATH: 1
ABDOMINAL PAIN: 0
COUGH: 0

## 2023-04-07 ASSESSMENT — ACTIVITIES OF DAILY LIVING (ADL)
ADLS_ACUITY_SCORE: 37
ADLS_ACUITY_SCORE: 37

## 2023-04-07 NOTE — ED NOTES
Rapid Assessment Note    History:   Patient reports that she was at a clinic appointment today and became lightheaded when walking. Her blood pressure was found to be 72/41. She states weakness and fatigue in the ED. No chest pain or shortness of breath.    Exam:   General:  Alert, interactive  Cardiovascular:  Well perfused  Lungs:  No respiratory distress, no accessory muscle use  Neuro:  Moving all 4 extremities  Skin:  Warm, dry  Psych:  Normal affect    Plan of Care:   I evaluated the patient and developed an initial plan of care. I discussed this plan and explained that I, or one of my partners, would be returning to complete the evaluation.     Labs obtained.  EKG ordered.  Room emergently.  Direct handoff to Dr. Ford for ongoing care.    I, Rigoberto Beyer, am serving as a scribe to document services personally performed by Melissa Singletno MD, based on my observations and the provider's statements to me.    04/07/23   [employer]    Portions of this medical record were completed by a scribe. UPON MY REVIEW AND AUTHENTICATION BY ELECTRONIC SIGNATURE, this confirms (a) I performed the applicable clinical services, and (b) the record is accurate.      Melissa Singleton MD  04/07/23 [id number]

## 2023-04-07 NOTE — ED TRIAGE NOTES
Pt states that she went to a routine appointment at the clinic today. As she was leaving, she became lightheaded in the hallway. Pt had her BP taken and it was 72/41. Pt states that she is feeling weak/fatigued right now. Denies chest pain.

## 2023-04-07 NOTE — ED PROVIDER NOTES
History     Chief Complaint:  Near Syncope     The history is provided by the patient and the spouse. History limited by: complex ongoing symptoms.      Alma Rosa Fishman is a 82 year old female on Eliquis with a history of type II diabetes mellitus, factor V Leiden, hypertension, hyperlipidemia, asthma who presents with lightheadedness/near syncope which began suddenly just prior to arrival. She reports that she was at a routine clinic visit earlier today to follow up on a recent visit to [facility] where she was found to have a prolonged QT interval. As she was walking in the hallway in the clinic, she suddenly felt lightheaded and felt as though she was about to have a syncopal event. She was immediately helped into a wheelchair and did not fall or experience loss of consciousness. Her blood pressure was subsequently measured at 72/41, prompting her to be sent here to the ED. She does note that her blood pressures have been low at home recently, typically in the 90s/50s. Her  also mentions that she had a similar near syncopal event 2 days ago with no loss of consciousness. Alma Rosa additionally endorses one week of dysuria and some recent black stools. She is not on any antibiotics at this time. She mentions that she was recently taking some iron supplements which could have caused her stools to turn black, though they have now returned to appearing brown. She has had non-bloody diarrhea as well, but she notes that this is normal for her. Endorses 1 to 1.5 years of shortness of breath which has not worsened. No cough, chest pain, abdominal pain, vomiting.    Independent Historian:     provides additional history as above.    Review of External Notes: Office note from her primary reviewed from today where she is being seen for follow-up after specialty care at the [facility].  She was sent to urgent care from the office, but I suspect she came here instead.    ROS:  Review of Systems   Unable to  perform ROS: Other (complex ongoing symptoms)   Respiratory: Positive for shortness of breath. Negative for cough.    Cardiovascular: Negative for chest pain.   Gastrointestinal: Positive for diarrhea. Negative for abdominal pain, blood in stool (black stools, now resolved) and vomiting.   Genitourinary: Positive for dysuria.   Neurological: Positive for light-headedness. Negative for syncope.     Allergies:  Contrast dye  Doxycycline  Acetaminophen  Albuterol  Atenolol  Codeine  Propoxyphene  Fluticasone-salmeterol  Hydrocodone-acetaminophen  Lisinopril  Metformin  Morphine  Penicillins  Oxycodone-acetaminophen  Neomycin-bacitracin-polymyxin      Medications:    Albuterol inhaler  Fosamax  Xanax  Amlodipine  Eliquis  Lipitor  Buspar  Zyrtec  Celexa  Prolia  Valium  Aricept  Gabapentin  Melatonin  Methotrexate  Omeprazole  Zofran  Prednisone  Senokot  Klonopin  Protonix  Sinemet    Past Medical History:    Acute respiratory failure with hypoxia  Calculus of kidney   Depression   Hypertension   Iron deficiency anemia  Nonsenile cataract   Hyperlipidemia   Small bowel obstruction  Syncope and collapse   Type II diabetes mellitus   Asthma   Gastritis and gastroduodenitis  Pernicious anemia  COVID-19  Esophageal dysmotility  Anxiety  SCC of skin  Osteoarthrosis  Spinal stenosis of lumbar region  Seborrheic dermatitis  Rheumatoid arthritis   Raynaud's syndrome  Primary osteoarthritis  B-complex deficiencies  Osteoarthritis  Osteopenia   Morbid obesity  Lichenification and lichen simplex chronicus  Cataplexy and narcolepsy   Chronic pain disorder  Factor V Leiden  Facet arthritis of lumbar region  Diastolic dysfunction  Complete tear of left rotator cuff  Colon adenomas  Cognitive dysfunction  CKD, stage 3  Chronic PTSD  Bilateral sensorineural hearing loss  Osteoporosis   Fibromyalgia  Hypocalcemia   Hypokalemia  Hyponatremia    Sciatica  Bladder incontinence  Endometriosis  Parkinsonism  Rotator cuff arthropathy of  right shoulder  Carpal tunnel syndrome   Adenomatous colonic polyps  DJD  GERD  Stroke     Past Surgical History:    Appendectomy  Cholecystectomy  Gastric bypass  Ankle fracture surgery, left  Total knee arthroplasty, bilateral   Total abdominal hysterectomy with bilateral salpingo-oophorectomy   Carpal tunnel repair, bilateral  Tonsillectomy  D&C  Tubal ligation  Bartholin cyst excision  Bladder repair  Skin cancer excision  Cataract removal, bilateral  Trigger finger release, right  Splenectomy     Family History:    Father: MI, blood cancer, bone cancer, type II diabetes mellitus, hypertension   Mother: AAA, hypertension   Sisters: depression, asthma  Brothers: lung cancer  Daughters: depression, bipolar disorder, skin cancer    Social History:  The patient presents to the ED with her .  The patient presents to the ED via private car.   PCP: Aida Short     Physical Exam     Patient Vitals for the past 24 hrs:   BP Temp Temp src Pulse Resp SpO2   04/07/23 1513 (!) 151/56 -- -- 54 15 90 %   04/07/23 1443 -- -- -- 51 15 96 %   04/07/23 1440 -- -- -- 51 14 98 %   04/07/23 1439 -- -- -- 50 11 98 %   04/07/23 1434 -- -- -- 52 -- --   04/07/23 1433 -- -- -- 53 -- --   04/07/23 1430 106/76 -- -- 57 -- --   04/07/23 1418 -- -- -- (!) 49 10 96 %   04/07/23 1417 -- -- -- (!) 48 14 97 %   04/07/23 1416 -- -- -- (!) 48 10 97 %   04/07/23 1415 134/52 -- -- (!) 47 (!) 8 99 %   04/07/23 1414 -- -- -- (!) 47 (!) 7 99 %   04/07/23 1413 -- -- -- (!) 47 (!) 9 93 %   04/07/23 1412 -- -- -- (!) 46 10 100 %   04/07/23 1411 -- -- -- (!) 46 13 98 %   04/07/23 1410 -- -- -- (!) 46 12 99 %   04/07/23 1409 -- -- -- (!) 48 15 99 %   04/07/23 1408 -- -- -- (!) 47 14 96 %   04/07/23 1407 -- -- -- (!) 48 24 93 %   04/07/23 1406 -- -- -- (!) 47 10 100 %   04/07/23 1405 -- -- -- 50 10 96 %   04/07/23 1403 (!) 83/45 -- -- (!) 49 10 99 %   04/07/23 1349 (!) 83/45 -- -- 50 -- 94 %   04/07/23 1342 (!) 84/46 97.4  F (36.3  C)  Temporal (!) 48 16 98 %      Physical Exam  General: Laying on the ED bed, no distress  HEENT: Normocephalic, atraumatic  Cardiac: Radial pulses 2+, regular bradycardia  Pulm: Breathing comfortably, no accessory muscle usage, no conversational dyspnea, and lungs clear bilaterally  GI: Abdomen soft, nontender, no rigidity or guarding  MSK: No deformities  Skin: Warm and dry  Neuro: Moves all extremities  Psych: Normal mood and affect     Emergency Department Course     ECG  ECG taken at 1405, ECG read at 1407  Sinus bradycardia  Otherwise normal ECG  Rate 47 bpm. ME interval 146 ms. QRS duration 84 ms. QT/QTc 538/476 ms. P-R-T axes 52 23 47.     Imaging:  XR Chest 2 Views   Final Result   IMPRESSION: There are no acute infiltrates. The cardiac silhouette is   not enlarged. Pulmonary vasculature is unremarkable.      JULIO C MANCERA MD            SYSTEM ID:  [id number]      CT Head w/o Contrast   Final Result   IMPRESSION:      1. No evidence of acute intracranial hemorrhage, mass, or herniation.   2. Mild nonspecific white matter changes likely due to chronic   microvascular ischemic disease.       CARLOS HUFFMAN MD            SYSTEM ID:  [id number]      Report per radiology    Laboratory:  Labs Ordered and Resulted from Time of ED Arrival to Time of ED Departure   BASIC METABOLIC PANEL - Abnormal       Result Value    Sodium 142      Potassium 5.1      Chloride 106      Carbon Dioxide (CO2) 25      Anion Gap 11      Urea Nitrogen 19.9      Creatinine 1.19 (*)     Calcium 8.6 (*)     Glucose 94      GFR Estimate 46 (*)    TROPONIN T, HIGH SENSITIVITY - Abnormal    Troponin T, High Sensitivity 18 (*)    ROUTINE UA WITH MICROSCOPIC REFLEX TO CULTURE - Abnormal    Color Urine Yellow      Appearance Urine Slightly Cloudy (*)     Glucose Urine Negative      Bilirubin Urine Negative      Ketones Urine Trace (*)     Specific Gravity Urine 1.023      Blood Urine Negative      pH Urine 5.5      Protein Albumin Urine 10 (*)      Urobilinogen Urine 3.0 (*)     Nitrite Urine Negative      Leukocyte Esterase Urine Large (*)     Bacteria Urine Many (*)     Mucus Urine Present (*)     RBC Urine 12 (*)     WBC Urine 63 (*)     Squamous Epithelials Urine 21 (*)     Transitional Epithelials Urine <1      Hyaline Casts Urine 40 (*)    CBC WITH PLATELETS AND DIFFERENTIAL - Abnormal    WBC Count 8.8      RBC Count 3.53 (*)     Hemoglobin 12.3      Hematocrit 38.5       (*)     MCH 34.8 (*)     MCHC 31.9      RDW 15.8 (*)     Platelet Count 201      % Neutrophils 65      % Lymphocytes 14      % Monocytes 12      % Eosinophils 7      % Basophils 1      % Immature Granulocytes 1      NRBCs per 100 WBC 0      Absolute Neutrophils 5.8      Absolute Lymphocytes 1.2      Absolute Monocytes 1.1      Absolute Eosinophils 0.6      Absolute Basophils 0.1      Absolute Immature Granulocytes 0.0      Absolute NRBCs 0.0     TROPONIN T, HIGH SENSITIVITY - Abnormal    Troponin T, High Sensitivity 15 (*)    LACTIC ACID WHOLE BLOOD - Normal    Lactic Acid 1.5     NT PROBNP INPATIENT - Normal    N terminal Pro BNP Inpatient 720     MAGNESIUM - Normal    Magnesium 1.8     TSH WITH FREE T4 REFLEX - Normal    TSH 1.83     BLOOD CULTURE   BLOOD CULTURE   URINE CULTURE      Emergency Department Course & Assessments:       Interventions:  Medications   0.9% sodium chloride BOLUS (0 mLs Intravenous Stopped 4/7/23 1602)   cefTRIAXone (ROCEPHIN) 1 g vial to attach to  mL bag for ADULTS or NS 50 mL bag for PEDS (1 g Intravenous $New Bag 4/7/23 1540)      Independent Interpretation (X-rays, CTs, rhythm strip):  Chest x-ray is clear without lobar infiltrate, pneumothorax, large pleural effusion, or significant edema.      Social Determinants of Health affecting care:  None    Assessments:  1401 I obtained history and performed an examination of the patient.   1703 The patient was rechecked and updated. We discussed results and plan of care.    Disposition:  The patient  was discharged to home.     Impression & Plan      Medical Decision Making:  Alma Rosa Fishman is a 82 year old female who presents with hypotension and bradycardia from clinic.  She was indeed found to be hypotensive and bradycardic in the high 40s here.  She otherwise had no acute complaints aside from the history of near syncope at the clinic.  She was given IV fluids with good stabilization of her blood pressure.  EKG was nonischemic.  Lactate was thankfully negative.  Troponin was very slightly elevated but decreasing on repeat.  I suspect this to be a type II demand ischemia in the setting of near syncope and UTI.  She does have a history of Parkinson's and today's near syncope seems like it might be consistent with an orthostatic etiology.  She was recently taken off of all of her antihypertensives at the [facility], so I do not believe that these would be contributing to her hypotension today.  Given the patient's presentation, initial vital signs, UTI, and overall clinical presentation, I did offer an observation admission for further monitoring in the hospital.  She declines and displays appropriate capacity to do so and I think this is a reasonable option as well.  She will return to the ED for any new or worsening symptoms and will continue to take her blood pressure at home as well as be as cautious as possible moving around the house.  Plan is for discharge home with PCP follow-up.    Diagnosis:    ICD-10-CM    1. Near syncope  R55       2. Hypotension, unspecified hypotension type  I95.9          Scribe Disclosure:  I, Kaye Beltran, am serving as a scribe at 3:10 PM on 4/7/2023 to document services personally performed by Gus Mccoy MD based on my observations and the provider's statements to me.     4/7/2023   MD Darius Harper Colin, MD  04/07/23 0740

## 2023-04-08 LAB
BACTERIA UR CULT: ABNORMAL
BACTERIA UR CULT: ABNORMAL

## 2023-04-10 LAB
ATRIAL RATE - MUSE: 47 BPM
DIASTOLIC BLOOD PRESSURE - MUSE: NORMAL MMHG
INTERPRETATION ECG - MUSE: NORMAL
P AXIS - MUSE: 52 DEGREES
PR INTERVAL - MUSE: 146 MS
QRS DURATION - MUSE: 84 MS
QT - MUSE: 538 MS
QTC - MUSE: 476 MS
R AXIS - MUSE: 23 DEGREES
SYSTOLIC BLOOD PRESSURE - MUSE: NORMAL MMHG
T AXIS - MUSE: 47 DEGREES
VENTRICULAR RATE- MUSE: 47 BPM

## 2023-04-12 LAB
BACTERIA BLD CULT: NO GROWTH
BACTERIA BLD CULT: NO GROWTH

## 2023-12-27 ENCOUNTER — HOSPITAL ENCOUNTER (OUTPATIENT)
Facility: CLINIC | Age: 83
Setting detail: OBSERVATION
Discharge: HOME | End: 2023-12-29
Attending: EMERGENCY MEDICINE | Admitting: INTERNAL MEDICINE
Payer: MEDICARE

## 2023-12-27 ENCOUNTER — APPOINTMENT (OUTPATIENT)
Dept: GENERAL RADIOLOGY | Facility: CLINIC | Age: 83
End: 2023-12-27
Attending: EMERGENCY MEDICINE
Payer: MEDICARE

## 2023-12-27 DIAGNOSIS — R53.1 GENERALIZED WEAKNESS: ICD-10-CM

## 2023-12-27 DIAGNOSIS — R03.0 ELEVATED BLOOD PRESSURE READING: ICD-10-CM

## 2023-12-27 DIAGNOSIS — J98.01 BRONCHOSPASM: ICD-10-CM

## 2023-12-27 DIAGNOSIS — J10.1 INFLUENZA A: ICD-10-CM

## 2023-12-27 LAB
ANION GAP SERPL CALCULATED.3IONS-SCNC: 12 MMOL/L (ref 7–15)
BASOPHILS # BLD AUTO: 0.1 10E3/UL (ref 0–0.2)
BASOPHILS NFR BLD AUTO: 1 %
BUN SERPL-MCNC: 15.4 MG/DL (ref 8–23)
CALCIUM SERPL-MCNC: 8.5 MG/DL (ref 8.8–10.2)
CHLORIDE SERPL-SCNC: 103 MMOL/L (ref 98–107)
CREAT SERPL-MCNC: 1.13 MG/DL (ref 0.51–0.95)
DEPRECATED HCO3 PLAS-SCNC: 22 MMOL/L (ref 22–29)
EGFRCR SERPLBLD CKD-EPI 2021: 49 ML/MIN/1.73M2
EOSINOPHIL # BLD AUTO: 0.1 10E3/UL (ref 0–0.7)
EOSINOPHIL NFR BLD AUTO: 2 %
ERYTHROCYTE [DISTWIDTH] IN BLOOD BY AUTOMATED COUNT: 15.4 % (ref 10–15)
FLUAV RNA SPEC QL NAA+PROBE: POSITIVE
FLUBV RNA RESP QL NAA+PROBE: NEGATIVE
GLUCOSE SERPL-MCNC: 92 MG/DL (ref 70–99)
HCT VFR BLD AUTO: 38.2 % (ref 35–47)
HGB BLD-MCNC: 12.6 G/DL (ref 11.7–15.7)
IMM GRANULOCYTES # BLD: 0 10E3/UL
IMM GRANULOCYTES NFR BLD: 1 %
LYMPHOCYTES # BLD AUTO: 0.8 10E3/UL (ref 0.8–5.3)
LYMPHOCYTES NFR BLD AUTO: 11 %
MCH RBC QN AUTO: 35.8 PG (ref 26.5–33)
MCHC RBC AUTO-ENTMCNC: 33 G/DL (ref 31.5–36.5)
MCV RBC AUTO: 109 FL (ref 78–100)
MONOCYTES # BLD AUTO: 1.2 10E3/UL (ref 0–1.3)
MONOCYTES NFR BLD AUTO: 17 %
NEUTROPHILS # BLD AUTO: 5.1 10E3/UL (ref 1.6–8.3)
NEUTROPHILS NFR BLD AUTO: 68 %
NRBC # BLD AUTO: 0.1 10E3/UL
NRBC BLD AUTO-RTO: 1 /100
NT-PROBNP SERPL-MCNC: 1561 PG/ML (ref 0–1800)
PLATELET # BLD AUTO: 183 10E3/UL (ref 150–450)
POTASSIUM SERPL-SCNC: 4.3 MMOL/L (ref 3.4–5.3)
PROCALCITONIN SERPL IA-MCNC: 0.07 NG/ML
RBC # BLD AUTO: 3.52 10E6/UL (ref 3.8–5.2)
RSV RNA SPEC NAA+PROBE: NEGATIVE
SARS-COV-2 RNA RESP QL NAA+PROBE: NEGATIVE
SODIUM SERPL-SCNC: 137 MMOL/L (ref 135–145)
TROPONIN T SERPL HS-MCNC: 22 NG/L
WBC # BLD AUTO: 7.4 10E3/UL (ref 4–11)

## 2023-12-27 PROCEDURE — 82805 BLOOD GASES W/O2 SATURATION: CPT | Performed by: EMERGENCY MEDICINE

## 2023-12-27 PROCEDURE — 93005 ELECTROCARDIOGRAM TRACING: CPT

## 2023-12-27 PROCEDURE — 250N000012 HC RX MED GY IP 250 OP 636 PS 637: Performed by: EMERGENCY MEDICINE

## 2023-12-27 PROCEDURE — 71045 X-RAY EXAM CHEST 1 VIEW: CPT

## 2023-12-27 PROCEDURE — 84145 PROCALCITONIN (PCT): CPT | Performed by: EMERGENCY MEDICINE

## 2023-12-27 PROCEDURE — 83880 ASSAY OF NATRIURETIC PEPTIDE: CPT | Performed by: EMERGENCY MEDICINE

## 2023-12-27 PROCEDURE — 80048 BASIC METABOLIC PNL TOTAL CA: CPT | Performed by: EMERGENCY MEDICINE

## 2023-12-27 PROCEDURE — 250N000011 HC RX IP 250 OP 636: Performed by: EMERGENCY MEDICINE

## 2023-12-27 PROCEDURE — 85025 COMPLETE CBC W/AUTO DIFF WBC: CPT | Performed by: EMERGENCY MEDICINE

## 2023-12-27 PROCEDURE — 87637 SARSCOV2&INF A&B&RSV AMP PRB: CPT | Performed by: EMERGENCY MEDICINE

## 2023-12-27 PROCEDURE — 99285 EMERGENCY DEPT VISIT HI MDM: CPT | Mod: 25

## 2023-12-27 PROCEDURE — 36415 COLL VENOUS BLD VENIPUNCTURE: CPT | Performed by: EMERGENCY MEDICINE

## 2023-12-27 PROCEDURE — 96374 THER/PROPH/DIAG INJ IV PUSH: CPT

## 2023-12-27 PROCEDURE — 84484 ASSAY OF TROPONIN QUANT: CPT | Performed by: EMERGENCY MEDICINE

## 2023-12-27 PROCEDURE — 250N000009 HC RX 250

## 2023-12-27 RX ORDER — IPRATROPIUM BROMIDE AND ALBUTEROL SULFATE 2.5; .5 MG/3ML; MG/3ML
SOLUTION RESPIRATORY (INHALATION)
Status: COMPLETED
Start: 2023-12-27 | End: 2023-12-27

## 2023-12-27 RX ORDER — PREDNISONE 20 MG/1
40 TABLET ORAL ONCE
Status: COMPLETED | OUTPATIENT
Start: 2023-12-27 | End: 2023-12-27

## 2023-12-27 RX ORDER — DIPHENHYDRAMINE HYDROCHLORIDE 50 MG/ML
12.5 INJECTION INTRAMUSCULAR; INTRAVENOUS ONCE
Status: COMPLETED | OUTPATIENT
Start: 2023-12-27 | End: 2023-12-27

## 2023-12-27 RX ORDER — IPRATROPIUM BROMIDE AND ALBUTEROL SULFATE 2.5; .5 MG/3ML; MG/3ML
3 SOLUTION RESPIRATORY (INHALATION) ONCE
Status: COMPLETED | OUTPATIENT
Start: 2023-12-27 | End: 2023-12-27

## 2023-12-27 RX ADMIN — IPRATROPIUM BROMIDE AND ALBUTEROL SULFATE 3 ML: .5; 3 SOLUTION RESPIRATORY (INHALATION) at 23:12

## 2023-12-27 RX ADMIN — PREDNISONE 40 MG: 20 TABLET ORAL at 23:19

## 2023-12-27 RX ADMIN — DIPHENHYDRAMINE HYDROCHLORIDE 12.5 MG: 50 INJECTION INTRAMUSCULAR; INTRAVENOUS at 23:19

## 2023-12-27 RX ADMIN — IPRATROPIUM BROMIDE AND ALBUTEROL SULFATE 3 ML: 2.5; .5 SOLUTION RESPIRATORY (INHALATION) at 23:12

## 2023-12-27 ASSESSMENT — ACTIVITIES OF DAILY LIVING (ADL): ADLS_ACUITY_SCORE: 35

## 2023-12-28 PROBLEM — R53.1 GENERALIZED WEAKNESS: Status: ACTIVE | Noted: 2023-12-28

## 2023-12-28 PROBLEM — J98.01 BRONCHOSPASM: Status: ACTIVE | Noted: 2023-12-28

## 2023-12-28 PROBLEM — J10.1 INFLUENZA A: Status: ACTIVE | Noted: 2023-12-28

## 2023-12-28 LAB
ANION GAP SERPL CALCULATED.3IONS-SCNC: 10 MMOL/L (ref 7–15)
ATRIAL RATE - MUSE: 80 BPM
BASE EXCESS BLDV CALC-SCNC: 0.9 MMOL/L (ref -7.7–1.9)
BUN SERPL-MCNC: 18 MG/DL (ref 8–23)
CALCIUM SERPL-MCNC: 8.3 MG/DL (ref 8.8–10.2)
CHLORIDE SERPL-SCNC: 104 MMOL/L (ref 98–107)
CREAT SERPL-MCNC: 1.03 MG/DL (ref 0.51–0.95)
DEPRECATED HCO3 PLAS-SCNC: 22 MMOL/L (ref 22–29)
DIASTOLIC BLOOD PRESSURE - MUSE: NORMAL MMHG
EGFRCR SERPLBLD CKD-EPI 2021: 54 ML/MIN/1.73M2
ERYTHROCYTE [DISTWIDTH] IN BLOOD BY AUTOMATED COUNT: 15.4 % (ref 10–15)
GLUCOSE BLDC GLUCOMTR-MCNC: 160 MG/DL (ref 70–99)
GLUCOSE SERPL-MCNC: 161 MG/DL (ref 70–99)
HCO3 BLDV-SCNC: 25 MMOL/L (ref 21–28)
HCT VFR BLD AUTO: 31.8 % (ref 35–47)
HGB BLD-MCNC: 10.6 G/DL (ref 11.7–15.7)
HOLD SPECIMEN: NORMAL
HOLD SPECIMEN: NORMAL
INTERPRETATION ECG - MUSE: NORMAL
MCH RBC QN AUTO: 35.8 PG (ref 26.5–33)
MCHC RBC AUTO-ENTMCNC: 33.3 G/DL (ref 31.5–36.5)
MCV RBC AUTO: 107 FL (ref 78–100)
O2/TOTAL GAS SETTING VFR VENT: 21 %
OXYHGB MFR BLDV: 47 % (ref 70–75)
P AXIS - MUSE: 64 DEGREES
PCO2 BLDV: 40 MM HG (ref 40–50)
PH BLDV: 7.42 [PH] (ref 7.32–7.43)
PLATELET # BLD AUTO: 167 10E3/UL (ref 150–450)
PO2 BLDV: 32 MM HG (ref 25–47)
POTASSIUM SERPL-SCNC: 4.1 MMOL/L (ref 3.4–5.3)
PR INTERVAL - MUSE: 138 MS
QRS DURATION - MUSE: 84 MS
QT - MUSE: 422 MS
QTC - MUSE: 486 MS
R AXIS - MUSE: 42 DEGREES
RBC # BLD AUTO: 2.96 10E6/UL (ref 3.8–5.2)
SODIUM SERPL-SCNC: 136 MMOL/L (ref 135–145)
SYSTOLIC BLOOD PRESSURE - MUSE: NORMAL MMHG
T AXIS - MUSE: 83 DEGREES
VENTRICULAR RATE- MUSE: 80 BPM
WBC # BLD AUTO: 6.6 10E3/UL (ref 4–11)

## 2023-12-28 PROCEDURE — G0378 HOSPITAL OBSERVATION PER HR: HCPCS

## 2023-12-28 PROCEDURE — 99207 PR NO BILLABLE SERVICE THIS VISIT: CPT | Performed by: INTERNAL MEDICINE

## 2023-12-28 PROCEDURE — 96375 TX/PRO/DX INJ NEW DRUG ADDON: CPT

## 2023-12-28 PROCEDURE — 250N000013 HC RX MED GY IP 250 OP 250 PS 637: Performed by: INTERNAL MEDICINE

## 2023-12-28 PROCEDURE — 250N000009 HC RX 250: Performed by: INTERNAL MEDICINE

## 2023-12-28 PROCEDURE — 36415 COLL VENOUS BLD VENIPUNCTURE: CPT | Performed by: INTERNAL MEDICINE

## 2023-12-28 PROCEDURE — 82962 GLUCOSE BLOOD TEST: CPT

## 2023-12-28 PROCEDURE — 250N000012 HC RX MED GY IP 250 OP 636 PS 637: Performed by: INTERNAL MEDICINE

## 2023-12-28 PROCEDURE — 250N000013 HC RX MED GY IP 250 OP 250 PS 637: Performed by: EMERGENCY MEDICINE

## 2023-12-28 PROCEDURE — 250N000013 HC RX MED GY IP 250 OP 250 PS 637: Performed by: PHYSICIAN ASSISTANT

## 2023-12-28 PROCEDURE — 80048 BASIC METABOLIC PNL TOTAL CA: CPT | Performed by: INTERNAL MEDICINE

## 2023-12-28 PROCEDURE — 85041 AUTOMATED RBC COUNT: CPT | Performed by: INTERNAL MEDICINE

## 2023-12-28 PROCEDURE — 99222 1ST HOSP IP/OBS MODERATE 55: CPT | Mod: AI | Performed by: INTERNAL MEDICINE

## 2023-12-28 PROCEDURE — 250N000011 HC RX IP 250 OP 636: Performed by: EMERGENCY MEDICINE

## 2023-12-28 RX ORDER — BENZONATATE 100 MG/1
100 CAPSULE ORAL 3 TIMES DAILY
Status: DISCONTINUED | OUTPATIENT
Start: 2023-12-28 | End: 2023-12-29 | Stop reason: HOSPADM

## 2023-12-28 RX ORDER — AMOXICILLIN 250 MG
2 CAPSULE ORAL 2 TIMES DAILY PRN
Status: DISCONTINUED | OUTPATIENT
Start: 2023-12-28 | End: 2023-12-29 | Stop reason: HOSPADM

## 2023-12-28 RX ORDER — CARBIDOPA AND LEVODOPA 25; 100 MG/1; MG/1
2 TABLET ORAL 3 TIMES DAILY
COMMUNITY

## 2023-12-28 RX ORDER — PROCHLORPERAZINE MALEATE 5 MG
5 TABLET ORAL EVERY 6 HOURS PRN
Status: DISCONTINUED | OUTPATIENT
Start: 2023-12-28 | End: 2023-12-29 | Stop reason: HOSPADM

## 2023-12-28 RX ORDER — LEVALBUTEROL INHALATION SOLUTION 0.63 MG/3ML
0.63 SOLUTION RESPIRATORY (INHALATION) EVERY 4 HOURS PRN
Status: DISCONTINUED | OUTPATIENT
Start: 2023-12-28 | End: 2023-12-29 | Stop reason: HOSPADM

## 2023-12-28 RX ORDER — TRIMETHOPRIM 100 MG/1
100 TABLET ORAL DAILY
COMMUNITY
Start: 2023-09-01

## 2023-12-28 RX ORDER — PROCHLORPERAZINE 25 MG
12.5 SUPPOSITORY, RECTAL RECTAL EVERY 12 HOURS PRN
Status: DISCONTINUED | OUTPATIENT
Start: 2023-12-28 | End: 2023-12-29 | Stop reason: HOSPADM

## 2023-12-28 RX ORDER — AMOXICILLIN 250 MG
1 CAPSULE ORAL 2 TIMES DAILY PRN
Status: DISCONTINUED | OUTPATIENT
Start: 2023-12-28 | End: 2023-12-29 | Stop reason: HOSPADM

## 2023-12-28 RX ORDER — ACETAMINOPHEN 325 MG/1
650 TABLET ORAL EVERY 4 HOURS PRN
Status: DISCONTINUED | OUTPATIENT
Start: 2023-12-28 | End: 2023-12-29 | Stop reason: HOSPADM

## 2023-12-28 RX ORDER — CITALOPRAM HYDROBROMIDE 20 MG/1
20 TABLET ORAL DAILY
Status: DISCONTINUED | OUTPATIENT
Start: 2023-12-28 | End: 2023-12-29 | Stop reason: HOSPADM

## 2023-12-28 RX ORDER — ONDANSETRON 2 MG/ML
4 INJECTION INTRAMUSCULAR; INTRAVENOUS EVERY 6 HOURS PRN
Status: DISCONTINUED | OUTPATIENT
Start: 2023-12-28 | End: 2023-12-29 | Stop reason: HOSPADM

## 2023-12-28 RX ORDER — IBUPROFEN 600 MG/1
600 TABLET, FILM COATED ORAL ONCE
Status: COMPLETED | OUTPATIENT
Start: 2023-12-28 | End: 2023-12-28

## 2023-12-28 RX ORDER — OSELTAMIVIR PHOSPHATE 75 MG/1
75 CAPSULE ORAL ONCE
Status: COMPLETED | OUTPATIENT
Start: 2023-12-28 | End: 2023-12-28

## 2023-12-28 RX ORDER — LORAZEPAM 0.5 MG/1
0.25 TABLET ORAL EVERY 4 HOURS PRN
Status: DISCONTINUED | OUTPATIENT
Start: 2023-12-28 | End: 2023-12-29 | Stop reason: HOSPADM

## 2023-12-28 RX ORDER — PANTOPRAZOLE SODIUM 20 MG/1
20 TABLET, DELAYED RELEASE ORAL DAILY
Status: DISCONTINUED | OUTPATIENT
Start: 2023-12-28 | End: 2023-12-29 | Stop reason: HOSPADM

## 2023-12-28 RX ORDER — TRIMETHOPRIM 100 MG/1
100 TABLET ORAL DAILY
Status: DISCONTINUED | OUTPATIENT
Start: 2023-12-28 | End: 2023-12-29 | Stop reason: HOSPADM

## 2023-12-28 RX ORDER — PREDNISONE 20 MG/1
40 TABLET ORAL DAILY
Status: DISCONTINUED | OUTPATIENT
Start: 2023-12-28 | End: 2023-12-29

## 2023-12-28 RX ORDER — OSELTAMIVIR PHOSPHATE 30 MG/1
30 CAPSULE ORAL 2 TIMES DAILY
Status: DISCONTINUED | OUTPATIENT
Start: 2023-12-28 | End: 2023-12-29 | Stop reason: HOSPADM

## 2023-12-28 RX ORDER — POLYETHYLENE GLYCOL 3350 17 G/17G
17 POWDER, FOR SOLUTION ORAL 2 TIMES DAILY PRN
Status: DISCONTINUED | OUTPATIENT
Start: 2023-12-28 | End: 2023-12-29 | Stop reason: HOSPADM

## 2023-12-28 RX ORDER — GUAIFENESIN 200 MG/10ML
200 LIQUID ORAL EVERY 4 HOURS PRN
Status: DISCONTINUED | OUTPATIENT
Start: 2023-12-28 | End: 2023-12-29 | Stop reason: HOSPADM

## 2023-12-28 RX ORDER — FOLIC ACID 1 MG/1
1 TABLET ORAL DAILY
Status: DISCONTINUED | OUTPATIENT
Start: 2023-12-28 | End: 2023-12-29 | Stop reason: HOSPADM

## 2023-12-28 RX ORDER — CARBIDOPA AND LEVODOPA 25; 100 MG/1; MG/1
2 TABLET ORAL 3 TIMES DAILY
Status: DISCONTINUED | OUTPATIENT
Start: 2023-12-28 | End: 2023-12-29 | Stop reason: HOSPADM

## 2023-12-28 RX ORDER — IBUPROFEN 400 MG/1
400 TABLET, FILM COATED ORAL EVERY 6 HOURS PRN
Status: DISCONTINUED | OUTPATIENT
Start: 2023-12-28 | End: 2023-12-29 | Stop reason: HOSPADM

## 2023-12-28 RX ORDER — ONDANSETRON 4 MG/1
4 TABLET, ORALLY DISINTEGRATING ORAL EVERY 6 HOURS PRN
Status: DISCONTINUED | OUTPATIENT
Start: 2023-12-28 | End: 2023-12-29 | Stop reason: HOSPADM

## 2023-12-28 RX ORDER — HYDRALAZINE HYDROCHLORIDE 20 MG/ML
5 INJECTION INTRAMUSCULAR; INTRAVENOUS ONCE
Status: COMPLETED | OUTPATIENT
Start: 2023-12-28 | End: 2023-12-28

## 2023-12-28 RX ORDER — BUSPIRONE HYDROCHLORIDE 5 MG/1
5 TABLET ORAL 2 TIMES DAILY
Status: DISCONTINUED | OUTPATIENT
Start: 2023-12-28 | End: 2023-12-29 | Stop reason: HOSPADM

## 2023-12-28 RX ORDER — PANTOPRAZOLE SODIUM 20 MG/1
20 TABLET, DELAYED RELEASE ORAL DAILY
COMMUNITY
Start: 2023-04-07

## 2023-12-28 RX ORDER — HYDRALAZINE HCL 10 MG
5 TABLET ORAL EVERY 4 HOURS PRN
Status: DISCONTINUED | OUTPATIENT
Start: 2023-12-28 | End: 2023-12-29 | Stop reason: HOSPADM

## 2023-12-28 RX ORDER — CETIRIZINE HYDROCHLORIDE 10 MG/1
10 TABLET ORAL DAILY
Status: DISCONTINUED | OUTPATIENT
Start: 2023-12-28 | End: 2023-12-29 | Stop reason: HOSPADM

## 2023-12-28 RX ADMIN — BENZONATATE 100 MG: 100 CAPSULE ORAL at 09:08

## 2023-12-28 RX ADMIN — CARBIDOPA AND LEVODOPA 2 TABLET: 25; 100 TABLET ORAL at 09:09

## 2023-12-28 RX ADMIN — IBUPROFEN 400 MG: 400 TABLET, FILM COATED ORAL at 15:19

## 2023-12-28 RX ADMIN — ACETAMINOPHEN 650 MG: 325 TABLET, FILM COATED ORAL at 20:17

## 2023-12-28 RX ADMIN — LORAZEPAM 0.25 MG: 0.5 TABLET ORAL at 01:06

## 2023-12-28 RX ADMIN — FOLIC ACID 1 MG: 1 TABLET ORAL at 09:09

## 2023-12-28 RX ADMIN — CARBIDOPA AND LEVODOPA 2 TABLET: 25; 100 TABLET ORAL at 15:09

## 2023-12-28 RX ADMIN — OSELTAMIVIR PHOSPHATE 30 MG: 30 CAPSULE ORAL at 20:16

## 2023-12-28 RX ADMIN — PREDNISONE 40 MG: 20 TABLET ORAL at 09:09

## 2023-12-28 RX ADMIN — CETIRIZINE HYDROCHLORIDE 10 MG: 10 TABLET, FILM COATED ORAL at 09:09

## 2023-12-28 RX ADMIN — OSELTAMIVIR PHOSPHATE 75 MG: 75 CAPSULE ORAL at 00:18

## 2023-12-28 RX ADMIN — GUAIFENESIN 200 MG: 200 SOLUTION ORAL at 17:37

## 2023-12-28 RX ADMIN — LEVALBUTEROL HYDROCHLORIDE 0.63 MG: 0.63 SOLUTION RESPIRATORY (INHALATION) at 21:26

## 2023-12-28 RX ADMIN — BUSPIRONE HYDROCHLORIDE 5 MG: 5 TABLET ORAL at 20:17

## 2023-12-28 RX ADMIN — IBUPROFEN 400 MG: 400 TABLET, FILM COATED ORAL at 22:30

## 2023-12-28 RX ADMIN — IBUPROFEN 600 MG: 600 TABLET, FILM COATED ORAL at 00:18

## 2023-12-28 RX ADMIN — CITALOPRAM HYDROBROMIDE 20 MG: 20 TABLET, FILM COATED ORAL at 09:09

## 2023-12-28 RX ADMIN — HYDRALAZINE HYDROCHLORIDE 5 MG: 20 INJECTION INTRAMUSCULAR; INTRAVENOUS at 00:19

## 2023-12-28 RX ADMIN — BENZONATATE 100 MG: 100 CAPSULE ORAL at 15:09

## 2023-12-28 RX ADMIN — CARBIDOPA AND LEVODOPA 2 TABLET: 25; 100 TABLET ORAL at 20:15

## 2023-12-28 RX ADMIN — OSELTAMIVIR PHOSPHATE 30 MG: 30 CAPSULE ORAL at 09:08

## 2023-12-28 RX ADMIN — PANTOPRAZOLE SODIUM 20 MG: 20 TABLET, DELAYED RELEASE ORAL at 09:09

## 2023-12-28 RX ADMIN — BENZONATATE 100 MG: 100 CAPSULE ORAL at 20:17

## 2023-12-28 RX ADMIN — BUSPIRONE HYDROCHLORIDE 5 MG: 5 TABLET ORAL at 09:52

## 2023-12-28 RX ADMIN — LEVALBUTEROL HYDROCHLORIDE 0.63 MG: 0.63 SOLUTION RESPIRATORY (INHALATION) at 01:06

## 2023-12-28 ASSESSMENT — ACTIVITIES OF DAILY LIVING (ADL)
ADLS_ACUITY_SCORE: 37
ADLS_ACUITY_SCORE: 35
ADLS_ACUITY_SCORE: 33
ADLS_ACUITY_SCORE: 35
ADLS_ACUITY_SCORE: 35
ADLS_ACUITY_SCORE: 33
ADLS_ACUITY_SCORE: 33
ADLS_ACUITY_SCORE: 35
DEPENDENT_IADLS:: COOKING;CLEANING
ADLS_ACUITY_SCORE: 35
ADLS_ACUITY_SCORE: 37
ADLS_ACUITY_SCORE: 33
ADLS_ACUITY_SCORE: 37

## 2023-12-28 NOTE — CONSULTS
Care Management Initial Consult    General Information  Assessment completed with: Patient, Patient  Type of CM/SW Visit: Initial Assessment    Primary Care Provider verified and updated as needed:     Readmission within the last 30 days:        Reason for Consult: discharge planning  Advance Care Planning:            Communication Assessment  Patient's communication style: spoken language (English or Bilingual)             Cognitive  Cognitive/Neuro/Behavioral: WDL                      Living Environment:   People in home: spouse     Current living Arrangements:        Able to return to prior arrangements: yes       Family/Social Support:  Care provided by:    Provides care for: spouse  Marital Status:              Description of Support System: Supportive, Involved    Support Assessment: Adequate family and caregiver support, Patient communicates needs well met    Current Resources:   Patient receiving home care services: No     Community Resources: None  Equipment currently used at home:    Supplies currently used at home:      Employment/Financial:  Employment Status:          Financial Concerns:     Referral to Financial Worker: Yes       Does the patient's insurance plan have a 3 day qualifying hospital stay waiver?  No    Lifestyle & Psychosocial Needs:  Social Determinants of Health     Food Insecurity: Not on file   Depression: Not at risk (7/1/2021)    PHQ-2     PHQ-2 Score: 0   Housing Stability: Not on file   Tobacco Use: Medium Risk (12/27/2023)    Patient History     Smoking Tobacco Use: Former     Smokeless Tobacco Use: Never     Passive Exposure: Not on file   Financial Resource Strain: Not on file   Alcohol Use: Not on file   Transportation Needs: Not on file   Physical Activity: Not on file   Interpersonal Safety: Not on file   Stress: Not on file   Social Connections: Not on file       Functional Status:  Prior to admission patient needed assistance:      Dependent IADLs:: Cooking,  Cleaning  Assesssment of Functional Status: Not at baseline with ADL Functioning    Mental Health Status:  Mental Health Status: No Current Concerns       Chemical Dependency Status:  Chemical Dependency Status: No Current Concerns             Values/Beliefs:  Spiritual, Cultural Beliefs, Denominational Practices, Values that affect care:                 Additional Information:  SW spoke with patient via phone due to influenza. Patient has a URR of 18%. Patient lives at home with her spouse. Her spouse does the cooking. She tries to be independent. Patient thinks she may benefit from additional support. She says her kids work during the day and can't help. Patient gave SW permission to call her daughter.     Made a referral for home care RN, PT, and OT. Patient does not have TCU coverage. Patient had to quickly hang up because she had to go to the bathroom. Patient is unsure how PT would work since she is having rotator cuff surgery in February.     Patient's daughter states she has had a lot of falls. Patient's daughter states  helps with medications. Patients daughter is unsure when she showered last. She wears depends.     Will follow PT recs.     YASMINE Newman, SW  Emergency Room   Please contact the SW on the floor in which the patient is staying for any questions or concerns

## 2023-12-28 NOTE — H&P
[facility]  Hospitalist Admission Note  Name: Alma Rosa Fishman    MRN: [id number]  YOB: 1940    Age: 83 year old  Date of admission: 12/27/2023  Primary care provider: Clinic, [facility] [place]    Chief Complaint: Cough, shortness breath    Assessment and Plan:   Influenza A infection  Asthma with acute exacerbation: Presenting with 1 day of frequent cough minimally productive associate with intermittent chills and shortness of breath.  Significantly wheezy in the ER, so likely has asthma exacerbation as well.  Spouse also admitted here for influenza A which she tested positive for on PCR.  COVID-19 and RSV negative.  Chest x-ray negative.  Currently on room air.  Hyperventilating ER which is part due to her acute on chronic anxiety, VBG looks okay though.  -Tamiflu was initiated in the ER, continue 75 mg twice daily for 5 days  -Prednisone 40 mg daily  -Albuterol allergy listed with reaction of tongue swelling, although I believe she had inhaler for this in the past.  She is very jittery and anxious so I ordered Xopenex nebulizer every 4 hours as needed for wheezing or shortness of breath  -Continuous pulse ox and add supplemental oxygen as needed  -Treating anxiety as below  -Multiple different acetaminophen combined with opiate allergies listed.  Ordered ibuprofen 400 mg every 6 hours as needed for fevers or pain.  She thinks she may have tolerated acetaminophen in the past, if fevers refractory to ibuprofen we can consider trying this.    Anxiety  MDD  RLS: Chronic issue for the patient going to family.  She appears quite anxious in the ER.  I believe she is on buspirone 5 mg twice daily and citalopram 20 mg daily.  Has had benzodiazepine prescriptions in the past.  -Continue buspirone and citalopram  -Added oral lorazepam 0.25 every 4 as needed for anxiety  -Unclear if she still taking gabapentin at night, but Descovy resumed once med rec complete    Type II DM: History of  diabetes many years ago, most recent A1c 5.0 not on any medications for this.  -Since she will be on prednisone, initially monitor glucose twice daily and initiate sliding scale if blood glucose above 200    Parkinsonism: Resume PTA Sinemet  mg 2 tablets daily.    Rheumatoid arthritis: PTA methotrexate weekly and folic acid daily.  Ordered daily folic acid.  Also ordered Voltaren gel as needed.  She is also on Prolia injections.    HTN: In the past she has been on low-dose amlodipine and metoprolol, however based on the fill history I can see here not certain that she is taking both of these and she cannot tell me her medications.  Hypertensive in the ER in the setting of significant shortness of breath and anxiety with blood pressure of 206/96.  Received 5 mg IV hydralazine and blood pressures in the 150 systolic.  -Awaiting formal med rec, then resume PTA regimen  -In the meantime ordered 5 mg IV hydralazine every 4 as needed for SBP >190    History DVT: History of provoked DVT in the setting of previous small bowel obstruction surgery.  Treated for 3 months with Eliquis, no longer on anticoagulation.    History CVA: No reported deficits, but she was told she had a stroke on imaging test.  She was told to start taking aspirin 324 mg daily which will be continued.    History splenectomy: Found to have spleen laceration during her small obstruction surgery associated with splenectomy.    CKD stage IIIa: Creatinine baseline 1.1.    HLD: Current observation status, okay to hold atorvastatin.    History UTIs: Resume PTA prophylaxis trimethoprim 4 mg daily    GERD: Resume PTA pantoprazole 20 g daily.      DVT Prophylaxis: Pneumatic Compression Devices  Code Status: No CPR in event of cardiac arrest, okay for prearrest intubation for potential reversible causes.  Discussed with patient on admission in presence of daughters  FEN: Regular diet  Discharge Dispo: Lives at home with her spouse who is also admitted for  influenza.  May need to involve PT depending on ambulatory trial during the day  Estimated Disch Date / # of Days until Disch: Admit observation for influenza A and likely asthma exacerbation.  Anticipate discharge within 48 hours if symptoms improved.      History of Present Illness:  Alma Rosa Fishman is a 83 year old female with PMH including asthma, rheumatoid arthritis, splenectomy, HTN, HLD, CVA, provoked DVT not on anticoagulation, MDD, anxiety, RLS, GERD, iron deficiency anemia, SBO, CKD stage IIIa, and parkinsonism who presents with cough and shortness of breath that started earlier today.  Her  is also ill with a cough and came to the ER and is actually admitted for influenza A.  She developed a cough minimally productive throughout the day today resulting in shortness of breath and some wheezing.  She also has felt fatigued and had intermittent chills throughout the day.  Due to her symptoms and living alone she came in for evaluation.  She denies any fevers, nausea, vomiting, diarrhea, dysuria.  She does report some intermittent chest pain when you press on her chest that is not new, but denies any chest pressure.  No new leg swelling or leg pain.  She does feel very anxious which is common for her and she takes medications for this.    History obtained from patient, patient's daughters, medical record, and from Dr. Tracey in the emergency department.  Blood pressure initially 206/96.  She received 5 mg of IV hydralazine and the SBP now is in the 150s.  Heart rate 76, temperature 98.8  F, oxygen in the low to mid 90s on room air.  Her influenza A PCR is positive, COVID-19, influenza B, RSV negative.  EKG shows NSR with QTc of 486 and no ST elevation or depression.  Chest x-ray does not show any acute infiltrate.  Her WBC is 7.4, hemoglobin 12.6, platelet count 183.  Creatinine 1.13 otherwise BMP within normal limits.  VBG is within normal limits.  She received a DuoNeb due to being wheezing  initially which helped.  She also received 4 mg prednisone for asthma exacerbation and 12.5 mg IV diphenhydramine.  She will be admitted to observation for further monitoring.       Clinically Significant Risk Factors Present on Admission                  # Hypertension: Noted on problem list          # Asthma: noted on problem list                  Past Medical History reviewed:  Past Medical History:   Diagnosis Date    Asthma     Deep vein thrombosis     Depression     Esophageal reflux 02/18/2004    EGD: NL; Gastric Motility Study: small HH and GERD    Essential hypertension, benign     Hyperlipidemia     Iron deficiency anemia 2000    colonoscopy and EGD done in 2000 were negative    Kidney stone 06/2002    s/p stent placement    Nonsenile cataract     Osteopenia 01/2004    Right hip osteopenia by DEXA    Restless leg syndrome     RLS    Rheumatoid arthritis     Sleep issues     on Ritalin; managed by [facility]    Small bowel obstruction (H) 06/23/2021    Type 2 diabetes mellitus      Past Surgical History reviewed:  Past Surgical History:   Procedure Laterality Date    APPENDECTOMY      ARTHROPLASTY KNEE Bilateral     CHOLECYSTECTOMY      COLONOSCOPY      ENDOSCOPIC RELEASE CARPAL TUNNEL Bilateral     EYE SURGERY      Gastric bypass NOS      OPEN REDUCTION INTERNAL FIXATION ANKLE Left     ORTHOPEDIC SURGERY      Total abdominal hysterectomy with bilateral salpingectomy       Social History reviewed:  Social History     Tobacco Use    Smoking status: Former    Smokeless tobacco: Never   Substance Use Topics    Alcohol use: Yes     Comment: 2 drinks per week     Social History     Social History Narrative    Not on file     Family History reviewed:  Family History   Problem Relation Age of Onset    Cardiovascular Father         3 major MI's d: age 72    Cancer Father         blood and bone    Diabetes Father         Type 2    Circulatory Mother         stomach aneurysm; d: age 69     "Hypertension Mother     Cancer Maternal Grandmother         bladder    Cancer Paternal Grandmother         stomach     Allergies:  Allergies   Allergen Reactions    Contrast Dye Rash, Anaphylaxis, Hives and Swelling     Tolerated CT chest with contrast 6/2021 after pre-treatment with benadryl and solumedrol  Hives / throat swelling    Doxycycline Anaphylaxis    Acetaminophen      Vicodin/Darvocet/Swelling throat    Albuterol      \"Half my tongue swelled.\"   \"Half my tongue swelled.\"       Atenolol Unknown and Other (See Comments)     PN: LW Reaction: swelling of the tongue  (see [facility] records scanned 3/11/2014)  (see [facility] records scanned 3/11/2014)      Darvocet [Propoxyphene N-Apap]     Fluticasone-Salmeterol      PN: tongue swelling      Hydrocodone-Acetaminophen Swelling     throat      Lisinopril      Tongue Swelling    Metformin Hydrochloride      Glucophage caused severe diarrhea    Morphine      MISAEL    Penicillins      Swelling throat    Percocet [Oxycodone-Acetaminophen]     Neomycin-Bacitracin-Polymyxin Hives and Rash     Medications, formal med rec pending as this medication list is incorrect:  Prior to Admission medications    Medication Sig Last Dose Taking? Auth Provider Long Term End Date   albuterol (PROAIR HFA/PROVENTIL HFA/VENTOLIN HFA) 108 (90 Base) MCG/ACT inhaler Inhale 2 puffs into the lungs every 4 hours as needed for shortness of breath / dyspnea or wheezing   Emely Macdonald MD Yes    alendronate (FOSAMAX) 70 MG tablet Take 70 mg by mouth once a week On Mondays (ON HOLD)   Unknown, Entered By History Yes    ALPRAZolam (XANAX) 0.25 MG tablet Take 0.25 mg by mouth   Reported, Patient     amLODIPine (NORVASC) 5 MG tablet Take 5 mg by mouth daily   Unknown, Entered By History     atorvastatin (LIPITOR) 40 MG tablet Take 40 mg by mouth daily   Unknown, Entered By History     BACILLUS COAGULANS-INULIN PO Take 1 capsule by mouth daily   Reported, Patient     busPIRone (BUSPAR) 5 MG " tablet Take 5 mg by mouth 2 times daily    Unknown, Entered By History     Calcium Carb-Cholecalciferol 500-200 MG-UNIT PACK Take 1 tablet by mouth daily    Reported, Patient     cetirizine (ZYRTEC) 10 MG tablet TAKE 1 TABLET(10 MG) BY MOUTH DAILY   Coming Aida Suresh MD     citalopram (CELEXA) 40 MG tablet TAKE 1 TABLET(40 MG) BY MOUTH DAILY   Coming Aida Suresh MD Yes    cyanocobalamin (CYANOCOBALAMIN) 1000 MCG/ML injection Inject 1 mL into the muscle every 30 days   Unknown, Entered By History     denosumab (PROLIA) 60 MG/ML SOSY injection Inject 60 mg Subcutaneous every 6 months   Reported, Patient Yes    diazepam (VALIUM) 5 MG tablet Take 1 tablet by mouth one time for 1 dose prior to dental work   Unknown, Entered By History     diclofenac (VOLTAREN) 1 % topical gel Apply 4 g topically 4 times daily as needed for moderate pain Plus 2 g to hands tid   Unknown, Entered By History     donepezil (ARICEPT) 5 MG tablet Take 5 mg by mouth daily before breakfast    Unknown, Entered By History     EPINEPHrine (EPIPEN) 0.3 MG/0.3ML injection Inject 0.3 mLs (0.3 mg) into the muscle once as needed for anaphylaxis   Trierweiler, Chad A, MD     folic acid (FOLVITE) 1 MG tablet Take 1 mg by mouth 6 times a week on M,T,Th, Fr, Sat,Sun (NOT WED)   Unknown, Entered By History     gabapentin (NEURONTIN) 300 MG capsule Take 600 mg by mouth At Bedtime   Unknown, Entered By History     ketoconazole 1 % shampoo Apply topically every 72 hours   Reported, Patient     melatonin 3 MG tablet Take 1 tablet (3 mg) by mouth nightly as needed   Coming Aida Suresh MD     methotrexate 2.5 MG tablet Take 12.5 mg by mouth Every Wednesday AM  (ON HOLD)   Unknown, Entered By History Yes    methotrexate 2.5 MG tablet Take 10 mg by mouth Every Wednesday PM (ON HOLD)   Unknown, Entered By History Yes    metoprolol succinate ER (TOPROL-XL) 50 MG 24 hr tablet Take 0.5 tablets (25 mg) by mouth daily   Emely Macdonald MD  Yes    nystatin (MYCOSTATIN) 874346 UNIT/GM external powder Apply topically 2 times daily Apply to affected area topically BID for redness   Reported, Patient     omeprazole (PRILOSEC) 20 MG DR capsule Take 20 mg by mouth daily   Unknown, Entered By History     polyethylene glycol (MIRALAX) 17 g packet Take 1 packet by mouth 2 times daily as needed for constipation   Reported, Patient     predniSONE (DELTASONE) 5 MG tablet Take 1 tablet (5 mg) by mouth daily   Emely Macdonald MD     Probiotic Product (PROBIOTIC MATURE ADULT) CAPS Take 1 capsule by mouth daily   Reported, Patient     sennosides (SENOKOT) 8.6 MG tablet Take 1 tablet by mouth 2 times daily as needed    Reported, Patient     SF 5000 PLUS 1.1 % CREA    Reported, Patient     Vitamin D3 (CHOLECALCIFEROL) 25 mcg (1000 units) tablet Take 100 mcg by mouth daily    Unknown, Entered By History       Review of Systems:  A Comprehensive greater than 10 system review of systems was carried out.  Pertinent positives and negatives are noted above.  Otherwise negative.     Physical Exam:  Blood pressure (!) 197/90, pulse 93, temperature 98.8  F (37.1  C), temperature source Oral, resp. rate (!) 8, SpO2 92%.  Wt Readings from Last 1 Encounters:   07/13/21 90.3 kg (199 lb)     Exam:  Constitutional: Awake, appears quite anxious, but is not in distress  Eyes: sclera white   HEENT:   MMM  Respiratory: Mild tachypnea, no focal crackles, no wheeze at this time  Cardiovascular: RRR.  No murmur appreciated  GI: non-tender, not distended, bowel sounds present  Skin: no rash   Musculoskeletal/extremities: atraumatic, no major deformities. No lower extremity edema  Neurologic: Alert, answering symptom-based questions appropriately, diffuse tremor noted  Psychiatric: Very anxious, but cooperative with exam    Lab and imaging data personally reviewed:  Labs:  Recent Labs   Lab 12/27/23  2347   PHV 7.42   PO2V 32   PCO2V 40   HCO3V 25     Recent Labs   Lab 12/27/23  6345    WBC 7.4   HGB 12.6   HCT 38.2   *        Recent Labs   Lab 12/27/23  2251      POTASSIUM 4.3   CHLORIDE 103   CO2 22   ANIONGAP 12   GLC 92   BUN 15.4   CR 1.13*   GFRESTIMATED 49*   ELAN 8.5*     Influenza A PCR positive  COVID-19, influenza A B, RSV PCR negative    EKG: NSR, no ST elevation or depression    Imaging:  Recent Results (from the past 24 hour(s))   XR Chest Port 1 View    Narrative    EXAM: XR CHEST PORT 1 VIEW  LOCATION: [facility]  DATE: 12/27/2023    INDICATION: Shortness of breath.  COMPARISON: 04/07/2023      Impression    IMPRESSION:     No focal airspace disease. No pleural effusion or pneumothorax.    The cardiomediastinal silhouette is unremarkable.    Electronic device projects over the heart. Surgical clips project over the epigastric region.       Juve Whitaker MD  Hospitalist  [facility]'

## 2023-12-28 NOTE — PLAN OF CARE
PRIMARY DIAGNOSIS: GENERALIZED WEAKNESS/Influenza    OUTPATIENT/OBSERVATION GOALS TO BE MET BEFORE DISCHARGE  1. Orthostatic performed: N/A    2. Tolerating PO medications: Yes    3. Return to near baseline physical activity: No    4. Cleared for discharge by consultants (if involved): N/A    Discharge Planner Nurse   Safe discharge environment identified: No  Barriers to discharge: Yes       Entered by: Devan Wang RN 12/28/2023 11:25 AM     Please review provider order for any additional goals.   Nurse to notify provider when observation goals have been met and patient is ready for discharge.    Plan of Care Reviewed With: patient        RN - VSS. Pt utlizing 2L supplemental O2. Lung sounds diminished and coarse with infrequent cough. Up with 1 and gait belt walker - at baseline pt mobilizes with cane. Tolerating PO intake. Voiding adequately and 1x BM. Continues with tamiflu. Discharge plan pending PT eval and weaning O2.

## 2023-12-28 NOTE — ED NOTES
"[facility]  ED Nurse Handoff Report    ED Chief complaint: Flu Symptoms and Shortness of Breath  . ED Diagnosis:   Final diagnoses:   Influenza A   Generalized weakness   Bronchospasm       Allergies:   Allergies   Allergen Reactions    Contrast Dye Rash, Anaphylaxis, Hives and Swelling     Tolerated CT chest with contrast 6/2021 after pre-treatment with benadryl and solumedrol  Hives / throat swelling    Doxycycline Anaphylaxis    Acetaminophen      Vicodin/Darvocet/Swelling throat    Albuterol      \"Half my tongue swelled.\"   \"Half my tongue swelled.\"       Atenolol Unknown and Other (See Comments)     PN: LW Reaction: swelling of the tongue  (see [facility] records scanned 3/11/2014)  (see [facility] records scanned 3/11/2014)      Darvocet [Propoxyphene N-Apap]     Fluticasone-Salmeterol      PN: tongue swelling      Hydrocodone-Acetaminophen Swelling     throat      Lisinopril      Tongue Swelling    Metformin Hydrochloride      Glucophage caused severe diarrhea    Morphine      MISAEL    Penicillins      Swelling throat    Percocet [Oxycodone-Acetaminophen]     Neomycin-Bacitracin-Polymyxin Hives and Rash       Code Status: Full Code    Activity level - Baseline/Home:  assist of 1 and walker.  Activity Level - Current:   assist of 1 and walker.   Lift room needed: No.   Bariatric: No   Needed: No   Isolation: Yes.   Infection: Not Applicable  Influenza.     Respiratory status: Nasal cannula - 2 L for comfort but sat good in RA     Vital Signs (within 30 minutes):   Vitals:    12/27/23 2219 12/27/23 2315 12/27/23 2330 12/27/23 2345   BP:  (!) 208/93 (!) 199/103 (!) 197/90   Pulse:  85 87 93   Resp: 28 20 (!) 35 (!) 8   Temp:       TempSrc:       SpO2:  95% 92% 92%       Cardiac Rhythm:  ,      Pain level:    Patient confused: No.   Patient Falls Risk: activity supervised, mobility aid in reach, and toileting schedule implemented.   Elimination Status: Unable to void     Patient Report " - Initial Complaint: Flu like symptom and SOB .   Focused Assessment: Alma Rosa Fishman is a 83 year old female with history of asthma, type 2 diabetes, hypertension, and hyperlipidemia who presents to the ED with her daughters for evaluation of flu symptoms and shortness of breath. The patient reports that her  is also in the ED with influenza. She states that this morning, she was helping him off the floor and after helping him, she began coughing. Also endorses shortness of breath. Patient did get her flu vaccine. Notes that she had a blood clot in her leg and she has Factor V, but she is not on Eliquis anymore.       Abnormal Results:   Labs Ordered and Resulted from Time of ED Arrival to Time of ED Departure   INFLUENZA A/B, RSV, & SARS-COV2 PCR - Abnormal       Result Value    Influenza A PCR Positive (*)     Influenza B PCR Negative      RSV PCR Negative      SARS CoV2 PCR Negative     BASIC METABOLIC PANEL - Abnormal    Sodium 137      Potassium 4.3      Chloride 103      Carbon Dioxide (CO2) 22      Anion Gap 12      Urea Nitrogen 15.4      Creatinine 1.13 (*)     GFR Estimate 49 (*)     Calcium 8.5 (*)     Glucose 92     TROPONIN T, HIGH SENSITIVITY - Abnormal    Troponin T, High Sensitivity 22 (*)    CBC WITH PLATELETS AND DIFFERENTIAL - Abnormal    WBC Count 7.4      RBC Count 3.52 (*)     Hemoglobin 12.6      Hematocrit 38.2       (*)     MCH 35.8 (*)     MCHC 33.0      RDW 15.4 (*)     Platelet Count 183      % Neutrophils 68      % Lymphocytes 11      % Monocytes 17      % Eosinophils 2      % Basophils 1      % Immature Granulocytes 1      NRBCs per 100 WBC 1 (*)     Absolute Neutrophils 5.1      Absolute Lymphocytes 0.8      Absolute Monocytes 1.2      Absolute Eosinophils 0.1      Absolute Basophils 0.1      Absolute Immature Granulocytes 0.0      Absolute NRBCs 0.1     BLOOD GAS VENOUS WITH OXYHEMOGLOBIN - Abnormal    pH Venous 7.42      pCO2 Venous 40      pO2 Venous 32       Bicarbonate Venous 25      FIO2 21      Oxyhemoglobin Venous 47 (*)     Base Excess/Deficit 0.9     NT PROBNP INPATIENT - Normal    N terminal Pro BNP Inpatient 1,561     PROCALCITONIN - Normal    Procalcitonin 0.07          XR Chest Port 1 View   Final Result   IMPRESSION:       No focal airspace disease. No pleural effusion or pneumothorax.      The cardiomediastinal silhouette is unremarkable.      Electronic device projects over the heart. Surgical clips project over the epigastric region.          Treatments provided: See MAR   Family Comments:  also admitted, daughter accept calls   OBS brochure/video discussed/provided to patient:  Yes  ED Medications:   Medications   hydrALAZINE (APRESOLINE) injection 5 mg (20 mg Intravenous Incomplete 12/28/23 0019)   ipratropium - albuterol 0.5 mg/2.5 mg/3 mL (DUONEB) neb solution 3 mL (3 mLs Nebulization $Given 12/27/23 2312)   diphenhydrAMINE (BENADRYL) injection 12.5 mg (12.5 mg Intravenous $Given 12/27/23 2319)   predniSONE (DELTASONE) tablet 40 mg (40 mg Oral $Given 12/27/23 2319)   ibuprofen (ADVIL/MOTRIN) tablet 600 mg (600 mg Oral $Given 12/28/23 0018)   oseltamivir (TAMIFLU) capsule 75 mg (75 mg Oral $Given 12/28/23 0018)       Drips infusing:  No  For the majority of the shift this patient was Green.   Interventions performed were NA .    Sepsis treatment initiated: No    Cares/treatment/interventions/medications to be completed following ED care: NA    ED Nurse Name: Linda Canada RN  12:45 AM     RECEIVING UNIT ED HANDOFF REVIEW    Above ED Nurse Handoff Report was reviewed: Yes  Reviewed by: Bruna Wilkinson RN on December 28, 2023 at 1:44 PM

## 2023-12-28 NOTE — PROGRESS NOTES
Alma Rosa was seen and examined by me this morning.  She was admitted this morning with acute asthma exacerbation and influenza A infection.  History and physical and care plan by admitting physician from this morning was reviewed.    This morning she is doing relatively well with no fever.  She has generalized weakness but has no wheezing.  Unfortunately her  is also in the room admitted for influenza A and hip pain.  Will continue current care plan, consult PT for assessment and discharge need.  Care plan was discussed with bedside nurse

## 2023-12-28 NOTE — PLAN OF CARE
ROOM # 208-2    Living Situation (if not independent, order SW consult): condo with   Facility name: N/A  : Fern (daughter)    Activity level at baseline: Ind w/cane   Activity level on admit: SBA w/walker    Who will be transporting you at discharge: one of daughters    Patient registered to observation; given Patient Bill of Rights; given the opportunity to ask questions about observation status and their plan of care.  Patient has been oriented to the observation room, bathroom and call light is in place.    Discussed discharge goals and expectations with patient/family.

## 2023-12-28 NOTE — ED PROVIDER NOTES
History     Chief Complaint:  Flu Symptoms and Shortness of Breath     The history is provided by the patient.      Alma Rosa Fishman is a 83 year old female with history of asthma, type 2 diabetes, hypertension, and hyperlipidemia who presents to the ED with her daughters for evaluation of flu symptoms and shortness of breath. The patient reports that her  is also in the ED with influenza. She states that this morning, she was helping him off the floor and after helping him, she began coughing. Also endorses shortness of breath. Patient did get her flu vaccine. Notes that she had a blood clot in her leg and she has Factor V, but she is not on Eliquis anymore.     Independent Historian:    None     Review of External Notes:  Care everywhere reviewed and epic updated    Medications:    albuterol (PROAIR HFA/PROVENTIL HFA/VENTOLIN HFA) 108 (90 Base) MCG/ACT inhaler  alendronate (FOSAMAX) 70 MG tablet  ALPRAZolam (XANAX) 0.25 MG tablet  amLODIPine (NORVASC) 5 MG tablet  atorvastatin (LIPITOR) 40 MG tablet  BACILLUS COAGULANS-INULIN PO  busPIRone (BUSPAR) 5 MG tablet  Calcium Carb-Cholecalciferol 500-200 MG-UNIT PACK  cetirizine (ZYRTEC) 10 MG tablet  citalopram (CELEXA) 40 MG tablet  cyanocobalamin (CYANOCOBALAMIN) 1000 MCG/ML injection  denosumab (PROLIA) 60 MG/ML SOSY injection  diazepam (VALIUM) 5 MG tablet  diclofenac (VOLTAREN) 1 % topical gel  donepezil (ARICEPT) 5 MG tablet  EPINEPHrine (EPIPEN) 0.3 MG/0.3ML injection  folic acid (FOLVITE) 1 MG tablet  gabapentin (NEURONTIN) 300 MG capsule  ketoconazole 1 % shampoo  melatonin 3 MG tablet  methotrexate 2.5 MG tablet  methotrexate 2.5 MG tablet  metoprolol succinate ER (TOPROL-XL) 50 MG 24 hr tablet  nystatin (MYCOSTATIN) [id number] UNIT/GM external powder  omeprazole (PRILOSEC) 20 MG DR capsule  polyethylene glycol (MIRALAX) 17 g packet  predniSONE (DELTASONE) 5 MG tablet  Probiotic Product (PROBIOTIC MATURE ADULT) CAPS  sennosides (SENOKOT) 8.6 MG tablet  SF  5000 PLUS 1.1 % CREA  Vitamin D3 (CHOLECALCIFEROL) 25 mcg (1000 units) tablet    Past Medical History:    Past Medical History:   Diagnosis Date    Asthma     Deep vein thrombosis     Depression     Esophageal reflux 02/18/2004    Essential hypertension, benign     Hyperlipidemia     Iron deficiency anemia 2000    Kidney stone 06/2002    Nonsenile cataract     Osteopenia 01/2004    Restless leg syndrome     Rheumatoid arthritis     Sleep issues     Small bowel obstruction (H) 06/23/2021    Type 2 diabetes mellitus      Past Surgical History:    Past Surgical History:   Procedure Laterality Date    APPENDECTOMY      ARTHROPLASTY KNEE Bilateral     CHOLECYSTECTOMY      COLONOSCOPY      ENDOSCOPIC RELEASE CARPAL TUNNEL Bilateral     EYE SURGERY      Gastric bypass NOS      OPEN REDUCTION INTERNAL FIXATION ANKLE Left     ORTHOPEDIC SURGERY      Total abdominal hysterectomy with bilateral salpingectomy        Physical Exam   Patient Vitals for the past 24 hrs:   BP Temp Temp src Pulse Resp SpO2   12/27/23 2345  197/90 -- -- 93  8 92 %   12/27/23 2330  199/103 -- -- 87  35 92 %   12/27/23 2315  208/93 -- -- 85 20 95 %   12/27/23 2219 -- -- -- -- 28 --   12/27/23 2217  206/96 -- -- -- -- --   12/27/23 2216 -- 98.8  F (37.1  C) Oral 76 20 97 %      Physical Exam  Nursing note and vitals reviewed.  Constitutional: Cooperative.   HENT:   Mouth/Throat: Mucous membranes are slightly dry mucous membranes  Cardiovascular: Normal rate, regular rhythm and normal heart sounds.  No murmur.  Pulmonary/Chest: Slight increased work of breathing.  Diffuse expiratory wheezes in all lung fields.  No crackles.  Abdominal: Soft. Normal appearance. There is no tenderness. There is no rigidity and no guarding.   Musculoskeletal: Normal range of motion of extremities. Dependent old appearing ecchymosis to the right biceps region. No edema in the legs.  Neurological: Alert.  Oriented x 3.  Strength normal  Skin: Skin is warm and dry.  See  above  Psychiatric: Normal mood and affect.      Emergency Department Course   ECG  ECG taken at 2249, ECG read at 2316  Normal sinus rhythm.   Rate 80 bpm. FL interval 138 ms. QRS duration 84 ms. QT/QTc 422/486 ms. P-R-T axes 64 42 83.    Imaging:  XR Chest Port 1 View   Final Result   IMPRESSION:       No focal airspace disease. No pleural effusion or pneumothorax.      The cardiomediastinal silhouette is unremarkable.      Electronic device projects over the heart. Surgical clips project over the epigastric region.      Report per radiology    Laboratory:  Labs Ordered and Resulted from Time of ED Arrival to Time of ED Departure   INFLUENZA A/B, RSV, & SARS-COV2 PCR - Abnormal       Result Value    Influenza A PCR Positive (*)     Influenza B PCR Negative      RSV PCR Negative      SARS CoV2 PCR Negative     BASIC METABOLIC PANEL - Abnormal    Sodium 137      Potassium 4.3      Chloride 103      Carbon Dioxide (CO2) 22      Anion Gap 12      Urea Nitrogen 15.4      Creatinine 1.13 (*)     GFR Estimate 49 (*)     Calcium 8.5 (*)     Glucose 92     TROPONIN T, HIGH SENSITIVITY - Abnormal    Troponin T, High Sensitivity 22 (*)    CBC WITH PLATELETS AND DIFFERENTIAL - Abnormal    WBC Count 7.4      RBC Count 3.52 (*)     Hemoglobin 12.6      Hematocrit 38.2       (*)     MCH 35.8 (*)     MCHC 33.0      RDW 15.4 (*)     Platelet Count 183      % Neutrophils 68      % Lymphocytes 11      % Monocytes 17      % Eosinophils 2      % Basophils 1      % Immature Granulocytes 1      NRBCs per 100 WBC 1 (*)     Absolute Neutrophils 5.1      Absolute Lymphocytes 0.8      Absolute Monocytes 1.2      Absolute Eosinophils 0.1      Absolute Basophils 0.1      Absolute Immature Granulocytes 0.0      Absolute NRBCs 0.1     BLOOD GAS VENOUS WITH OXYHEMOGLOBIN - Abnormal    pH Venous 7.42      pCO2 Venous 40      pO2 Venous 32      Bicarbonate Venous 25      FIO2 21      Oxyhemoglobin Venous 47 (*)     Base Excess/Deficit 0.9      NT PROBNP INPATIENT - Normal    N terminal Pro BNP Inpatient 1,561     PROCALCITONIN - Normal    Procalcitonin 0.07         Interventions:  Medications   hydrALAZINE (APRESOLINE) injection 5 mg (has no administration in time range)   ipratropium - albuterol 0.5 mg/2.5 mg/3 mL (DUONEB) neb solution 3 mL (3 mLs Nebulization $Given 12/27/23 2312)   diphenhydrAMINE (BENADRYL) injection 12.5 mg (12.5 mg Intravenous $Given 12/27/23 2319)   predniSONE (DELTASONE) tablet 40 mg (40 mg Oral $Given 12/27/23 2319)   ibuprofen (ADVIL/MOTRIN) tablet 600 mg (600 mg Oral $Given 12/28/23 0018)   oseltamivir (TAMIFLU) capsule 75 mg (75 mg Oral $Given 12/28/23 0018)      Independent Interpretation (X-rays, CTs, rhythm strip):  I independently reviewed the chest XR and see no infiltrate.    Assessments/Consultations/Discussion of Management or Tests:  ED Course as of 12/28/23 0034   Wed Dec 27, 2023   2307 I obtained history and examined the patient as noted above.    u Dec 28, 2023   0006 I rechecked and updated the patient. Her breathing is improved. Less wheezing.   0016 I spoke with hospitalist Dr. Whitaker regarding admission.      Social Determinants of Health affecting care:  None      Disposition:  The patient was admitted to the hospital under the care of Dr. Whitaker.     Impression & Plan    Medical Decision Making:  This is a 83-year-old female who presents with generalized weakness and fatigue in the setting of confirmed influenza A infection.  She lives with her  who is also in the hospital with influenza A.  She does not have anybody else to care for her.  Workup here has been reassuring.  We placed her on 2 L nasal cannula for comfort but she does not have any hypoxia.  Wheezing markedly improved after DuoNeb treatment.  Despite a chart allergy to albuterol she did not have any reaction and was monitored closely.  Steroids initiated.  No evidence of bacterial pneumonia on x-ray.  Procalcitonin is reassuring.   First dose of Tamiflu administered in the ED.  Blood pressure is noted to be slightly hypertensive.  Small dose of hydralazine ordered and this can be managed on the floor when she is admitted.  Family is comfortable with this plan    Diagnosis:    ICD-10-CM    1. Influenza A  J10.1       2. Generalized weakness  R53.1       3. Bronchospasm  J98.01       4. Elevated blood pressure reading  R03.0           Scribe Disclosure:  MICHELLE BARBER, am serving as a scribe at 11:04 PM on 12/27/2023 to document services personally performed by Will Tracey MD based on my observations and the provider's statements to me.    12/27/2023   Will Tracey MD Amdahl, John, MD  12/28/23 0050

## 2023-12-28 NOTE — ED TRIAGE NOTES
Pt arrives with complaint of cough, chills, shortness of breath.  positive for Influenza A. 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 A&Ox4.      Triage Assessment (Adult)       Row Name 12/27/23 2974          Respiratory WDL    Respiratory WDL X;rhythm/pattern     Rhythm/Pattern, Respiratory tachypneic;shortness of breath

## 2023-12-29 ENCOUNTER — APPOINTMENT (OUTPATIENT)
Dept: PHYSICAL THERAPY | Facility: CLINIC | Age: 83
End: 2023-12-29
Attending: INTERNAL MEDICINE
Payer: MEDICARE

## 2023-12-29 VITALS
OXYGEN SATURATION: 96 % | HEART RATE: 76 BPM | TEMPERATURE: 98.5 F | HEIGHT: 62 IN | DIASTOLIC BLOOD PRESSURE: 84 MMHG | BODY MASS INDEX: 37.41 KG/M2 | SYSTOLIC BLOOD PRESSURE: 167 MMHG | WEIGHT: 203.26 LBS | RESPIRATION RATE: 18 BRPM

## 2023-12-29 LAB
ANION GAP SERPL CALCULATED.3IONS-SCNC: 9 MMOL/L (ref 7–15)
BUN SERPL-MCNC: 22.4 MG/DL (ref 8–23)
CALCIUM SERPL-MCNC: 8 MG/DL (ref 8.8–10.2)
CHLORIDE SERPL-SCNC: 110 MMOL/L (ref 98–107)
CREAT SERPL-MCNC: 0.94 MG/DL (ref 0.51–0.95)
DEPRECATED HCO3 PLAS-SCNC: 22 MMOL/L (ref 22–29)
EGFRCR SERPLBLD CKD-EPI 2021: 61 ML/MIN/1.73M2
ERYTHROCYTE [DISTWIDTH] IN BLOOD BY AUTOMATED COUNT: 15.5 % (ref 10–15)
GLUCOSE BLDC GLUCOMTR-MCNC: 90 MG/DL (ref 70–99)
GLUCOSE SERPL-MCNC: 97 MG/DL (ref 70–99)
HCT VFR BLD AUTO: 30.7 % (ref 35–47)
HGB BLD-MCNC: 10.1 G/DL (ref 11.7–15.7)
MCH RBC QN AUTO: 35.4 PG (ref 26.5–33)
MCHC RBC AUTO-ENTMCNC: 32.9 G/DL (ref 31.5–36.5)
MCV RBC AUTO: 108 FL (ref 78–100)
PLATELET # BLD AUTO: 154 10E3/UL (ref 150–450)
POTASSIUM SERPL-SCNC: 3.7 MMOL/L (ref 3.4–5.3)
RBC # BLD AUTO: 2.85 10E6/UL (ref 3.8–5.2)
SODIUM SERPL-SCNC: 141 MMOL/L (ref 135–145)
WBC # BLD AUTO: 11.6 10E3/UL (ref 4–11)

## 2023-12-29 PROCEDURE — 82962 GLUCOSE BLOOD TEST: CPT

## 2023-12-29 PROCEDURE — 99239 HOSP IP/OBS DSCHRG MGMT >30: CPT | Performed by: PHYSICIAN ASSISTANT

## 2023-12-29 PROCEDURE — 250N000013 HC RX MED GY IP 250 OP 250 PS 637: Performed by: INTERNAL MEDICINE

## 2023-12-29 PROCEDURE — 80048 BASIC METABOLIC PNL TOTAL CA: CPT | Performed by: INTERNAL MEDICINE

## 2023-12-29 PROCEDURE — 85041 AUTOMATED RBC COUNT: CPT | Performed by: INTERNAL MEDICINE

## 2023-12-29 PROCEDURE — G0378 HOSPITAL OBSERVATION PER HR: HCPCS

## 2023-12-29 PROCEDURE — 250N000012 HC RX MED GY IP 250 OP 636 PS 637: Performed by: INTERNAL MEDICINE

## 2023-12-29 PROCEDURE — 250N000013 HC RX MED GY IP 250 OP 250 PS 637: Performed by: PHYSICIAN ASSISTANT

## 2023-12-29 PROCEDURE — 97161 PT EVAL LOW COMPLEX 20 MIN: CPT | Mod: GP | Performed by: PHYSICAL THERAPIST

## 2023-12-29 PROCEDURE — 250N000009 HC RX 250: Performed by: INTERNAL MEDICINE

## 2023-12-29 PROCEDURE — 97530 THERAPEUTIC ACTIVITIES: CPT | Mod: GP | Performed by: PHYSICAL THERAPIST

## 2023-12-29 PROCEDURE — 36415 COLL VENOUS BLD VENIPUNCTURE: CPT | Performed by: INTERNAL MEDICINE

## 2023-12-29 RX ORDER — OSELTAMIVIR PHOSPHATE 30 MG/1
30 CAPSULE ORAL 2 TIMES DAILY
Qty: 6 CAPSULE | Refills: 0 | Status: SHIPPED | OUTPATIENT
Start: 2023-12-29

## 2023-12-29 RX ORDER — PREDNISONE 20 MG/1
40 TABLET ORAL DAILY
Status: DISCONTINUED | OUTPATIENT
Start: 2023-12-30 | End: 2023-12-29 | Stop reason: HOSPADM

## 2023-12-29 RX ORDER — PREDNISONE 20 MG/1
TABLET ORAL
Qty: 9 TABLET | Refills: 0 | Status: SHIPPED | OUTPATIENT
Start: 2023-12-30 | End: 2024-01-05

## 2023-12-29 RX ORDER — LEVALBUTEROL TARTRATE 45 UG/1
2 AEROSOL, METERED ORAL EVERY 4 HOURS PRN
Qty: 15 G | Refills: 0 | Status: SHIPPED | OUTPATIENT
Start: 2023-12-29

## 2023-12-29 RX ADMIN — CARBIDOPA AND LEVODOPA 2 TABLET: 25; 100 TABLET ORAL at 14:29

## 2023-12-29 RX ADMIN — TRIMETHOPRIM 100 MG: 100 TABLET ORAL at 08:47

## 2023-12-29 RX ADMIN — PREDNISONE 40 MG: 20 TABLET ORAL at 08:46

## 2023-12-29 RX ADMIN — CARBIDOPA AND LEVODOPA 2 TABLET: 25; 100 TABLET ORAL at 08:47

## 2023-12-29 RX ADMIN — LEVALBUTEROL HYDROCHLORIDE 0.63 MG: 0.63 SOLUTION RESPIRATORY (INHALATION) at 04:35

## 2023-12-29 RX ADMIN — CETIRIZINE HYDROCHLORIDE 10 MG: 10 TABLET, FILM COATED ORAL at 08:47

## 2023-12-29 RX ADMIN — BENZONATATE 100 MG: 100 CAPSULE ORAL at 08:46

## 2023-12-29 RX ADMIN — ACETAMINOPHEN 650 MG: 325 TABLET, FILM COATED ORAL at 04:15

## 2023-12-29 RX ADMIN — BENZONATATE 100 MG: 100 CAPSULE ORAL at 14:29

## 2023-12-29 RX ADMIN — FOLIC ACID 1 MG: 1 TABLET ORAL at 08:47

## 2023-12-29 RX ADMIN — BUSPIRONE HYDROCHLORIDE 5 MG: 5 TABLET ORAL at 08:47

## 2023-12-29 RX ADMIN — PANTOPRAZOLE SODIUM 20 MG: 20 TABLET, DELAYED RELEASE ORAL at 08:46

## 2023-12-29 RX ADMIN — OSELTAMIVIR PHOSPHATE 30 MG: 30 CAPSULE ORAL at 08:47

## 2023-12-29 RX ADMIN — CITALOPRAM HYDROBROMIDE 20 MG: 20 TABLET, FILM COATED ORAL at 08:47

## 2023-12-29 ASSESSMENT — ACTIVITIES OF DAILY LIVING (ADL)
ADLS_ACUITY_SCORE: 33

## 2023-12-29 NOTE — UTILIZATION REVIEW
Concurrent stay review; Secondary Review Determination     [facility]          Under the authority of the Utilization Management Committee, the utilization review process indicated a secondary review on the above patient.  The review outcome is based on review of the medical records, discussions with staff, and applying clinical experience noted on the date of the review.          (x) Observation Status Appropriate - Concurrent stay review    RATIONALE FOR DETERMINATION          The Patient is 84 y/o woman,  with PMHx significant for  asthma, rheumatoid arthritis, splenectomy, HTN, HLD, CVA, provoked DVT not on anticoagulation, MDD, anxiety, RLS, GERD, iron deficiency anemia, SBO, CKD stage IIIa, and parkinsonism who presents with cough and shortness of breath that started earlier today and she tested positive for influenza A.  The patient was started on Tamiflu.  She has generalized weakness, but no wheezing, no hypoxemia.  She has mild leukocytosis at 11.6, most likely related with prednisone treatment.  She has mild anemia stable at 10.1-10.6.  Admission creatinine was mildly elevated at 1.13 likely secondary dehydration and it improved to 0.94 today.  Trivial increase in troponin with a flat trend.  Chest x-ray with no acute pathology.    Patient is clinically improving and there is no clear indication to change patient's status to inpatient. The severity of illness, intensity of service provided, expected LOS and risk for adverse outcome make the care appropriate for observation.      This document was produced using voice recognition software       The information on this document is developed by the utilization review team in order for the business office to ensure compliance.  This only denotes the appropriateness of proper admission status and does not reflect the quality of care rendered.         The definitions of Inpatient Status and Observation Status used in making the  determination above are those provided in the CMS Coverage Manual, Chapter 1 and Chapter 6, section 70.4.      Sincerely,     VALERIO ORELLANA MD   Utilization Review  Physician Advisor  [facility]

## 2023-12-29 NOTE — PLAN OF CARE
PRIMARY DIAGNOSIS: PNEUMONIA  OUTPATIENT/OBSERVATION GOALS TO BE MET BEFORE DISCHARGE:  Dyspnea improved and O2 sats >88% on RA or back to baseline O2 levels: Yes   SpO2: 97 %, O2 Device: None (Room air)     Tolerating oral abx or appropriate plans made outpatient infusion: Yes     Vitals signs normal or return to baseline: No     Short term supplemental O2 needed with activity at home: No     Tolerate oral intake to maintain hydration: Yes     Return to near baseline physical activity: Yes     Discharge Planner Nurse   Safe discharge environment identified: No  Barriers to discharge: Yes       Entered by: Marcelina Chirinos RN 12/28/2023 10:42 PM     Vitals are Temp: 98.3  F (36.8  C) Temp src: Oral BP: (!) 152/72 Pulse: 80   Resp: 18 SpO2: 97 %.  Patient is Alert and Oriented x4. They are 1 Assist with Gait Belt and Walker.  Patient is on Droplet precuations for Influenza A.  Pt is a Regular diet.  They are complaining of 10/10 pain in their back and shoulder. Tylenol given for pain. Medications decreased pain.  Patient is Saline locked. Pt was given PRN neb treatment for wheezing. PT and SW following.            Please review provider order for any additional goals.   Nurse to notify provider when observation goals have been met and patient is ready for discharge.

## 2023-12-29 NOTE — DISCHARGE SUMMARY
[facility]    Discharge Summary  Hospitalist    Date of Admission:  12/27/2023  Date of Discharge:  12/29/2023  Provider:  Paloma Ferreira PA-C  Date of Service (when I last saw the patient): 12/29/23    Discharge Diagnoses   Influenza A infection  Asthma with acute exacerbation   HTN  Anxiety    Other medical issues:  Past Medical History:   Diagnosis Date    Asthma     Deep vein thrombosis     Depression     Esophageal reflux 02/18/2004    EGD: NL; Gastric Motility Study: small HH and GERD    Essential hypertension, benign     Hyperlipidemia     Iron deficiency anemia 2000    colonoscopy and EGD done in 2000 were negative    Kidney stone 06/2002    s/p stent placement    Nonsenile cataract     Osteopenia 01/2004    Right hip osteopenia by DEXA    Restless leg syndrome     RLS    Rheumatoid arthritis     Sleep issues     on Ritalin; managed by [facility]    Small bowel obstruction (H) 06/23/2021    Type 2 diabetes mellitus      History of Present Illness   Alma Rosa Fishman is a 83 year old female with PMH including asthma, rheumatoid arthritis, splenectomy, HTN, HLD, CVA, provoked DVT not on anticoagulation, MDD, anxiety, RLS, GERD, iron deficiency anemia, SBO, CKD stage IIIa, and parkinsonism who presents with cough and shortness of breath that started earlier today.  Her  is also ill with a cough and came to the ER and is actually admitted for influenza A.  She developed a cough minimally productive throughout the day today resulting in shortness of breath and some wheezing.  She also has felt fatigued and had intermittent chills throughout the day.  Due to her symptoms and living alone she came in for evaluation.  She denies any fevers, nausea, vomiting, diarrhea, dysuria.  She does report some intermittent chest pain when you press on her chest that is not new, but denies any chest pressure.  No new leg swelling or leg pain.  She does feel very anxious which  is common for her and she takes medications for this.     History obtained from patient, patient's daughters, medical record, and from Dr. Tracey in the emergency department.  Blood pressure initially 206/96.  She received 5 mg of IV hydralazine and the SBP now is in the 150s.  Heart rate 76, temperature 98.8  F, oxygen in the low to mid 90s on room air.  Her influenza A PCR is positive, COVID-19, influenza B, RSV negative.  EKG shows NSR with QTc of 486 and no ST elevation or depression.  Chest x-ray does not show any acute infiltrate.  Her WBC is 7.4, hemoglobin 12.6, platelet count 183.  Creatinine 1.13 otherwise BMP within normal limits.  VBG is within normal limits.  She received a DuoNeb due to being wheezing initially which helped.  She also received 4 mg prednisone for asthma exacerbation and 12.5 mg IV diphenhydramine.  She will be admitted to observation for further monitoring. Please see the admission history and physical for full details.    Hospital Course   Alma Rosa Fishman was admitted on 12/27/2023.  The following problems were addressed during her hospitalization:    Influenza A infection  Asthma with acute exacerbation: presenting with 1 day of frequent cough minimally productive associated with intermittent chills and shortness of breath.  Significantly wheezy when seen in ER, so likely has asthma exacerbation as well.  Spouse also admitted here for influenza A which she tested positive for on PCR.  COVID-19 and RSV negative. Chest x-ray negative. Currently on room air. Noted to be hyperventilating in ED which is part due to her acute on chronic anxiety, VBG looks okay though.  -Tamiflu was initiated in the ER, continue 75 mg twice daily for 5 days   -Prednisone 40 mg daily for 5 days then taper back to 5 mg daily (on PTA for RA)  -Albuterol allergy listed with reaction of tongue swelling, PRN Levalbuaterol inhaler upon discharge   -Continuous pulse ox and add supplemental oxygen as needed  -PRN  IBU at discharge if fevers, none here while admitted  -PT consulted and saw this stay with plans for discharge home with home RN/PT/OT    Anxiety  MDD  RLS: chronic issue for the patient and noted to appear quite anxious in the ER. Has had benzodiazepine prescriptions in the past.  -ongoing anxiety here, prednisone likely contributing, will not prescribe ativan at discharge with cognitive impairment   -Continue buspirone, vibegron, gabapentin, and citalopram      Type II DM: History of diabetes many years ago, most recent A1c 5.0 not on any medications for this.  -BG stable here with receiving prednisone      Parkinsonism: Resume PTA Sinemet  mg 2 tablets daily     Rheumatoid arthritis: PTA methotrexate weekly on Wednesdays and folic acid daily.  Ordered daily folic acid.  Also ordered Voltaren gel as needed. She is also on Prolia injections every 6 months, resume as outpatient.     HTN: in the past she has been on low-dose amlodipine and metoprolol, unclear based on patient's inability to give history if still taking. Hypertensive in the ER in the setting of significant shortness of breath and anxiety with blood pressure of 206/96.  Received 5 mg IV hydralazine and blood pressures in the 150 systolic.   -per  she is taking Amlodipine and Metoprolol, resume at discharge  -BP higher here due to not receiving PTA medications here, resume home regimen and monitor with PCP     History DVT: h/o provoked DVT in the setting of previous small bowel obstruction surgery.  Treated for 3 months with Eliquis, no longer on anticoagulation.     History CVA: no reported deficits, but she was told she had a stroke on imaging test.  She was told to start taking aspirin 324 mg daily which will be continued.     History splenectomy: found to have spleen laceration during her small obstruction surgery associated with splenectomy.     CKD stage IIIa: creatinine stable at 0.94     HLD: resume PTA atorvastatin at discharge     "  History UTIs: resume PTA prophylaxis trimethoprim 4 mg daily     GERD: Resume PTA pantoprazole 20 g daily    Cognitive impairment: continue PTA Aricept. Unable to recall mediations she takes and reports her  assists with this. Concern for ability to care for self, will not accept help from daughters in area.     Called and updated patient's daughter Rachele with all questions answered.     Pending Results   None    Code Status   DNR       Primary Care Physician   Joselin Tai    Exam:    BP (!) 167/84 (BP Location: Left arm)   Pulse 76   Temp 98.5  F (36.9  C) (Oral)   Resp 18   Ht 1.575 m (5' 2\")   Wt 92.2 kg (203 lb 4.2 oz)   SpO2 96%   BMI 37.18 kg/m    General: forgetful, alert, interactive, appears anxious, NAD  Lungs: CTAB without significant wheezing  CV: RRR without murmur or rub  GI: soft, normoactive bowel sounds, nontender, nondistended  Skin: warm, dry  Neuro: diffuse tremor noted   Psych: anxious     Discharge Disposition   Discharged to home with home care    Consultations This Hospital Stay   PHYSICAL THERAPY ADULT IP CONSULT  CARE MANAGEMENT / SOCIAL WORK IP CONSULT    Time Spent on this Encounter   INuvia PA-C, personally saw the patient today and spent greater than 30 minutes discharging this patient.    Discharge Orders      Home Care Referral      Reason for your hospital stay    You were admitted due to concerns for influenza A infection causing an asthma exacerbation.  Your workup in the emergency room showed that you were positive for influenza A but had a negative chest x-ray.  Thankfully= throughout your stay you did not require any supplemental oxygen.  You were treated with Tamiflu, increased prednisone dosing, and levalbuterol nebulizers with symptoms improvement.  You will continue your increased dose of prednisone with a quick taper bacterial 5 mg daily.  You will complete a total of a 5-day course of Tamiflu at discharge. You will be provided with " a prescription for levalbuterol to continue at discharge due to reported allergy to albuterol.  Of note your blood pressure was elevated here but this was due to pharmacy having difficulty performing your med rec and you should resume your home amlodipine and metoprolol at discharge.  If your blood pressure continues to be elevated upon discharge after resuming these then discuss with primary care about adjustments to your regimen.  You were evaluated by physical therapy during your stay and felt you are safe to go home with additional home services in the meantime.     Follow-up and recommended labs and tests     Follow up with primary care provider, Joselin Tai, within 7-10 days for hospital follow- up.  No follow up labs or test are needed.     Activity    Your activity upon discharge: activity as tolerated     Diet    Follow this diet upon discharge: Orders Placed This Encounter      Regular Diet Adult     Discharge Medications   Current Discharge Medication List        START taking these medications    Details   levalbuterol (XOPENEX HFA) 45 MCG/ACT inhaler Inhale 2 puffs into the lungs every 4 hours as needed for shortness of breath or wheezing  Qty: 15 g, Refills: 0    Associated Diagnoses: Influenza A      oseltamivir (TAMIFLU) 30 MG capsule Take 1 capsule (30 mg) by mouth 2 times daily  Qty: 6 capsule, Refills: 0    Associated Diagnoses: Influenza A           CONTINUE these medications which have CHANGED    Details   predniSONE (DELTASONE) 20 MG tablet Take 2 tablets (40 mg) by mouth daily for 3 days, THEN 1 tablet (20 mg) daily for 2 days, THEN 0.5 tablets (10 mg) daily for 1 day.  Qty: 9 tablet, Refills: 0    Associated Diagnoses: Influenza A; Bronchospasm           CONTINUE these medications which have NOT CHANGED    Details   pantoprazole (PROTONIX) 20 MG EC tablet Take 20 mg by mouth daily      trimethoprim (TRIMPEX) 100 MG tablet Take 100 mg by mouth daily      vibegron (GEMTESA) 75 MG TABS  tablet Take 75 mg by mouth daily      albuterol (PROAIR HFA/PROVENTIL HFA/VENTOLIN HFA) 108 (90 Base) MCG/ACT inhaler Inhale 2 puffs into the lungs every 4 hours as needed for shortness of breath / dyspnea or wheezing  Qty: 6.7 g, Refills: 0    Comments: Pharmacy may dispense brand covered by insurance (Proair, or proventil or ventolin or generic albuterol inhaler)  Associated Diagnoses: Acute respiratory failure with hypoxia (H)      alendronate (FOSAMAX) 70 MG tablet Take 70 mg by mouth once a week On Mondays (ON HOLD)      ALPRAZolam (XANAX) 0.25 MG tablet Take 0.25 mg by mouth      amLODIPine (NORVASC) 5 MG tablet Take 5 mg by mouth daily      atorvastatin (LIPITOR) 40 MG tablet Take 40 mg by mouth daily      BACILLUS COAGULANS-INULIN PO Take 1 capsule by mouth daily      busPIRone (BUSPAR) 5 MG tablet Take 5 mg by mouth 2 times daily       Calcium Carb-Cholecalciferol 500-200 MG-UNIT PACK Take 1 tablet by mouth daily       carbidopa-levodopa (SINEMET)  MG tablet Take 2 tablets by mouth 3 times daily      cetirizine (ZYRTEC) 10 MG tablet TAKE 1 TABLET(10 MG) BY MOUTH DAILY  Qty: 30 tablet, Refills: 1    Comments: Needs visit for refills  Associated Diagnoses: Seasonal allergic rhinitis, unspecified trigger      citalopram (CELEXA) 40 MG tablet TAKE 1 TABLET(40 MG) BY MOUTH DAILY  Qty: 90 tablet, Refills: 0    Comments: Needs appt before further refills.  Associated Diagnoses: Moderate episode of recurrent major depressive disorder (H)      cyanocobalamin (CYANOCOBALAMIN) 1000 MCG/ML injection Inject 1 mL into the muscle every 30 days      denosumab (PROLIA) 60 MG/ML SOSY injection Inject 60 mg Subcutaneous every 6 months      diazepam (VALIUM) 5 MG tablet Take 1 tablet by mouth one time for 1 dose prior to dental work      diclofenac (VOLTAREN) 1 % topical gel Apply 4 g topically 4 times daily as needed for moderate pain Plus 2 g to hands tid      donepezil (ARICEPT) 5 MG tablet Take 5 mg by mouth daily  "before breakfast       EPINEPHrine (EPIPEN) 0.3 MG/0.3ML injection Inject 0.3 mLs (0.3 mg) into the muscle once as needed for anaphylaxis  Qty: 1 each, Refills: 0      folic acid (FOLVITE) 1 MG tablet Take 1 mg by mouth 6 times a week on M,T,Th, Fr, Sat,Sun (NOT WED)      gabapentin (NEURONTIN) 300 MG capsule Take 600 mg by mouth At Bedtime      ketoconazole 1 % shampoo Apply topically every 72 hours      melatonin 3 MG tablet Take 1 tablet (3 mg) by mouth nightly as needed    Associated Diagnoses: Anxiety and depression      !! methotrexate 2.5 MG tablet Take 12.5 mg by mouth Every Wednesday AM  (ON HOLD)      !! methotrexate 2.5 MG tablet Take 10 mg by mouth Every Wednesday PM (ON HOLD)      metoprolol succinate ER (TOPROL-XL) 50 MG 24 hr tablet Take 0.5 tablets (25 mg) by mouth daily    Associated Diagnoses: Essential hypertension, benign      nystatin (MYCOSTATIN) 864614 UNIT/GM external powder Apply topically 2 times daily Apply to affected area topically BID for redness      polyethylene glycol (MIRALAX) 17 g packet Take 1 packet by mouth 2 times daily as needed for constipation      Probiotic Product (PROBIOTIC MATURE ADULT) CAPS Take 1 capsule by mouth daily      sennosides (SENOKOT) 8.6 MG tablet Take 1 tablet by mouth 2 times daily as needed       SF 5000 PLUS 1.1 % CREA       Vitamin D3 (CHOLECALCIFEROL) 25 mcg (1000 units) tablet Take 100 mcg by mouth daily        !! - Potential duplicate medications found. Please discuss with provider.        Allergies   Allergies   Allergen Reactions    Contrast Dye Rash, Anaphylaxis, Hives and Swelling     Tolerated CT chest with contrast 6/2021 after pre-treatment with benadryl and solumedrol  Hives / throat swelling    Doxycycline Anaphylaxis    Acetaminophen      Vicodin/Darvocet/Swelling throat    Albuterol      \"Half my tongue swelled.\"   \"Half my tongue swelled.\"       Atenolol Unknown and Other (See Comments)     PN: LW Reaction: swelling of the tongue  (see " [place] records scanned 3/11/2014)  (see [place] records scanned 3/11/2014)      Darvocet [Propoxyphene N-Apap]     Fluticasone-Salmeterol      PN: tongue swelling      Hydrocodone-Acetaminophen Swelling     throat      Lisinopril      Tongue Swelling    Metformin Hydrochloride      Glucophage caused severe diarrhea    Morphine      MISAEL    Penicillins      Swelling throat    Percocet [Oxycodone-Acetaminophen]     Neomycin-Bacitracin-Polymyxin Hives and Rash     Data   Results for orders placed or performed during the hospital encounter of 12/27/23   XR Chest Port 1 View     Status: None    Narrative    EXAM: XR CHEST PORT 1 VIEW  LOCATION: [facility]  DATE: 12/27/2023    INDICATION: Shortness of breath.  COMPARISON: 04/07/2023      Impression    IMPRESSION:     No focal airspace disease. No pleural effusion or pneumothorax.    The cardiomediastinal silhouette is unremarkable.    Electronic device projects over the heart. Surgical clips project over the epigastric region.   [place] Draw     Status: None    Narrative    The following orders were created for panel order [place] Draw.  Procedure                               Abnormality         Status                     ---------                               -----------         ------                     Extra Blue Top Tube[[id number]]                              Final result               Extra Red Top Tube[[id number]]                               Final result                 Please view results for these tests on the individual orders.   Symptomatic Influenza A/B, RSV, & SARS-CoV2 PCR (COVID-19) Nasopharyngeal     Status: Abnormal    Specimen: Nasopharyngeal; Swab   Result Value Ref Range    Influenza A PCR Positive (A) Negative    Influenza B PCR Negative Negative    RSV PCR Negative Negative    SARS CoV2 PCR Negative Negative    Narrative    Testing was performed using the Xpert Xpress CoV2/Flu/RSV Assay on the [employer]  Instrument. This test should be ordered for the detection of SARS-CoV-2, influenza, and RSV viruses in individuals who meet clinical and/or epidemiological criteria. Test performance is unknown in asymptomatic patients. This test is for in vitro diagnostic use under the FDA EUA for laboratories certified under CLIA to perform high or moderate complexity testing. This test has not been FDA cleared or approved. A negative result does not rule out the presence of PCR inhibitors in the specimen or target RNA in concentration below the limit of detection for the assay. If only one viral target is positive but coinfection with multiple targets is suspected, the sample should be re-tested with another FDA cleared, approved, or authorized test, if coinfection would change clinical management. This test was validated by the [facility]. These laboratories are certified under the Clinical Laboratory Improvement Amendments of 1988 (CLIA-88) as qualified to perform high complexity laboratory testing.   Basic metabolic panel     Status: Abnormal   Result Value Ref Range    Sodium 137 135 - 145 mmol/L    Potassium 4.3 3.4 - 5.3 mmol/L    Chloride 103 98 - 107 mmol/L    Carbon Dioxide (CO2) 22 22 - 29 mmol/L    Anion Gap 12 7 - 15 mmol/L    Urea Nitrogen 15.4 8.0 - 23.0 mg/dL    Creatinine 1.13 (H) 0.51 - 0.95 mg/dL    GFR Estimate 49 (L) >60 mL/min/1.73m2    Calcium 8.5 (L) 8.8 - 10.2 mg/dL    Glucose 92 70 - 99 mg/dL   Troponin T, High Sensitivity     Status: Abnormal   Result Value Ref Range    Troponin T, High Sensitivity 22 (H) <=14 ng/L   Extra Blue Top Tube     Status: None   Result Value Ref Range    Hold Specimen JIC    Extra Red Top Tube     Status: None   Result Value Ref Range    Hold Specimen JIC    CBC with platelets and differential     Status: Abnormal   Result Value Ref Range    WBC Count 7.4 4.0 - 11.0 10e3/uL    RBC Count 3.52 (L) 3.80 - 5.20 10e6/uL    Hemoglobin 12.6 11.7 - 15.7 g/dL     Hematocrit 38.2 35.0 - 47.0 %     (H) 78 - 100 fL    MCH 35.8 (H) 26.5 - 33.0 pg    MCHC 33.0 31.5 - 36.5 g/dL    RDW 15.4 (H) 10.0 - 15.0 %    Platelet Count 183 150 - 450 10e3/uL    % Neutrophils 68 %    % Lymphocytes 11 %    % Monocytes 17 %    % Eosinophils 2 %    % Basophils 1 %    % Immature Granulocytes 1 %    NRBCs per 100 WBC 1 (H) <1 /100    Absolute Neutrophils 5.1 1.6 - 8.3 10e3/uL    Absolute Lymphocytes 0.8 0.8 - 5.3 10e3/uL    Absolute Monocytes 1.2 0.0 - 1.3 10e3/uL    Absolute Eosinophils 0.1 0.0 - 0.7 10e3/uL    Absolute Basophils 0.1 0.0 - 0.2 10e3/uL    Absolute Immature Granulocytes 0.0 <=0.4 10e3/uL    Absolute NRBCs 0.1 10e3/uL   BNP     Status: Normal   Result Value Ref Range    N terminal Pro BNP Inpatient 1,561 0 - 1,800 pg/mL   Blood gas venous and oxyhgb     Status: Abnormal   Result Value Ref Range    pH Venous 7.42 7.32 - 7.43    pCO2 Venous 40 40 - 50 mm Hg    pO2 Venous 32 25 - 47 mm Hg    Bicarbonate Venous 25 21 - 28 mmol/L    FIO2 21     Oxyhemoglobin Venous 47 (L) 70 - 75 %    Base Excess/Deficit 0.9 -7.7 - 1.9 mmol/L   Procalcitonin     Status: Normal   Result Value Ref Range    Procalcitonin 0.07 <0.50 ng/mL   Basic metabolic panel     Status: Abnormal   Result Value Ref Range    Sodium 136 135 - 145 mmol/L    Potassium 4.1 3.4 - 5.3 mmol/L    Chloride 104 98 - 107 mmol/L    Carbon Dioxide (CO2) 22 22 - 29 mmol/L    Anion Gap 10 7 - 15 mmol/L    Urea Nitrogen 18.0 8.0 - 23.0 mg/dL    Creatinine 1.03 (H) 0.51 - 0.95 mg/dL    GFR Estimate 54 (L) >60 mL/min/1.73m2    Calcium 8.3 (L) 8.8 - 10.2 mg/dL    Glucose 161 (H) 70 - 99 mg/dL   CBC with platelets     Status: Abnormal   Result Value Ref Range    WBC Count 6.6 4.0 - 11.0 10e3/uL    RBC Count 2.96 (L) 3.80 - 5.20 10e6/uL    Hemoglobin 10.6 (L) 11.7 - 15.7 g/dL    Hematocrit 31.8 (L) 35.0 - 47.0 %     (H) 78 - 100 fL    MCH 35.8 (H) 26.5 - 33.0 pg    MCHC 33.3 31.5 - 36.5 g/dL    RDW 15.4 (H) 10.0 - 15.0 %     Platelet Count 167 150 - 450 10e3/uL   Glucose by meter     Status: Abnormal   Result Value Ref Range    GLUCOSE BY METER POCT 160 (H) 70 - 99 mg/dL   CBC with platelets     Status: Abnormal   Result Value Ref Range    WBC Count 11.6 (H) 4.0 - 11.0 10e3/uL    RBC Count 2.85 (L) 3.80 - 5.20 10e6/uL    Hemoglobin 10.1 (L) 11.7 - 15.7 g/dL    Hematocrit 30.7 (L) 35.0 - 47.0 %     (H) 78 - 100 fL    MCH 35.4 (H) 26.5 - 33.0 pg    MCHC 32.9 31.5 - 36.5 g/dL    RDW 15.5 (H) 10.0 - 15.0 %    Platelet Count 154 150 - 450 10e3/uL   Basic metabolic panel     Status: Abnormal   Result Value Ref Range    Sodium 141 135 - 145 mmol/L    Potassium 3.7 3.4 - 5.3 mmol/L    Chloride 110 (H) 98 - 107 mmol/L    Carbon Dioxide (CO2) 22 22 - 29 mmol/L    Anion Gap 9 7 - 15 mmol/L    Urea Nitrogen 22.4 8.0 - 23.0 mg/dL    Creatinine 0.94 0.51 - 0.95 mg/dL    GFR Estimate 61 >60 mL/min/1.73m2    Calcium 8.0 (L) 8.8 - 10.2 mg/dL    Glucose 97 70 - 99 mg/dL   Glucose by meter     Status: Normal   Result Value Ref Range    GLUCOSE BY METER POCT 90 70 - 99 mg/dL   EKG 12-lead, tracing only     Status: None   Result Value Ref Range    Systolic Blood Pressure  mmHg    Diastolic Blood Pressure  mmHg    Ventricular Rate 80 BPM    Atrial Rate 80 BPM    MN Interval 138 ms    QRS Duration 84 ms     ms    QTc 486 ms    P Axis 64 degrees    R AXIS 42 degrees    T Axis 83 degrees    Interpretation ECG       Sinus rhythm  Normal ECG  When compared with ECG of 07-APR-2023 14:05,  Vent. rate has increased BY  33 BPM     CBC with Platelets & Differential     Status: Abnormal    Narrative    The following orders were created for panel order CBC with Platelets & Differential.  Procedure                               Abnormality         Status                     ---------                               -----------         ------                     CBC with platelets and d...[[id number]]  Abnormal            Final result                 Please view  results for these tests on the individual orders.       Nuvia Ferreira PA-C

## 2023-12-29 NOTE — CARE PLAN
Discharge instructions given to patient, iv removed. Patient will be wheeled down by staff when ride is available. Filled meds given to patient for home use.

## 2023-12-29 NOTE — PLAN OF CARE
PRIMARY DIAGNOSIS: PNEUMONIA  OUTPATIENT/OBSERVATION GOALS TO BE MET BEFORE DISCHARGE:  Dyspnea improved and O2 sats >88% on RA or back to baseline O2 levels: Yes   SpO2: 97 %, O2 Device: None (Room air)    Tolerating oral abx or appropriate plans made outpatient infusion: Yes    Vitals signs normal or return to baseline: No    Short term supplemental O2 needed with activity at home: No    Tolerate oral intake to maintain hydration: Yes    Return to near baseline physical activity: Yes    Discharge Planner Nurse   Safe discharge environment identified: No  Barriers to discharge: Yes       Entered by: Marcelina Chirinos RN 12/28/2023 10:42 PM    Vitals are Temp: 98.3  F (36.8  C) Temp src: Oral BP: (!) 137/94 Pulse: 89   Resp: 20 SpO2: 96 % on 2 L.  Patient is Alert and Oriented x4. They are 1 Assist with Gait Belt and Walker.  Patient is on Droplet precuations for Influenza A.  Pt is a Regular diet.  They are complaining of 7/10 pain in their back.  Ibuprofen given for pain.  Medications decreased pain.  Patient is Saline locked.       Please review provider order for any additional goals.   Nurse to notify provider when observation goals have been met and patient is ready for discharge.

## 2023-12-29 NOTE — CARE PLAN
PRIMARY DIAGNOSIS: GENERALIZED WEAKNESS    OUTPATIENT/OBSERVATION GOALS TO BE MET BEFORE DISCHARGE  1. Orthostatic performed: N/A    2. Tolerating PO medications: Yes    3. Return to near baseline physical activity: Yes    4. Cleared for discharge by consultants (if involved): No    Discharge Planner Nurse   Safe discharge environment identified: Yes  Barriers to discharge: No       Entered by: Radha Beyer RN 12/29/2023 1:33 PM     Please review provider order for any additional goals.   Nurse to notify provider when observation goals have been met and patient is ready for discharge.

## 2023-12-29 NOTE — PLAN OF CARE
PRIMARY DIAGNOSIS: PNEUMONIA  OUTPATIENT/OBSERVATION GOALS TO BE MET BEFORE DISCHARGE:  Dyspnea improved and O2 sats >88% on RA or back to baseline O2 levels: Yes   SpO2: 95 %, O2 Device: None (Room air)     Tolerating oral abx or appropriate plans made outpatient infusion: Yes     Vitals signs normal or return to baseline: No     Short term supplemental O2 needed with activity at home: No     Tolerate oral intake to maintain hydration: Yes     Return to near baseline physical activity: Yes     Discharge Planner Nurse   Safe discharge environment identified: No  Barriers to discharge: Yes       Entered by: Marcelina Chirinos RN 12/29/2023 4:45 AM     Vitals are Temp: 98.4  F (36.9  C) Temp src: Oral BP: (!) 140/63 Pulse: 77   Resp: 18 SpO2: 95 %.  Patient is Alert and Oriented x4. They are SBA with Gait Belt and Walker.  Patient is on Droplet precuations for Influenza A. Pt is a Regular diet. They are complaining of 4/10 pain in their back. Tylenol given for pain. Medications decreased pain.  Patient is Saline locked. Pt was given 2 PRN neb treatment for wheezing. PT and SW following.            Please review provider order for any additional goals.   Nurse to notify provider when observation goals have been met and patient is ready for discharge.

## 2023-12-29 NOTE — PROGRESS NOTES
12/29/23 1300   Appointment Info   Signing Clinician's Name / Credentials (PT) Beatriz Barrientos DPT   Quick Adds   Quick Adds Certification   Living Environment   People in Home spouse   Current Living Arrangements house   Home Accessibility no concerns   Self-Care   Equipment Currently Used at Home walker, rolling;shower chair   Fall history within last six months no   General Information   Onset of Illness/Injury or Date of Surgery 12/27/23   Referring Physician Dr. Vick   Patient/Family Therapy Goals Statement (PT) Pt is hopeful to DC home   Pertinent History of Current Problem (include personal factors and/or comorbidities that impact the POC) Alma Rosa Fishman is a 83 year old female with PMH including asthma, rheumatoid arthritis, splenectomy, HTN, HLD, CVA, provoked DVT not on anticoagulation, MDD, anxiety, RLS, GERD, iron deficiency anemia, SBO, CKD stage IIIa, and parkinsonism who presents with cough and shortness of breath that started earlier today.  Her  is also ill with a cough and came to the ER and is actually admitted for influenza A.  She developed a cough minimally productive throughout the day today resulting in shortness of breath and some wheezing.  She also has felt fatigued and had intermittent chills throughout the day.  Due to her symptoms and living alone she came in for evaluation.  She denies any fevers, nausea, vomiting, diarrhea, dysuria.  She does report some intermittent chest pain when you press on her chest that is not new, but denies any chest pressure.  No new leg swelling or leg pain.  She does feel very anxious which is common for her and she takes medications for this.   Cognition   Affect/Mental Status (Cognition) anxious   Orientation Status (Cognition) oriented x 4   Posture    Posture Forward head position;Protracted shoulders  (forward flexed at hips and low back)   Range of Motion (ROM)   Range of Motion ROM is WNL   Strength (Manual Muscle Testing)   Strength  (Manual Muscle Testing) Deficits observed during functional mobility   Strength Comments limited hip flexion strength   Bed Mobility   Comment, (Bed Mobility) increased time and effort, but able to perform SBA   Transfers   Comment, (Transfers) SBA   Gait/Stairs (Locomotion)   Comment, (Gait/Stairs) SBA   Balance   Balance Comments No LOB use of FWW   Clinical Impression   Criteria for Skilled Therapeutic Intervention Yes, treatment indicated   PT Diagnosis (PT) decreased functional strength   Influenced by the following impairments mildly decreased bed mob, decreased endurance   Functional limitations due to impairments decreased IADLs, decreased dressing on R UE   Clinical Presentation (PT Evaluation Complexity) stable   Clinical Presentation Rationale improving   Clinical Decision Making (Complexity) low complexity   Planned Therapy Interventions (PT) balance training;bed mobility training;gait training   Risk & Benefits of therapy have been explained evaluation/treatment results reviewed;care plan/treatment goals reviewed;risks/benefits reviewed;current/potential barriers reviewed;participants voiced agreement with care plan;participants included;patient   PT Total Evaluation Time   PT Eval, Low Complexity Minutes (21851) 5   Therapy Certification   Start of care date 12/29/23   Certification date from 12/29/23   Certification date to 01/01/24   Medical Diagnosis influenza   Physical Therapy Goals   PT Frequency Daily   PT Predicted Duration/Target Date for Goal Attainment 01/01/24   PT Goals Bed Mobility;Transfers;Gait   PT: Bed Mobility Independent;Supine to/from sit   PT: Transfers Modified independent;Sit to/from stand;Bed to/from chair;Assistive device   PT: Gait Modified independent;Rolling walker;50 feet   Interventions   Interventions Quick Adds Therapeutic Activity   Therapeutic Activity   Therapeutic Activities: dynamic activities to improve functional performance Minutes (12924) 25   Symptoms Noted  "During/After Treatment Shortness of breath   Treatment Detail/Skilled Intervention Pt was cued for supine to sit, able to peform SBA. Pt needing education on ideas on how to improve return to supine, trialed \"leg \" but not able to perform, instead worked on transitioning buttocks into bed farther. Pt was cued for scooting back into bed which was effective. Pt was cued for safety in BR, tolerating well. Pt was cued for 50 feet of ambulation with FWW, SOB noted but sats stable. Discussed having A with IADLs from family, pt and pt  concerned about recieving this from family   PT Discharge Planning   PT Plan Re-attempt bed mob, improve endurance   PT Discharge Recommendation (DC Rec) home with assist;home with home care physical therapy   PT Rationale for DC Rec Recommend home with home PT as pt would benefit from endurance training, improving home set up geovanny for bed mob. Pt would benefit from A with cooking, cleaning and grocery shopping. Noted husabnd also hospitalized, recommend family- have 3 dtgs in area as well as grandkids to at least ask for assistance with this. Pt and  hesitant for reaching out to family for IADL assist. Other option would be hiring A for these activities. Pt and raman able to perform basic mobility items without LOB.   PT Brief overview of current status Pt able to ambulate 50 feet transfer with SBA. perform LB dressing inde.   Total Session Time   Timed Code Treatment Minutes 25   Total Session Time (sum of timed and untimed services) 30     [facility]  OUTPATIENT PHYSICAL THERAPY EVALUATION  PLAN OF TREATMENT FOR OUTPATIENT REHABILITATION  (COMPLETE FOR INITIAL CLAIMS ONLY)  Patient's Last Name, First Name, M.I.  YOB: 1940  Alma Rosa Fishman                        Provider's Name  [facility] Medical Record No.  [id number]                             Onset Date:  12/27/23   Start of Care Date:  " 12/29/23   Type:     _X_PT   ___OT   ___SLP Medical Diagnosis:  influenza              PT Diagnosis:  decreased functional strength Visits from SOC:  1     See note for plan of treatment, functional goals and certification details    I CERTIFY THE NEED FOR THESE SERVICES FURNISHED UNDER        THIS PLAN OF TREATMENT AND WHILE UNDER MY CARE     (Physician co-signature of this document indicates review and certification of the therapy plan).

## 2023-12-29 NOTE — PROGRESS NOTES
Care Management Follow Up    Expected Discharge Date: 12/30/2023     Concerns to be Addressed: Discharge planning     Patient plan of care discussed at interdisciplinary rounds: Yes    Anticipated Discharge Disposition: Home     Anticipated Discharge Services: Home Care RN/PT/OT    Patient/Family in Agreement with the Plan: yes    Referrals Placed by CM/SW: Home Care  Private pay costs discussed: Not applicable    Additional Information:  [facility] has accepted HC referral for HC RN/PT/OT, AVS updated. Updated pt on discharge plans. She anticipates family may be able to transport pt and spouse home. SW will continue to follow.     YASMINE Modi, [facility]   Inpatient Care Coordination  [facility]   [phone]

## 2023-12-29 NOTE — PLAN OF CARE
PRIMARY DIAGNOSIS: GENERALIZED WEAKNESS    OUTPATIENT/OBSERVATION GOALS TO BE MET BEFORE DISCHARGE  1. Orthostatic performed: N/A    2. Tolerating PO medications: Yes    3. Return to near baseline physical activity: Yes    4. Cleared for discharge by consultants (if involved): No    Discharge Planner Nurse   Safe discharge environment identified: Yes  Barriers to discharge: No       Entered by: Radha Beyer RN 12/29/2023 9:28 AM     Please review provider order for any additional goals.   Nurse to notify provider when observation goals have been met and patient is ready for discharge.

## 2023-12-29 NOTE — PROGRESS NOTES
[facility]     Patient is anticipated to discharge 12/29/23.  Patient agrees to have RN/PT/OT [facility] provide  services. Patient will receive a phone call from [facility] to schedule an initial home care visit. Anticipated start of care date is [within 24-48 hours of discharge].     Please reach out to Clinical Liaison with questions.     JÚNIOR Trujillo, LPN  [facility] Liaison   Cell: [phone]

## 2023-12-29 NOTE — DISCHARGE INSTRUCTIONS
Your home care referral was sent to [facility]  If you haven't heard from them within the next 24-48 hours,  Please call them at [phone].

## 2023-12-29 NOTE — PLAN OF CARE
PRIMARY DIAGNOSIS: GENERALIZED WEAKNESS    OUTPATIENT/OBSERVATION GOALS TO BE MET BEFORE DISCHARGE  1. Orthostatic performed: N/A    2. Tolerating PO medications: Yes    3. Return to near baseline physical activity: Yes    4. Cleared for discharge by consultants (if involved): Yes    Discharge Planner Nurse   Safe discharge environment identified: Yes  Barriers to discharge: No       Entered by: Radha Beyer RN 12/29/2023 4:14 PM     Please review provider order for any additional goals.   Nurse to notify provider when observation goals have been met and patient is ready for discharge.

## 2023-12-29 NOTE — PLAN OF CARE
PRIMARY DIAGNOSIS: PNEUMONIA  OUTPATIENT/OBSERVATION GOALS TO BE MET BEFORE DISCHARGE:  Dyspnea improved and O2 sats >88% on RA or back to baseline O2 levels: Yes   SpO2: 95 %, O2 Device: None (Room air)     Tolerating oral abx or appropriate plans made outpatient infusion: Yes     Vitals signs normal or return to baseline: No     Short term supplemental O2 needed with activity at home: No     Tolerate oral intake to maintain hydration: Yes     Return to near baseline physical activity: Yes     Discharge Planner Nurse   Safe discharge environment identified: No  Barriers to discharge: Yes       Entered by: Marcelina Chirinos RN 12/29/2023 2:25 AM     Vitals are Temp: 99  F (37.2  C) Temp src: Oral BP: (!) 148/85 Pulse: 81   Resp: 16 SpO2: 95 %.  Patient is Alert and Oriented x4. They are 1 Assist with Gait Belt and Walker.  Patient is on Droplet precuations for Influenza A.  Pt is a Regular diet.  They are complaining of 6/10 pain in their back and shoulder. Tylenol given for pain. Medications decreased pain.  Patient is Saline locked. Pt was given PRN neb treatment for wheezing. PT and SW following.            Please review provider order for any additional goals.   Nurse to notify provider when observation goals have been met and patient is ready for discharge.

## 2023-12-30 NOTE — PROGRESS NOTES
Physical Therapy Discharge Summary    Reason for therapy discharge:    Discharged to home.    Progress towards therapy goal(s). See goals on Care Plan in [facility] electronic health record for goal details.  Goals not met.  Barriers to achieving goals:   discharge on same date as initial evaluation.    Therapy recommendation(s):    Continued therapy is recommended.  Rationale/Recommendations:  Recommend home with home PT as pt would benefit from endurance training, improving home set up geovanny for bed mob. Pt would benefit from A with cooking, cleaning and grocery shopping. Noted husabnd also hospitalized, recommend family- have 3 dtgs in area as well as grandkids to at least ask for assistance with this. Pt and  hesitant for reaching out to family for IADL assist. Other option would be hiring A for these activities. Pt and raman able to perform basic mobility items without LOB..

## 2024-03-21 ENCOUNTER — LAB REQUISITION (OUTPATIENT)
Dept: LAB | Facility: CLINIC | Age: 84
End: 2024-03-21
Payer: MEDICARE

## 2024-03-21 DIAGNOSIS — N39.0 URINARY TRACT INFECTION, SITE NOT SPECIFIED: ICD-10-CM

## 2024-03-21 LAB
ALBUMIN UR-MCNC: NEGATIVE MG/DL
APPEARANCE UR: ABNORMAL
BACTERIA #/AREA URNS HPF: ABNORMAL /HPF
BILIRUB UR QL STRIP: NEGATIVE
COLOR UR AUTO: YELLOW
GLUCOSE UR STRIP-MCNC: NEGATIVE MG/DL
HGB UR QL STRIP: NEGATIVE
HYALINE CASTS: 1 /LPF
KETONES UR STRIP-MCNC: NEGATIVE MG/DL
LEUKOCYTE ESTERASE UR QL STRIP: ABNORMAL
MUCOUS THREADS #/AREA URNS LPF: PRESENT /LPF
NITRATE UR QL: NEGATIVE
PH UR STRIP: 7 [PH] (ref 5–7)
RBC URINE: 2 /HPF
SP GR UR STRIP: 1.02 (ref 1–1.03)
SQUAMOUS EPITHELIAL: 4 /HPF
UROBILINOGEN UR STRIP-MCNC: 2 MG/DL
WBC URINE: 9 /HPF

## 2024-03-21 PROCEDURE — 81001 URINALYSIS AUTO W/SCOPE: CPT | Mod: ORL | Performed by: FAMILY MEDICINE

## 2024-06-04 ENCOUNTER — LAB REQUISITION (OUTPATIENT)
Dept: LAB | Facility: CLINIC | Age: 84
End: 2024-06-04

## 2024-06-04 DIAGNOSIS — R42 DIZZINESS AND GIDDINESS: ICD-10-CM

## 2024-06-04 LAB
ERYTHROCYTE [DISTWIDTH] IN BLOOD BY AUTOMATED COUNT: 15.9 % (ref 10–15)
HCT VFR BLD AUTO: 33.3 % (ref 35–47)
HGB BLD-MCNC: 10.8 G/DL (ref 11.7–15.7)
MCH RBC QN AUTO: 36.1 PG (ref 26.5–33)
MCHC RBC AUTO-ENTMCNC: 32.4 G/DL (ref 31.5–36.5)
MCV RBC AUTO: 111 FL (ref 78–100)
PLATELET # BLD AUTO: 334 10E3/UL (ref 150–450)
RBC # BLD AUTO: 2.99 10E6/UL (ref 3.8–5.2)
WBC # BLD AUTO: 10.7 10E3/UL (ref 4–11)

## 2024-06-04 PROCEDURE — 85027 COMPLETE CBC AUTOMATED: CPT | Performed by: INTERNAL MEDICINE

## 2024-06-04 PROCEDURE — 80048 BASIC METABOLIC PNL TOTAL CA: CPT | Performed by: INTERNAL MEDICINE

## 2024-06-05 LAB
ANION GAP SERPL CALCULATED.3IONS-SCNC: 9 MMOL/L (ref 7–15)
BUN SERPL-MCNC: 19 MG/DL (ref 8–23)
CALCIUM SERPL-MCNC: 8.6 MG/DL (ref 8.8–10.2)
CHLORIDE SERPL-SCNC: 106 MMOL/L (ref 98–107)
CREAT SERPL-MCNC: 0.82 MG/DL (ref 0.51–0.95)
DEPRECATED HCO3 PLAS-SCNC: 25 MMOL/L (ref 22–29)
EGFRCR SERPLBLD CKD-EPI 2021: 71 ML/MIN/1.73M2
GLUCOSE SERPL-MCNC: 68 MG/DL (ref 70–99)
POTASSIUM SERPL-SCNC: 5 MMOL/L (ref 3.4–5.3)
SODIUM SERPL-SCNC: 140 MMOL/L (ref 135–145)

## 2024-06-16 ENCOUNTER — LAB REQUISITION (OUTPATIENT)
Dept: LAB | Facility: CLINIC | Age: 84
End: 2024-06-16
Payer: MEDICARE

## 2024-06-16 DIAGNOSIS — N18.30 CHRONIC KIDNEY DISEASE, STAGE 3 UNSPECIFIED (H): ICD-10-CM

## 2024-06-16 DIAGNOSIS — R60.9 EDEMA, UNSPECIFIED: ICD-10-CM

## 2024-06-18 LAB
ANION GAP SERPL CALCULATED.3IONS-SCNC: 11 MMOL/L (ref 7–15)
BUN SERPL-MCNC: 17.2 MG/DL (ref 8–23)
CALCIUM SERPL-MCNC: 8.2 MG/DL (ref 8.8–10.2)
CHLORIDE SERPL-SCNC: 108 MMOL/L (ref 98–107)
CREAT SERPL-MCNC: 0.75 MG/DL (ref 0.51–0.95)
DEPRECATED HCO3 PLAS-SCNC: 23 MMOL/L (ref 22–29)
EGFRCR SERPLBLD CKD-EPI 2021: 80 ML/MIN/1.73M2
GLUCOSE SERPL-MCNC: 75 MG/DL (ref 70–99)
POTASSIUM SERPL-SCNC: 3.8 MMOL/L (ref 3.4–5.3)
SODIUM SERPL-SCNC: 142 MMOL/L (ref 135–145)

## 2024-06-18 PROCEDURE — P9603 ONE-WAY ALLOW PRORATED MILES: HCPCS | Performed by: NURSE PRACTITIONER

## 2024-06-18 PROCEDURE — 80048 BASIC METABOLIC PNL TOTAL CA: CPT | Performed by: NURSE PRACTITIONER

## 2024-06-18 PROCEDURE — 36415 COLL VENOUS BLD VENIPUNCTURE: CPT | Performed by: NURSE PRACTITIONER

## 2024-09-07 ENCOUNTER — HOSPITAL ENCOUNTER (EMERGENCY)
Facility: CLINIC | Age: 84
Discharge: HOME | End: 2024-09-07
Attending: EMERGENCY MEDICINE | Admitting: EMERGENCY MEDICINE
Payer: MEDICARE

## 2024-09-07 VITALS
HEIGHT: 61 IN | RESPIRATION RATE: 18 BRPM | DIASTOLIC BLOOD PRESSURE: 70 MMHG | BODY MASS INDEX: 33.99 KG/M2 | WEIGHT: 180 LBS | TEMPERATURE: 97.9 F | OXYGEN SATURATION: 97 % | HEART RATE: 67 BPM | SYSTOLIC BLOOD PRESSURE: 116 MMHG

## 2024-09-07 DIAGNOSIS — E83.51 HYPOCALCEMIA: ICD-10-CM

## 2024-09-07 DIAGNOSIS — R20.0 LEG NUMBNESS: ICD-10-CM

## 2024-09-07 LAB
ACANTHOCYTES BLD QL SMEAR: SLIGHT
ANION GAP SERPL CALCULATED.3IONS-SCNC: 13 MMOL/L (ref 7–15)
BASOPHILS # BLD MANUAL: 0 10E3/UL (ref 0–0.2)
BASOPHILS NFR BLD MANUAL: 0 %
BUN SERPL-MCNC: 12 MG/DL (ref 8–23)
CALCIUM SERPL-MCNC: 7.8 MG/DL (ref 8.8–10.4)
CHLORIDE SERPL-SCNC: 110 MMOL/L (ref 98–107)
CREAT SERPL-MCNC: 0.71 MG/DL (ref 0.51–0.95)
EGFRCR SERPLBLD CKD-EPI 2021: 85 ML/MIN/1.73M2
EOSINOPHIL # BLD MANUAL: 1.2 10E3/UL (ref 0–0.7)
EOSINOPHIL NFR BLD MANUAL: 12 %
ERYTHROCYTE [DISTWIDTH] IN BLOOD BY AUTOMATED COUNT: 15.9 % (ref 10–15)
FRAGMENTS BLD QL SMEAR: SLIGHT
GLUCOSE SERPL-MCNC: 119 MG/DL (ref 70–99)
HCO3 SERPL-SCNC: 18 MMOL/L (ref 22–29)
HCT VFR BLD AUTO: 37.5 % (ref 35–47)
HGB BLD-MCNC: 12.3 G/DL (ref 11.7–15.7)
HOLD SPECIMEN: NORMAL
HOLD SPECIMEN: NORMAL
LYMPHOCYTES # BLD MANUAL: 1.3 10E3/UL (ref 0.8–5.3)
LYMPHOCYTES NFR BLD MANUAL: 13 %
MCH RBC QN AUTO: 33.4 PG (ref 26.5–33)
MCHC RBC AUTO-ENTMCNC: 32.8 G/DL (ref 31.5–36.5)
MCV RBC AUTO: 102 FL (ref 78–100)
MONOCYTES # BLD MANUAL: 1.2 10E3/UL (ref 0–1.3)
MONOCYTES NFR BLD MANUAL: 12 %
NEUTROPHILS # BLD MANUAL: 6.4 10E3/UL (ref 1.6–8.3)
NEUTROPHILS NFR BLD MANUAL: 63 %
NRBC # BLD AUTO: 0 10E3/UL
NRBC BLD AUTO-RTO: 0 /100
PLAT MORPH BLD: ABNORMAL
PLATELET # BLD AUTO: 251 10E3/UL (ref 150–450)
POTASSIUM SERPL-SCNC: 3.8 MMOL/L (ref 3.4–5.3)
RBC # BLD AUTO: 3.68 10E6/UL (ref 3.8–5.2)
RBC MORPH BLD: ABNORMAL
SARS-COV-2 RNA RESP QL NAA+PROBE: NEGATIVE
SODIUM SERPL-SCNC: 141 MMOL/L (ref 135–145)
WBC # BLD AUTO: 10.2 10E3/UL (ref 4–11)

## 2024-09-07 PROCEDURE — 85007 BL SMEAR W/DIFF WBC COUNT: CPT | Performed by: EMERGENCY MEDICINE

## 2024-09-07 PROCEDURE — 93005 ELECTROCARDIOGRAM TRACING: CPT

## 2024-09-07 PROCEDURE — 80048 BASIC METABOLIC PNL TOTAL CA: CPT | Performed by: EMERGENCY MEDICINE

## 2024-09-07 PROCEDURE — 85027 COMPLETE CBC AUTOMATED: CPT | Performed by: EMERGENCY MEDICINE

## 2024-09-07 PROCEDURE — 36415 COLL VENOUS BLD VENIPUNCTURE: CPT | Performed by: EMERGENCY MEDICINE

## 2024-09-07 PROCEDURE — 99284 EMERGENCY DEPT VISIT MOD MDM: CPT

## 2024-09-07 PROCEDURE — 87635 SARS-COV-2 COVID-19 AMP PRB: CPT | Performed by: EMERGENCY MEDICINE

## 2024-09-07 ASSESSMENT — COLUMBIA-SUICIDE SEVERITY RATING SCALE - C-SSRS
1. IN THE PAST MONTH, HAVE YOU WISHED YOU WERE DEAD OR WISHED YOU COULD GO TO SLEEP AND NOT WAKE UP?: NO
2. HAVE YOU ACTUALLY HAD ANY THOUGHTS OF KILLING YOURSELF IN THE PAST MONTH?: NO
6. HAVE YOU EVER DONE ANYTHING, STARTED TO DO ANYTHING, OR PREPARED TO DO ANYTHING TO END YOUR LIFE?: NO

## 2024-09-07 NOTE — ED TRIAGE NOTES
Presents to triage with c/o nausea, fatigue, and headaches that have been ongoing for the past several days. Just PTA patient was at her grandson's football game and her R leg felt numb prompting her  to bring her to the ED. Numbness has now resolved. BEFAST negative.

## 2024-09-07 NOTE — ED PROVIDER NOTES
History     Chief Complaint:  Nausea, Headache, and Numbness       HPI   Alma Rosa Fishman is a 83 year old female with history of DVT, diabetes, fibromyalgia, spinal stenosis, rheumatoid arthritis, and Parkinson's who presents emergency department for evaluation of numbness in her right leg that lasted for approximately 1 hour earlier today.  She reports that she noticed this after sitting in her walker for her grandsons football game.  No changes in vision or current changes in speech, or current headache or weakness anywhere.  She does have a history of chronic back pain which is unchanged.  She reports intermittent headaches for the past several months.  She reports some nausea for the past couple of days.  No fever, current chest pain, shortness of breath, abdominal pain, urinary symptoms.  She is following up with the back specialist in several days.  No history of stroke, patient does have a history of TIA      Independent Historian:    None    Review of External Notes:  Reviewed neurology note from 8/26/2024.  Patient has a history of chronic back pain and reports that her legs occasionally give out and also has a history of Parkinson's    Medications:    albuterol (PROAIR HFA/PROVENTIL HFA/VENTOLIN HFA) 108 (90 Base) MCG/ACT inhaler  alendronate (FOSAMAX) 70 MG tablet  ALPRAZolam (XANAX) 0.25 MG tablet  amLODIPine (NORVASC) 5 MG tablet  atorvastatin (LIPITOR) 40 MG tablet  BACILLUS COAGULANS-INULIN PO  busPIRone (BUSPAR) 5 MG tablet  Calcium Carb-Cholecalciferol 500-200 MG-UNIT PACK  carbidopa-levodopa (SINEMET)  MG tablet  cetirizine (ZYRTEC) 10 MG tablet  citalopram (CELEXA) 40 MG tablet  cyanocobalamin (CYANOCOBALAMIN) 1000 MCG/ML injection  denosumab (PROLIA) 60 MG/ML SOSY injection  diazepam (VALIUM) 5 MG tablet  diclofenac (VOLTAREN) 1 % topical gel  donepezil (ARICEPT) 5 MG tablet  EPINEPHrine (EPIPEN) 0.3 MG/0.3ML injection  folic acid (FOLVITE) 1 MG tablet  gabapentin (NEURONTIN) 300 MG  "capsule  ketoconazole 1 % shampoo  levalbuterol (XOPENEX HFA) 45 MCG/ACT inhaler  melatonin 3 MG tablet  methotrexate 2.5 MG tablet  methotrexate 2.5 MG tablet  metoprolol succinate ER (TOPROL-XL) 50 MG 24 hr tablet  nystatin (MYCOSTATIN) 740895 UNIT/GM external powder  oseltamivir (TAMIFLU) 30 MG capsule  pantoprazole (PROTONIX) 20 MG EC tablet  polyethylene glycol (MIRALAX) 17 g packet  Probiotic Product (PROBIOTIC MATURE ADULT) CAPS  sennosides (SENOKOT) 8.6 MG tablet  SF 5000 PLUS 1.1 % CREA  trimethoprim (TRIMPEX) 100 MG tablet  vibegron (GEMTESA) 75 MG TABS tablet  Vitamin D3 (CHOLECALCIFEROL) 25 mcg (1000 units) tablet        Past Medical History:    Past Medical History:   Diagnosis Date    Asthma     Deep vein thrombosis     Depression     Esophageal reflux 02/18/2004    Essential hypertension, benign     Hyperlipidemia     Iron deficiency anemia 2000    Kidney stone 06/2002    Nonsenile cataract     Osteopenia 01/2004    Restless leg syndrome     Rheumatoid arthritis     Sleep issues     Small bowel obstruction (H) 06/23/2021    Type 2 diabetes mellitus        Past Surgical History:    Past Surgical History:   Procedure Laterality Date    APPENDECTOMY      ARTHROPLASTY KNEE Bilateral     CHOLECYSTECTOMY      COLONOSCOPY      ENDOSCOPIC RELEASE CARPAL TUNNEL Bilateral     EYE SURGERY      Gastric bypass NOS      IR LUMBAR PUNCTURE  5/16/2022    IR LUMBAR PUNCTURE  2/10/2023    OPEN REDUCTION INTERNAL FIXATION ANKLE Left     ORTHOPEDIC SURGERY      Total abdominal hysterectomy with bilateral salpingectomy            Physical Exam   Patient Vitals for the past 24 hrs:   BP Temp Temp src Pulse Resp SpO2 Height Weight   09/07/24 1544 -- -- -- -- -- -- 1.549 m (5' 1\") 81.6 kg (180 lb)   09/07/24 1402 116/70 97.9  F (36.6  C) Temporal 67 18 97 % -- --        Physical Exam  GENERAL: Awake, alert  CARDIOVASCULAR: Regular rate and rhythm, 2+ right DP pulse  LUNGS: Clear bilaterally, no wheezes rales or " rhonchi  ABDOMEN: Soft, nontender, no rebound or guarding  EXTREMITIES: No peripheral edema, no erythema or warmth of the legs or calf tenderness  MSK: Tender to palpation of lumbar spine  NEUROLOGICAL: Patient is awake, face is symmetric, tongue is midline, extraocular muscles intact, no dysarthria, no dysmetria on finger-to-nose, 5 out of 5 strength throughout and sensation is normal        Emergency Department Course   ECG  ECG taken at 2:08PM , ECG read at 2:10P  Normal sinus rhythm, no ST or T wave changes  No changes as compared to prior, dated 12-  Rate 65 bpm. MN interval 142 ms. QRS duration 84 ms. QT/QTc 460/478 ms. P-R-T axes 69 54 57        Imaging:  No orders to display       Laboratory:  Labs Ordered and Resulted from Time of ED Arrival to Time of ED Departure   BASIC METABOLIC PANEL - Abnormal       Result Value    Sodium 141      Potassium 3.8      Chloride 110 (*)     Carbon Dioxide (CO2) 18 (*)     Anion Gap 13      Urea Nitrogen 12.0      Creatinine 0.71      GFR Estimate 85      Calcium 7.8 (*)     Glucose 119 (*)    CBC WITH PLATELETS AND DIFFERENTIAL - Abnormal    WBC Count 10.2      RBC Count 3.68 (*)     Hemoglobin 12.3      Hematocrit 37.5       (*)     MCH 33.4 (*)     MCHC 32.8      RDW 15.9 (*)     Platelet Count 251      NRBCs per 100 WBC 0      Absolute NRBCs 0.0     MANUAL DIFFERENTIAL - Abnormal    % Neutrophils 63      % Lymphocytes 13      % Monocytes 12      % Eosinophils 12      % Basophils 0      Absolute Neutrophils 6.4      Absolute Lymphocytes 1.3      Absolute Monocytes 1.2      Absolute Eosinophils 1.2 (*)     Absolute Basophils 0.0      RBC Morphology Confirmed RBC Indices      Platelet Assessment        Value: Automated Count Confirmed. Platelet morphology is normal.    Acanthocytes Slight (*)     RBC Fragments Slight (*)         Procedures       Emergency Department Course & Assessments:    Interventions:  Medications - No data to display      Consultations/Discussion of Management or Tests:    ED Course as of 24 1650   Sat Sep 07, 2024   1536 Calcium(!): 7.8       Disposition:  The patient was discharged.    Impression & Plan    CMS Diagnoses: None       Medical Decision Makin-year-old female who presents for an episode of numbness in her right leg after sitting on her walker for her [employer] football game.  She does have a history of spinal stenosis and chronic back pain which is unchanged.  All of her symptoms have resolved.  No other neurological changes.  No headache currently.  Her sensation is normal and her pulse is normal in her right foot.  I discussed that I cannot rule out stroke without an MRI of the brain although patient does have a history of documented spinal stenosis, her legs occasionally give out, she was sitting in her walker and I suspect that this is more of a pinched nerve rather than a stroke or TIA.  We discussed MRI versus watchful monitoring of her symptoms and she wishes to closely monitor her symptoms at home.  Her EKG showed no evidence of A-fib or arrhythmia her glucose was normal at 119, she had some mildly low CO2 at 18 but no anion gap and unclear as to the significance of this that she reports she has been eating and drinking normally.  She reports that she feels fatigued but her hemoglobin is normal, no leukocytosis, COVID is negative.  Patient is stable for discharge will return if worsening and can follow-up with her primary doctor and return if worsening    Diagnosis:    ICD-10-CM    1. Leg numbness  R20.0       2. Hypocalcemia  E83.51            Discharge Medications:  Discharge Medication List as of 2024  3:43 PM                    Claudia Snow MD  [id number]

## 2024-09-09 LAB
ATRIAL RATE - MUSE: 65 BPM
DIASTOLIC BLOOD PRESSURE - MUSE: NORMAL MMHG
INTERPRETATION ECG - MUSE: NORMAL
P AXIS - MUSE: 69 DEGREES
PR INTERVAL - MUSE: 142 MS
QRS DURATION - MUSE: 84 MS
QT - MUSE: 460 MS
QTC - MUSE: 478 MS
R AXIS - MUSE: 54 DEGREES
SYSTOLIC BLOOD PRESSURE - MUSE: NORMAL MMHG
T AXIS - MUSE: 57 DEGREES
VENTRICULAR RATE- MUSE: 65 BPM

## 2025-02-10 ENCOUNTER — APPOINTMENT (OUTPATIENT)
Dept: CT IMAGING | Facility: CLINIC | Age: 85
DRG: 291 | End: 2025-02-10
Attending: BEHAVIOR TECHNICIAN
Payer: MEDICARE

## 2025-02-10 ENCOUNTER — APPOINTMENT (OUTPATIENT)
Dept: GENERAL RADIOLOGY | Facility: CLINIC | Age: 85
DRG: 291 | End: 2025-02-10
Attending: BEHAVIOR TECHNICIAN
Payer: MEDICARE

## 2025-02-10 ENCOUNTER — HOSPITAL ENCOUNTER (INPATIENT)
Facility: CLINIC | Age: 85
DRG: 291 | End: 2025-02-10
Attending: BEHAVIOR TECHNICIAN | Admitting: INTERNAL MEDICINE
Payer: MEDICARE

## 2025-02-10 DIAGNOSIS — J96.01 ACUTE RESPIRATORY FAILURE WITH HYPOXIA (H): ICD-10-CM

## 2025-02-10 DIAGNOSIS — I50.9 ACUTE CONGESTIVE HEART FAILURE, UNSPECIFIED HEART FAILURE TYPE (H): ICD-10-CM

## 2025-02-10 DIAGNOSIS — I31.39 PERICARDIAL EFFUSION: ICD-10-CM

## 2025-02-10 DIAGNOSIS — J90 PLEURAL EFFUSION ON RIGHT: ICD-10-CM
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
## 2025-02-10 DIAGNOSIS — I10 HYPERTENSION: ICD-10-CM

## 2025-02-10 LAB
ANION GAP SERPL CALCULATED.3IONS-SCNC: 11 MMOL/L (ref 7–15)
BASOPHILS # BLD AUTO: 0.1 10E3/UL (ref 0–0.2)
BASOPHILS NFR BLD AUTO: 1 %
BUN SERPL-MCNC: 19.4 MG/DL (ref 8–23)
CALCIUM SERPL-MCNC: 8.2 MG/DL (ref 8.8–10.4)
CHLORIDE SERPL-SCNC: 107 MMOL/L (ref 98–107)
CREAT SERPL-MCNC: 0.84 MG/DL (ref 0.51–0.95)
EGFRCR SERPLBLD CKD-EPI 2021: 69 ML/MIN/1.73M2
EOSINOPHIL # BLD AUTO: 0.3 10E3/UL (ref 0–0.7)
EOSINOPHIL NFR BLD AUTO: 3 %
ERYTHROCYTE [DISTWIDTH] IN BLOOD BY AUTOMATED COUNT: 16.7 % (ref 10–15)
GLUCOSE SERPL-MCNC: 93 MG/DL (ref 70–99)
HCO3 SERPL-SCNC: 24 MMOL/L (ref 22–29)
HCT VFR BLD AUTO: 35.4 % (ref 35–47)
HGB BLD-MCNC: 12.3 G/DL (ref 11.7–15.7)
IMM GRANULOCYTES # BLD: 0.1 10E3/UL
IMM GRANULOCYTES NFR BLD: 1 %
LDH SERPL L TO P-CCNC: 390 U/L (ref 0–250)
LYMPHOCYTES # BLD AUTO: 1.2 10E3/UL (ref 0.8–5.3)
LYMPHOCYTES NFR BLD AUTO: 13 %
MCH RBC QN AUTO: 36.6 PG (ref 26.5–33)
MCHC RBC AUTO-ENTMCNC: 34.7 G/DL (ref 31.5–36.5)
MCV RBC AUTO: 105 FL (ref 78–100)
MONOCYTES # BLD AUTO: 1.8 10E3/UL (ref 0–1.3)
MONOCYTES NFR BLD AUTO: 19 %
NEUTROPHILS # BLD AUTO: 6 10E3/UL (ref 1.6–8.3)
NEUTROPHILS NFR BLD AUTO: 64 %
NRBC # BLD AUTO: 0 10E3/UL
NRBC BLD AUTO-RTO: 0 /100
NT-PROBNP SERPL-MCNC: 880 PG/ML (ref 0–1800)
PLATELET # BLD AUTO: 159 10E3/UL (ref 150–450)
POTASSIUM SERPL-SCNC: 3.7 MMOL/L (ref 3.4–5.3)
PROT SERPL-MCNC: 5.5 G/DL (ref 6.4–8.3)
RBC # BLD AUTO: 3.36 10E6/UL (ref 3.8–5.2)
SODIUM SERPL-SCNC: 142 MMOL/L (ref 135–145)
TROPONIN T SERPL HS-MCNC: 20 NG/L
TROPONIN T SERPL HS-MCNC: 20 NG/L
WBC # BLD AUTO: 9.4 10E3/UL (ref 4–11)

## 2025-02-10 PROCEDURE — 36415 COLL VENOUS BLD VENIPUNCTURE: CPT | Performed by: BEHAVIOR TECHNICIAN

## 2025-02-10 PROCEDURE — 120N000001 HC R&B MED SURG/OB

## 2025-02-10 PROCEDURE — 84484 ASSAY OF TROPONIN QUANT: CPT | Performed by: BEHAVIOR TECHNICIAN

## 2025-02-10 PROCEDURE — 99223 1ST HOSP IP/OBS HIGH 75: CPT | Mod: AI | Performed by: INTERNAL MEDICINE

## 2025-02-10 PROCEDURE — 250N000011 HC RX IP 250 OP 636: Performed by: BEHAVIOR TECHNICIAN

## 2025-02-10 PROCEDURE — 93005 ELECTROCARDIOGRAM TRACING: CPT

## 2025-02-10 PROCEDURE — 72125 CT NECK SPINE W/O DYE: CPT

## 2025-02-10 PROCEDURE — 250N000011 HC RX IP 250 OP 636: Performed by: INTERNAL MEDICINE

## 2025-02-10 PROCEDURE — 36416 COLLJ CAPILLARY BLOOD SPEC: CPT | Performed by: BEHAVIOR TECHNICIAN

## 2025-02-10 PROCEDURE — 82565 ASSAY OF CREATININE: CPT | Performed by: BEHAVIOR TECHNICIAN

## 2025-02-10 PROCEDURE — 83735 ASSAY OF MAGNESIUM: CPT | Performed by: INTERNAL MEDICINE

## 2025-02-10 PROCEDURE — 250N000013 HC RX MED GY IP 250 OP 250 PS 637: Performed by: INTERNAL MEDICINE

## 2025-02-10 PROCEDURE — 71046 X-RAY EXAM CHEST 2 VIEWS: CPT

## 2025-02-10 PROCEDURE — 99285 EMERGENCY DEPT VISIT HI MDM: CPT | Mod: 25

## 2025-02-10 PROCEDURE — 83615 LACTATE (LD) (LDH) ENZYME: CPT | Performed by: INTERNAL MEDICINE

## 2025-02-10 PROCEDURE — 96374 THER/PROPH/DIAG INJ IV PUSH: CPT

## 2025-02-10 PROCEDURE — 83880 ASSAY OF NATRIURETIC PEPTIDE: CPT | Performed by: BEHAVIOR TECHNICIAN

## 2025-02-10 PROCEDURE — 70450 CT HEAD/BRAIN W/O DYE: CPT

## 2025-02-10 PROCEDURE — 85025 COMPLETE CBC W/AUTO DIFF WBC: CPT | Performed by: BEHAVIOR TECHNICIAN

## 2025-02-10 PROCEDURE — 73610 X-RAY EXAM OF ANKLE: CPT | Mod: RT

## 2025-02-10 PROCEDURE — 84155 ASSAY OF PROTEIN SERUM: CPT | Performed by: INTERNAL MEDICINE

## 2025-02-10 PROCEDURE — 71250 CT THORAX DX C-: CPT

## 2025-02-10 RX ORDER — PANTOPRAZOLE SODIUM 20 MG/1
20 TABLET, DELAYED RELEASE ORAL DAILY
Status: DISCONTINUED | OUTPATIENT
Start: 2025-02-11 | End: 2025-02-11

## 2025-02-10 RX ORDER — LIDOCAINE 40 MG/G
CREAM TOPICAL
Status: DISCONTINUED | OUTPATIENT
Start: 2025-02-10 | End: 2025-02-15 | Stop reason: HOSPADM

## 2025-02-10 RX ORDER — ASPIRIN 325 MG
325 TABLET, DELAYED RELEASE (ENTERIC COATED) ORAL DAILY
COMMUNITY

## 2025-02-10 RX ORDER — FUROSEMIDE 10 MG/ML
60 INJECTION INTRAMUSCULAR; INTRAVENOUS ONCE
Status: COMPLETED | OUTPATIENT
Start: 2025-02-10 | End: 2025-02-10

## 2025-02-10 RX ORDER — CITALOPRAM HYDROBROMIDE 20 MG/1
20 TABLET ORAL DAILY
COMMUNITY

## 2025-02-10 RX ORDER — ONDANSETRON 4 MG/1
4 TABLET, ORALLY DISINTEGRATING ORAL EVERY 6 HOURS PRN
Status: DISCONTINUED | OUTPATIENT
Start: 2025-02-10 | End: 2025-02-15 | Stop reason: HOSPADM

## 2025-02-10 RX ORDER — ONDANSETRON 2 MG/ML
4 INJECTION INTRAMUSCULAR; INTRAVENOUS EVERY 6 HOURS PRN
Status: DISCONTINUED | OUTPATIENT
Start: 2025-02-10 | End: 2025-02-15 | Stop reason: HOSPADM

## 2025-02-10 RX ORDER — FUROSEMIDE 10 MG/ML
40 INJECTION INTRAMUSCULAR; INTRAVENOUS
Status: DISCONTINUED | OUTPATIENT
Start: 2025-02-11 | End: 2025-02-14

## 2025-02-10 RX ORDER — HYDRALAZINE HYDROCHLORIDE 20 MG/ML
10 INJECTION INTRAMUSCULAR; INTRAVENOUS EVERY 4 HOURS PRN
Status: DISCONTINUED | OUTPATIENT
Start: 2025-02-10 | End: 2025-02-11

## 2025-02-10 RX ORDER — AMOXICILLIN 250 MG
2 CAPSULE ORAL 2 TIMES DAILY PRN
Status: DISCONTINUED | OUTPATIENT
Start: 2025-02-10 | End: 2025-02-15 | Stop reason: HOSPADM

## 2025-02-10 RX ORDER — DIAZEPAM 5 MG/1
5 TABLET ORAL ONCE
COMMUNITY

## 2025-02-10 RX ORDER — ACETAMINOPHEN 325 MG/1
975 TABLET ORAL 3 TIMES DAILY
Status: DISCONTINUED | OUTPATIENT
Start: 2025-02-10 | End: 2025-02-15 | Stop reason: HOSPADM

## 2025-02-10 RX ORDER — GABAPENTIN 300 MG/1
600 CAPSULE ORAL AT BEDTIME
Status: DISCONTINUED | OUTPATIENT
Start: 2025-02-11 | End: 2025-02-15 | Stop reason: HOSPADM

## 2025-02-10 RX ORDER — ACETAMINOPHEN 500 MG
1000 TABLET ORAL 3 TIMES DAILY
COMMUNITY

## 2025-02-10 RX ORDER — CARBIDOPA AND LEVODOPA 25; 100 MG/1; MG/1
2 TABLET ORAL 3 TIMES DAILY
Status: DISCONTINUED | OUTPATIENT
Start: 2025-02-11 | End: 2025-02-15 | Stop reason: HOSPADM

## 2025-02-10 RX ORDER — IBUPROFEN 400 MG/1
400 TABLET, FILM COATED ORAL 3 TIMES DAILY
Status: COMPLETED | OUTPATIENT
Start: 2025-02-10 | End: 2025-02-11

## 2025-02-10 RX ORDER — CALCIUM CARBONATE 500 MG/1
1000 TABLET, CHEWABLE ORAL 4 TIMES DAILY PRN
Status: DISCONTINUED | OUTPATIENT
Start: 2025-02-10 | End: 2025-02-15 | Stop reason: HOSPADM

## 2025-02-10 RX ORDER — HYDRALAZINE HYDROCHLORIDE 20 MG/ML
10 INJECTION INTRAMUSCULAR; INTRAVENOUS ONCE
Status: COMPLETED | OUTPATIENT
Start: 2025-02-10 | End: 2025-02-10

## 2025-02-10 RX ORDER — HYDRALAZINE HYDROCHLORIDE 20 MG/ML
10 INJECTION INTRAMUSCULAR; INTRAVENOUS EVERY 4 HOURS PRN
Status: DISCONTINUED | OUTPATIENT
Start: 2025-02-10 | End: 2025-02-10

## 2025-02-10 RX ORDER — AMOXICILLIN 250 MG
1 CAPSULE ORAL 2 TIMES DAILY PRN
Status: DISCONTINUED | OUTPATIENT
Start: 2025-02-10 | End: 2025-02-15 | Stop reason: HOSPADM

## 2025-02-10 RX ADMIN — HYDRALAZINE HYDROCHLORIDE 10 MG: 20 INJECTION INTRAMUSCULAR; INTRAVENOUS at 23:08

## 2025-02-10 RX ADMIN — ACETAMINOPHEN 975 MG: 325 TABLET, FILM COATED ORAL at 23:08

## 2025-02-10 RX ADMIN — FUROSEMIDE 60 MG: 10 INJECTION, SOLUTION INTRAMUSCULAR; INTRAVENOUS at 20:50

## 2025-02-10 RX ADMIN — HYDRALAZINE HYDROCHLORIDE 10 MG: 20 INJECTION INTRAMUSCULAR; INTRAVENOUS at 21:20

## 2025-02-10 RX ADMIN — IBUPROFEN 400 MG: 400 TABLET ORAL at 23:09

## 2025-02-10 ASSESSMENT — ACTIVITIES OF DAILY LIVING (ADL)
ADLS_ACUITY_SCORE: 53

## 2025-02-10 NOTE — ED PROVIDER NOTES
Emergency Department Note      History of Present Illness     Chief Complaint   Rib Pain and Fall      HPI   Alma Rosa Fishman is a 84 year old female with history of Parkinson's, type 2 diabetes, RA, HTN, HLD, DVT who presents to the ED for evaluation of rib pain after a fall.  Patient reports that as she was getting up from her recliner this afternoon, she tripped over her blanket and fell.  She  does not know whether she hit her head on the table, chair, or floor as she fell.  She denied loss of consciousness.  She does endorse headache, dizziness, and lightheadedness.  She also endorses left chest wall pain and rib pain.  She states that her chest hurts with deep breathing.  She denies any other injuries.   was able to help her back up to the chair. Patient states that she has been more short of breath with exertion recently.   reports that she stopped taking her Lasix due to frequent urination.  No syncope.  Denies any recent travel hospitalizations, or surgeries.  No blood thinner use.    Independent Historian    supplies additional history above.     Review of External Notes   Reviewed cardiology note from 11/13/2024.  Patient was seen for follow-up regarding heart failure and hypertension.  Plan to increase Lasix to 40 mg daily due to elevated BNP and dyspnea on exertion.  Plan for sodium restriction to help maintain better blood pressure.    Past Medical History     Medical History and Problem List   Past Medical History:   Diagnosis Date    Asthma     Deep vein thrombosis     Depression     Esophageal reflux 02/18/2004    Essential hypertension, benign     Hyperlipidemia     Iron deficiency anemia 2000    Kidney stone 06/2002    Nonsenile cataract     Osteopenia 01/2004    Restless leg syndrome     Rheumatoid arthritis     Sleep issues     Small bowel obstruction (H) 06/23/2021    Type 2 diabetes mellitus        Medications   No current outpatient medications on file.      Surgical  History   Past Surgical History:   Procedure Laterality Date    APPENDECTOMY      ARTHROPLASTY KNEE Bilateral     CHOLECYSTECTOMY      COLONOSCOPY      ENDOSCOPIC RELEASE CARPAL TUNNEL Bilateral     EYE SURGERY      Gastric bypass NOS      IR LUMBAR PUNCTURE  5/16/2022    IR LUMBAR PUNCTURE  2/10/2023    OPEN REDUCTION INTERNAL FIXATION ANKLE Left     ORTHOPEDIC SURGERY      Total abdominal hysterectomy with bilateral salpingectomy         Physical Exam     Patient Vitals for the past 24 hrs:   BP Temp Temp src Pulse Resp SpO2 Weight   02/11/25 0918 -- -- -- -- -- 94 % --   02/11/25 0900 -- -- -- -- -- (!) 88 % --   02/11/25 0831 (!) 163/64 97.6  F (36.4  C) Temporal 75 18 92 % --   02/11/25 0024 (!) 165/55 97.9  F (36.6  C) Temporal 65 20 94 % --   02/10/25 2241 (!) 181/60 98.3  F (36.8  C) Temporal 88 22 -- 80.4 kg (177 lb 4 oz)   02/10/25 2145 -- -- -- 90 24 93 % --   02/10/25 2143 -- -- -- 90 (!) 34 98 % --   02/10/25 2142 -- -- -- 89 28 92 % --   02/10/25 2141 -- -- -- 90 (!) 35 (!) 91 % --   02/10/25 2140 (!) 190/113 -- -- 87 20 99 % --   02/10/25 2138 -- -- -- 87 17 98 % --   02/10/25 2137 -- -- -- 90 29 98 % --   02/10/25 2136 -- -- -- 92 24 95 % --   02/10/25 2132 -- -- -- 86 27 94 % --   02/10/25 2123 -- -- -- 77 26 100 % --   02/10/25 2122 -- -- -- 77 19 99 % --   02/10/25 2121 -- -- -- 83 24 97 % --   02/10/25 2120 (!) 220/94 -- -- 83 26 98 % --   02/10/25 2119 -- -- -- 82 28 100 % --   02/10/25 2117 (!) 222/109 -- -- 79 20 (!) 88 % --   02/10/25 2113 -- -- -- 80 25 92 % --   02/10/25 2108 -- -- -- 77 21 99 % --   02/10/25 2058 -- -- -- 82 28 -- --   02/10/25 2053 -- -- -- 80 18 98 % --   02/10/25 2043 -- -- -- 78 24 100 % --   02/10/25 2041 -- -- -- 78 22 99 % --   02/10/25 2028 (!) 226/117 -- -- 76 16 99 % --   02/10/25 2026 (!) 206/98 -- -- 74 17 98 % --   02/10/25 2011 -- -- -- 74 21 98 % --   02/10/25 2006 -- -- -- 77 21 (!) 91 % --   02/10/25 2005 -- -- -- 78 17 98 % --   02/10/25 2004 -- -- -- 79  23 92 % --   02/10/25 2002 (!) 199/122 -- -- 79 (!) 35 -- --   02/10/25 2001 -- -- -- -- 21 -- --   02/10/25 2000 -- -- -- 78 18 -- --   02/10/25 1959 -- -- -- 77 18 -- --   02/10/25 1958 -- -- -- 76 21 -- --   02/10/25 1556 (!) 159/84 97.7  F (36.5  C) Temporal 75 18 98 % --     Physical Exam  Physical Exam:  General: Alert, mild distress   Head: normocephalic, atraumatic  Eyes: PERRLA, EOMI  Ears: External ears appear normal.   Nose: no signs of bleeding   Throat: moist mucous membranes  Neck: No JVD  Chest: Mild tenderness to palpation of the left anterior lower chest wall. No ecchymosis or deformity. No crepitus.   CV: regular rate and rhythm  Pulm: Decreased breath sounds on the right.  Abdomen: soft, non-tender, non-distended  MSK: No midline tenderness  Ext: Mild tenderness to palpation of the lateral malleolus of the right ankle. No deformity, ecchymosis, or edema. Normal ROM of the right ankle. Normal range of motion of all other extremities. No gross deformities  Skin: warm, dry, no rashes  Neuro: alert and oriented  Psych: Appropriate mood. Cooperative      Diagnostics     Lab Results   Labs Ordered and Resulted from Time of ED Arrival to Time of ED Departure   BASIC METABOLIC PANEL - Abnormal       Result Value    Sodium 142      Potassium 3.7      Chloride 107      Carbon Dioxide (CO2) 24      Anion Gap 11      Urea Nitrogen 19.4      Creatinine 0.84      GFR Estimate 69      Calcium 8.2 (*)     Glucose 93     TROPONIN T, HIGH SENSITIVITY - Abnormal    Troponin T, High Sensitivity 20 (*)    CBC WITH PLATELETS AND DIFFERENTIAL - Abnormal    WBC Count 9.4      RBC Count 3.36 (*)     Hemoglobin 12.3      Hematocrit 35.4       (*)     MCH 36.6 (*)     MCHC 34.7      RDW 16.7 (*)     Platelet Count 159      % Neutrophils 64      % Lymphocytes 13      % Monocytes 19      % Eosinophils 3      % Basophils 1      % Immature Granulocytes 1      NRBCs per 100 WBC 0      Absolute Neutrophils 6.0       Absolute Lymphocytes 1.2      Absolute Monocytes 1.8 (*)     Absolute Eosinophils 0.3      Absolute Basophils 0.1      Absolute Immature Granulocytes 0.1      Absolute NRBCs 0.0     TROPONIN T, HIGH SENSITIVITY - Abnormal    Troponin T, High Sensitivity 20 (*)    NT PROBNP INPATIENT - Normal    N terminal Pro BNP Inpatient 880     GLUCOSE FLUID   LACTATE DEHYDROGENASE FLUID   PROTEIN FLUID   AEROBIC BACTERIAL CULTURE ROUTINE   GRAM STAIN   CELL COUNT WITH DIFFERENTIAL FLUID       Imaging   CT Cervical Spine w/o Contrast   Final Result   IMPRESSION:   1.  No evidence of acute fracture or subluxation of the cervical spine by CT imaging.   2.  Degenerative cervical spondylosis as described above.      CT Head w/o Contrast   Final Result   IMPRESSION:     1.  No evidence of acute intracranial hemorrhage or mass effect.   2.  Mild nonspecific white matter changes.   3.  Mild brain parenchymal volume loss.      Chest CT w/o contrast   Final Result   IMPRESSION:    1.  Large right pleural effusion with associated compressive atelectasis.   2.  Small pericardial effusion.         XR Ankle Right 3 Views   Final Result   IMPRESSION: No evidence of acute fracture. Mild degenerative arthritis tibiotalar joint and midfoot. Chronic well-corticated ossicles about the medial and lateral malleoli and dorsal talonavicular joint may be degenerative or sequela of remote trauma.    Plantar and Achilles calcaneal spurs. Arterial calcifications.      Chest XR,  PA & LAT   Final Result   IMPRESSION: Moderate-sized right pleural effusion has developed with passive atelectasis in the right lung. Reversed right total shoulder arthroplasty has been placed. Remainder unchanged. Implanted cardiac loop recorder. Left upper quadrant surgical    clips. Moderate degenerative change thoracic spine and bilateral acromioclavicular joints. Calcified aortic atherosclerosis.       US Thoracentesis    (Results Pending)   Echocardiogram Complete    (Results  Pending)       EKG   ECG taken at 17:43, ECG read at 17:49  Sinus rhythm with premature supraventricular complexes  Prolonged QT   Rate 75 bpm. MA interval 132 ms. QRS duration 82 ms. QT/QTc 450/502 ms. P-R-T axes 59 31 45.    Independent Interpretation   CXR: right pleural effusion.  X-ray of right ankle shows no acute fracture or dislocation.     ED Course      Medications Administered   Medications   lidocaine 1 % 0.1-1 mL (has no administration in time range)   lidocaine (LMX4) cream (has no administration in time range)   sodium chloride (PF) 0.9% PF flush 3 mL ( Intracatheter Canceled Entry 2/11/25 0600)   sodium chloride (PF) 0.9% PF flush 3 mL (3 mLs Intracatheter $Given 2/11/25 3185)   senna-docusate (SENOKOT-S/PERICOLACE) 8.6-50 MG per tablet 1 tablet (has no administration in time range)     Or   senna-docusate (SENOKOT-S/PERICOLACE) 8.6-50 MG per tablet 2 tablet (has no administration in time range)   calcium carbonate (TUMS) chewable tablet 1,000 mg (has no administration in time range)   ondansetron (ZOFRAN ODT) ODT tab 4 mg (has no administration in time range)     Or   ondansetron (ZOFRAN) injection 4 mg (has no administration in time range)   benzocaine-menthol (CHLORASEPTIC) 6-10 MG lozenge 1 lozenge (has no administration in time range)   furosemide (LASIX) injection 40 mg (40 mg Intravenous $Given 2/11/25 0838)   hydrALAZINE (APRESOLINE) injection 10 mg (10 mg Intravenous $Given 2/10/25 2120)   acetaminophen (TYLENOL) tablet 975 mg (975 mg Oral $Given 2/11/25 0834)   ibuprofen (ADVIL/MOTRIN) tablet 400 mg (400 mg Oral $Given 2/11/25 0834)   carbidopa-levodopa (SINEMET)  MG per tablet 2 tablet (2 tablets Oral $Given 2/11/25 0834)   gabapentin (NEURONTIN) capsule 600 mg (600 mg Oral $Given 2/11/25 0027)   pantoprazole (PROTONIX) EC tablet 20 mg (20 mg Oral $Given 2/11/25 1645)   Lidocaine (LIDOCARE) 4 % Patch 2 patch (2 patches Transdermal $Patch/Med Applied 2/11/25 5661)   diclofenac  (VOLTAREN) 1 % topical gel 2 g ( Topical Canceled Entry 2/11/25 0600)   albuterol (PROVENTIL HFA/VENTOLIN HFA) inhaler (has no administration in time range)   aspirin (ASA) EC tablet 325 mg ( Oral Automatically Held 2/14/25 0800)   atorvastatin (LIPITOR) tablet 40 mg (40 mg Oral $Given 2/11/25 0834)   busPIRone (BUSPAR) tablet 5 mg (5 mg Oral $Given 2/11/25 1021)   cephALEXin (KEFLEX) capsule 250 mg ( Oral Automatically Held 2/14/25 0800)   cetirizine (zyrTEC) tablet 10 mg (10 mg Oral $Given 2/11/25 0834)   cholecalciferol (VITAMIN D3) capsule 125 mcg (125 mcg Oral $Given 2/11/25 1020)   citalopram (celeXA) tablet 20 mg (20 mg Oral $Given 2/11/25 0834)   folic acid (FOLVITE) tablet 1 mg ( Oral Automatically Held 2/14/25 2000)   vibegron (GEMTESA) tablet 75 mg (has no administration in time range)   furosemide (LASIX) injection 60 mg (60 mg Intravenous $Given 2/10/25 2050)   hydrALAZINE (APRESOLINE) injection 10 mg (10 mg Intravenous $Given 2/10/25 2308)       Procedures   Procedures     Discussion of Management   Admitting Hospitalist, Dr. Rock.    ED Course   ED Course as of 02/11/25 1202   Mon Feb 10, 2025   1726 I evaluated patient and obtained history.    2030 Rechecked patient.    2108 Discussed with Dr. Rock, hospitalist. Accepted patient for admission.        Additional Documentation  None    Medical Decision Making / Diagnosis     CMS Diagnoses: None    MIPS       None    MDM   Alma Rosa Fishman is a 84 year old female with history of Parkinson's who presents to the ED for evaluation of left chest wall pain/rib pain after a fall this afternoon.  Unclear head injury.  Denies loss of consciousness.  Denies any other injuries.  Patient also reports increasing dyspnea on exertion in the last few weeks.  She does have a known history of acute diastolic heart failure and stopped taking her diuretics due to frequent urination.  See further HPI details above.  Broad differential was considered including  rib fracture, rib contusion, pneumothorax, hemothorax, ACS, PE, acute CHF.  On exam, patient appears short of breath with mild increase in work of breathing.  Initial vitals are notable for elevated blood pressure but otherwise reassuring.  No hypoxia.  No evidence of head trauma on exam.  She is neurologically intact.  Lung exam is notable for decreased breath sounds in the right.  Thankfully, trauma workup is negative.  No evidence of acute hemorrhage or skull fracture on the CT.  No evidence of cervical fracture or subluxation.  CT chest shows evidence of a large right pleural effusion with associated compressive atelectasis and a small pericardial effusion.  There are several old right rib fractures but no new rib fractures.  Suspect her pain is secondary to rib contusion.  Patient's O2 dropped to 88% with ambulation on room air. EKG is reassuring against ischemia.  Troponin is elevated but delta troponin is stable.  Per chart review, troponin is chronically elevated.  I have a low suspicion for ACS given reassuring EKG, reassuring vital signs, and stable troponin.  History and presentation is not consistent with PE.  Other labs are reassuring.  She is afebrile with no leukocytosis.  Nothing to suggest acute infectious process at this time.  Suspect hypoxia and shortness of breath are secondary to acute CHF exacerbation in the setting of discontinuing her diuretics.  Patient was given a dose of 60 mg of IV Lasix here.  I think she will benefit from hospital admission for thoracentesis and echocardiogram.  Patient is stable and not in acute distress therefore emergent thoracentesis is not indicated.  I discussed results and plan for admission with patient and  who are agreeable.  I discussed with the hospital team who accepted the patient for admission.  All questions were answered.    Disposition   The patient was discharged.     Diagnosis     ICD-10-CM    1. Fall, initial encounter  W19.XXXA       2.  Acute congestive heart failure, unspecified heart failure type (H)  I50.9       3. Acute respiratory failure with hypoxia (H)  J96.01       4. Pleural effusion on right  J90       5. Pericardial effusion  I31.39       6. Hypertension  I10            Discharge Medications   Current Discharge Medication List            IRWIN Friedman Kausar H, PA-C  02/11/25 4897

## 2025-02-10 NOTE — ED TRIAGE NOTES
BIBA from home with pain when she takes a deep breath post fall. Patient tripped while using walker and now c/o L chest wall pain. Unclear if she hit her head. Collar placed by EMS. No LOC, no thinner use.  VSS in route.

## 2025-02-11 ENCOUNTER — APPOINTMENT (OUTPATIENT)
Dept: ULTRASOUND IMAGING | Facility: CLINIC | Age: 85
DRG: 291 | End: 2025-02-11
Attending: HOSPITALIST
Payer: MEDICARE

## 2025-02-11 ENCOUNTER — APPOINTMENT (OUTPATIENT)
Dept: PHYSICAL THERAPY | Facility: CLINIC | Age: 85
DRG: 291 | End: 2025-02-11
Attending: INTERNAL MEDICINE
Payer: MEDICARE

## 2025-02-11 ENCOUNTER — APPOINTMENT (OUTPATIENT)
Dept: CARDIOLOGY | Facility: CLINIC | Age: 85
DRG: 291 | End: 2025-02-11
Attending: INTERNAL MEDICINE
Payer: MEDICARE

## 2025-02-11 ENCOUNTER — APPOINTMENT (OUTPATIENT)
Dept: ULTRASOUND IMAGING | Facility: CLINIC | Age: 85
DRG: 291 | End: 2025-02-11
Attending: INTERNAL MEDICINE
Payer: MEDICARE

## 2025-02-11 LAB
% LINING CELLS, BODY FLUID: 2 %
ANION GAP SERPL CALCULATED.3IONS-SCNC: 10 MMOL/L (ref 7–15)
APPEARANCE FLD: ABNORMAL
ATRIAL RATE - MUSE: 75 BPM
BUN SERPL-MCNC: 19.6 MG/DL (ref 8–23)
CALCIUM SERPL-MCNC: 8.1 MG/DL (ref 8.8–10.4)
CHLORIDE SERPL-SCNC: 108 MMOL/L (ref 98–107)
COLOR FLD: YELLOW
CREAT SERPL-MCNC: 0.85 MG/DL (ref 0.51–0.95)
DIASTOLIC BLOOD PRESSURE - MUSE: NORMAL MMHG
EGFRCR SERPLBLD CKD-EPI 2021: 68 ML/MIN/1.73M2
ERYTHROCYTE [DISTWIDTH] IN BLOOD BY AUTOMATED COUNT: 16.8 % (ref 10–15)
GLUCOSE BODY FLUID SOURCE: NORMAL
GLUCOSE FLD-MCNC: 110 MG/DL
GLUCOSE SERPL-MCNC: 101 MG/DL (ref 70–99)
HCO3 SERPL-SCNC: 25 MMOL/L (ref 22–29)
HCT VFR BLD AUTO: 34.2 % (ref 35–47)
HGB BLD-MCNC: 11.5 G/DL (ref 11.7–15.7)
INTERPRETATION ECG - MUSE: NORMAL
LD BODY BODY FLUID SOURCE: NORMAL
LDH FLD L TO P-CCNC: 107 U/L
LVEF ECHO: NORMAL
LYMPHOCYTES NFR FLD MANUAL: 18 %
MAGNESIUM SERPL-MCNC: 1.8 MG/DL (ref 1.7–2.3)
MAGNESIUM SERPL-MCNC: 1.9 MG/DL (ref 1.7–2.3)
MCH RBC QN AUTO: 35.3 PG (ref 26.5–33)
MCHC RBC AUTO-ENTMCNC: 33.6 G/DL (ref 31.5–36.5)
MCV RBC AUTO: 105 FL (ref 78–100)
MONOS+MACROS NFR FLD MANUAL: 65 %
NEUTS BAND NFR FLD MANUAL: 15 %
P AXIS - MUSE: 59 DEGREES
PLATELET # BLD AUTO: 147 10E3/UL (ref 150–450)
POTASSIUM SERPL-SCNC: 3.5 MMOL/L (ref 3.4–5.3)
PR INTERVAL - MUSE: 132 MS
PROT FLD-MCNC: 1.6 G/DL
PROTEIN BODY FLUID SOURCE: NORMAL
QRS DURATION - MUSE: 82 MS
QT - MUSE: 450 MS
QTC - MUSE: 502 MS
R AXIS - MUSE: 31 DEGREES
RBC # BLD AUTO: 3.26 10E6/UL (ref 3.8–5.2)
SODIUM SERPL-SCNC: 143 MMOL/L (ref 135–145)
SPECIMEN SOURCE FLD: ABNORMAL
SYSTOLIC BLOOD PRESSURE - MUSE: NORMAL MMHG
T AXIS - MUSE: 45 DEGREES
VENTRICULAR RATE- MUSE: 75 BPM
WBC # BLD AUTO: 8.5 10E3/UL (ref 4–11)
WBC # FLD AUTO: 556 /UL

## 2025-02-11 PROCEDURE — 83735 ASSAY OF MAGNESIUM: CPT | Performed by: INTERNAL MEDICINE

## 2025-02-11 PROCEDURE — 32555 ASPIRATE PLEURA W/ IMAGING: CPT

## 2025-02-11 PROCEDURE — 93306 TTE W/DOPPLER COMPLETE: CPT

## 2025-02-11 PROCEDURE — 36415 COLL VENOUS BLD VENIPUNCTURE: CPT | Performed by: INTERNAL MEDICINE

## 2025-02-11 PROCEDURE — 97161 PT EVAL LOW COMPLEX 20 MIN: CPT | Mod: GP | Performed by: PHYSICAL THERAPIST

## 2025-02-11 PROCEDURE — 250N000011 HC RX IP 250 OP 636: Performed by: INTERNAL MEDICINE

## 2025-02-11 PROCEDURE — 88112 CYTOPATH CELL ENHANCE TECH: CPT | Mod: TC | Performed by: HOSPITALIST

## 2025-02-11 PROCEDURE — 82945 GLUCOSE OTHER FLUID: CPT | Performed by: INTERNAL MEDICINE

## 2025-02-11 PROCEDURE — 250N000013 HC RX MED GY IP 250 OP 250 PS 637: Performed by: INTERNAL MEDICINE

## 2025-02-11 PROCEDURE — 99232 SBSQ HOSP IP/OBS MODERATE 35: CPT | Performed by: HOSPITALIST

## 2025-02-11 PROCEDURE — 93306 TTE W/DOPPLER COMPLETE: CPT | Mod: 26 | Performed by: INTERNAL MEDICINE

## 2025-02-11 PROCEDURE — 250N000009 HC RX 250: Performed by: RADIOLOGY

## 2025-02-11 PROCEDURE — 89050 BODY FLUID CELL COUNT: CPT | Performed by: INTERNAL MEDICINE

## 2025-02-11 PROCEDURE — 97116 GAIT TRAINING THERAPY: CPT | Mod: GP | Performed by: PHYSICAL THERAPIST

## 2025-02-11 PROCEDURE — 83615 LACTATE (LD) (LDH) ENZYME: CPT | Performed by: INTERNAL MEDICINE

## 2025-02-11 PROCEDURE — 84157 ASSAY OF PROTEIN OTHER: CPT | Performed by: INTERNAL MEDICINE

## 2025-02-11 PROCEDURE — 85014 HEMATOCRIT: CPT | Performed by: INTERNAL MEDICINE

## 2025-02-11 PROCEDURE — 120N000001 HC R&B MED SURG/OB

## 2025-02-11 PROCEDURE — 97530 THERAPEUTIC ACTIVITIES: CPT | Mod: GP | Performed by: PHYSICAL THERAPIST

## 2025-02-11 PROCEDURE — 82565 ASSAY OF CREATININE: CPT | Performed by: INTERNAL MEDICINE

## 2025-02-11 PROCEDURE — 93970 EXTREMITY STUDY: CPT

## 2025-02-11 PROCEDURE — 250N000013 HC RX MED GY IP 250 OP 250 PS 637: Performed by: HOSPITALIST

## 2025-02-11 PROCEDURE — 0W993ZZ DRAINAGE OF RIGHT PLEURAL CAVITY, PERCUTANEOUS APPROACH: ICD-10-PCS | Performed by: RADIOLOGY

## 2025-02-11 PROCEDURE — 87070 CULTURE OTHR SPECIMN AEROBIC: CPT | Performed by: INTERNAL MEDICINE

## 2025-02-11 RX ORDER — BUSPIRONE HYDROCHLORIDE 5 MG/1
5 TABLET ORAL 3 TIMES DAILY
Status: DISCONTINUED | OUTPATIENT
Start: 2025-02-11 | End: 2025-02-15 | Stop reason: HOSPADM

## 2025-02-11 RX ORDER — CITALOPRAM HYDROBROMIDE 20 MG/1
20 TABLET ORAL DAILY
Status: DISCONTINUED | OUTPATIENT
Start: 2025-02-11 | End: 2025-02-15 | Stop reason: HOSPADM

## 2025-02-11 RX ORDER — FOLIC ACID 1 MG/1
1 TABLET ORAL 3 TIMES DAILY
Status: DISCONTINUED | OUTPATIENT
Start: 2025-02-11 | End: 2025-02-11

## 2025-02-11 RX ORDER — ATORVASTATIN CALCIUM 40 MG/1
40 TABLET, FILM COATED ORAL DAILY
Status: DISCONTINUED | OUTPATIENT
Start: 2025-02-11 | End: 2025-02-15 | Stop reason: HOSPADM

## 2025-02-11 RX ORDER — PANTOPRAZOLE SODIUM 40 MG/1
40 TABLET, DELAYED RELEASE ORAL DAILY
Status: DISCONTINUED | OUTPATIENT
Start: 2025-02-12 | End: 2025-02-15 | Stop reason: HOSPADM

## 2025-02-11 RX ORDER — CETIRIZINE HYDROCHLORIDE 10 MG/1
10 TABLET ORAL DAILY
Status: DISCONTINUED | OUTPATIENT
Start: 2025-02-11 | End: 2025-02-15 | Stop reason: HOSPADM

## 2025-02-11 RX ORDER — HYDRALAZINE HYDROCHLORIDE 20 MG/ML
10 INJECTION INTRAMUSCULAR; INTRAVENOUS EVERY 8 HOURS PRN
Status: DISCONTINUED | OUTPATIENT
Start: 2025-02-11 | End: 2025-02-14

## 2025-02-11 RX ORDER — LIDOCAINE 4 G/G
2 PATCH TOPICAL EVERY 24 HOURS
Status: DISCONTINUED | OUTPATIENT
Start: 2025-02-11 | End: 2025-02-15 | Stop reason: HOSPADM

## 2025-02-11 RX ORDER — ALBUTEROL SULFATE 90 UG/1
2 INHALANT RESPIRATORY (INHALATION) EVERY 4 HOURS PRN
Status: DISCONTINUED | OUTPATIENT
Start: 2025-02-11 | End: 2025-02-15 | Stop reason: HOSPADM

## 2025-02-11 RX ORDER — FOLIC ACID 1 MG/1
1 TABLET ORAL
Status: DISCONTINUED | OUTPATIENT
Start: 2025-02-11 | End: 2025-02-12

## 2025-02-11 RX ORDER — ASPIRIN 325 MG
325 TABLET, DELAYED RELEASE (ENTERIC COATED) ORAL DAILY
Status: DISCONTINUED | OUTPATIENT
Start: 2025-02-11 | End: 2025-02-15 | Stop reason: HOSPADM

## 2025-02-11 RX ADMIN — GABAPENTIN 600 MG: 300 CAPSULE ORAL at 21:47

## 2025-02-11 RX ADMIN — GABAPENTIN 600 MG: 300 CAPSULE ORAL at 00:27

## 2025-02-11 RX ADMIN — FUROSEMIDE 40 MG: 10 INJECTION, SOLUTION INTRAMUSCULAR; INTRAVENOUS at 08:38

## 2025-02-11 RX ADMIN — DICLOFENAC SODIUM 2 G: 10 GEL TOPICAL at 04:12

## 2025-02-11 RX ADMIN — FUROSEMIDE 40 MG: 10 INJECTION, SOLUTION INTRAMUSCULAR; INTRAVENOUS at 16:25

## 2025-02-11 RX ADMIN — DICLOFENAC SODIUM 2 G: 10 GEL TOPICAL at 19:48

## 2025-02-11 RX ADMIN — IBUPROFEN 400 MG: 400 TABLET ORAL at 08:34

## 2025-02-11 RX ADMIN — BUSPIRONE HYDROCHLORIDE 5 MG: 5 TABLET ORAL at 19:41

## 2025-02-11 RX ADMIN — CARBIDOPA AND LEVODOPA 2 TABLET: 25; 100 TABLET ORAL at 14:59

## 2025-02-11 RX ADMIN — CARBIDOPA AND LEVODOPA 2 TABLET: 25; 100 TABLET ORAL at 08:34

## 2025-02-11 RX ADMIN — BUSPIRONE HYDROCHLORIDE 5 MG: 5 TABLET ORAL at 10:21

## 2025-02-11 RX ADMIN — ACETAMINOPHEN 975 MG: 325 TABLET, FILM COATED ORAL at 14:59

## 2025-02-11 RX ADMIN — Medication 125 MCG: at 10:20

## 2025-02-11 RX ADMIN — CITALOPRAM HYDROBROMIDE 20 MG: 20 TABLET ORAL at 08:34

## 2025-02-11 RX ADMIN — ATORVASTATIN CALCIUM 40 MG: 40 TABLET, FILM COATED ORAL at 08:34

## 2025-02-11 RX ADMIN — LIDOCAINE 2 PATCH: 4 PATCH TOPICAL at 03:58

## 2025-02-11 RX ADMIN — DICLOFENAC SODIUM 2 G: 10 GEL TOPICAL at 16:25

## 2025-02-11 RX ADMIN — CARBIDOPA AND LEVODOPA 2 TABLET: 25; 100 TABLET ORAL at 00:26

## 2025-02-11 RX ADMIN — ACETAMINOPHEN 975 MG: 325 TABLET, FILM COATED ORAL at 19:41

## 2025-02-11 RX ADMIN — BUSPIRONE HYDROCHLORIDE 5 MG: 5 TABLET ORAL at 15:00

## 2025-02-11 RX ADMIN — CARBIDOPA AND LEVODOPA 2 TABLET: 25; 100 TABLET ORAL at 19:41

## 2025-02-11 RX ADMIN — VIBEGRON 75 MG: 75 TABLET, FILM COATED ORAL at 21:47

## 2025-02-11 RX ADMIN — ACETAMINOPHEN 975 MG: 325 TABLET, FILM COATED ORAL at 08:34

## 2025-02-11 RX ADMIN — LIDOCAINE HYDROCHLORIDE 10 ML: 10 INJECTION, SOLUTION EPIDURAL; INFILTRATION; INTRACAUDAL; PERINEURAL at 14:05

## 2025-02-11 RX ADMIN — IBUPROFEN 400 MG: 400 TABLET ORAL at 15:00

## 2025-02-11 RX ADMIN — PANTOPRAZOLE SODIUM 20 MG: 20 TABLET, DELAYED RELEASE ORAL at 08:34

## 2025-02-11 RX ADMIN — DICLOFENAC SODIUM 2 G: 10 GEL TOPICAL at 12:38

## 2025-02-11 RX ADMIN — CETIRIZINE HYDROCHLORIDE 10 MG: 10 TABLET, FILM COATED ORAL at 08:34

## 2025-02-11 ASSESSMENT — ACTIVITIES OF DAILY LIVING (ADL)
ADLS_ACUITY_SCORE: 52
ADLS_ACUITY_SCORE: 52
ADLS_ACUITY_SCORE: 46
ADLS_ACUITY_SCORE: 53
ADLS_ACUITY_SCORE: 46
ADLS_ACUITY_SCORE: 53
ADLS_ACUITY_SCORE: 52
ADLS_ACUITY_SCORE: 52
ADLS_ACUITY_SCORE: 53
ADLS_ACUITY_SCORE: 46
ADLS_ACUITY_SCORE: 67
ADLS_ACUITY_SCORE: 53
ADLS_ACUITY_SCORE: 53

## 2025-02-11 NOTE — H&P
[facility]  Hospitalist Admission Note  Name: Alma Rosa Fishman    MRN: [id number]  YOB: 1940    Age: 84 year old  Date of admission: 2/10/2025  Primary care provider: Joselin Tai    Chief Complaint: Fall, hypoxia    Alma Rosa Fishman is a 84 year old female with PMH including Parkinson's, chronic diastolic CHF with recent self discontinuation of Lasix, DVT, hypertension, depression, GERD, asthma, type 2 diabetes who presents with a fall episode as well as shortness of breath.  She got up from her recliner this afternoon and tripped over a blanket and fell.  She did not lose consciousness but did have left-sided chest wall pain which was painful with breathing.  She came to the ER due to concern regarding a possible rib fracture.    Here in the emergency room she was hypertensive initially to 159 systolic but later up to 202 systolic and noted to be hypoxic to the 80s at rest.  Lab workup was relatively unremarkable though BNP was 880, BMP was grossly normal and troponin was detectable at 20.  CBC was grossly normal.  She underwent traumatic workup including CT cervical spine CT head and CT chest without contrast.  There were no evidence of fractures but she was noted to have a large right pleural effusion with associated compressive atelectasis and a small pericardial effusion as well.    At this point we suspect that her effusion is most likely related to CHF but certainly there is still a broad differential including malignant, less likely traumatic or infectious.  Plan on a diagnostic and therapeutic thoracentesis in the morning as well as a trial of IV diuresis.    Assessment and Plan:   Acute hypoxemic respiratory failure with large right pleural effusion: Suspect this is acute on chronic diastolic CHF in the setting of self discontinuing her diuretic and potentially uncontrolled hypertension.  Evaluating further with a right-sided thoracentesis and will check an  echocardiogram given that there was evidence of a pericardial effusion on her CT scan.  -- Admit under inpatient status  -- Given 60 mg IV Lasix in the ER, will continue with 40 mg twice daily in the morning.  -- Will need to reevaluate the ongoing need for diuretics daily.  -- Diagnostic and therapeutic thoracentesis ordered for the morning.  Given that she is 84 we will also send for cytology.  -- Daily weights, strict ins and outs, cardiac monitoring and check BMP in the morning.    2.   Fall with left-sided chest wall pain: Traumatic workup negative for fractures.  Likely has a rib contusion.  Parkinson's certainly predisposes her to falls but it sounds as though this was mainly mechanical.  -- PT consult  -- Numerous allergies to pain medications noted  --tylenol and ibuprofen for pain (she reports she tolerates both, though has numerous narcotic allergies)    3.   Accelerated hypertension: Systolic blood pressure up to 202  in the ER.  Some of this may be driven by pain or dyspnea but warrants treatment at this point.  -- Gave Lasix in the ER  ---treat pain  --- IV hydralazine as needed for systolic blood pressure greater than 180  --- If blood pressure remains elevated likely need to start a blood pressure agent.    4.   History of Parkinson's disease: Resume home Sinemet.  Uses a walker.  Did have another fall around [place].  Lives in [facility].    5.   Chronic diastolic CHF: Follows at [place].  Self discontinued her Lasix.    6..  History of rheumatoid arthritis: Maintained on weekly methotrexate.  She does take Naproxen twice daily as well as gabapentin in the evening.    7.   GERD: Continue home PPI    8.  Extensive allergy list, but confirmed she tolerates tylenol and ibuprofen    9.  Diet-controlled type 2 diabetes    Med rec pending.  Lists are different from system to system.  Restarting sinemet tonight based on a list her  had.    DVT Prophylaxis: Pneumatic Compression Devices  Code Status:  DNR  Discharge Dispo: Admit under inpatient status  Medically Ready for Discharge: Anticipated in 2-4 Days      Clinically Significant Risk Factors Present on Admission                   # Hypertension: Noted on problem list               # Asthma: noted on problem list              History of Present Illness:  Alma Rosa Fishman is a 84 year old female with PMH including Parkinson's, chronic diastolic CHF with recent self discontinuation of Lasix, DVT, hypertension, depression, GERD, asthma, type 2 diabetes who presents with a fall episode as well as shortness of breath.  She got up from her recliner this afternoon and tripped over a blanket and fell.  She did not lose consciousness but did have left-sided chest wall pain which was painful with breathing.  She came to the ER due to concern regarding a possible rib fracture.    Her  corroborated the history.    [place] reside in [place] [facility].  Did have another fall around [place].  Has been short of breath with minimal exertion at home.           Past Medical History:  Past Medical History:   Diagnosis Date    Asthma     Deep vein thrombosis     Depression     Esophageal reflux 02/18/2004    EGD: NL; Gastric Motility Study: small HH and GERD    Essential hypertension, benign     Hyperlipidemia     Iron deficiency anemia 2000    colonoscopy and EGD done in 2000 were negative    Kidney stone 06/2002    s/p stent placement    Nonsenile cataract     Osteopenia 01/2004    Right hip osteopenia by DEXA    Restless leg syndrome     RLS    Rheumatoid arthritis     Sleep issues     on Ritalin; managed by [facility]    Small bowel obstruction (H) 06/23/2021    Type 2 diabetes mellitus      Past Surgical History:  Past Surgical History:   Procedure Laterality Date    APPENDECTOMY      ARTHROPLASTY KNEE Bilateral     CHOLECYSTECTOMY      COLONOSCOPY      ENDOSCOPIC RELEASE CARPAL TUNNEL Bilateral     EYE SURGERY      Gastric bypass NOS      IR LUMBAR  "PUNCTURE  5/16/2022    IR LUMBAR PUNCTURE  2/10/2023    OPEN REDUCTION INTERNAL FIXATION ANKLE Left     ORTHOPEDIC SURGERY      Total abdominal hysterectomy with bilateral salpingectomy       Social History:  Social History     Tobacco Use    Smoking status: Former    Smokeless tobacco: Never   Substance Use Topics    Alcohol use: Yes     Comment: 2 drinks per week     Social History     Social History Narrative    Not on file     Family History:  Family History   Problem Relation Age of Onset    Cardiovascular Father         3 major MI's d: age 72    Cancer Father         blood and bone    Diabetes Father         Type 2    Circulatory Mother         stomach aneurysm; d: age 69    Hypertension Mother     Cancer Maternal Grandmother         bladder    Cancer Paternal Grandmother         stomach     Allergies:  Allergies   Allergen Reactions    Contrast Dye Rash, Anaphylaxis, Hives and Swelling     Tolerated CT chest with contrast 6/2021 after pre-treatment with benadryl and solumedrol  Hives / throat swelling    Doxycycline Anaphylaxis    Acetaminophen      Vicodin/Darvocet/Swelling throat    Albuterol      \"Half my tongue swelled.\"   \"Half my tongue swelled.\"       Atenolol Unknown and Other (See Comments)     PN: LW Reaction: swelling of the tongue  (see [facility] records scanned 3/11/2014)  (see [facility] records scanned 3/11/2014)      Darvocet [Propoxyphene N-Apap]     Fluticasone-Salmeterol      PN: tongue swelling      Hydrocodone-Acetaminophen Swelling     throat      Lisinopril      Tongue Swelling    Metformin Hydrochloride      Glucophage caused severe diarrhea    Morphine      MISAEL    Penicillins      Swelling throat    Percocet [Oxycodone-Acetaminophen]     Neomycin-Bacitracin-Polymyxin Hives and Rash     Medications:  Current Facility-Administered Medications   Medication Dose Route Frequency Provider Last Rate Last Admin    hydrALAZINE (APRESOLINE) injection 10 mg  10 mg Intravenous Q4H PRN Mimi, " Mario Puente MD         Current Outpatient Medications   Medication Sig Dispense Refill    albuterol (PROAIR HFA/PROVENTIL HFA/VENTOLIN HFA) 108 (90 Base) MCG/ACT inhaler Inhale 2 puffs into the lungs every 4 hours as needed for shortness of breath / dyspnea or wheezing 6.7 g 0    alendronate (FOSAMAX) 70 MG tablet Take 70 mg by mouth once a week On Mondays (ON HOLD)      ALPRAZolam (XANAX) 0.25 MG tablet Take 0.25 mg by mouth      amLODIPine (NORVASC) 5 MG tablet Take 5 mg by mouth daily      atorvastatin (LIPITOR) 40 MG tablet Take 40 mg by mouth daily      BACILLUS COAGULANS-INULIN PO Take 1 capsule by mouth daily      busPIRone (BUSPAR) 5 MG tablet Take 5 mg by mouth 2 times daily       Calcium Carb-Cholecalciferol 500-200 MG-UNIT PACK Take 1 tablet by mouth daily       carbidopa-levodopa (SINEMET)  MG tablet Take 2 tablets by mouth 3 times daily      cetirizine (ZYRTEC) 10 MG tablet TAKE 1 TABLET(10 MG) BY MOUTH DAILY 30 tablet 1    citalopram (CELEXA) 40 MG tablet TAKE 1 TABLET(40 MG) BY MOUTH DAILY (Patient taking differently: Take 20 mg by mouth daily) 90 tablet 0    cyanocobalamin (CYANOCOBALAMIN) 1000 MCG/ML injection Inject 1 mL into the muscle every 30 days      denosumab (PROLIA) 60 MG/ML SOSY injection Inject 60 mg Subcutaneous every 6 months      diazepam (VALIUM) 5 MG tablet Take 1 tablet by mouth one time for 1 dose prior to dental work      diclofenac (VOLTAREN) 1 % topical gel Apply 4 g topically 4 times daily as needed for moderate pain Plus 2 g to hands tid      donepezil (ARICEPT) 5 MG tablet Take 5 mg by mouth daily before breakfast       EPINEPHrine (EPIPEN) 0.3 MG/0.3ML injection Inject 0.3 mLs (0.3 mg) into the muscle once as needed for anaphylaxis 1 each 0    folic acid (FOLVITE) 1 MG tablet Take 1 mg by mouth 6 times a week on M,T,Th, Fr, Sat,Sun (NOT WED)      gabapentin (NEURONTIN) 300 MG capsule Take 600 mg by mouth At Bedtime      ketoconazole 1 % shampoo Apply topically every  72 hours      levalbuterol (XOPENEX HFA) 45 MCG/ACT inhaler Inhale 2 puffs into the lungs every 4 hours as needed for shortness of breath or wheezing 15 g 0    melatonin 3 MG tablet Take 1 tablet (3 mg) by mouth nightly as needed      methotrexate 2.5 MG tablet Take 12.5 mg by mouth Every Wednesday AM  (ON HOLD)      methotrexate 2.5 MG tablet Take 10 mg by mouth Every Wednesday PM (ON HOLD)      metoprolol succinate ER (TOPROL-XL) 50 MG 24 hr tablet Take 0.5 tablets (25 mg) by mouth daily      nystatin (MYCOSTATIN) 902594 UNIT/GM external powder Apply topically 2 times daily Apply to affected area topically BID for redness      oseltamivir (TAMIFLU) 30 MG capsule Take 1 capsule (30 mg) by mouth 2 times daily 6 capsule 0    pantoprazole (PROTONIX) 20 MG EC tablet Take 20 mg by mouth daily      polyethylene glycol (MIRALAX) 17 g packet Take 1 packet by mouth 2 times daily as needed for constipation      Probiotic Product (PROBIOTIC MATURE ADULT) CAPS Take 1 capsule by mouth daily      sennosides (SENOKOT) 8.6 MG tablet Take 1 tablet by mouth 2 times daily as needed       SF 5000 PLUS 1.1 % CREA       trimethoprim (TRIMPEX) 100 MG tablet Take 100 mg by mouth daily      vibegron (GEMTESA) 75 MG TABS tablet Take 75 mg by mouth daily      Vitamin D3 (CHOLECALCIFEROL) 25 mcg (1000 units) tablet Take 100 mcg by mouth daily            Review of Systems:  A Comprehensive greater than 10 system review of systems was carried out.  Pertinent positives and negatives are noted above.  Otherwise negative for contributory information.     Physical Exam:  Blood pressure (!) 206/98, pulse 77, temperature 97.7  F (36.5  C), temperature source Temporal, resp. rate 21, SpO2 99%.  Wt Readings from Last 1 Encounters:   09/07/24 81.6 kg (180 lb)       Exam:  General: Alert, awake, no acute distress.  HEENT: NC/AT, eyes anicteric, external occular movements intact, face symmetric.    Cardiac: RRR, S1, S2.    Pulmonary: tender over left  lower ribs.  Normal chest rise, normal work of breathing.  Diminished on the right.  Abdomen: soft, non-tender, non-distended.  Bowel Sounds Present.  No guarding.  Extremities: no deformities.  Warm, well perfused.  Skin: no rashes or lesions noted.  Warm and Dry.  Neuro: No focal deficits noted.  Speech clear.  Coordination and strength grossly normal.  Psych: Appropriate affect.    I have personally reviewed the following data including lab tests and imaging:  EKG: Sinus rhythm with PACs, QTc is 502 ms  Imaging:  Recent Results (from the past 48 hours)   Chest XR,  PA & LAT    Narrative    EXAM: XR CHEST 2 VIEWS  LOCATION: [facility]  DATE: 2/10/2025    INDICATION: fall, L anterior rib pain  COMPARISON: 12/27/2023       Impression    IMPRESSION: Moderate-sized right pleural effusion has developed with passive atelectasis in the right lung. Reversed right total shoulder arthroplasty has been placed. Remainder unchanged. Implanted cardiac loop recorder. Left upper quadrant surgical   clips. Moderate degenerative change thoracic spine and bilateral acromioclavicular joints. Calcified aortic atherosclerosis.    XR Ankle Right 3 Views    Narrative    EXAM: XR ANKLE RIGHT G/E 3 VIEWS  LOCATION: [facility]  DATE: 2/10/2025    INDICATION: fall, ankle pain  COMPARISON: None.      Impression    IMPRESSION: No evidence of acute fracture. Mild degenerative arthritis tibiotalar joint and midfoot. Chronic well-corticated ossicles about the medial and lateral malleoli and dorsal talonavicular joint may be degenerative or sequela of remote trauma.   Plantar and Achilles calcaneal spurs. Arterial calcifications.   Chest CT w/o contrast    Narrative    EXAM: CT CHEST W/O CONTRAST  LOCATION: [facility]  DATE: 2/10/2025    INDICATION: fall, chest rib pain, moderate sized pleural effusion on xray  COMPARISON: CT dated 6/20/2024  TECHNIQUE: CT chest without IV  contrast. Multiplanar reformats were obtained.  CONTRAST: None.  LIMITATIONS: High KV/high-dose technique and lack of intravenous contrast    FINDINGS:   LUNGS AND PLEURA: Large right pleural effusion, and associated compressive atelectasis. No pneumothorax.    MEDIASTINUM/AXILLAE: Stable heart size. New small pericardial effusion. No mediastinal hematoma.    CORONARY ARTERY CALCIFICATION: None.    UPPER ABDOMEN: Postoperative changes in the stomach. Splenectomy. Cholecystectomy. 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    MUSCULOSKELETAL: Right shoulder arthroplasty. Implanted cardiac monitoring device. Mild to moderate multifocal degenerative change. No acute bony abnormalities. There are several old right anterior rib fractures.      Impression    IMPRESSION:   1.  Large right pleural effusion with associated compressive atelectasis.  2.  Small pericardial effusion.     CT Head w/o Contrast    Narrative    EXAM: CT HEAD W/O CONTRAST  LOCATION: [facility]  DATE: 2/10/2025    INDICATION: fall, head injury  COMPARISON: None.  TECHNIQUE: Routine CT Head without IV contrast. Multiplanar reformats. Dose reduction techniques were used.    FINDINGS:  INTRACRANIAL CONTENTS: No evidence of acute intracranial hemorrhage or mass effect. Scattered foci of decreased attenuation within the cerebral hemispheric white matter which are nonspecific, though most commonly ascribed to chronic small vessel ischemic   disease. The ventricles and sulci are prominent consistent with mild brain parenchymal volume loss. Gray-white matter differentiation is maintained. The basilar cisterns are patent.    VISUALIZED ORBITS/SINUSES/MASTOIDS: The globes are unremarkable. The partially imaged paranasal sinuses, mastoid air cells and middle ear cavities are unremarkable.     BONES/SOFT TISSUES: The visualized skull base and calvarium are unremarkable.      Impression     IMPRESSION:    1.  No evidence of acute intracranial hemorrhage or mass effect.  2.  Mild nonspecific white matter changes.  3.  Mild brain parenchymal volume loss.   CT Cervical Spine w/o Contrast    Narrative    EXAM: CT CERVICAL SPINE W/O CONTRAST  LOCATION: [facility]  DATE: 2/10/2025    INDICATION: fall  COMPARISON: None.  TECHNIQUE: Routine CT Cervical Spine without IV contrast. Multiplanar reformats. Dose reduction techniques were used.    FINDINGS:  No evidence of acute fracture or subluxation of the cervical spine by CT imaging. The vertebral bodies of the cervical spine have normal stature and alignment. Multilevel degenerative disc disease of the cervical spine with disc height loss, most   pronounced at C6/C7 and C7/T1. No extraspinal abnormality.     Craniovertebral junction and C1-C2: The odontoid process is well approximated with the anterior body of C1 and well aligned between the lateral masses of C1.    C2-C3: No posterior disc bulge or spinal canal narrowing. No neural foraminal narrowing.     C3-C4: No posterior disc bulge or spinal canal narrowing. Uncovertebral joint disease and facet arthropathy with severe left and moderate right neural foraminal narrowing.     C4-C5: No posterior disc bulge or spinal canal narrowing. No neural foraminal narrowing.     C5-C6: Route bar disc osteophyte complex with severe spinal canal narrowing. Uncovertebral joint disease and facet arthropathy with severe bilateral neural foraminal narrowing.     C6-C7: No posterior disc bulge or spinal canal narrowing. No neural foraminal narrowing.     C7-T1: No posterior disc bulge or spinal canal narrowing. No neural foraminal narrowing.       Impression    IMPRESSION:  1.  No evidence of acute fracture or subluxation of the cervical spine by CT imaging.  2.  Degenerative cervical spondylosis as described above.     Labs:  Recent Labs   Lab 02/10/25  1906   WBC 9.4   HGB 12.3   HCT 35.4   *  "            Lab Results   Component Value Date     02/10/2025     09/07/2024     06/18/2024     06/22/2021     06/10/2021     06/09/2021    Lab Results   Component Value Date    CHLORIDE 107 02/10/2025    CHLORIDE 110 09/07/2024    CHLORIDE 108 06/18/2024    CHLORIDE 111 06/22/2021    CHLORIDE 111 06/10/2021    CHLORIDE 112 06/09/2021    Lab Results   Component Value Date    BUN 19.4 02/10/2025    BUN 12.0 09/07/2024    BUN 17.2 06/18/2024    BUN 11 06/22/2021    BUN 16 06/10/2021    BUN 14 06/09/2021      Lab Results   Component Value Date    POTASSIUM 3.7 02/10/2025    POTASSIUM 3.8 09/07/2024    POTASSIUM 3.8 06/18/2024    POTASSIUM 4.4 06/22/2021    POTASSIUM 4.1 06/10/2021    POTASSIUM 4.3 06/09/2021    Lab Results   Component Value Date    CO2 24 02/10/2025    CO2 18 09/07/2024    CO2 23 06/18/2024    CO2 24 06/22/2021    CO2 24 06/10/2021    CO2 26 06/09/2021    Lab Results   Component Value Date    CR 0.84 02/10/2025    CR 0.71 09/07/2024    CR 0.75 06/18/2024    CR 0.96 06/22/2021    CR 1.06 06/10/2021    CR 1.07 06/09/2021        No results for input(s): \"TROPONIN\", \"TROPI\", \"TROPR\", \"TROPONINIS\" in the last 168 hours.    Invalid input(s): \"TROPT\", \"TROP\", \"TROPONINIES\", \"TNIH\"    I've spent 65 minutes in chart review, ordering medications and tests, obtaining additional history from the EMR and the ER provider, evaluating the patient and in documentation for this encounter.    Carlos Le MD  Hospitalist  [facility]          "

## 2025-02-11 NOTE — PLAN OF CARE
"Goal Outcome Evaluation:      Plan of Care Reviewed With: patient    Overall Patient Progress: improvingOverall Patient Progress: improving     Pt alert and oriented. On 1L 02. Up with assist of 1 gait belt and walker. Thoracentesis today. 1000ml out. Voiding well. On IV lasix. Tylenol, ibuprofen, voltaren gel for pain.       Problem: Adult Inpatient Plan of Care  Goal: Plan of Care Review  Description: The Plan of Care Review/Shift note should be completed every shift.  The Outcome Evaluation is a brief statement about your assessment that the patient is improving, declining, or no change.  This information will be displayed automatically on your shift  note.  Outcome: Progressing  Flowsheets (Taken 2/11/2025 1608)  Plan of Care Reviewed With: patient  Overall Patient Progress: improving  Goal: Patient-Specific Goal (Individualized)  Description: You can add care plan individualizations to a care plan. Examples of Individualization might be:  \"Parent requests to be called daily at 9am for status\", \"I have a hard time hearing out of my right ear\", or \"Do not touch me to wake me up as it startles  me\".  Outcome: Progressing  Goal: Absence of Hospital-Acquired Illness or Injury  Outcome: Progressing  Intervention: Identify and Manage Fall Risk  Recent Flowsheet Documentation  Taken 2/11/2025 0900 by Rose Guzman RN  Safety Promotion/Fall Prevention:   activity supervised   assistive device/personal items within reach   clutter free environment maintained   lighting adjusted   mobility aid in reach   nonskid shoes/slippers when out of bed  Intervention: Prevent and Manage VTE (Venous Thromboembolism) Risk  Recent Flowsheet Documentation  Taken 2/11/2025 0900 by Rose Guzman RN  VTE Prevention/Management: SCDs off (sequential compression devices)  Intervention: Prevent Infection  Recent Flowsheet Documentation  Taken 2/11/2025 0900 by Rose Guzman RN  Infection Prevention: hand hygiene promoted  Goal: " Optimal Comfort and Wellbeing  Outcome: Progressing  Intervention: Monitor Pain and Promote Comfort  Recent Flowsheet Documentation  Taken 2/11/2025 0834 by Rose Guzman RN  Pain Management Interventions: medication (see MAR)  Goal: Readiness for Transition of Care  Outcome: Progressing     Problem: Pain Acute  Goal: Optimal Pain Control and Function  Outcome: Progressing  Intervention: Develop Pain Management Plan  Recent Flowsheet Documentation  Taken 2/11/2025 0834 by Rose Guzman RN  Pain Management Interventions: medication (see MAR)  Intervention: Prevent or Manage Pain  Recent Flowsheet Documentation  Taken 2/11/2025 0900 by Rose Guzman RN  Medication Review/Management: medications reviewed

## 2025-02-11 NOTE — ED NOTES
Patient sats to 88-90% on RA just lying in bed, BP is 199/122. MD aware. Put patient on 2 LPM via NC sats up to 98%.

## 2025-02-11 NOTE — ED PROVIDER NOTES
ED APC SUPERVISION NOTE:   I evaluated this patient in conjunction with Chang GAYLE  I have participated in the care of the patient and personally performed key elements of the history, exam, and medical decision making.      HPI:   Alma Rosa Fishman is a 84 year old female with complicated medical history, presents after mechanical fall with pain to her ribs, also on the left-hand side.  Uncertain of head strike. No LOC.  Has discomfort in the left side of her chest when she takes a deep breath and has shortness of breath that has been gradually worsening.     EXAM:   GENERAL: Patient lying on gurney, sounds slightly short of breath with speaking.  HEAD: Atraumatic.  NECK: No rigidity  CV: RRR, no murmurs, rubs or gallops  PULM: Manage breath sounds in right lung base.  Chest: Tenderness over left anterior chest.  ABD: Soft, nontender, nondistended, no guarding  DERM: No rash. Skin warm and dry  EXTREMITY: Moving all extremities without difficulty. No peripheral edema.       Independent Interpretation (X-rays, CTs, rhythm strip):  CT head no bleed.  X-ray right ankle no fracture.  Chest x-ray right-sided pleural effusion.     MEDICAL DECISION MAKING/ASSESSMENT AND PLAN:   This is an elderly female with a complicated medical history, presenting with a mechanical fall.  Thankfully, CT head and C-spine negative for acute process and x-ray of the right ankle negative.  She has pain over her chest.,  Thankfully no visible rib fractures, but does have a large right-sided pleural effusion and a trace pericardial effusion.  Patient's O2 sat drops with ambulation.  Therefore, placed on nasal cannula and O2 sats oliva appropriately.  Given Lasix for treatment of CHF.  Also she will benefit from admission and routine thoracentesis tomorrow.  Do not think she requires that emergently as she is not in distress.     DIAGNOSIS:     ICD-10-CM    1. Fall, initial encounter  W19.XXXA       2. Acute congestive heart failure, unspecified  heart failure type (H)  I50.9       3. Acute respiratory failure with hypoxia (H)  J96.01       4. Pleural effusion on right  J90       5. Pericardial effusion  I31.39       6. Hypertension  I10             Jonel Little MD  2/10/2025  [facility]     Jonel Little MD  02/10/25 [id number]

## 2025-02-11 NOTE — ED NOTES
"[facility]  ED Nurse Handoff Report    ED Chief complaint: Rib Pain and Fall  . ED Diagnosis:   Final diagnoses:   Fall, initial encounter   Acute congestive heart failure, unspecified heart failure type (H)   Acute respiratory failure with hypoxia (H)   Pleural effusion on right   Pericardial effusion   Hypertension       Allergies:   Allergies   Allergen Reactions    Contrast Dye Rash, Anaphylaxis, Hives and Swelling     Tolerated CT chest with contrast 6/2021 after pre-treatment with benadryl and solumedrol  Hives / throat swelling    Doxycycline Anaphylaxis    Acetaminophen      Vicodin/Darvocet/Swelling throat    Albuterol      \"Half my tongue swelled.\"   \"Half my tongue swelled.\"       Atenolol Unknown and Other (See Comments)     PN: LW Reaction: swelling of the tongue  (see [place] records scanned 3/11/2014)  (see [place] records scanned 3/11/2014)      Darvocet [Propoxyphene N-Apap]     Fluticasone-Salmeterol      PN: tongue swelling      Hydrocodone-Acetaminophen Swelling     throat      Lisinopril      Tongue Swelling    Metformin Hydrochloride      Glucophage caused severe diarrhea    Morphine      MSIAEL    Penicillins      Swelling throat    Percocet [Oxycodone-Acetaminophen]     Neomycin-Bacitracin-Polymyxin Hives and Rash       Code Status:     Activity level - Baseline/Home:  independent.  Activity Level - Current:   in bed and standby.   Lift room needed: No.   Bariatric: No   Needed: No   Isolation: No.   Infection: Not Applicable.     Respiratory status: Nasal cannula    Vital Signs (within 30 minutes):   Vitals:    02/10/25 2120 02/10/25 2121 02/10/25 2122 02/10/25 2123   BP: (!) 220/94      Pulse: 83 83 77 77   Resp: 26 24 19 26   Temp:       TempSrc:       SpO2: 98% 97% 99% 100%       Cardiac Rhythm:  ,      Pain level:    Patient confused: No.   Patient Falls Risk: arm band in place and patient and family education.   Elimination Status: Has voided     Patient " Report - Initial Complaint: Rib pain, Fall.   Focused Assessment: Rib Pain and Fall        HPI   Alma Rosa Fishman is a 84 year old female with history of Parkinson's, type 2 diabetes, RA, HTN, HLD, DVT who presents to the ED for evaluation of rib pain after a fall.  Patient reports that as she was getting up from her recliner this afternoon, she tripped over her blanket and fell.  She  does not know whether she hit her head on the table, chair, or floor as she fell.  She denied loss of consciousness.  She does endorse headache, dizziness, and lightheadedness.  She also endorses left chest wall pain and rib pain.  She states that her chest hurts with deep breathing.  She denies any other injuries.   was able to help her back up to the chair. Patient states that she has been more short of breath with exertion.      Abnormal Results:   Labs Ordered and Resulted from Time of ED Arrival to Time of ED Departure   BASIC METABOLIC PANEL - Abnormal       Result Value    Sodium 142      Potassium 3.7      Chloride 107      Carbon Dioxide (CO2) 24      Anion Gap 11      Urea Nitrogen 19.4      Creatinine 0.84      GFR Estimate 69      Calcium 8.2 (*)     Glucose 93     TROPONIN T, HIGH SENSITIVITY - Abnormal    Troponin T, High Sensitivity 20 (*)    CBC WITH PLATELETS AND DIFFERENTIAL - Abnormal    WBC Count 9.4      RBC Count 3.36 (*)     Hemoglobin 12.3      Hematocrit 35.4       (*)     MCH 36.6 (*)     MCHC 34.7      RDW 16.7 (*)     Platelet Count 159      % Neutrophils 64      % Lymphocytes 13      % Monocytes 19      % Eosinophils 3      % Basophils 1      % Immature Granulocytes 1      NRBCs per 100 WBC 0      Absolute Neutrophils 6.0      Absolute Lymphocytes 1.2      Absolute Monocytes 1.8 (*)     Absolute Eosinophils 0.3      Absolute Basophils 0.1      Absolute Immature Granulocytes 0.1      Absolute NRBCs 0.0     NT PROBNP INPATIENT - Normal    N terminal Pro BNP Inpatient 880     TROPONIN T, HIGH  SENSITIVITY        CT Cervical Spine w/o Contrast   Final Result   IMPRESSION:   1.  No evidence of acute fracture or subluxation of the cervical spine by CT imaging.   2.  Degenerative cervical spondylosis as described above.      CT Head w/o Contrast   Final Result   IMPRESSION:     1.  No evidence of acute intracranial hemorrhage or mass effect.   2.  Mild nonspecific white matter changes.   3.  Mild brain parenchymal volume loss.      Chest CT w/o contrast   Final Result   IMPRESSION:    1.  Large right pleural effusion with associated compressive atelectasis.   2.  Small pericardial effusion.         XR Ankle Right 3 Views   Final Result   IMPRESSION: No evidence of acute fracture. Mild degenerative arthritis tibiotalar joint and midfoot. Chronic well-corticated ossicles about the medial and lateral malleoli and dorsal talonavicular joint may be degenerative or sequela of remote trauma.    Plantar and Achilles calcaneal spurs. Arterial calcifications.      Chest XR,  PA & LAT   Final Result   IMPRESSION: Moderate-sized right pleural effusion has developed with passive atelectasis in the right lung. Reversed right total shoulder arthroplasty has been placed. Remainder unchanged. Implanted cardiac loop recorder. Left upper quadrant surgical    clips. Moderate degenerative change thoracic spine and bilateral acromioclavicular joints. Calcified aortic atherosclerosis.           Treatments provided: See MAR  Family Comments:  at the bedside  OBS brochure/video discussed/provided to patient:  Yes  ED Medications:   Medications   hydrALAZINE (APRESOLINE) injection 10 mg (10 mg Intravenous $Given 2/10/25 2120)   furosemide (LASIX) injection 60 mg (60 mg Intravenous $Given 2/10/25 2050)       Drips infusing:  No  For the majority of the shift this patient was Green.   Interventions performed were None.    Sepsis treatment initiated: No    Cares/treatment/interventions/medications to be completed following ED  care: Patient care monitoring, BP and O2 sats.    ED Nurse Name: Lynda Quijano RN  9:29 PM       RECEIVING UNIT ED HANDOFF REVIEW    Above ED Nurse Handoff Report was reviewed: Yes  Reviewed by: Karey Johnston RN on February 10, 2025 at 9:56 PM   I Chaparro called the ED to inform them the note was read: Yes

## 2025-02-11 NOTE — PROGRESS NOTES
"   02/11/25 160   Appointment Info   Signing Clinician's Name / Credentials (PT) Moon Wells, MEG   Living Environment   People in Home spouse   Current Living Arrangements condominium   Home Accessibility no concerns   Transportation Anticipated family or friend will provide   Living Environment Comments small environment; tends to furniture walk   Self-Care   Usual Activity Tolerance moderate   Current Activity Tolerance poor   Equipment Currently Used at Home walker, rolling  (at times for longer distance; furniture walks in condo)   Fall history within last six months yes   Number of times patient has fallen within last six months 2   Activity/Exercise/Self-Care Comment spouse assists patient with mobility and cares as needed   General Information   Onset of Illness/Injury or Date of Surgery 02/10/25   Referring Physician Mario Le MD   Patient/Family Therapy Goals Statement (PT) return home   Pertinent History of Current Problem (include personal factors and/or comorbidities that impact the POC) per chart: \"Alma Rosa Fishman is a 84 year old female with PMH including Parkinson's, chronic diastolic CHF with recent self discontinuation of Lasix, DVT, hypertension, depression, GERD, asthma, type 2 diabetes who presents with a fall episode as well as shortness of breath.  She got up from her recliner this afternoon and tripped over a blanket and fell.  She did not lose consciousness but did have left-sided chest wall pain which was painful with breathing.  She came to the ER due to concern regarding a possible rib fracture.; found to have Acute hypoxemic respiratory failure with large right pleural effusion; some L shoulder pain after fall; reports baseline rotator cuff issues; L shoulder needs surgery per patient report   Existing Precautions/Restrictions fall;oxygen therapy device and L/min  (1L)   General Observations Patient up in chair; spouse present   Cognition   Cognitive Status Comments " "forgetful during conversation   Pain Assessment   Patient Currently in Pain Yes, see Vital Sign flowsheet  (L shoulder; doesn't rate)   Integumentary/Edema   Integumentary/Edema Comments see nursing notes for further information   Posture    Posture Comments forward flexed at head and trunk   Range of Motion (ROM)   ROM Comment L shoulder limited by pain and rotator cuff issues; LE's WFL   Strength (Manual Muscle Testing)   Strength Comments UE's limited by rotator cuff issues L worse than R; functional weakness   Bed Mobility   Comment, (Bed Mobility) mod A for LE's into bed from sitting; spouse normally assists legs into bed   Transfers   Comment, (Transfers) CGA for sit>stand from recliner; use of walker   Gait/Stairs (Locomotion)   Distance in Feet (Gait) 10   Comment, (Gait/Stairs) CGA with use of walker   Balance   Balance Comments baseline impairment from Parkinson's;; no gross LOB during session   Clinical Impression   Criteria for Skilled Therapeutic Intervention Yes, treatment indicated   PT Diagnosis (PT) impaired functional mobility   Influenced by the following impairments weakness; \"lightheaded\"; pain in L shoulder; decreased activity tolerance; impaired balance; SOB   Functional limitations due to impairments impaired independence with mobility and cares secondary to above deficits   Clinical Presentation (PT Evaluation Complexity) evolving   Clinical Presentation Rationale clinical judgement; level of assist   Clinical Decision Making (Complexity) low complexity   Planned Therapy Interventions (PT) balance training;bed mobility training;cryotherapy;gait training;transfer training;progressive activity/exercise   Risk & Benefits of therapy have been explained evaluation/treatment results reviewed;care plan/treatment goals reviewed;risks/benefits reviewed;current/potential barriers reviewed;participants voiced agreement with care plan;participants included;patient;spouse/significant other   Clinical " "Impression Comments below reported baseline   PT Total Evaluation Time   PT Eval, Low Complexity Minutes (00229) 10   Physical Therapy Goals   PT Frequency Daily   PT Predicted Duration/Target Date for Goal Attainment 02/14/25   PT Goals Bed Mobility;Transfers;Gait   PT: Bed Mobility Minimal assist;Supine to/from sit   PT: Transfers Supervision/stand-by assist;Sit to/from stand;Bed to/from chair;Assistive device   PT: Gait Supervision/stand-by assist;Rolling walker;100 feet   PT Discharge Planning   PT Discharge Recommendation (DC Rec) home with assist;home with home care physical therapy   PT Rationale for DC Rec Patient below reported baseline level of function; currently moving with CGA of 1 but limited by feeling \"lightheaded\" and \"weak\"; further limited by L shoulder pain; BP drops per spouse report; on 1L O2; O2 sats 95%; anticipate once cause of \"lightheadedness\" is corrected, patient should be safe to return home with assist of spouse for mobility and cares; may benefit from Home PT to maximize return to PLOF   PT Brief overview of current status A x 1 with walker   PT Total Distance Amb During Session (feet) 15   PT Equipment Needed at Discharge walker, rolling;wheelchair       "

## 2025-02-11 NOTE — PROGRESS NOTES
[facility]    Hospitalist Progress Note  Name: Alma Rosa Fishman    MRN: [id number]  Provider:  Will Waldron DO MPH  Date of Service: 02/11/2025    Summary of Stay: Alma Rosa Fishman is a 84 year old female with PMH including Parkinson's, chronic diastolic CHF with recent self discontinuation of Lasix, DVT, hypertension, depression, GERD, asthma, type 2 diabetes who presents with a fall episode as well as shortness of breath.  She got up from her recliner this afternoon and tripped over a blanket and fell.  She did not lose consciousness but did have left-sided chest wall pain which was painful with breathing.  She came to the ER due to concern regarding a possible rib fracture.     Here in the emergency room she was hypertensive initially to 159 systolic but later up to 202 systolic and noted to be hypoxic to the 80s at rest.  Lab workup was relatively unremarkable though BNP was 880, BMP was grossly normal and troponin was detectable at 20.  CBC was grossly normal.  She underwent traumatic workup including CT cervical spine CT head and CT chest without contrast.  There were no evidence of fractures but she was noted to have a large right pleural effusion with associated compressive atelectasis and a small pericardial effusion as well.     At this point we suspect that her effusion is most likely related to CHF but certainly there is still a broad differential including malignant, less likely traumatic or infectious.  Plan on a diagnostic and therapeutic thoracentesis in the morning as well as a trial of IV diuresis.     Assessment and Plan:   Acute hypoxemic respiratory failure with large right pleural effusion: Suspect this is acute on chronic diastolic CHF in the setting of self discontinuing her diuretic and potentially uncontrolled hypertension.  Evaluating further with a right-sided thoracentesis and will check an echocardiogram given that there was evidence of a pericardial effusion on her  CT scan.  --Given 60 mg IV Lasix in the ER, will continue with 40 mg twice daily in the morning.  --Will need to reevaluate the ongoing need for diuretics daily.  --Diagnostic and therapeutic thoracentesis.  Given that she is 84 we will also send for cytology.  --Daily weights, strict ins and outs, cardiac monitoring and daily BMP.     Fall with left-sided chest wall pain: Traumatic workup negative for fractures.  Likely has a rib contusion.  Parkinson's certainly predisposes her to falls but it sounds as though this was mainly mechanical.  --PT consult  --Numerous allergies to pain medications noted  --Tylenol and ibuprofen for pain (she reports she tolerates both, though has numerous narcotic allergies)     Accelerated hypertension: Systolic blood pressure up to 202  in the ER.  Some of this may be driven by pain or dyspnea but warrants treatment at this point.  --IV Lasix   --Treat pain  --IV hydralazine as needed for systolic blood pressure greater than 180  --If blood pressure remains elevated likely need to start a blood pressure agent     History of Parkinson's disease: Resume home Sinemet.  Uses a walker.  Did have another fall around [place].  Lives in [facility].     Chronic diastolic CHF: Follows at [place].  Self discontinued her Lasix.     History of rheumatoid arthritis: Maintained on weekly methotrexate.  She does take Naproxen twice daily as well as gabapentin in the evening.     GERD: Continue home PPI.     Extensive allergy list, but confirmed she tolerates tylenol and ibuprofen     Diet-controlled type 2 diabetes    DVT Prophylaxis: Pneumatic Compression Devices  Code Status: No CPR- Pre-arrest intubation OK  Diet: Combination Diet Regular Diet Adult    Quiroz Catheter: Not present  Disposition: Expected discharge in 1-2 days to home. Goals prior to discharge include thoracentesis.   Incidental Findings: As above.  Family updated today: Yes left voicemail for spouse Vadim.    40 MINUTES SPENT BY ME on  the date of service doing chart review, history, exam, documentation & further activities per the note.      Interval History   Assumed care from previous hospitalist. The history was fully reviewed.  The patient reports doing well. No chest pain but still has shortness of breath. No nausea, vomiting, diarrhea, constipation. No fevers. No other specific complaints identified.     -Data reviewed today: I personally reviewed all new labs and imaging results over the last 24 hours.     Physical Exam   Temp: 97.6  F (36.4  C) Temp src: Temporal BP: (!) 163/64 Pulse: 75   Resp: 18 SpO2: 94 % O2 Device: Nasal cannula Oxygen Delivery: 1 LPM  Vitals:    02/10/25 2241   Weight: 80.4 kg (177 lb 4 oz)     Vital Signs with Ranges  Temp:  [97.6  F (36.4  C)-98.3  F (36.8  C)] 97.6  F (36.4  C)  Pulse:  [65-92] 75  Resp:  [16-35] 18  BP: (159-226)/() 163/64  SpO2:  [88 %-100 %] 94 %  No intake/output data recorded.    GENERAL: No apparent distress. Awake, alert, and fully oriented.  HEENT: Normocephalic, atraumatic. Extraocular movements intact.  CARDIOVASCULAR: Regular rate and rhythm without murmurs or rubs. No S3.  PULMONARY: Clear bilaterally but dull on right.  GASTROINTESTINAL: Soft, non-tender, non-distended. Bowel sounds normoactive.   EXTREMITIES: No cyanosis or clubbing. No edema.  NEUROLOGICAL: CN 2-12 grossly intact, no focal neurological deficits.  DERMATOLOGICAL: No rash, ulcer, bruising, nor jaundice.     Medications   Current Facility-Administered Medications   Medication Dose Route Frequency Provider Last Rate Last Admin     Current Facility-Administered Medications   Medication Dose Route Frequency Provider Last Rate Last Admin    acetaminophen (TYLENOL) tablet 975 mg  975 mg Oral TID Mario Le MD   975 mg at 02/11/25 0834    [Held by provider] aspirin (ASA) EC tablet 325 mg  325 mg Oral Daily Will Waldron DO        atorvastatin (LIPITOR) tablet 40 mg  40 mg Oral Daily Will Waldron  DO Woody   40 mg at 02/11/25 0834    busPIRone (BUSPAR) tablet 5 mg  5 mg Oral TID Will Waldron DO   5 mg at 02/11/25 1021    carbidopa-levodopa (SINEMET)  MG per tablet 2 tablet  2 tablet Oral TID Mario Le MD   2 tablet at 02/11/25 0834    [Held by provider] cephALEXin (KEFLEX) capsule 250 mg  250 mg Oral Daily Will Waldron DO        cetirizine (zyrTEC) tablet 10 mg  10 mg Oral Daily Will Waldron DO   10 mg at 02/11/25 0834    cholecalciferol (VITAMIN D3) capsule 125 mcg  125 mcg Oral Daily Will Waldron DO   125 mcg at 02/11/25 1020    citalopram (celeXA) tablet 20 mg  20 mg Oral Daily Will Waldron DO   20 mg at 02/11/25 0834    diclofenac (VOLTAREN) 1 % topical gel 2 g  2 g Topical 4x Daily Danay Nicole MD   2 g at 02/11/25 0412    [Held by provider] folic acid (FOLVITE) tablet 1 mg  1 mg Oral TID Will Waldron DO        furosemide (LASIX) injection 40 mg  40 mg Intravenous BID Mario Le MD   40 mg at 02/11/25 0838    gabapentin (NEURONTIN) capsule 600 mg  600 mg Oral At Bedtime aMrio Le MD   600 mg at 02/11/25 0027    ibuprofen (ADVIL/MOTRIN) tablet 400 mg  400 mg Oral TID Mario Le MD   400 mg at 02/11/25 0834    Lidocaine (LIDOCARE) 4 % Patch 2 patch  2 patch Transdermal Q24H Danay Nicole MD   2 patch at 02/11/25 0358    pantoprazole (PROTONIX) EC tablet 20 mg  20 mg Oral Daily Mario Le MD   20 mg at 02/11/25 0834    sodium chloride (PF) 0.9% PF flush 3 mL  3 mL Intracatheter Q8H Mario Le MD   3 mL at 02/10/25 2309    vibegron (GEMTESA) tablet 75 mg  75 mg Oral At Bedtime Will Waldron, DO         Data     Laboratory:  Recent Labs   Lab 02/11/25  0703 02/10/25  1906   WBC 8.5 9.4   HGB 11.5* 12.3   HCT 34.2* 35.4   * 105*   * 159     Recent Labs   Lab 02/11/25  0703 02/10/25  1906    142  "  POTASSIUM 3.5 3.7   CHLORIDE 108* 107   CO2 25 24   ANIONGAP 10 11   * 93   BUN 19.6 19.4   CR 0.85 0.84   GFRESTIMATED 68 69   ELAN 8.1* 8.2*     No results for input(s): \"CULT\" in the last 168 hours.    Imaging:  Recent Results (from the past 24 hours)   Chest XR,  PA & LAT    Narrative    EXAM: XR CHEST 2 VIEWS  LOCATION: [facility]  DATE: 2/10/2025    INDICATION: fall, L anterior rib pain  COMPARISON: 12/27/2023       Impression    IMPRESSION: Moderate-sized right pleural effusion has developed with passive atelectasis in the right lung. Reversed right total shoulder arthroplasty has been placed. Remainder unchanged. Implanted cardiac loop recorder. Left upper quadrant surgical   clips. Moderate degenerative change thoracic spine and bilateral acromioclavicular joints. Calcified aortic atherosclerosis.    XR Ankle Right 3 Views    Narrative    EXAM: XR ANKLE RIGHT G/E 3 VIEWS  LOCATION: [facility]  DATE: 2/10/2025    INDICATION: fall, ankle pain  COMPARISON: None.      Impression    IMPRESSION: No evidence of acute fracture. Mild degenerative arthritis tibiotalar joint and midfoot. Chronic well-corticated ossicles about the medial and lateral malleoli and dorsal talonavicular joint may be degenerative or sequela of remote trauma.   Plantar and Achilles calcaneal spurs. Arterial calcifications.   Chest CT w/o contrast    Narrative    EXAM: CT CHEST W/O CONTRAST  LOCATION: [facility]  DATE: 2/10/2025    INDICATION: fall, chest rib pain, moderate sized pleural effusion on xray  COMPARISON: CT dated 6/20/2024  TECHNIQUE: CT chest without IV contrast. Multiplanar reformats were obtained.  CONTRAST: None.  LIMITATIONS: High KV/high-dose technique and lack of intravenous contrast    FINDINGS:   LUNGS AND PLEURA: Large right pleural effusion, and associated compressive atelectasis. No pneumothorax.    MEDIASTINUM/AXILLAE: Stable heart size. New " small pericardial effusion. No mediastinal hematoma.    CORONARY ARTERY CALCIFICATION: None.    UPPER ABDOMEN: Postoperative changes in the stomach. Splenectomy. Cholecystectomy. 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    MUSCULOSKELETAL: Right shoulder arthroplasty. Implanted cardiac monitoring device. Mild to moderate multifocal degenerative change. No acute bony abnormalities. There are several old right anterior rib fractures.      Impression    IMPRESSION:   1.  Large right pleural effusion with associated compressive atelectasis.  2.  Small pericardial effusion.     CT Head w/o Contrast    Narrative    EXAM: CT HEAD W/O CONTRAST  LOCATION: [facility]  DATE: 2/10/2025    INDICATION: fall, head injury  COMPARISON: None.  TECHNIQUE: Routine CT Head without IV contrast. Multiplanar reformats. Dose reduction techniques were used.    FINDINGS:  INTRACRANIAL CONTENTS: No evidence of acute intracranial hemorrhage or mass effect. Scattered foci of decreased attenuation within the cerebral hemispheric white matter which are nonspecific, though most commonly ascribed to chronic small vessel ischemic   disease. The ventricles and sulci are prominent consistent with mild brain parenchymal volume loss. Gray-white matter differentiation is maintained. The basilar cisterns are patent.    VISUALIZED ORBITS/SINUSES/MASTOIDS: The globes are unremarkable. The partially imaged paranasal sinuses, mastoid air cells and middle ear cavities are unremarkable.     BONES/SOFT TISSUES: The visualized skull base and calvarium are unremarkable.      Impression    IMPRESSION:    1.  No evidence of acute intracranial hemorrhage or mass effect.  2.  Mild nonspecific white matter changes.  3.  Mild brain parenchymal volume loss.   CT Cervical Spine w/o Contrast    Narrative    EXAM: CT CERVICAL SPINE W/O CONTRAST  LOCATION: [facility]  DATE:  2/10/2025    INDICATION: fall  COMPARISON: None.  TECHNIQUE: Routine CT Cervical Spine without IV contrast. Multiplanar reformats. Dose reduction techniques were used.    FINDINGS:  No evidence of acute fracture or subluxation of the cervical spine by CT imaging. The vertebral bodies of the cervical spine have normal stature and alignment. Multilevel degenerative disc disease of the cervical spine with disc height loss, most   pronounced at C6/C7 and C7/T1. No extraspinal abnormality.     Craniovertebral junction and C1-C2: The odontoid process is well approximated with the anterior body of C1 and well aligned between the lateral masses of C1.    C2-C3: No posterior disc bulge or spinal canal narrowing. No neural foraminal narrowing.     C3-C4: No posterior disc bulge or spinal canal narrowing. Uncovertebral joint disease and facet arthropathy with severe left and moderate right neural foraminal narrowing.     C4-C5: No posterior disc bulge or spinal canal narrowing. No neural foraminal narrowing.     C5-C6: Route bar disc osteophyte complex with severe spinal canal narrowing. Uncovertebral joint disease and facet arthropathy with severe bilateral neural foraminal narrowing.     C6-C7: No posterior disc bulge or spinal canal narrowing. No neural foraminal narrowing.     C7-T1: No posterior disc bulge or spinal canal narrowing. No neural foraminal narrowing.       Impression    IMPRESSION:  1.  No evidence of acute fracture or subluxation of the cervical spine by CT imaging.  2.  Degenerative cervical spondylosis as described above.         Will Waldron DO MPH  [facility] Hospitalist  [address] [place] [address]  02/11/2025

## 2025-02-11 NOTE — PLAN OF CARE
"Pt is alert and oriented x4, was not oob due to pain, pure wick used with adequate output. Pt reports pain to L side of chest, scheduled Tylenol, Ibuprofen given, per pt was not affective, provider paged, new order for lidocaine patches and Voltaren gel received. Patches and cream applied. Pt refused cold pack, warm pack applied, helped per pt. SL. Pt is on 3L O2 via NC. Plan for thoracentesis in morning.         Goal Outcome Evaluation:      Plan of Care Reviewed With: patient    Overall Patient Progress: no changeOverall Patient Progress: no change    Outcome Evaluation: Plan for thoracentesis in am.      Problem: Adult Inpatient Plan of Care  Goal: Plan of Care Review  Description: The Plan of Care Review/Shift note should be completed every shift.  The Outcome Evaluation is a brief statement about your assessment that the patient is improving, declining, or no change.  This information will be displayed automatically on your shift  note.  Outcome: Not Progressing  Flowsheets (Taken 2/11/2025 0422)  Outcome Evaluation: Plan for thoracentesis in am.  Plan of Care Reviewed With: patient  Overall Patient Progress: no change  Goal: Patient-Specific Goal (Individualized)  Description: You can add care plan individualizations to a care plan. Examples of Individualization might be:  \"Parent requests to be called daily at 9am for status\", \"I have a hard time hearing out of my right ear\", or \"Do not touch me to wake me up as it startles  me\".  Outcome: Not Progressing  Goal: Absence of Hospital-Acquired Illness or Injury  Outcome: Not Progressing  Intervention: Identify and Manage Fall Risk  Recent Flowsheet Documentation  Taken 2/11/2025 0000 by Karey Johnston, RN  Safety Promotion/Fall Prevention: activity supervised  Intervention: Prevent and Manage VTE (Venous Thromboembolism) Risk  Recent Flowsheet Documentation  Taken 2/11/2025 0000 by Karey Johnston, RN  VTE Prevention/Management: SCDs on (sequential compression " devices)  Goal: Optimal Comfort and Wellbeing  Outcome: Not Progressing  Goal: Readiness for Transition of Care  Outcome: Not Progressing  Intervention: Mutually Develop Transition Plan  Recent Flowsheet Documentation  Taken 2/11/2025 0100 by Karey Johnston, RN  Equipment Currently Used at Home: walker, rolling     Problem: Pain Acute  Goal: Optimal Pain Control and Function  Outcome: Not Progressing

## 2025-02-11 NOTE — PROGRESS NOTES
Patient tolerated right thoracentesis well.  1000 mL of cloudy yellow fluid drained done by Dr. Restrepo. Dressing clean dry and intact with no drainage.  No albumin given. Patient to have US of bilateral legs after.  Samples collected were brought to lab for processing.

## 2025-02-11 NOTE — PHARMACY-ADMISSION MEDICATION HISTORY
Pharmacy Intern Admission Medication History    Admission medication history is complete. The information provided in this note is only as accurate as the sources available at the time of the update.    Information Source(s): Family member and CareEverywhere/SureScripts via in-person    Pertinent Information: None    Changes made to PTA medication list:  Added: Cephalexin, tylenol, aspirin   Deleted: TRIMPEX, SF 5000, Senna, Probiotic, MIRALAX, TAMIFLU, metoprolol, melatonin, XOPENEX, ketoconazole,   Changed:   Vitamin D 1000-->5000  Folic acid  Citalopram 40 mg-->20 mg  Buspirone BID-->TID    Allergies reviewed with patient and updates made in EHR: yes    Medication History Completed By: Cachorro Russ 2/10/2025 11:00 PM    PTA Med List   Medication Sig Last Dose/Taking    acetaminophen (TYLENOL) 500 MG tablet Take 1,000 mg by mouth 3 times daily. 2/10/2025 Morning    albuterol (PROAIR HFA/PROVENTIL HFA/VENTOLIN HFA) 108 (90 Base) MCG/ACT inhaler Inhale 2 puffs into the lungs every 4 hours as needed for shortness of breath / dyspnea or wheezing Taking As Needed    aspirin (ASA) 325 MG EC tablet Take 325 mg by mouth daily. 2/10/2025 Morning    atorvastatin (LIPITOR) 40 MG tablet Take 40 mg by mouth daily 2/10/2025 Morning    busPIRone (BUSPAR) 5 MG tablet Take 5 mg by mouth 3 times daily. 2/10/2025 Morning    Calcium Carb-Cholecalciferol 500-200 MG-UNIT PACK Take 1 tablet by mouth daily  2/10/2025 Morning    carbidopa-levodopa (SINEMET)  MG tablet Take 2 tablets by mouth 3 times daily 2/10/2025 Morning    cephALEXin (KEFLEX) 250 MG capsule Take 250 mg by mouth daily. 2/10/2025 Morning    cetirizine (ZYRTEC) 10 MG tablet TAKE 1 TABLET(10 MG) BY MOUTH DAILY 2/10/2025 Morning    cholecalciferol (VITAMIN D3) 125 mcg (5000 units) capsule Take 125 mcg by mouth daily. 2/10/2025 Morning    citalopram (CELEXA) 20 MG tablet Take 20 mg by mouth daily. 2/10/2025 Morning    cyanocobalamin (CYANOCOBALAMIN) 1000 MCG/ML  injection Inject 1 mL into the muscle every 30 days Past Month    denosumab (PROLIA) 60 MG/ML SOSY injection Inject 60 mg Subcutaneous every 6 months Past Week    diclofenac (VOLTAREN) 1 % topical gel Apply 4 g topically 4 times daily as needed for moderate pain Plus 2 g to hands tid Taking As Needed    folic acid (FOLVITE) 1 MG tablet Take 1 mg by mouth. 3 times daily expect on Wednesday 2/10/2025 Morning    gabapentin (NEURONTIN) 300 MG capsule Take 600 mg by mouth At Bedtime 2/9/2025 Bedtime    methotrexate 2.5 MG tablet Take 3 tablets by mouth every 7 days. Every Wednesday AM 2/5/2025 Morning    methotrexate 2.5 MG tablet Take 4 tablets by mouth every 7 days. Every Wednesday PM 2/5/2025 Bedtime    nystatin (MYCOSTATIN) 400473 UNIT/GM external powder Apply topically 2 times daily Apply to affected area topically BID for redness Taking    pantoprazole (PROTONIX) 20 MG EC tablet Take 20 mg by mouth daily 2/10/2025 Morning    vibegron (GEMTESA) 75 MG TABS tablet Take 75 mg by mouth at bedtime. 2/9/2025 Bedtime

## 2025-02-12 ENCOUNTER — APPOINTMENT (OUTPATIENT)
Dept: PHYSICAL THERAPY | Facility: CLINIC | Age: 85
DRG: 291 | End: 2025-02-12
Payer: MEDICARE

## 2025-02-12 LAB
ANION GAP SERPL CALCULATED.3IONS-SCNC: 11 MMOL/L (ref 7–15)
ATRIAL RATE - MUSE: 67 BPM
BUN SERPL-MCNC: 21.6 MG/DL (ref 8–23)
CALCIUM SERPL-MCNC: 8.1 MG/DL (ref 8.8–10.4)
CHLORIDE SERPL-SCNC: 106 MMOL/L (ref 98–107)
CREAT SERPL-MCNC: 0.91 MG/DL (ref 0.51–0.95)
DIASTOLIC BLOOD PRESSURE - MUSE: NORMAL MMHG
EGFRCR SERPLBLD CKD-EPI 2021: 63 ML/MIN/1.73M2
GLUCOSE SERPL-MCNC: 88 MG/DL (ref 70–99)
HCO3 SERPL-SCNC: 24 MMOL/L (ref 22–29)
HOLD SPECIMEN: NORMAL
INTERPRETATION ECG - MUSE: NORMAL
MAGNESIUM SERPL-MCNC: 1.5 MG/DL (ref 1.7–2.3)
MAGNESIUM SERPL-MCNC: 2.5 MG/DL (ref 1.7–2.3)
P AXIS - MUSE: 10 DEGREES
POTASSIUM SERPL-SCNC: 3.2 MMOL/L (ref 3.4–5.3)
POTASSIUM SERPL-SCNC: 3.2 MMOL/L (ref 3.4–5.3)
POTASSIUM SERPL-SCNC: 4.2 MMOL/L (ref 3.4–5.3)
PR INTERVAL - MUSE: 136 MS
QRS DURATION - MUSE: 88 MS
QT - MUSE: 472 MS
QTC - MUSE: 498 MS
R AXIS - MUSE: 4 DEGREES
SODIUM SERPL-SCNC: 141 MMOL/L (ref 135–145)
SYSTOLIC BLOOD PRESSURE - MUSE: NORMAL MMHG
T AXIS - MUSE: 34 DEGREES
VENTRICULAR RATE- MUSE: 67 BPM

## 2025-02-12 PROCEDURE — 250N000013 HC RX MED GY IP 250 OP 250 PS 637: Performed by: INTERNAL MEDICINE

## 2025-02-12 PROCEDURE — 84132 ASSAY OF SERUM POTASSIUM: CPT | Performed by: HOSPITALIST

## 2025-02-12 PROCEDURE — 250N000013 HC RX MED GY IP 250 OP 250 PS 637: Performed by: HOSPITALIST

## 2025-02-12 PROCEDURE — 250N000011 HC RX IP 250 OP 636: Performed by: HOSPITALIST

## 2025-02-12 PROCEDURE — 83735 ASSAY OF MAGNESIUM: CPT | Performed by: HOSPITALIST

## 2025-02-12 PROCEDURE — 82435 ASSAY OF BLOOD CHLORIDE: CPT | Performed by: HOSPITALIST

## 2025-02-12 PROCEDURE — 250N000011 HC RX IP 250 OP 636: Performed by: INTERNAL MEDICINE

## 2025-02-12 PROCEDURE — 120N000001 HC R&B MED SURG/OB

## 2025-02-12 PROCEDURE — 93005 ELECTROCARDIOGRAM TRACING: CPT

## 2025-02-12 PROCEDURE — 97116 GAIT TRAINING THERAPY: CPT | Mod: GP

## 2025-02-12 PROCEDURE — 99232 SBSQ HOSP IP/OBS MODERATE 35: CPT | Performed by: HOSPITALIST

## 2025-02-12 PROCEDURE — 97530 THERAPEUTIC ACTIVITIES: CPT | Mod: GP

## 2025-02-12 PROCEDURE — 36415 COLL VENOUS BLD VENIPUNCTURE: CPT | Performed by: HOSPITALIST

## 2025-02-12 RX ORDER — FOLIC ACID 1 MG/1
1 TABLET ORAL DAILY
Status: DISCONTINUED | OUTPATIENT
Start: 2025-02-13 | End: 2025-02-15 | Stop reason: HOSPADM

## 2025-02-12 RX ORDER — MAGNESIUM SULFATE HEPTAHYDRATE 40 MG/ML
4 INJECTION, SOLUTION INTRAVENOUS ONCE
Status: COMPLETED | OUTPATIENT
Start: 2025-02-12 | End: 2025-02-12

## 2025-02-12 RX ORDER — POTASSIUM CHLORIDE 1500 MG/1
40 TABLET, EXTENDED RELEASE ORAL ONCE
Status: COMPLETED | OUTPATIENT
Start: 2025-02-12 | End: 2025-02-12

## 2025-02-12 RX ADMIN — PANTOPRAZOLE SODIUM 40 MG: 40 TABLET, DELAYED RELEASE ORAL at 08:26

## 2025-02-12 RX ADMIN — FUROSEMIDE 40 MG: 10 INJECTION, SOLUTION INTRAMUSCULAR; INTRAVENOUS at 08:41

## 2025-02-12 RX ADMIN — LIDOCAINE 2 PATCH: 4 PATCH TOPICAL at 05:21

## 2025-02-12 RX ADMIN — FUROSEMIDE 40 MG: 10 INJECTION, SOLUTION INTRAMUSCULAR; INTRAVENOUS at 15:59

## 2025-02-12 RX ADMIN — BUSPIRONE HYDROCHLORIDE 5 MG: 5 TABLET ORAL at 08:26

## 2025-02-12 RX ADMIN — MAGNESIUM SULFATE HEPTAHYDRATE 4 G: 40 INJECTION, SOLUTION INTRAVENOUS at 10:38

## 2025-02-12 RX ADMIN — CARBIDOPA AND LEVODOPA 2 TABLET: 25; 100 TABLET ORAL at 08:26

## 2025-02-12 RX ADMIN — BUSPIRONE HYDROCHLORIDE 5 MG: 5 TABLET ORAL at 20:02

## 2025-02-12 RX ADMIN — CETIRIZINE HYDROCHLORIDE 10 MG: 10 TABLET, FILM COATED ORAL at 08:26

## 2025-02-12 RX ADMIN — GABAPENTIN 600 MG: 300 CAPSULE ORAL at 21:26

## 2025-02-12 RX ADMIN — POTASSIUM CHLORIDE 40 MEQ: 1500 TABLET, EXTENDED RELEASE ORAL at 10:36

## 2025-02-12 RX ADMIN — CITALOPRAM HYDROBROMIDE 20 MG: 20 TABLET ORAL at 08:26

## 2025-02-12 RX ADMIN — ACETAMINOPHEN 975 MG: 325 TABLET, FILM COATED ORAL at 20:01

## 2025-02-12 RX ADMIN — DICLOFENAC SODIUM 2 G: 10 GEL TOPICAL at 20:01

## 2025-02-12 RX ADMIN — BUSPIRONE HYDROCHLORIDE 5 MG: 5 TABLET ORAL at 14:35

## 2025-02-12 RX ADMIN — ONDANSETRON 4 MG: 2 INJECTION INTRAMUSCULAR; INTRAVENOUS at 22:36

## 2025-02-12 RX ADMIN — CARBIDOPA AND LEVODOPA 2 TABLET: 25; 100 TABLET ORAL at 14:36

## 2025-02-12 RX ADMIN — DICLOFENAC SODIUM 2 G: 10 GEL TOPICAL at 15:59

## 2025-02-12 RX ADMIN — DICLOFENAC SODIUM 2 G: 10 GEL TOPICAL at 10:43

## 2025-02-12 RX ADMIN — VIBEGRON 75 MG: 75 TABLET, FILM COATED ORAL at 21:27

## 2025-02-12 RX ADMIN — ACETAMINOPHEN 975 MG: 325 TABLET, FILM COATED ORAL at 08:26

## 2025-02-12 RX ADMIN — ACETAMINOPHEN 975 MG: 325 TABLET, FILM COATED ORAL at 14:36

## 2025-02-12 RX ADMIN — CARBIDOPA AND LEVODOPA 2 TABLET: 25; 100 TABLET ORAL at 20:02

## 2025-02-12 RX ADMIN — ATORVASTATIN CALCIUM 40 MG: 40 TABLET, FILM COATED ORAL at 08:26

## 2025-02-12 RX ADMIN — Medication 125 MCG: at 08:27

## 2025-02-12 RX ADMIN — DICLOFENAC SODIUM 2 G: 10 GEL TOPICAL at 12:39

## 2025-02-12 ASSESSMENT — ACTIVITIES OF DAILY LIVING (ADL)
ADLS_ACUITY_SCORE: 46

## 2025-02-12 NOTE — PLAN OF CARE
"Goal Outcome Evaluation:      Plan of Care Reviewed With: patient    Overall Patient Progress: no changeOverall Patient Progress: no change     Pt alert and oriented. VSS on 1L 02. Up with assist of 1 gait belt and walker. Voiding well. Had an episode of dizziness, lightheadedness when coming back from bathroom. Blood pressure dropped to 92/46. MD aware. Complained of chest pain with breathing. EKG completed. K. Mg replaced rechecks this afternoon. Pain controlled with tylenol, voltaren gel, lidocaine patches.           Problem: Adult Inpatient Plan of Care  Goal: Plan of Care Review  Description: The Plan of Care Review/Shift note should be completed every shift.  The Outcome Evaluation is a brief statement about your assessment that the patient is improving, declining, or no change.  This information will be displayed automatically on your shift  note.  Outcome: Progressing  Flowsheets (Taken 2/12/2025 1348)  Plan of Care Reviewed With: patient  Overall Patient Progress: no change  Goal: Patient-Specific Goal (Individualized)  Description: You can add care plan individualizations to a care plan. Examples of Individualization might be:  \"Parent requests to be called daily at 9am for status\", \"I have a hard time hearing out of my right ear\", or \"Do not touch me to wake me up as it startles  me\".  Outcome: Progressing  Goal: Absence of Hospital-Acquired Illness or Injury  Outcome: Progressing  Intervention: Identify and Manage Fall Risk  Recent Flowsheet Documentation  Taken 2/12/2025 1000 by Rose Guzman RN  Safety Promotion/Fall Prevention:   activity supervised   clutter free environment maintained   increase visualization of patient   nonskid shoes/slippers when out of bed   room near nurse's station   safety round/check completed   room organization consistent  Intervention: Prevent Skin Injury  Recent Flowsheet Documentation  Taken 2/12/2025 1000 by Rose Guzman RN  Body Position: supine, head " elevated  Intervention: Prevent and Manage VTE (Venous Thromboembolism) Risk  Recent Flowsheet Documentation  Taken 2/12/2025 1000 by Rose Guzman RN  VTE Prevention/Management:   SCDs off (sequential compression devices)   patient refused intervention  Intervention: Prevent Infection  Recent Flowsheet Documentation  Taken 2/12/2025 1000 by Rose Guzman RN  Infection Prevention:   hand hygiene promoted   rest/sleep promoted   single patient room provided  Goal: Optimal Comfort and Wellbeing  Outcome: Progressing  Intervention: Monitor Pain and Promote Comfort  Recent Flowsheet Documentation  Taken 2/12/2025 0826 by Rose Guzman RN  Pain Management Interventions:   medication (see MAR)   care clustered  Goal: Readiness for Transition of Care  Outcome: Progressing     Problem: Pain Acute  Goal: Optimal Pain Control and Function  Outcome: Progressing  Intervention: Develop Pain Management Plan  Recent Flowsheet Documentation  Taken 2/12/2025 0826 by Rose Guzman RN  Pain Management Interventions:   medication (see MAR)   care clustered  Intervention: Prevent or Manage Pain  Recent Flowsheet Documentation  Taken 2/12/2025 1000 by Rose Guzman RN  Medication Review/Management: medications reviewed

## 2025-02-12 NOTE — PROGRESS NOTES
[facility]    Hospitalist Progress Note  Name: Alma Rosa Fishman    MRN: [id number]  Provider:  Will Waldron DO MPH  Date of Service: 02/12/2025    Summary of Stay: Alma Rosa Fishman is a 84 year old female with PMH including Parkinson's, chronic diastolic CHF with recent self discontinuation of Lasix, DVT, hypertension, depression, GERD, asthma, type 2 diabetes who presents with a fall episode as well as shortness of breath.  She got up from her recliner this afternoon and tripped over a blanket and fell.  She did not lose consciousness but did have left-sided chest wall pain which was painful with breathing.  She came to the ER due to concern regarding a possible rib fracture.     Here in the emergency room she was hypertensive initially to 159 systolic but later up to 202 systolic and noted to be hypoxic to the 80s at rest.  Lab workup was relatively unremarkable though BNP was 880, BMP was grossly normal and troponin was detectable at 20.  CBC was grossly normal.  She underwent traumatic workup including CT cervical spine CT head and CT chest without contrast.  There were no evidence of fractures but she was noted to have a large right pleural effusion with associated compressive atelectasis and a small pericardial effusion as well.     At this point we suspect that her effusion is most likely related to CHF but certainly there is still a broad differential including malignant, less likely traumatic or infectious.  Underwent diagnostic and therapeutic thoracentesis with improvement of symptoms and continuing IV diuretics.     Assessment and Plan:   Acute hypoxemic respiratory failure with large right pleural effusion: Suspect this is acute on chronic diastolic CHF in the setting of self discontinuing her diuretic and potentially uncontrolled hypertension.  Evaluated further with a right-sided thoracentesis and an echocardiogram given that there was evidence of a pericardial effusion on her CT  scan.  --Given 60 mg IV Lasix in the ER, will continue with 40 mg twice daily as renal function is tolerating diuresis and she still has lower extremity edema.  --Will need to reevaluate the ongoing need for diuretics daily.  --Echocardiogram largely unremarkable  --Diagnostic and therapeutic thoracentesis completed yesterday with 1 L of clear fluid removed.  Given that she is 84 she would benefit from cytology, added this on to her pleural fluid studies this morning and will contact IR to ensure it gets performed.  Gram stain and cultures negative.  --Daily weights, strict ins and outs, cardiac monitoring and daily BMP.     Fall with left-sided chest wall pain: Traumatic workup negative for fractures.  Likely has a rib contusion.  Parkinson's certainly predisposes her to falls but it sounds as though this was mainly mechanical.  --PT consult, recommending discharge home with services  --Numerous allergies to pain medications noted  --Tylenol for pain (she reports she tolerates both, though has numerous narcotic allergies), discontinue ibuprofen     Accelerated hypertension: Systolic blood pressure up to 202  in the ER.  Some of this may be driven by pain or dyspnea but warrants treatment at this point.  --IV Lasix   --Treat pain  --IV hydralazine as needed for systolic blood pressure greater than 180  --If blood pressure remains elevated likely need to start a blood pressure agent     History of Parkinson's disease: Resume home Sinemet.  Uses a walker.  Did have another fall around [place].  Lives in [facility] with [place].     Chronic diastolic CHF: Follows at [facility].  Self discontinued her Lasix.     History of rheumatoid arthritis: Maintained on weekly methotrexate.  She does take Naproxen twice daily as well as gabapentin in the evening.     GERD: Continue home PPI.     Extensive allergy list, but confirmed she tolerates tylenol and ibuprofen     Diet-controlled type 2 diabetes    DVT Prophylaxis: Pneumatic  Compression Devices  Code Status: No CPR- Pre-arrest intubation OK  Diet: Combination Diet Regular Diet Adult    Quiroz Catheter: Not present  Disposition: Expected discharge in 1-2 days to home. Goals prior to discharge include thoracentesis.   Incidental Findings: As above.  Family updated today: Yes left voicemail for spouse Vadim.    40 MINUTES SPENT BY ME on the date of service doing chart review, history, exam, documentation & further activities per the note.      Interval History   The patient reports doing well. No chest pain and shortness of breath much improved. No nausea, vomiting, diarrhea, constipation. No fevers. No other specific complaints identified.     -Data reviewed today: I personally reviewed all new labs and imaging results over the last 24 hours.     Physical Exam   Temp: 97.4  F (36.3  C) Temp src: Temporal BP: (!) 141/60 Pulse: 73   Resp: 16 SpO2: 97 % O2 Device: Nasal cannula Oxygen Delivery: 1 LPM  Vitals:    02/10/25 2241 02/12/25 0659   Weight: 80.4 kg (177 lb 4 oz) 79.6 kg (175 lb 7.8 oz)     Vital Signs with Ranges  Temp:  [97.4  F (36.3  C)-97.8  F (36.6  C)] 97.4  F (36.3  C)  Pulse:  [70-84] 73  Resp:  [16-18] 16  BP: ()/(42-69) 141/60  SpO2:  [88 %-97 %] 97 %  I/O last 3 completed shifts:  In: 240 [P.O.:240]  Out: 1500 [Urine:1500]    GENERAL: No apparent distress. Awake, alert, and fully oriented.  HEENT: Normocephalic, atraumatic. Extraocular movements intact.  CARDIOVASCULAR: Regular rate and rhythm without murmurs or rubs. No S3.  PULMONARY: Clear bilaterally but dull on right.  GASTROINTESTINAL: Soft, non-tender, non-distended. Bowel sounds normoactive.   EXTREMITIES: No cyanosis or clubbing. 1+ BL LE edema.  NEUROLOGICAL: CN 2-12 grossly intact, no focal neurological deficits.  DERMATOLOGICAL: No rash, ulcer, bruising, nor jaundice.     Medications   Current Facility-Administered Medications   Medication Dose Route Frequency Provider Last Rate Last Admin     Current  Facility-Administered Medications   Medication Dose Route Frequency Provider Last Rate Last Admin    acetaminophen (TYLENOL) tablet 975 mg  975 mg Oral TID Mario Le MD   975 mg at 02/12/25 0826    aspirin (ASA) EC tablet 325 mg  325 mg Oral Daily Will Waldron DO        atorvastatin (LIPITOR) tablet 40 mg  40 mg Oral Daily Will Waldron DO   40 mg at 02/12/25 0826    busPIRone (BUSPAR) tablet 5 mg  5 mg Oral TID Will Waldron DO   5 mg at 02/12/25 0826    carbidopa-levodopa (SINEMET)  MG per tablet 2 tablet  2 tablet Oral TID Mario Le MD   2 tablet at 02/12/25 0826    [Held by provider] cephALEXin (KEFLEX) capsule 250 mg  250 mg Oral Daily Will Waldron DO        cetirizine (zyrTEC) tablet 10 mg  10 mg Oral Daily Will Waldron DO   10 mg at 02/12/25 0826    cholecalciferol (VITAMIN D3) capsule 125 mcg  125 mcg Oral Daily Will Waldron DO   125 mcg at 02/12/25 0827    citalopram (celeXA) tablet 20 mg  20 mg Oral Daily Will Waldron DO   20 mg at 02/12/25 0826    diclofenac (VOLTAREN) 1 % topical gel 2 g  2 g Topical 4x Daily Danay Nicole MD   2 g at 02/11/25 1948    [Held by provider] folic acid (FOLVITE) tablet 1 mg  1 mg Oral Daily Will Waldron DO        furosemide (LASIX) injection 40 mg  40 mg Intravenous BID Mario Le MD   40 mg at 02/12/25 0841    gabapentin (NEURONTIN) capsule 600 mg  600 mg Oral At Bedtime Mario Le MD   600 mg at 02/11/25 2147    Lidocaine (LIDOCARE) 4 % Patch 2 patch  2 patch Transdermal Q24H Danay Nicole MD   2 patch at 02/12/25 0521    magnesium sulfate 4 g in 100 mL sterile water intermittent infusion  4 g Intravenous Once Will Waldron DO        pantoprazole (PROTONIX) EC tablet 40 mg  40 mg Oral Daily Will Waldron DO   40 mg at 02/12/25 0826    potassium chloride manuel ER (KLOR-CON M20) CR tablet 40 mEq  40 mEq Oral Once  "Will Waldron DO        sodium chloride (PF) 0.9% PF flush 3 mL  3 mL Intracatheter Q8H Tawanda Le MD   3 mL at 25 0523    vibegron (GEMTESA) tablet 75 mg  75 mg Oral At Bedtime Will Waldron DO   75 mg at 25 2147     Data     Laboratory:  Recent Labs   Lab 25  0703 02/10/25  1906   WBC 8.5 9.4   HGB 11.5* 12.3   HCT 34.2* 35.4   * 105*   * 159     Recent Labs   Lab 25  0720 25  0703 02/10/25  1906    143 142   POTASSIUM 3.2*  3.2* 3.5 3.7   CHLORIDE 106 108* 107   CO2 24 25 24   ANIONGAP 11 10 11   GLC 88 101* 93   BUN 21.6 19.6 19.4   CR 0.91 0.85 0.84   GFRESTIMATED 63 68 69   ELAN 8.1* 8.1* 8.2*     No results for input(s): \"CULT\" in the last 168 hours.    Imaging:  Recent Results (from the past 24 hours)   Echocardiogram Complete   Result Value    LVEF  55-60%    Narrative    [id number]  [id number]  [id number]  [id number]^KAREN^TAWANDA^CUCA     [facility]  Echocardiography Laboratory  [address] [place] [address]     Name: BRIAN JUNE  MRN: [id number]  : 1940  Study Date: 2025 11:32 AM  Age: 84 yrs  Gender: Female  Patient Location: [facility]  Reason For Study: CHF  Ordering Physician: TAWANDA LE  Referring Physician: Joselin Tai  Performed By: Rosalinda Stephens RDCS     BSA: 1.8 m2  Height: 61 in  Weight: 177 lb  HR: 71  BP: 190/113 mmHg  ______________________________________________________________________________  Procedure  Echocardiogram with two-dimensional, color and spectral Doppler.  ______________________________________________________________________________  Interpretation Summary     1. The left ventricle is normal in size. There is mild to moderate concentric  left ventricular hypertrophy. Left ventricular systolic function is normal.  The visual ejection fraction is 55-60%. Grade I or early diastolic  dysfunction. Diastolic Doppler findings (E/E' " ratio and/or other parameters)  suggest left ventricular filling pressures are increased. No regional wall  motion abnormalities noted.  2. The right ventricle is normal size. The right ventricular systolic function  is normal.  3. The left atrium is moderately dilated. The right atrium is mildly dilated.  There is no color Doppler evidence of an atrial shunt.  4. Trace to mild mitral and tricuspid regurgitation.  5. Trivial pericardial effusion.  6. No previous study for comparison.  ______________________________________________________________________________  Left Ventricle  The left ventricle is normal in size. There is mild to moderate concentric  left ventricular hypertrophy. Left ventricular systolic function is normal.  The visual ejection fraction is 55-60%. Grade I or early diastolic  dysfunction. Diastolic Doppler findings (E/E' ratio and/or other parameters)  suggest left ventricular filling pressures are increased. No regional wall  motion abnormalities noted.     Right Ventricle  The right ventricle is normal size. The right ventricular systolic function is  normal.     Atria  The left atrium is moderately dilated. The right atrium is mildly dilated.  There is no color Doppler evidence of an atrial shunt.     Mitral Valve  There is trace to mild mitral regurgitation.     Tricuspid Valve  There is mild (1+) tricuspid regurgitation. The right ventricular systolic  pressure is approximated at 45.7 mmHg plus the right atrial pressure.     Aortic Valve  There is mild trileaflet aortic sclerosis. No aortic regurgitation is present.  No aortic stenosis is present.     Pulmonic Valve  There is trace pulmonic valvular regurgitation. There is no pulmonic valvular  stenosis.     Vessels  The aortic root is normal size. Normal size ascending aorta. Descending aortic  velocity normal. The inferior vena cava is normal.     Pericardium  Trivial pericardial effusion.     Rhythm  Sinus rhythm was  noted.  ______________________________________________________________________________  MMode/2D Measurements & Calculations  IVSd: 1.2 cm     LVIDd: 4.6 cm  LVIDs: 2.4 cm  LVPWd: 1.1 cm  IVC diam: 1.7 cm  FS: 47.8 %  LV mass(C)d: 203.9 grams  LV mass(C)dI: 113.7 grams/m2  Ao root diam: 3.2 cm  LVOT diam: 2.1 cm  LVOT area: 3.6 cm2  Ao root diam index Ht(cm/m): 2.1  Ao root diam index BSA (cm/m2): 1.8  LA Volume (BP): 79.9 ml  LA Volume Index (BP): 44.6 ml/m2  RV Base: 4.1 cm     RWT: 0.49  TAPSE: 1.8 cm     Doppler Measurements & Calculations  MV E max renzo: 84.9 cm/sec  MV A max renzo: 101.9 cm/sec  MV E/A: 0.83  MV max P.4 mmHg  MV mean P.4 mmHg  MV V2 VTI: 31.7 cm  MV dec time: 0.23 sec  PA acc time: 0.13 sec  TR max renzo: 338.0 cm/sec  TR max P.7 mmHg  E/E' av.8  Lateral E/e': 20.5  Medial E/e': 21.1  RV S Renzo: 11.9 cm/sec     ______________________________________________________________________________  Report approved by: Julianna Pan MD on 2025 01:02 PM         US Lower Extremity Venous Duplex Bilateral    Narrative    EXAM: US LOWER EXTREMITY VENOUS DUPLEX BILATERAL  LOCATION: [facility]  DATE: 2025    INDICATION: Bilateral leg swelling.  COMPARISON: None.  TECHNIQUE: Venous Duplex ultrasound of bilateral lower extremities with and without compression, augmentation and duplex. Color flow and spectral Doppler with waveform analysis performed.    FINDINGS: Exam includes the common femoral, femoral, popliteal veins as well as segmentally visualized deep calf veins and greater saphenous vein.     RIGHT: No deep vein thrombosis. No superficial thrombophlebitis. No popliteal cyst.    LEFT: No deep vein thrombosis. No superficial thrombophlebitis. No popliteal cyst.      Impression    IMPRESSION:  No deep venous thrombosis in the bilateral lower extremities.   US Thoracentesis    Narrative    EXAM:   1. RIGHT THORACENTESIS  2. ULTRASOUND GUIDANCE  LOCATION: [facility]  [facility]  DATE: 2/11/2025    INDICATION: Pleural effusion.    PROCEDURE: Informed consent obtained. Time out performed. The chest was prepped and draped in sterile fashion. 10 mL of 1 % lidocaine was infused into the local soft tissues. Under direct ultrasound guidance, a 5 Hungarian catheter system was placed into   the pleural effusion.     1 liter of clear yellow fluid were removed and sent to lab, if requested.    Patient tolerated procedure well.    Ultrasound imaging was obtained and placed in the patient's permanent medical record.      Impression    IMPRESSION:  Status post right ultrasound-guided thoracentesis. 1 L removed.           Will Waldron DO MPH  [facility] Hospitalist  [address] [place] [address]  02/12/2025

## 2025-02-12 NOTE — PLAN OF CARE
"Goal Outcome Evaluation:      Plan of Care Reviewed With: patient    Overall Patient Progress: improvingOverall Patient Progress: improving    Outcome Evaluation: VSS. On 1L via NC. A&Ox4. On tele, SR, HR 79. On potassium and magnesium protocols. Pain controlled with scheduled tylenol and ibuprofen. Assist of 1 GB/W. On IV lasix. Incontinent at times. Plan is discharge home when medically ready.      Problem: Adult Inpatient Plan of Care  Goal: Plan of Care Review  Description: The Plan of Care Review/Shift note should be completed every shift.  The Outcome Evaluation is a brief statement about your assessment that the patient is improving, declining, or no change.  This information will be displayed automatically on your shift  note.  Outcome: Progressing  Flowsheets (Taken 2/11/2025 2124)  Outcome Evaluation: VSS. On 1L via NC. A&Ox4. On tele, SR, HR 79. On potassium and magnesium protocols. Pain controlled with scheduled tylenol and ibuprofen. Assist of 1 GB/W. On IV lasix. Incontinent at times. Plan is discharge home when medically ready.  Plan of Care Reviewed With: patient  Overall Patient Progress: improving  Goal: Patient-Specific Goal (Individualized)  Description: You can add care plan individualizations to a care plan. Examples of Individualization might be:  \"Parent requests to be called daily at 9am for status\", \"I have a hard time hearing out of my right ear\", or \"Do not touch me to wake me up as it startles  me\".  Outcome: Progressing  Goal: Absence of Hospital-Acquired Illness or Injury  Outcome: Progressing  Intervention: Identify and Manage Fall Risk  Recent Flowsheet Documentation  Taken 2/11/2025 1617 by Jed Hurtado RN  Safety Promotion/Fall Prevention:   activity supervised   assistive device/personal items within reach   clutter free environment maintained   increased rounding and observation   increase visualization of patient   nonskid shoes/slippers when out of bed   patient and family " education   safety round/check completed   supervised activity  Intervention: Prevent and Manage VTE (Venous Thromboembolism) Risk  Recent Flowsheet Documentation  Taken 2/11/2025 1617 by Jed Hurtado RN  VTE Prevention/Management:   SCDs off (sequential compression devices)   patient refused intervention  Intervention: Prevent Infection  Recent Flowsheet Documentation  Taken 2/11/2025 1617 by Jed Hurtado RN  Infection Prevention:   hand hygiene promoted   rest/sleep promoted   single patient room provided  Goal: Optimal Comfort and Wellbeing  Outcome: Progressing  Intervention: Monitor Pain and Promote Comfort  Recent Flowsheet Documentation  Taken 2/11/2025 1617 by Jed Hurtado RN  Pain Management Interventions:   medication (see MAR)   care clustered  Goal: Readiness for Transition of Care  Outcome: Progressing     Problem: Pain Acute  Goal: Optimal Pain Control and Function  Outcome: Progressing  Intervention: Develop Pain Management Plan  Recent Flowsheet Documentation  Taken 2/11/2025 1617 by Jed Hurtado RN  Pain Management Interventions:   medication (see MAR)   care clustered  Intervention: Prevent or Manage Pain  Recent Flowsheet Documentation  Taken 2/11/2025 1617 by Jed Hurtado RN  Medication Review/Management: medications reviewed

## 2025-02-12 NOTE — PLAN OF CARE
"Goal Outcome Evaluation:      Plan of Care Reviewed With: patient    Overall Patient Progress: improvingOverall Patient Progress: improving       A&O. Ax1 GB/walker. 1 O2.Voiding.  K/mag protocol. No PRNs given overnight.       Problem: Adult Inpatient Plan of Care  Goal: Plan of Care Review  Description: The Plan of Care Review/Shift note should be completed every shift.  The Outcome Evaluation is a brief statement about your assessment that the patient is improving, declining, or no change.  This information will be displayed automatically on your shift  note.  Outcome: Progressing  Flowsheets (Taken 2/12/2025 0700)  Plan of Care Reviewed With: patient  Overall Patient Progress: improving  Goal: Patient-Specific Goal (Individualized)  Description: You can add care plan individualizations to a care plan. Examples of Individualization might be:  \"Parent requests to be called daily at 9am for status\", \"I have a hard time hearing out of my right ear\", or \"Do not touch me to wake me up as it startles  me\".  Outcome: Progressing  Goal: Absence of Hospital-Acquired Illness or Injury  Outcome: Progressing  Intervention: Identify and Manage Fall Risk  Recent Flowsheet Documentation  Taken 2/12/2025 0252 by Janie Myers, RN  Safety Promotion/Fall Prevention:   activity supervised   clutter free environment maintained   increase visualization of patient   nonskid shoes/slippers when out of bed   room near nurse's station   safety round/check completed   room organization consistent  Intervention: Prevent Infection  Recent Flowsheet Documentation  Taken 2/12/2025 0252 by Janie Myers, RN  Infection Prevention:   hand hygiene promoted   rest/sleep promoted   single patient room provided  Goal: Optimal Comfort and Wellbeing  Outcome: Progressing  Goal: Readiness for Transition of Care  Outcome: Progressing     Problem: Pain Acute  Goal: Optimal Pain Control and Function  Outcome: " Progressing  Intervention: Prevent or Manage Pain  Recent Flowsheet Documentation  Taken 2/12/2025 0252 by Janie Myers, RN  Medication Review/Management: medications reviewed

## 2025-02-13 ENCOUNTER — APPOINTMENT (OUTPATIENT)
Dept: GENERAL RADIOLOGY | Facility: CLINIC | Age: 85
DRG: 291 | End: 2025-02-13
Attending: HOSPITALIST
Payer: MEDICARE

## 2025-02-13 VITALS
DIASTOLIC BLOOD PRESSURE: 54 MMHG | BODY MASS INDEX: 31.32 KG/M2 | OXYGEN SATURATION: 94 % | HEART RATE: 66 BPM | RESPIRATION RATE: 16 BRPM | TEMPERATURE: 97.6 F | WEIGHT: 165.79 LBS | SYSTOLIC BLOOD PRESSURE: 131 MMHG

## 2025-02-13 LAB
ANION GAP SERPL CALCULATED.3IONS-SCNC: 15 MMOL/L (ref 7–15)
BACTERIA PLR CULT: NORMAL
BUN SERPL-MCNC: 26.2 MG/DL (ref 8–23)
CALCIUM SERPL-MCNC: 8.2 MG/DL (ref 8.8–10.4)
CHLORIDE SERPL-SCNC: 106 MMOL/L (ref 98–107)
CREAT SERPL-MCNC: 0.92 MG/DL (ref 0.51–0.95)
EGFRCR SERPLBLD CKD-EPI 2021: 62 ML/MIN/1.73M2
GLUCOSE SERPL-MCNC: 101 MG/DL (ref 70–99)
GRAM STAIN RESULT: NORMAL
GRAM STAIN RESULT: NORMAL
HCO3 SERPL-SCNC: 20 MMOL/L (ref 22–29)
MAGNESIUM SERPL-MCNC: 2 MG/DL (ref 1.7–2.3)
PATH REPORT.COMMENTS IMP SPEC: NORMAL
PATH REPORT.FINAL DX SPEC: NORMAL
PATH REPORT.GROSS SPEC: NORMAL
PATH REPORT.MICROSCOPIC SPEC OTHER STN: NORMAL
POTASSIUM SERPL-SCNC: 4.1 MMOL/L (ref 3.4–5.3)
POTASSIUM SERPL-SCNC: 4.1 MMOL/L (ref 3.4–5.3)
SODIUM SERPL-SCNC: 141 MMOL/L (ref 135–145)

## 2025-02-13 PROCEDURE — 120N000001 HC R&B MED SURG/OB

## 2025-02-13 PROCEDURE — 36415 COLL VENOUS BLD VENIPUNCTURE: CPT | Performed by: INTERNAL MEDICINE

## 2025-02-13 PROCEDURE — 250N000013 HC RX MED GY IP 250 OP 250 PS 637: Performed by: INTERNAL MEDICINE

## 2025-02-13 PROCEDURE — 71046 X-RAY EXAM CHEST 2 VIEWS: CPT

## 2025-02-13 PROCEDURE — 99232 SBSQ HOSP IP/OBS MODERATE 35: CPT | Performed by: HOSPITALIST

## 2025-02-13 PROCEDURE — 250N000013 HC RX MED GY IP 250 OP 250 PS 637: Performed by: HOSPITALIST

## 2025-02-13 PROCEDURE — 80048 BASIC METABOLIC PNL TOTAL CA: CPT | Performed by: HOSPITALIST

## 2025-02-13 PROCEDURE — 83735 ASSAY OF MAGNESIUM: CPT | Performed by: INTERNAL MEDICINE

## 2025-02-13 PROCEDURE — 88305 TISSUE EXAM BY PATHOLOGIST: CPT | Mod: 26 | Performed by: PATHOLOGY

## 2025-02-13 PROCEDURE — 84132 ASSAY OF SERUM POTASSIUM: CPT | Performed by: INTERNAL MEDICINE

## 2025-02-13 PROCEDURE — 73030 X-RAY EXAM OF SHOULDER: CPT | Mod: LT

## 2025-02-13 PROCEDURE — 88112 CYTOPATH CELL ENHANCE TECH: CPT | Mod: 26 | Performed by: PATHOLOGY

## 2025-02-13 PROCEDURE — 250N000011 HC RX IP 250 OP 636: Performed by: INTERNAL MEDICINE

## 2025-02-13 RX ORDER — GUAIFENESIN 200 MG/10ML
200 LIQUID ORAL EVERY 4 HOURS PRN
Status: DISCONTINUED | OUTPATIENT
Start: 2025-02-13 | End: 2025-02-15 | Stop reason: HOSPADM

## 2025-02-13 RX ADMIN — FUROSEMIDE 40 MG: 10 INJECTION, SOLUTION INTRAMUSCULAR; INTRAVENOUS at 08:28

## 2025-02-13 RX ADMIN — DICLOFENAC SODIUM 2 G: 10 GEL TOPICAL at 20:16

## 2025-02-13 RX ADMIN — GUAIFENESIN 200 MG: 100 LIQUID ORAL at 08:28

## 2025-02-13 RX ADMIN — CETIRIZINE HYDROCHLORIDE 10 MG: 10 TABLET, FILM COATED ORAL at 08:28

## 2025-02-13 RX ADMIN — BUSPIRONE HYDROCHLORIDE 5 MG: 5 TABLET ORAL at 20:16

## 2025-02-13 RX ADMIN — GUAIFENESIN 200 MG: 100 LIQUID ORAL at 13:36

## 2025-02-13 RX ADMIN — VIBEGRON 75 MG: 75 TABLET, FILM COATED ORAL at 21:06

## 2025-02-13 RX ADMIN — ACETAMINOPHEN 975 MG: 325 TABLET, FILM COATED ORAL at 20:16

## 2025-02-13 RX ADMIN — PANTOPRAZOLE SODIUM 40 MG: 40 TABLET, DELAYED RELEASE ORAL at 08:27

## 2025-02-13 RX ADMIN — Medication 125 MCG: at 08:28

## 2025-02-13 RX ADMIN — CARBIDOPA AND LEVODOPA 2 TABLET: 25; 100 TABLET ORAL at 08:28

## 2025-02-13 RX ADMIN — ATORVASTATIN CALCIUM 40 MG: 40 TABLET, FILM COATED ORAL at 08:27

## 2025-02-13 RX ADMIN — BUSPIRONE HYDROCHLORIDE 5 MG: 5 TABLET ORAL at 08:27

## 2025-02-13 RX ADMIN — ASPIRIN 325 MG: 325 TABLET, COATED ORAL at 08:27

## 2025-02-13 RX ADMIN — CITALOPRAM HYDROBROMIDE 20 MG: 20 TABLET ORAL at 08:28

## 2025-02-13 RX ADMIN — ACETAMINOPHEN 975 MG: 325 TABLET, FILM COATED ORAL at 13:35

## 2025-02-13 RX ADMIN — DICLOFENAC SODIUM 2 G: 10 GEL TOPICAL at 13:35

## 2025-02-13 RX ADMIN — CARBIDOPA AND LEVODOPA 2 TABLET: 25; 100 TABLET ORAL at 20:16

## 2025-02-13 RX ADMIN — GABAPENTIN 600 MG: 300 CAPSULE ORAL at 21:07

## 2025-02-13 RX ADMIN — LIDOCAINE 2 PATCH: 4 PATCH TOPICAL at 04:40

## 2025-02-13 RX ADMIN — GUAIFENESIN 200 MG: 100 LIQUID ORAL at 20:16

## 2025-02-13 RX ADMIN — DICLOFENAC SODIUM 2 G: 10 GEL TOPICAL at 08:38

## 2025-02-13 RX ADMIN — BUSPIRONE HYDROCHLORIDE 5 MG: 5 TABLET ORAL at 13:35

## 2025-02-13 RX ADMIN — CARBIDOPA AND LEVODOPA 2 TABLET: 25; 100 TABLET ORAL at 13:35

## 2025-02-13 RX ADMIN — ACETAMINOPHEN 975 MG: 325 TABLET, FILM COATED ORAL at 08:25

## 2025-02-13 RX ADMIN — DICLOFENAC SODIUM 2 G: 10 GEL TOPICAL at 15:57

## 2025-02-13 RX ADMIN — GUAIFENESIN 200 MG: 100 LIQUID ORAL at 03:41

## 2025-02-13 ASSESSMENT — ACTIVITIES OF DAILY LIVING (ADL)
ADLS_ACUITY_SCORE: 46
ADLS_ACUITY_SCORE: 49
ADLS_ACUITY_SCORE: 46
ADLS_ACUITY_SCORE: 46
ADLS_ACUITY_SCORE: 49
ADLS_ACUITY_SCORE: 46
ADLS_ACUITY_SCORE: 49
DEPENDENT_IADLS:: CLEANING;COOKING;LAUNDRY;SHOPPING;MEAL PREPARATION;MEDICATION MANAGEMENT
ADLS_ACUITY_SCORE: 46
ADLS_ACUITY_SCORE: 49
ADLS_ACUITY_SCORE: 49
ADLS_ACUITY_SCORE: 46
ADLS_ACUITY_SCORE: 49
ADLS_ACUITY_SCORE: 46
ADLS_ACUITY_SCORE: 49
ADLS_ACUITY_SCORE: 46

## 2025-02-13 NOTE — PLAN OF CARE
"Goal Outcome Evaluation:    Plan of Care Reviewed With: patient    Overall Patient Progress: improving    Outcome Evaluation: Up with A1 GB/walker. 2 PIVs saline locked. Complaints of pain on right shoulder/arm. Tele. K+ and Mg protocol. Both replaced. O2 at 1LPM via NC. PRN robitussin given for cough.    Problem: Adult Inpatient Plan of Care  Goal: Plan of Care Review  Description: The Plan of Care Review/Shift note should be completed every shift.  The Outcome Evaluation is a brief statement about your assessment that the patient is improving, declining, or no change.  This information will be displayed automatically on your shift  note.  Outcome: Progressing  Flowsheets (Taken 2/13/2025 0350)  Outcome Evaluation: Up with A1 GB/walker. 2 PIVs saline locked. Complaints of pain on right shoulder/arm. Tele. K+ and Mg protocol. Both replaced. O2 at 1LPM via NC. PRN robitussin given for cough.  Plan of Care Reviewed With: patient  Overall Patient Progress: improving  Goal: Patient-Specific Goal (Individualized)  Description: You can add care plan individualizations to a care plan. Examples of Individualization might be:  \"Parent requests to be called daily at 9am for status\", \"I have a hard time hearing out of my right ear\", or \"Do not touch me to wake me up as it startles  me\".  Outcome: Progressing  Goal: Absence of Hospital-Acquired Illness or Injury  Outcome: Progressing  Goal: Optimal Comfort and Wellbeing  Outcome: Progressing  Goal: Readiness for Transition of Care  Outcome: Progressing     Problem: Pain Acute  Goal: Optimal Pain Control and Function  Outcome: Progressing       "

## 2025-02-13 NOTE — DISCHARGE INSTRUCTIONS
Your home care referral was sent to [facility] for RN, PT OT HHA  If you haven't heard from them within the next 24-48 hours,  Please call them at [phone]

## 2025-02-13 NOTE — CONSULTS
Care Management Initial Consult    General Information  Assessment completed with: Alma Rosa Ruiz  Type of CM/SW Visit: Initial Assessment    Primary Care Provider verified and updated as needed: Yes   Readmission within the last 30 days: no previous admission in last 30 days      Reason for Consult: discharge planning  Advance Care Planning:            Communication Assessment  Patient's communication style: spoken language (English or Bilingual)    Hearing Difficulty or Deaf: no   Wear Glasses or Blind: yes    Cognitive  Cognitive/Neuro/Behavioral: WDL                      Living Environment:   People in home: spouse     Current living Arrangements: condominium      Able to return to prior arrangements: yes       Family/Social Support:  Care provided by: self, spouse/significant other  Provides care for: no one  Marital Status:   Support system: , Children          Description of Support System: Involved, Supportive    Support Assessment: Adequate social supports    Current Resources:   Patient receiving home care services: No        Community Resources: None  Equipment currently used at home: walker, rolling  Supplies currently used at home: Incontinence Supplies    Employment/Financial:  Employment Status:          Financial Concerns:             Does the patient's insurance plan have a 3 day qualifying hospital stay waiver?  No    Lifestyle & Psychosocial Needs:  Social Drivers of Health     Food Insecurity: Low Risk  (2/11/2025)    Food Insecurity     Within the past 12 months, did you worry that your food would run out before you got money to buy more?: No     Within the past 12 months, did the food you bought just not last and you didn t have money to get more?: No   Depression: Not at risk (10/21/2024)    Received from [facility]    PHQ-2     PHQ-2 Total: 0   Housing Stability: Low Risk  (2/11/2025)    Housing Stability     Do you have housing? : Yes     Are  you worried about losing your housing?: No   Tobacco Use: Low Risk  (1/6/2025)    Received from [facility]    Patient History     Smoking Tobacco Use: Never     Smokeless Tobacco Use: Never     Passive Exposure: Not on file   Financial Resource Strain: Low Risk  (2/11/2025)    Financial Resource Strain     Within the past 12 months, have you or your family members you live with been unable to get utilities (heat, electricity) when it was really needed?: No   Alcohol Use: Not At Risk (3/1/2023)    Received from [facility]    AUDIT-C     Frequency of Alcohol Consumption: 4 or more times a week     Average Number of Drinks: 1 or 2     Frequency of Binge Drinking: Never   Transportation Needs: Low Risk  (2/11/2025)    Transportation Needs     Within the past 12 months, has lack of transportation kept you from medical appointments, getting your medicines, non-medical meetings or appointments, work, or from getting things that you need?: No   Physical Activity: Inactive (5/22/2024)    Received from [facility]    Exercise Vital Sign     Days of Exercise per Week: 0 days     Minutes of Exercise per Session: 0 min   Interpersonal Safety: Low Risk  (2/11/2025)    Interpersonal Safety     Do you feel physically and emotionally safe where you currently live?: Yes     Within the past 12 months, have you been hit, slapped, kicked or otherwise physically hurt by someone?: No     Within the past 12 months, have you been humiliated or emotionally abused in other ways by your partner or ex-partner?: No   Stress: Stress Concern Present (3/1/2023)    Received from [facility]    [employer] - Occupational Stress Questionnaire     Feeling of Stress : To some extent   Social Connections: Moderately Isolated (3/1/2023)    Received from [facility], [facility]    Social Connection and Isolation Panel [NHANES]     Frequency of Communication with Friends and Family: More than  three times a week     Frequency of Social Gatherings with Friends and Family: Patient declined     Attends Church Services: Never     Active Member of Clubs or Organizations: No     Attends Club or Organization Meetings: Never     Marital Status:    Health Literacy: Not on file       Functional Status:  Prior to admission patient needed assistance:   Dependent ADLs:: Bathing, Ambulation-walker  Dependent IADLs:: Cleaning, Cooking, Laundry, Shopping, Meal Preparation, Medication Management  Assesssment of Functional Status: Not at  functional baseline, Not at baseline with mobility, Not at baseline with ADL Functioning    Mental Health Status:          Chemical Dependency Status:                Values/Beliefs:  Spiritual, Cultural Beliefs, Church Practices, Values that affect care:                 Discussed  Partnership in Safe Discharge Planning  document with patient/family: No    Additional Information:  Patient admitted for after a fall and having L sided chest wall pain. She was found to have a R pleural effusion and CHF with rib contusion.  She is followed by the Hospitalist.  Patient lives [place] condo with her . They work together to accomplish tasks. He assists her with a shower. She ambulated with a walker. Transportation home via her spouse.  PT recommends HC PT. CM discussed with patient the PT recommendations . She is in agreement and has no preference of agency. She would like to add RN, OT and HHA after discussion.  Referral sent for the above.        Next Steps: Monitor for HC acceptance.    Linda Peterson, RN, BSN, CM  Inpatient Care Coordination  [facility]  [phone]

## 2025-02-13 NOTE — PLAN OF CARE
Patient:   RHIANNA MARINELLI            MRN: [id number]            FIN: [id number]              Age:   51 years     Sex:  FEMALE     :  72   Associated Diagnoses:   None   Author:   VAISHALI-PhD, PARISH     SEE SAME-DAY CONSULT NOTE. STUDENT RETURNED TO FURTHER DISCUSS PT'S PSYCHOLOGICAL HEALTH.  Impression: Pt was seen for f/u visit by doctoral student Ciera Amato and [facility] psychologist Dr. Brower due to reported concerns of bad dreams.  Pt reported she had been having these dreams over the past month or so.  She explained dreaming that she is having an out of body experience and also feeling that she is in a dream state, in her dreams.  Pt shared she often does not talk about her emotions and does not know much about emotions.  She  reported feeling \"happy\" most of the time.  Discussed the mind-body connection and how her emotional health impacts her physical health.  Discussed if supression of her emotions is contributing to her dreams of others hurting her.  Pt was able to acknowledge that she may feel sad but added her sadness is not at the clinical level.  Discussed with pt this writer's assessment of her presentation from earlier and how her tendency to be  logical could be her attempt at expressing feelings of helplessness and hopelessness.  Pt was in agreement that she often feels she is unable to do anything about her condition.  She appeared willing to explore her emotions and appeared to pondered on discussion.   Pt's attitude was cooperative. Mood good, affect neutral. Pt was oriented x 4. Concentration appeared focused.  Speech was WNL. Thoughts appeared linear. Pt denied SI, HI, hallucinations. Behavior was observed to be WNL. Judgment adequate, and insight poor-adequate.  Conclusion:  Pt was seen for f/u visit by doctoral student Ciera Amato and [facility] psychologist Dr. Brower due to reported concerns of bad dreams.  Pt appeared more open to discuss her emotions and acknowledged that she is not well  Goal Outcome Evaluation:      Plan of Care Reviewed With: patient    Overall Patient Progress: improving    Outcome Evaluation: lashonda PO well, up with assist of 1 with walker and gait belt, voiding, Tele d/cd, K+ and MG good and reordered for tomorrow am, PRN Robitussin for cough, attempted to wean O2 but dips to 88% on RA, 1 L O2 per nasal cannula lashonda well,c/o WING, waiting on CXR and L shoulder xrays to be done      Problem: Adult Inpatient Plan of Care  Goal: Plan of Care Review    Outcome: Progressing  Flowsheets (Taken 2/13/2025 1319)  Outcome Evaluation: lashonda PO well, up with assist of 1 with walker and gait belt, voiding, Tele d/cd, K+ and MG good and reordered for tomorrow am, PRN Robitussin for cough, attempted to wean O2 but dips to 88% on RA, 1 L O2 per nasal cannula lashonda well,c/o WING, waiting on CXR and L shoulder xrays to be done  Plan of Care Reviewed With: patient  Overall Patient Progress: improving  Goal: Patient-Specific Goal (Individualized)    Outcome: Progressing  Goal: Absence of Hospital-Acquired Illness or Injury  Outcome: Progressing  Intervention: Identify and Manage Fall Risk  Recent Flowsheet Documentation  Taken 2/13/2025 0820 by Brooklyn Powers RN  Safety Promotion/Fall Prevention:   activity supervised   assistive device/personal items within reach   safety round/check completed   nonskid shoes/slippers when out of bed   lighting adjusted   clutter free environment maintained  Intervention: Prevent Skin Injury  Recent Flowsheet Documentation  Taken 2/13/2025 0820 by Brooklyn Powers RN  Body Position: position changed independently  Skin Protection: adhesive use limited  Intervention: Prevent Infection  Recent Flowsheet Documentation  Taken 2/13/2025 0820 by Brooklyn Powers RN  Infection Prevention: hand hygiene promoted  Goal: Optimal Comfort and Wellbeing  Outcome: Progressing  Intervention: Monitor Pain and Promote Comfort  Recent Flowsheet Documentation  Taken 2/13/2025 0825 by  Brooklyn Powers RN  Pain Management Interventions: medication (see MAR)  Goal: Readiness for Transition of Care  Outcome: Progressing     Problem: Pain Acute  Goal: Optimal Pain Control and Function  Outcome: Progressing  Intervention: Develop Pain Management Plan  Recent Flowsheet Documentation  Taken 2/13/2025 0825 by Brooklyn Powers RN  Pain Management Interventions: medication (see MAR)  Intervention: Prevent or Manage Pain  Recent Flowsheet Documentation  Taken 2/13/2025 0820 by Brooklyn Powers RN  Medication Review/Management: medications reviewed        versed in her emotions.  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  Pt acknowledged  that her emotions could be the reasons for her bad dreams.  Provided psychoeducation on unconscious thoughts and feelings and how these emotions can manifest in different ways.  Pt agreed to think about this and to discuss this next week if she is still here.  It is likely pt has not been encouraged to discuss her emotions previously or has been allowed to dismiss them.  She appeared more willing and open to exploring her emotions in future  sessions.  Time spent with patient: 25 minutes  Thank you for allowing this writer to participate in this pt's care. Please contact this writer at the Integrated Behavioral Health Team at [phone] if you have any questions or concerns.

## 2025-02-13 NOTE — PROGRESS NOTES
[facility]    Hospitalist Progress Note  Name: Alma Rosa Fishman    MRN: [id number]  Provider:  Will Waldron DO MPH  Date of Service: 02/13/2025    Summary of Stay: Alma Rosa Fishman is a 84 year old female with PMH including Parkinson's, chronic diastolic CHF with recent self discontinuation of Lasix, DVT, hypertension, depression, GERD, asthma, type 2 diabetes who presents with a fall episode as well as shortness of breath.  She got up from her recliner this afternoon and tripped over a blanket and fell.  She did not lose consciousness but did have left-sided chest wall pain which was painful with breathing.  She came to the ER due to concern regarding a possible rib fracture.     Here in the emergency room she was hypertensive initially to 159 systolic but later up to 202 systolic and noted to be hypoxic to the 80s at rest.  Lab workup was relatively unremarkable though BNP was 880, BMP was grossly normal and troponin was detectable at 20.  CBC was grossly normal.  She underwent traumatic workup including CT cervical spine CT head and CT chest without contrast.  There were no evidence of fractures but she was noted to have a large right pleural effusion with associated compressive atelectasis and a small pericardial effusion as well.     At this point we suspect that her effusion is most likely related to CHF but certainly there is still a broad differential including malignant, less likely traumatic or infectious.  Underwent diagnostic and therapeutic thoracentesis with improvement of symptoms and continuing IV diuretics.     Assessment and Plan:   Acute hypoxemic respiratory failure with large right pleural effusion: Suspect this is acute on chronic diastolic CHF in the setting of self discontinuing her diuretic and potentially uncontrolled hypertension.  Evaluated further with a right-sided thoracentesis and an echocardiogram given that there was evidence of a pericardial effusion on her CT  scan.  --Given 60 mg IV Lasix in the ER, will continue with 40 mg twice daily as renal function is tolerating diuresis and she still has lower extremity edema.  --Will need to reevaluate the ongoing need for diuretics daily.  --Bilateral lower extremity ultrasounds negative for DVT  --Echocardiogram largely unremarkable.  --Diagnostic and therapeutic thoracentesis completed 2/11 with 1 L of clear fluid removed.  Given that she is 84 she would benefit from cytology which was added on to the studies by lab.  Gram stain and cultures negative.  --Daily weights, strict ins and outs, cardiac monitoring and daily BMP.  --Still slightly hypoxic although I suspect this is atelectasis and positional related due to splinting from rib contusions but will reobtain chest x-ray to ensure no reaccumulation of pleural fluid.  --Aggressive use of incentive spirometry and ambulation/activity.     Fall with left-sided chest wall pain and now left shoulder pain: Traumatic workup negative for fractures.  Likely has a rib contusion.  Parkinson's certainly predisposes her to falls but it sounds as though this was mainly mechanical.  Yesterday afternoon she started reporting some left shoulder pain.  --PT consult, recommending discharge home with services  --Numerous allergies to pain medications noted  --Tylenol for pain (she reports she tolerates both, though has numerous narcotic allergies), discontinue ibuprofen  --Repeat chest x-ray in obtain left shoulder x-rays     Accelerated hypertension: Systolic blood pressure up to 202  in the ER.  Some of this may be driven by pain or dyspnea but warrants treatment at this point.  --IV Lasix   --Treat pain  --IV hydralazine as needed for systolic blood pressure greater than 180  --If blood pressure remains elevated likely need to start a blood pressure agent     History of Parkinson's disease: Resume home Sinemet.  Uses a walker.  Did have another fall around [place].  Lives in [facility] with  spouse.     Chronic diastolic CHF: Follows at [facility].  Self discontinued her Lasix.     History of rheumatoid arthritis: Maintained on weekly methotrexate.  She does take Naproxen twice daily as well as gabapentin in the evening.     GERD: Continue home PPI.     Extensive allergy list, but confirmed she tolerates tylenol and ibuprofen     Diet-controlled type 2 diabetes    DVT Prophylaxis: Pneumatic Compression Devices  Code Status: No CPR- Pre-arrest intubation OK  Diet: Combination Diet Regular Diet Adult    Quiroz Catheter: Not present  Disposition: Expected discharge in 1-2 days to home. Goals prior to discharge include imaging of chest and shoulder.   Incidental Findings: As above.  Family updated today: Yes spoke to spouse Vadim.    40 MINUTES SPENT BY ME on the date of service doing chart review, history, exam, documentation & further activities per the note.      Interval History   The patient reports doing well. No chest pain but still  shortness of breath much improved. Some new left shoulder pain. No nausea, vomiting, diarrhea, constipation. No fevers. No other specific complaints identified.     -Data reviewed today: I personally reviewed all new labs and imaging results over the last 24 hours.     Physical Exam   Temp: 98  F (36.7  C) Temp src: Temporal BP: (!) 147/58 Pulse: 70   Resp: 16 SpO2: 92 % O2 Device: Nasal cannula Oxygen Delivery: 1 LPM  Vitals:    02/10/25 2241 02/12/25 0659 02/13/25 0530   Weight: 80.4 kg (177 lb 4 oz) 79.6 kg (175 lb 7.8 oz) 75.2 kg (165 lb 12.6 oz)     Vital Signs with Ranges  Temp:  [97.2  F (36.2  C)-98  F (36.7  C)] 98  F (36.7  C)  Pulse:  [67-75] 70  Resp:  [16] 16  BP: ()/(42-74) 147/58  SpO2:  [88 %-97 %] 92 %  I/O last 3 completed shifts:  In: 740 [P.O.:740]  Out: 1950 [Urine:1450; Emesis/NG output:500]    GENERAL: No apparent distress. Awake, alert, and fully oriented.  HEENT: Normocephalic, atraumatic. Extraocular movements intact.  CARDIOVASCULAR: Regular  rate and rhythm without murmurs or rubs. No S3.  PULMONARY: Clear bilaterally but dull on right.  GASTROINTESTINAL: Soft, non-tender, non-distended. Bowel sounds normoactive.   EXTREMITIES: No cyanosis or clubbing. 1+ BL LE edema.  NEUROLOGICAL: CN 2-12 grossly intact, no focal neurological deficits.  DERMATOLOGICAL: No rash, ulcer, bruising, nor jaundice.     Medications   Current Facility-Administered Medications   Medication Dose Route Frequency Provider Last Rate Last Admin     Current Facility-Administered Medications   Medication Dose Route Frequency Provider Last Rate Last Admin    acetaminophen (TYLENOL) tablet 975 mg  975 mg Oral TID Mario Le MD   975 mg at 02/13/25 0825    aspirin (ASA) EC tablet 325 mg  325 mg Oral Daily Will Waldron DO   325 mg at 02/13/25 0827    atorvastatin (LIPITOR) tablet 40 mg  40 mg Oral Daily Will Waldron DO   40 mg at 02/13/25 0827    busPIRone (BUSPAR) tablet 5 mg  5 mg Oral TID Will Waldron DO   5 mg at 02/13/25 0827    carbidopa-levodopa (SINEMET)  MG per tablet 2 tablet  2 tablet Oral TID Mario Le MD   2 tablet at 02/13/25 0828    [Held by provider] cephALEXin (KEFLEX) capsule 250 mg  250 mg Oral Daily Will Waldron DO        cetirizine (zyrTEC) tablet 10 mg  10 mg Oral Daily Will Waldron DO   10 mg at 02/13/25 0828    cholecalciferol (VITAMIN D3) capsule 125 mcg  125 mcg Oral Daily Will Waldron DO   125 mcg at 02/13/25 0828    citalopram (celeXA) tablet 20 mg  20 mg Oral Daily Will Waldron DO   20 mg at 02/13/25 0828    diclofenac (VOLTAREN) 1 % topical gel 2 g  2 g Topical 4x Daily Danay Nicole MD   2 g at 02/13/25 0838    [Held by provider] folic acid (FOLVITE) tablet 1 mg  1 mg Oral Daily Will Waldron DO        furosemide (LASIX) injection 40 mg  40 mg Intravenous BID Mario Le MD   40 mg at 02/13/25 0828    gabapentin (NEURONTIN) capsule  "600 mg  600 mg Oral At Bedtime Mario Le MD   600 mg at 02/12/25 2126    Lidocaine (LIDOCARE) 4 % Patch 2 patch  2 patch Transdermal Q24H Danay Nicole MD   2 patch at 02/13/25 0440    pantoprazole (PROTONIX) EC tablet 40 mg  40 mg Oral Daily Will Waldron DO   40 mg at 02/13/25 0827    sodium chloride (PF) 0.9% PF flush 3 mL  3 mL Intracatheter Q8H Mario Le MD   3 mL at 02/13/25 0829    vibegron (GEMTESA) tablet 75 mg  75 mg Oral At Bedtime Will Waldron DO   75 mg at 02/12/25 2127     Data     Laboratory:  Recent Labs   Lab 02/11/25  0703 02/10/25  1906   WBC 8.5 9.4   HGB 11.5* 12.3   HCT 34.2* 35.4   * 105*   * 159     Recent Labs   Lab 02/13/25  0656 02/12/25  1620 02/12/25  0720 02/11/25  0703 02/10/25  1906   NA  --   --  141 143 142   POTASSIUM 4.1 4.2 3.2*  3.2* 3.5 3.7   CHLORIDE  --   --  106 108* 107   CO2  --   --  24 25 24   ANIONGAP  --   --  11 10 11   GLC  --   --  88 101* 93   BUN  --   --  21.6 19.6 19.4   CR  --   --  0.91 0.85 0.84   GFRESTIMATED  --   --  63 68 69   ELAN  --   --  8.1* 8.1* 8.2*     No results for input(s): \"CULT\" in the last 168 hours.    Imaging:  No results found for this or any previous visit (from the past 24 hours).        Will Waldron DO MPH  Critical access hospital Hospitalist  [address] [place] [address]  02/13/2025   "

## 2025-02-13 NOTE — PLAN OF CARE
"Goal Outcome Evaluation:      Plan of Care Reviewed With: patient    Overall Patient Progress: improvingOverall Patient Progress: improving    Outcome Evaluation: Vital signs stable on O2 @ 1 lpm nasal cannula with continupus pulse oximetry, sats 90. s. Lungs clear, encouraged inspirometer use. remote telemetry monitoring. SR hr 67.pain in left shoilder and left rib cage treated with tylenol, gabapentin and voltaren gel . On iv lasix. Assist of 1 using gait belt and walker. Voiding. potassium recheck 4.2, Mg 2.5. Care plan reviewed with pt.  C/o nausea, had large emesis of undigested food, iv zofran given with relief.    Problem: Adult Inpatient Plan of Care  Goal: Plan of Care Review  Description: The Plan of Care Review/Shift note should be completed every shift.  The Outcome Evaluation is a brief statement about your assessment that the patient is improving, declining, or no change.  This information will be displayed automatically on your shift  note.  Outcome: Progressing  Flowsheets (Taken 2/12/2025 1924)  Outcome Evaluation: Vital signs stable on O2 @ 1 lpm nasal cannula with continupus pulse oximetry, sats 90. s. Lungs clear, encouraged inspirometer use. remote telemetry monitoring. SR hr 67.pain in left shoilder and left rib cage treated with tylenol, gabapentin and voltaren gel . On iv lasix. Assist of 1 using gait belt and walker. Voiding. potassium recheck 4.2, Mg 2.5. Care plan reviewed with pt.  Plan of Care Reviewed With: patient  Overall Patient Progress: improving  Goal: Patient-Specific Goal (Individualized)  Description: You can add care plan individualizations to a care plan. Examples of Individualization might be:  \"Parent requests to be called daily at 9am for status\", \"I have a hard time hearing out of my right ear\", or \"Do not touch me to wake me up as it startles  me\".  Outcome: Progressing  Goal: Absence of Hospital-Acquired Illness or Injury  Outcome: Progressing  Intervention: Identify " and Manage Fall Risk  Recent Flowsheet Documentation  Taken 2/12/2025 1632 by Irma Villanueva RN  Safety Promotion/Fall Prevention:   activity supervised   assistive device/personal items within reach   increase visualization of patient   lighting adjusted  Intervention: Prevent Skin Injury  Recent Flowsheet Documentation  Taken 2/12/2025 1632 by Irma Villanueva RN  Body Position: position changed independently  Skin Protection: adhesive use limited  Intervention: Prevent and Manage VTE (Venous Thromboembolism) Risk  Recent Flowsheet Documentation  Taken 2/12/2025 1632 by Irma Villanueva RN  VTE Prevention/Management: SCDs on (sequential compression devices)  Intervention: Prevent Infection  Recent Flowsheet Documentation  Taken 2/12/2025 1632 by Irma Villanueva RN  Infection Prevention: hand hygiene promoted  Goal: Optimal Comfort and Wellbeing  Outcome: Progressing  Intervention: Monitor Pain and Promote Comfort  Recent Flowsheet Documentation  Taken 2/12/2025 1522 by Irma Villanueva RN  Pain Management Interventions:   rest   relaxation techniques promoted  Goal: Readiness for Transition of Care  Outcome: Progressing     Problem: Pain Acute  Goal: Optimal Pain Control and Function  Outcome: Progressing  Intervention: Develop Pain Management Plan  Recent Flowsheet Documentation  Taken 2/12/2025 1522 by Irma Villanueva RN  Pain Management Interventions:   rest   relaxation techniques promoted  Intervention: Prevent or Manage Pain  Recent Flowsheet Documentation  Taken 2/12/2025 1632 by Irma Villanueva RN  Medication Review/Management: medications reviewed

## 2025-02-13 NOTE — PLAN OF CARE
Goal Outcome Evaluation:                 Outcome Evaluation: See initial assessment. Patient needs HC- RN PT OT HHA. Referral sent.

## 2025-02-14 ENCOUNTER — APPOINTMENT (OUTPATIENT)
Dept: PHYSICAL THERAPY | Facility: CLINIC | Age: 85
DRG: 291 | End: 2025-02-14
Payer: MEDICARE

## 2025-02-14 LAB
ANION GAP SERPL CALCULATED.3IONS-SCNC: 9 MMOL/L (ref 7–15)
BUN SERPL-MCNC: 30.6 MG/DL (ref 8–23)
CALCIUM SERPL-MCNC: 7.9 MG/DL (ref 8.8–10.4)
CHLORIDE SERPL-SCNC: 107 MMOL/L (ref 98–107)
CREAT SERPL-MCNC: 1 MG/DL (ref 0.51–0.95)
EGFRCR SERPLBLD CKD-EPI 2021: 56 ML/MIN/1.73M2
GLUCOSE SERPL-MCNC: 83 MG/DL (ref 70–99)
HCO3 SERPL-SCNC: 25 MMOL/L (ref 22–29)
HOLD SPECIMEN: NORMAL
MAGNESIUM SERPL-MCNC: 2 MG/DL (ref 1.7–2.3)
POTASSIUM SERPL-SCNC: 3.7 MMOL/L (ref 3.4–5.3)
SODIUM SERPL-SCNC: 141 MMOL/L (ref 135–145)

## 2025-02-14 PROCEDURE — 83735 ASSAY OF MAGNESIUM: CPT | Performed by: HOSPITALIST

## 2025-02-14 PROCEDURE — 82565 ASSAY OF CREATININE: CPT | Performed by: HOSPITALIST

## 2025-02-14 PROCEDURE — 120N000001 HC R&B MED SURG/OB

## 2025-02-14 PROCEDURE — 250N000013 HC RX MED GY IP 250 OP 250 PS 637: Performed by: HOSPITALIST

## 2025-02-14 PROCEDURE — 250N000013 HC RX MED GY IP 250 OP 250 PS 637: Performed by: INTERNAL MEDICINE

## 2025-02-14 PROCEDURE — 36415 COLL VENOUS BLD VENIPUNCTURE: CPT | Performed by: HOSPITALIST

## 2025-02-14 PROCEDURE — 250N000011 HC RX IP 250 OP 636: Performed by: HOSPITALIST

## 2025-02-14 PROCEDURE — 258N000003 HC RX IP 258 OP 636: Performed by: HOSPITALIST

## 2025-02-14 PROCEDURE — 97530 THERAPEUTIC ACTIVITIES: CPT | Mod: GP

## 2025-02-14 PROCEDURE — 99232 SBSQ HOSP IP/OBS MODERATE 35: CPT | Performed by: HOSPITALIST

## 2025-02-14 RX ADMIN — ASPIRIN 325 MG: 325 TABLET, COATED ORAL at 08:20

## 2025-02-14 RX ADMIN — VIBEGRON 75 MG: 75 TABLET, FILM COATED ORAL at 21:58

## 2025-02-14 RX ADMIN — PANTOPRAZOLE SODIUM 40 MG: 40 TABLET, DELAYED RELEASE ORAL at 08:20

## 2025-02-14 RX ADMIN — FOLIC ACID 1 MG: 1 TABLET ORAL at 08:20

## 2025-02-14 RX ADMIN — GUAIFENESIN 200 MG: 100 LIQUID ORAL at 01:02

## 2025-02-14 RX ADMIN — BUSPIRONE HYDROCHLORIDE 5 MG: 5 TABLET ORAL at 14:03

## 2025-02-14 RX ADMIN — CITALOPRAM HYDROBROMIDE 20 MG: 20 TABLET ORAL at 08:20

## 2025-02-14 RX ADMIN — CARBIDOPA AND LEVODOPA 2 TABLET: 25; 100 TABLET ORAL at 08:20

## 2025-02-14 RX ADMIN — BUSPIRONE HYDROCHLORIDE 5 MG: 5 TABLET ORAL at 21:52

## 2025-02-14 RX ADMIN — DICLOFENAC SODIUM 2 G: 10 GEL TOPICAL at 17:42

## 2025-02-14 RX ADMIN — LIDOCAINE 2 PATCH: 4 PATCH TOPICAL at 04:20

## 2025-02-14 RX ADMIN — ACETAMINOPHEN 975 MG: 325 TABLET, FILM COATED ORAL at 21:52

## 2025-02-14 RX ADMIN — SODIUM CHLORIDE 250 ML: 9 INJECTION, SOLUTION INTRAVENOUS at 11:11

## 2025-02-14 RX ADMIN — DICLOFENAC SODIUM 2 G: 10 GEL TOPICAL at 14:05

## 2025-02-14 RX ADMIN — CARBIDOPA AND LEVODOPA 2 TABLET: 25; 100 TABLET ORAL at 14:03

## 2025-02-14 RX ADMIN — FUROSEMIDE 40 MG: 10 INJECTION, SOLUTION INTRAMUSCULAR; INTRAVENOUS at 08:19

## 2025-02-14 RX ADMIN — CARBIDOPA AND LEVODOPA 2 TABLET: 25; 100 TABLET ORAL at 21:52

## 2025-02-14 RX ADMIN — CETIRIZINE HYDROCHLORIDE 10 MG: 10 TABLET, FILM COATED ORAL at 08:19

## 2025-02-14 RX ADMIN — DICLOFENAC SODIUM 2 G: 10 GEL TOPICAL at 08:24

## 2025-02-14 RX ADMIN — DICLOFENAC SODIUM 2 G: 10 GEL TOPICAL at 22:00

## 2025-02-14 RX ADMIN — BUSPIRONE HYDROCHLORIDE 5 MG: 5 TABLET ORAL at 08:19

## 2025-02-14 RX ADMIN — ACETAMINOPHEN 975 MG: 325 TABLET, FILM COATED ORAL at 08:19

## 2025-02-14 RX ADMIN — ACETAMINOPHEN 975 MG: 325 TABLET, FILM COATED ORAL at 14:03

## 2025-02-14 RX ADMIN — Medication 125 MCG: at 08:19

## 2025-02-14 RX ADMIN — ATORVASTATIN CALCIUM 40 MG: 40 TABLET, FILM COATED ORAL at 08:19

## 2025-02-14 RX ADMIN — GABAPENTIN 600 MG: 300 CAPSULE ORAL at 21:52

## 2025-02-14 ASSESSMENT — ACTIVITIES OF DAILY LIVING (ADL)
ADLS_ACUITY_SCORE: 49

## 2025-02-14 NOTE — PLAN OF CARE
Goal Outcome Evaluation:      Plan of Care Reviewed With: patient    Overall Patient Progress: improvingOverall Patient Progress: improving    Outcome Evaluation: Soft Bps 250ml NS Bolus given resolved Bps refer to flow sheet for details, pain controlled with scheduled tylenol and repositioning prn, continues with ongoing care.    End of shift summary-0700-1500  Dx: Acute hypoxemic respiratory failure with large right pleural effusion   A/O: Alert and Oriented x4  Diet: Regular  Fluids: is Saline locked.  Transfer: are 1 Assist with Gait Belt and Walker  Bathroom:continent with b/b.   Pain: complaining of generalized pain.  Non-pharmacological interventions offered are patient was repositioned.  Telemetry Monitoring: No  Treatment:Sinemet, pain management, activity encouragement.   Discharge Plans:TBD, Home care accepted, continues with ongoing care.         Blood pressure 126/46, pulse 68, temperature 97  F (36.1  C), temperature source Temporal, resp. rate 16, weight 75.2 kg (165 lb 12.6 oz), SpO2 (!) 90%.     Problem: Adult Inpatient Plan of Care  Goal: Plan of Care Review  Description: The Plan of Care Review/Shift note should be completed every shift.  The Outcome Evaluation is a brief statement about your assessment that the patient is improving, declining, or no change.  This information will be displayed automatically on your shift  note.  Outcome: Progressing  Flowsheets (Taken 2/14/2025 1358)  Outcome Evaluation: Soft Bps 250ml NS Bolus given resolved Bps refer to flow sheet for details, pain controlled with scheduled tylenol and repositioning prn, continues with ongoing care.  Plan of Care Reviewed With: patient  Overall Patient Progress: improving  Goal: Absence of Hospital-Acquired Illness or Injury  Intervention: Identify and Manage Fall Risk  Recent Flowsheet Documentation  Taken 2/14/2025 1018 by Mary Ellen Spear, RN  Safety Promotion/Fall Prevention:   activity supervised   assistive  device/personal items within reach   lighting adjusted   increase visualization of patient  Intervention: Prevent and Manage VTE (Venous Thromboembolism) Risk  Recent Flowsheet Documentation  Taken 2/14/2025 1018 by Mary Ellen Spear RN  VTE Prevention/Management: SCDs off (sequential compression devices)  Intervention: Prevent Infection  Recent Flowsheet Documentation  Taken 2/14/2025 1018 by Mary Ellen Spear, RN  Infection Prevention:   hand hygiene promoted   single patient room provided   rest/sleep promoted

## 2025-02-14 NOTE — PROGRESS NOTES
Tried weaning the pt. Oxygen per MD order, pt. Desats to 81 on RA, reports sob, WING when up to the bathroom but recovers quickly. Pt. Continues needing supplemental oxygen MD notified aware, will follow up prn. Will continues to monitor.

## 2025-02-14 NOTE — PLAN OF CARE
"Goal Outcome Evaluation:      Plan of Care Reviewed With: patient, spouse    Overall Patient Progress: improvingOverall Patient Progress: improving    Outcome Evaluation: Vital signs stable. Dyspnea with activity, on O2 @1 lpm nasal cannula, sats 84 % Encouraged inspirometer use when awake. Infrequent  non productive cough. Ambulated with walker, gait belt and 1 assist.Voiding. Pain in left shoulder and rib area treated with tylenol and  voltaren gel. Chest and left shoulder x ray done. Potassium and magnesium recheck in am. Falls risk precautions.      Problem: Adult Inpatient Plan of Care  Goal: Plan of Care Review  Description: The Plan of Care Review/Shift note should be completed every shift.  The Outcome Evaluation is a brief statement about your assessment that the patient is improving, declining, or no change.  This information will be displayed automatically on your shift  note.  Outcome: Progressing  Flowsheets (Taken 2/13/2025 1917)  Outcome Evaluation: Vital signs stable. Dyspnea with activity, on O2 @1 lpm nasal cannula, sats 84 % Encouraged inspirometer use when awake. Infrequent  non productive cough. Ambulated with walker, gait belt and 1 assist.Voiding. Pain in left shoulder and rib area treated with tylenol and  voltaren gel. Chest and left shoulder x ray done. Potassium and magnesium recheck in am. Falls risk precautions.  Plan of Care Reviewed With:   patient   spouse  Overall Patient Progress: improving  Goal: Patient-Specific Goal (Individualized)  Description: You can add care plan individualizations to a care plan. Examples of Individualization might be:  \"Parent requests to be called daily at 9am for status\", \"I have a hard time hearing out of my right ear\", or \"Do not touch me to wake me up as it startles  me\".  Outcome: Progressing  Goal: Absence of Hospital-Acquired Illness or Injury  Outcome: Progressing  Intervention: Identify and Manage Fall Risk  Recent Flowsheet Documentation  Taken " 2/13/2025 1600 by Irma Villanueva RN  Safety Promotion/Fall Prevention:   activity supervised   assistive device/personal items within reach   lighting adjusted   increase visualization of patient  Intervention: Prevent Skin Injury  Recent Flowsheet Documentation  Taken 2/13/2025 1903 by Irma Villanueva RN  Body Position: supine, head elevated  Taken 2/13/2025 1600 by Irma Villanueva RN  Body Position: position changed independently  Skin Protection: adhesive use limited  Intervention: Prevent and Manage VTE (Venous Thromboembolism) Risk  Recent Flowsheet Documentation  Taken 2/13/2025 1600 by Irma Villanueva RN  VTE Prevention/Management: SCDs off (sequential compression devices)  Intervention: Prevent Infection  Recent Flowsheet Documentation  Taken 2/13/2025 1600 by Irma Villanueva RN  Infection Prevention:   hand hygiene promoted   single patient room provided   rest/sleep promoted  Goal: Optimal Comfort and Wellbeing  Outcome: Progressing  Intervention: Monitor Pain and Promote Comfort  Recent Flowsheet Documentation  Taken 2/13/2025 1557 by Irma Villanueva RN  Pain Management Interventions:   back rub   acupressure   medication (see MAR)  Goal: Readiness for Transition of Care  Outcome: Progressing     Problem: Pain Acute  Goal: Optimal Pain Control and Function  Outcome: Progressing  Intervention: Develop Pain Management Plan  Recent Flowsheet Documentation  Taken 2/13/2025 1557 by Irma Villanueva RN  Pain Management Interventions:   back rub   acupressure   medication (see MAR)  Intervention: Prevent or Manage Pain  Recent Flowsheet Documentation  Taken 2/13/2025 1600 by Irma Villanueva RN  Sleep/Rest Enhancement: noise level reduced  Medication Review/Management: medications reviewed

## 2025-02-14 NOTE — PLAN OF CARE
"Pt is alert and oriented x4, assist of 1 with transfers. Pain to L shoulder, L chest. Lidocaine patches applied. Ice helps. X ray results for chest and shoulder paged to provider, no new orders, Pt is voiding adequately. Edema to bilat LE. Pt needs O2 1L to keep O2sat above 90. Plan to discharge home with services, possible today.     Goal Outcome Evaluation:      Plan of Care Reviewed With: patient    Overall Patient Progress: improvingOverall Patient Progress: improving    Outcome Evaluation: Plan to d/c home with services.      Problem: Adult Inpatient Plan of Care  Goal: Plan of Care Review  Description: The Plan of Care Review/Shift note should be completed every shift.  The Outcome Evaluation is a brief statement about your assessment that the patient is improving, declining, or no change.  This information will be displayed automatically on your shift  note.  Outcome: Progressing  Flowsheets (Taken 2/14/2025 0513)  Outcome Evaluation: Plan to d/c home with services.  Plan of Care Reviewed With: patient  Overall Patient Progress: improving  Goal: Patient-Specific Goal (Individualized)  Description: You can add care plan individualizations to a care plan. Examples of Individualization might be:  \"Parent requests to be called daily at 9am for status\", \"I have a hard time hearing out of my right ear\", or \"Do not touch me to wake me up as it startles  me\".  Outcome: Progressing  Goal: Absence of Hospital-Acquired Illness or Injury  Outcome: Progressing  Intervention: Identify and Manage Fall Risk  Recent Flowsheet Documentation  Taken 2/14/2025 0132 by Karey Johnston, RN  Safety Promotion/Fall Prevention:   activity supervised   assistive device/personal items within reach   lighting adjusted   increase visualization of patient  Intervention: Prevent Skin Injury  Recent Flowsheet Documentation  Taken 2/14/2025 0132 by Karey Johnston, RN  Body Position: position changed independently  Intervention: Prevent " Infection  Recent Flowsheet Documentation  Taken 2/14/2025 0132 by Karey Johnston, RN  Infection Prevention:   hand hygiene promoted   single patient room provided   rest/sleep promoted  Goal: Optimal Comfort and Wellbeing  Outcome: Progressing  Goal: Readiness for Transition of Care  Outcome: Progressing     Problem: Pain Acute  Goal: Optimal Pain Control and Function  Outcome: Progressing  Intervention: Prevent or Manage Pain  Recent Flowsheet Documentation  Taken 2/14/2025 0132 by Karey Johnston, RN  Sleep/Rest Enhancement: noise level reduced  Medication Review/Management: medications reviewed

## 2025-02-14 NOTE — PROGRESS NOTES
Care Management Follow Up    Length of Stay (days): 4    Expected Discharge Date: 02/15/2025     Concerns to be Addressed: discharge planning     Patient plan of care discussed at interdisciplinary rounds: Yes    Anticipated Discharge Disposition: Home, Home Care              Anticipated Discharge Services:    Anticipated Discharge DME:      Patient/family educated on Medicare website which has current facility and service quality ratings: yes  Education Provided on the Discharge Plan:    Patient/Family in Agreement with the Plan: yes    Referrals Placed by CM/SW: Homecare  Private pay costs discussed: Not applicable    Discussed  Partnership in Safe Discharge Planning  document with patient/family: No     Handoff Completed: No, handoff not indicated or clinically appropriate    Additional Information:  [facility] has accepted for RN PT OT [facility]. Contact info placed on the AVS and patient informed.    Next Steps: Monitor for discharge.    Linda Peterson RN, BSN, CM  Inpatient Care Coordination  [facility]  [phone]

## 2025-02-14 NOTE — PROGRESS NOTES
[facility]    Hospitalist Progress Note  Name: Alma Rosa Fishman    MRN: [id number]  Provider:  Will Waldron DO MPH  Date of Service: 02/14/2025    Summary of Stay: Alma Rosa Fishman is a 84 year old female with PMH including Parkinson's, chronic diastolic CHF with recent self discontinuation of Lasix, DVT, hypertension, depression, GERD, asthma, type 2 diabetes who presents with a fall episode as well as shortness of breath.  She got up from her recliner this afternoon and tripped over a blanket and fell.  She did not lose consciousness but did have left-sided chest wall pain which was painful with breathing.  She came to the ER due to concern regarding a possible rib fracture.     Here in the emergency room she was hypertensive initially to 159 systolic but later up to 202 systolic and noted to be hypoxic to the 80s at rest.  Lab workup was relatively unremarkable though BNP was 880, BMP was grossly normal and troponin was detectable at 20.  CBC was grossly normal.  She underwent traumatic workup including CT cervical spine CT head and CT chest without contrast.  There were no evidence of fractures but she was noted to have a large right pleural effusion with associated compressive atelectasis and a small pericardial effusion as well.     At this point we suspect that her effusion is most likely related to CHF but certainly there is still a broad differential including malignant, less likely traumatic or infectious.  Underwent diagnostic and therapeutic thoracentesis with improvement of symptoms and continuing IV diuretics.     Assessment and Plan:   Acute hypoxemic respiratory failure with large right pleural effusion: Suspect this is acute on chronic diastolic CHF in the setting of self discontinuing her diuretic and potentially uncontrolled hypertension.  Evaluated further with a right-sided thoracentesis and an echocardiogram given that there was evidence of a pericardial effusion on her CT  scan.  --Given 60 mg IV Lasix in the ER, and a few days of IV Lasix.  Slight bump in creatinine and BUN so stopping IV diuresis.  --Will need to reevaluate the ongoing need for diuretics daily.  --Bilateral lower extremity ultrasounds negative for DVT.  --Echocardiogram largely unremarkable.  --Diagnostic and therapeutic thoracentesis completed 2/11 with 1 L of clear fluid removed.  Given that she is 84 she would benefit from cytology which was negative.  Gram stain and cultures negative.  --Daily weights, strict ins and outs, cardiac monitoring and daily BMP.  --Still slightly hypoxic although I suspect this is atelectasis and positional related due to splinting from rib contusions and CXR showed no reaccumulation of pleural fluid.  Wean O2 and increase activity.  --Aggressive use of incentive spirometry and ambulation/activity.     Fall with left-sided chest wall pain and now left shoulder pain: Traumatic workup negative for fractures.  Likely has a rib contusion.  Parkinson's certainly predisposes her to falls but it sounds as though this was mainly mechanical.  Yesterday afternoon she started reporting some left shoulder pain.  --PT consult, recommending discharge home with services  --Numerous allergies to pain medications noted  --Tylenol for pain (she reports she tolerates both, though has numerous narcotic allergies), discontinue ibuprofen  --CXR and left shoulder x-rays negative    Presyncope and orthostatic hypotension: Became relatively hypotensive and presyncopal with with PT.  --Stopping IV diuretics and giving 250 ml IVF bolus     Accelerated hypertension: Systolic blood pressure up to 202  in the ER.  Some of this may be driven by pain or dyspnea but warrants treatment at this point.  --IV Lasix initially  --Treat pain  --Stop PRN IV hydralazine  --If blood pressure remains elevated could consider starting a blood pressure agent but has had issues with orthostatic hypotension     History of  Parkinson's disease: Resume home Sinemet.  Uses a walker.  Did have another fall around [place].  Lives in [place] [facility] [place] with [place].     Chronic diastolic CHF: Follows at [place].  Self discontinued her Lasix.     History of rheumatoid arthritis: Maintained on weekly methotrexate.  She does take Naproxen twice daily as well as gabapentin in the evening.     GERD: Continue home PPI.     Extensive allergy list, but confirmed she tolerates tylenol and ibuprofen     Diet-controlled type 2 diabetes    DVT Prophylaxis: Pneumatic Compression Devices  Code Status: No CPR- Pre-arrest intubation OK  Diet: Combination Diet Regular Diet Adult    Quiroz Catheter: Not present  Disposition: Expected discharge in 1-2 days to home. Goals prior to discharge include imaging of chest and shoulder.   Incidental Findings: As above.  Family updated today: Yes spoke to spouse Vadim.    40 MINUTES SPENT BY ME on the date of service doing chart review, history, exam, documentation & further activities per the note.      Interval History   The patient reports doing well. No chest pain but still  shortness of breath much improved. Some new left shoulder pain. No nausea, vomiting, diarrhea, constipation. No fevers. No other specific complaints identified.     -Data reviewed today: I personally reviewed all new labs and imaging results over the last 24 hours.     Physical Exam   Temp: 97  F (36.1  C) Temp src: Temporal BP: 126/46 Pulse: 68   Resp: 16 SpO2: (!) 90 % O2 Device: Nasal cannula Oxygen Delivery: 1 LPM  Vitals:    02/10/25 2241 02/12/25 0659 02/13/25 0530   Weight: 80.4 kg (177 lb 4 oz) 79.6 kg (175 lb 7.8 oz) 75.2 kg (165 lb 12.6 oz)     Vital Signs with Ranges  Temp:  [97  F (36.1  C)-98.2  F (36.8  C)] 97  F (36.1  C)  Pulse:  [66-68] 68  Resp:  [16-18] 16  BP: ()/(40-54) 126/46  SpO2:  [80 %-94 %] 90 %  I/O last 3 completed shifts:  In: 540 [P.O.:540]  Out: 750 [Urine:750]    GENERAL: No apparent distress. Awake, alert, and fully  oriented.  HEENT: Normocephalic, atraumatic. Extraocular movements intact.  CARDIOVASCULAR: Regular rate and rhythm without murmurs or rubs. No S3.  PULMONARY: Clear bilaterally but dull on right.  GASTROINTESTINAL: Soft, non-tender, non-distended. Bowel sounds normoactive.   EXTREMITIES: No cyanosis or clubbing. 1+ BL LE edema.  NEUROLOGICAL: CN 2-12 grossly intact, no focal neurological deficits.  DERMATOLOGICAL: No rash, ulcer, bruising, nor jaundice.     Medications   Current Facility-Administered Medications   Medication Dose Route Frequency Provider Last Rate Last Admin     Current Facility-Administered Medications   Medication Dose Route Frequency Provider Last Rate Last Admin    acetaminophen (TYLENOL) tablet 975 mg  975 mg Oral TID Mario Le MD   975 mg at 02/14/25 0819    aspirin (ASA) EC tablet 325 mg  325 mg Oral Daily Will Waldron DO   325 mg at 02/14/25 0820    atorvastatin (LIPITOR) tablet 40 mg  40 mg Oral Daily Will Waldron DO   40 mg at 02/14/25 0819    busPIRone (BUSPAR) tablet 5 mg  5 mg Oral TID Will Waldron DO   5 mg at 02/14/25 0819    carbidopa-levodopa (SINEMET)  MG per tablet 2 tablet  2 tablet Oral TID Mario Le MD   2 tablet at 02/14/25 0820    cetirizine (zyrTEC) tablet 10 mg  10 mg Oral Daily Will Waldron DO   10 mg at 02/14/25 0819    cholecalciferol (VITAMIN D3) capsule 125 mcg  125 mcg Oral Daily Will Waldron DO   125 mcg at 02/14/25 0819    citalopram (celeXA) tablet 20 mg  20 mg Oral Daily Will Waldron DO   20 mg at 02/14/25 0820    diclofenac (VOLTAREN) 1 % topical gel 2 g  2 g Topical 4x Daily Danay Nicole MD   2 g at 02/14/25 0824    folic acid (FOLVITE) tablet 1 mg  1 mg Oral Daily Will Waldron DO   1 mg at 02/14/25 0820    gabapentin (NEURONTIN) capsule 600 mg  600 mg Oral At Bedtime Mario Le MD   600 mg at 02/13/25 2107    Lidocaine (LIDOCARE) 4 % Patch  "2 patch  2 patch Transdermal Q24H Danay Nicole MD   2 patch at 02/14/25 0420    pantoprazole (PROTONIX) EC tablet 40 mg  40 mg Oral Daily Will Waldron DO   40 mg at 02/14/25 0820    sodium chloride (PF) 0.9% PF flush 3 mL  3 mL Intracatheter Q8H Mario Le MD   3 mL at 02/14/25 0820    vibegron (GEMTESA) tablet 75 mg  75 mg Oral At Bedtime Will Waldron DO   75 mg at 02/13/25 2106     Data     Laboratory:  Recent Labs   Lab 02/11/25  0703 02/10/25  1906   WBC 8.5 9.4   HGB 11.5* 12.3   HCT 34.2* 35.4   * 105*   * 159     Recent Labs   Lab 02/14/25  0712 02/13/25  0656 02/12/25  1620 02/12/25  0720    141  --  141   POTASSIUM 3.7 4.1  4.1 4.2 3.2*  3.2*   CHLORIDE 107 106  --  106   CO2 25 20*  --  24   ANIONGAP 9 15  --  11   GLC 83 101*  --  88   BUN 30.6* 26.2*  --  21.6   CR 1.00* 0.92  --  0.91   GFRESTIMATED 56* 62  --  63   ELAN 7.9* 8.2*  --  8.1*     No results for input(s): \"CULT\" in the last 168 hours.    Imaging:  Recent Results (from the past 24 hours)   XR Shoulder Left G/E 3vw    Narrative    EXAM: XR SHOULDER LEFT G/E 3 VIEWS  LOCATION: [facility]  DATE: 2/13/2025    INDICATION: Left shoulder pain after a fall.  COMPARISON: None.      Impression    IMPRESSION: Left shoulder negative for fracture or dislocation. Superior migration of the humeral head relative to the glenoid, narrowing the acromiohumeral space, compatible with age indeterminant rotator cuff tearing. Mild degenerative arthrosis at the   glenohumeral joint, with small marginal osteophytes. Moderate advanced degenerative arthrosis at the AC joint.   XR Chest 2 Views    Narrative    EXAM: XR CHEST 2 VIEWS  LOCATION: [facility]  DATE: 2/13/2025    INDICATION: SOB hypoxia  COMPARISON: 2/10/2025      Impression    IMPRESSION: Small right pleural effusion and tiny left pleural effusion. No pneumothorax. Stable enlarged cardiomediastinal " silhouette. Right shoulder surgical hardware.           Will Waldron DO MPH  [facility] Hospital[facility]  [address] [place] [address]  02/14/2025

## 2025-02-15 ENCOUNTER — APPOINTMENT (OUTPATIENT)
Dept: PHYSICAL THERAPY | Facility: CLINIC | Age: 85
DRG: 291 | End: 2025-02-15
Payer: MEDICARE

## 2025-02-15 VITALS
TEMPERATURE: 97.6 F | SYSTOLIC BLOOD PRESSURE: 112 MMHG | OXYGEN SATURATION: 95 % | BODY MASS INDEX: 31.32 KG/M2 | RESPIRATION RATE: 16 BRPM | DIASTOLIC BLOOD PRESSURE: 47 MMHG | HEART RATE: 65 BPM | WEIGHT: 165.79 LBS

## 2025-02-15 LAB
ANION GAP SERPL CALCULATED.3IONS-SCNC: 8 MMOL/L (ref 7–15)
BUN SERPL-MCNC: 31.4 MG/DL (ref 8–23)
CALCIUM SERPL-MCNC: 7.8 MG/DL (ref 8.8–10.4)
CHLORIDE SERPL-SCNC: 105 MMOL/L (ref 98–107)
CREAT SERPL-MCNC: 0.97 MG/DL (ref 0.51–0.95)
EGFRCR SERPLBLD CKD-EPI 2021: 58 ML/MIN/1.73M2
ERYTHROCYTE [DISTWIDTH] IN BLOOD BY AUTOMATED COUNT: 16.4 % (ref 10–15)
GLUCOSE SERPL-MCNC: 82 MG/DL (ref 70–99)
HCO3 SERPL-SCNC: 25 MMOL/L (ref 22–29)
HCT VFR BLD AUTO: 30.8 % (ref 35–47)
HGB BLD-MCNC: 10.3 G/DL (ref 11.7–15.7)
MAGNESIUM SERPL-MCNC: 2 MG/DL (ref 1.7–2.3)
MCH RBC QN AUTO: 35.3 PG (ref 26.5–33)
MCHC RBC AUTO-ENTMCNC: 33.4 G/DL (ref 31.5–36.5)
MCV RBC AUTO: 106 FL (ref 78–100)
PLATELET # BLD AUTO: 180 10E3/UL (ref 150–450)
POTASSIUM SERPL-SCNC: 4 MMOL/L (ref 3.4–5.3)
RBC # BLD AUTO: 2.92 10E6/UL (ref 3.8–5.2)
SODIUM SERPL-SCNC: 138 MMOL/L (ref 135–145)
WBC # BLD AUTO: 8.7 10E3/UL (ref 4–11)

## 2025-02-15 PROCEDURE — 97116 GAIT TRAINING THERAPY: CPT | Mod: GP

## 2025-02-15 PROCEDURE — 85041 AUTOMATED RBC COUNT: CPT | Performed by: HOSPITALIST

## 2025-02-15 PROCEDURE — 97530 THERAPEUTIC ACTIVITIES: CPT | Mod: GP

## 2025-02-15 PROCEDURE — 99239 HOSP IP/OBS DSCHRG MGMT >30: CPT | Performed by: HOSPITALIST

## 2025-02-15 PROCEDURE — 80048 BASIC METABOLIC PNL TOTAL CA: CPT | Performed by: HOSPITALIST

## 2025-02-15 PROCEDURE — 250N000013 HC RX MED GY IP 250 OP 250 PS 637: Performed by: HOSPITALIST

## 2025-02-15 PROCEDURE — 250N000013 HC RX MED GY IP 250 OP 250 PS 637: Performed by: INTERNAL MEDICINE

## 2025-02-15 PROCEDURE — 83735 ASSAY OF MAGNESIUM: CPT | Performed by: INTERNAL MEDICINE

## 2025-02-15 PROCEDURE — 36415 COLL VENOUS BLD VENIPUNCTURE: CPT | Performed by: HOSPITALIST

## 2025-02-15 RX ORDER — FUROSEMIDE 20 MG/1
10 TABLET ORAL DAILY
Qty: 30 TABLET | Refills: 0 | Status: SHIPPED | OUTPATIENT
Start: 2025-02-17

## 2025-02-15 RX ADMIN — Medication 125 MCG: at 09:19

## 2025-02-15 RX ADMIN — ACETAMINOPHEN 975 MG: 325 TABLET, FILM COATED ORAL at 14:23

## 2025-02-15 RX ADMIN — ASPIRIN 325 MG: 325 TABLET, COATED ORAL at 09:18

## 2025-02-15 RX ADMIN — CARBIDOPA AND LEVODOPA 2 TABLET: 25; 100 TABLET ORAL at 09:19

## 2025-02-15 RX ADMIN — FOLIC ACID 1 MG: 1 TABLET ORAL at 09:19

## 2025-02-15 RX ADMIN — DICLOFENAC SODIUM 2 G: 10 GEL TOPICAL at 15:47

## 2025-02-15 RX ADMIN — PANTOPRAZOLE SODIUM 40 MG: 40 TABLET, DELAYED RELEASE ORAL at 09:20

## 2025-02-15 RX ADMIN — ATORVASTATIN CALCIUM 40 MG: 40 TABLET, FILM COATED ORAL at 09:19

## 2025-02-15 RX ADMIN — CARBIDOPA AND LEVODOPA 2 TABLET: 25; 100 TABLET ORAL at 14:23

## 2025-02-15 RX ADMIN — ACETAMINOPHEN 975 MG: 325 TABLET, FILM COATED ORAL at 09:19

## 2025-02-15 RX ADMIN — DICLOFENAC SODIUM 2 G: 10 GEL TOPICAL at 09:27

## 2025-02-15 RX ADMIN — DICLOFENAC SODIUM 2 G: 10 GEL TOPICAL at 11:44

## 2025-02-15 RX ADMIN — BUSPIRONE HYDROCHLORIDE 5 MG: 5 TABLET ORAL at 14:23

## 2025-02-15 RX ADMIN — CETIRIZINE HYDROCHLORIDE 10 MG: 10 TABLET, FILM COATED ORAL at 09:20

## 2025-02-15 RX ADMIN — BUSPIRONE HYDROCHLORIDE 5 MG: 5 TABLET ORAL at 09:19

## 2025-02-15 RX ADMIN — LIDOCAINE 2 PATCH: 4 PATCH TOPICAL at 04:43

## 2025-02-15 RX ADMIN — CITALOPRAM HYDROBROMIDE 20 MG: 20 TABLET ORAL at 09:19

## 2025-02-15 ASSESSMENT — ACTIVITIES OF DAILY LIVING (ADL)
ADLS_ACUITY_SCORE: 49

## 2025-02-15 NOTE — PLAN OF CARE
"A&Ox4. VSS on room air. Pt did have some orthostatic hypotension with PT, MD aware.  Pain managed with scheduled tylenol and voltaren gel. Tolerating regular diet. Up ax1 gb/walker. Voiding appropriately. BLE edema. Plan is to discharge home with home care this evening.  at bedside now.    Goal Outcome Evaluation:      Plan of Care Reviewed With: patient, spouse    Overall Patient Progress: improvingOverall Patient Progress: improving       Problem: Adult Inpatient Plan of Care  Goal: Plan of Care Review  Description: The Plan of Care Review/Shift note should be completed every shift.  The Outcome Evaluation is a brief statement about your assessment that the patient is improving, declining, or no change.  This information will be displayed automatically on your shift  note.  Outcome: Progressing  Flowsheets (Taken 2/15/2025 1223)  Plan of Care Reviewed With:   patient   spouse  Overall Patient Progress: improving  Goal: Patient-Specific Goal (Individualized)  Description: You can add care plan individualizations to a care plan. Examples of Individualization might be:  \"Parent requests to be called daily at 9am for status\", \"I have a hard time hearing out of my right ear\", or \"Do not touch me to wake me up as it startles  me\".  Outcome: Progressing  Goal: Absence of Hospital-Acquired Illness or Injury  Outcome: Progressing  Intervention: Identify and Manage Fall Risk  Recent Flowsheet Documentation  Taken 2/15/2025 1054 by Taryn Ramirez RN  Safety Promotion/Fall Prevention: safety round/check completed  Intervention: Prevent Skin Injury  Recent Flowsheet Documentation  Taken 2/15/2025 1054 by Taryn Ramirez RN  Skin Protection: adhesive use limited  Intervention: Prevent and Manage VTE (Venous Thromboembolism) Risk  Recent Flowsheet Documentation  Taken 2/15/2025 1054 by Taryn Ramirez RN  VTE Prevention/Management: SCDs off (sequential compression devices)  Intervention: Prevent Infection  Recent " Flowsheet Documentation  Taken 2/15/2025 1054 by Taryn Ramirez RN  Infection Prevention:   single patient room provided   rest/sleep promoted   hand hygiene promoted  Goal: Optimal Comfort and Wellbeing  Outcome: Progressing  Intervention: Monitor Pain and Promote Comfort  Recent Flowsheet Documentation  Taken 2/15/2025 0914 by Taryn Ramirez RN  Pain Management Interventions: medication (see MAR)  Goal: Readiness for Transition of Care  Outcome: Progressing     Problem: Pain Acute  Goal: Optimal Pain Control and Function  Outcome: Progressing  Intervention: Develop Pain Management Plan  Recent Flowsheet Documentation  Taken 2/15/2025 0914 by Taryn Ramirez RN  Pain Management Interventions: medication (see MAR)  Intervention: Prevent or Manage Pain  Recent Flowsheet Documentation  Taken 2/15/2025 1054 by Taryn Ramirez RN  Medication Review/Management: medications reviewed     Problem: Gas Exchange Impaired  Goal: Optimal Gas Exchange  Outcome: Progressing

## 2025-02-15 NOTE — PLAN OF CARE
"Goal Outcome Evaluation:      Plan of Care Reviewed With: patient    Overall Patient Progress: improvingOverall Patient Progress: improving    Outcome Evaluation: Patient VSS. discharging home.  Patient A & O x4. On 1 L of oxygen NC.   VSS, denied pain.   Lidocaine patches applied.    Pt is voiding adequately.   A-1 with ambulation.   BLE Edema.  Plan to discharge home with services.    Problem: Adult Inpatient Plan of Care  Goal: Plan of Care Review  Description: The Plan of Care Review/Shift note should be completed every shift.  The Outcome Evaluation is a brief statement about your assessment that the patient is improving, declining, or no change.  This information will be displayed automatically on your shift  note.  Outcome: Progressing  Flowsheets (Taken 2/15/2025 0247)  Outcome Evaluation: Patient VSS. discharging home.  Plan of Care Reviewed With: patient  Overall Patient Progress: improving  Goal: Patient-Specific Goal (Individualized)  Description: You can add care plan individualizations to a care plan. Examples of Individualization might be:  \"Parent requests to be called daily at 9am for status\", \"I have a hard time hearing out of my right ear\", or \"Do not touch me to wake me up as it startles  me\".  Outcome: Progressing  Goal: Absence of Hospital-Acquired Illness or Injury  Outcome: Progressing  Intervention: Identify and Manage Fall Risk  Recent Flowsheet Documentation  Taken 2/15/2025 0138 by Mariusz Cruz, MYAH  Safety Promotion/Fall Prevention:   activity supervised   assistive device/personal items within reach   lighting adjusted   increase visualization of patient  Intervention: Prevent Skin Injury  Recent Flowsheet Documentation  Taken 2/15/2025 0138 by Mariusz Cruz, RN  Body Position: position changed independently  Skin Protection: adhesive use limited  Intervention: Prevent and Manage VTE (Venous Thromboembolism) Risk  Recent Flowsheet Documentation  Taken 2/15/2025 0138 by Mariusz Cruz, " RN  VTE Prevention/Management: SCDs off (sequential compression devices)  Intervention: Prevent Infection  Recent Flowsheet Documentation  Taken 2/15/2025 0138 by Mariusz Cruz RN  Infection Prevention:   hand hygiene promoted   single patient room provided   rest/sleep promoted  Goal: Optimal Comfort and Wellbeing  Outcome: Progressing  Goal: Readiness for Transition of Care  Outcome: Progressing     Problem: Pain Acute  Goal: Optimal Pain Control and Function  Outcome: Progressing  Intervention: Prevent or Manage Pain  Recent Flowsheet Documentation  Taken 2/15/2025 0138 by Mariusz Cruz RN  Sleep/Rest Enhancement: regular sleep/rest pattern promoted  Medication Review/Management: medications reviewed     Problem: Gas Exchange Impaired  Goal: Optimal Gas Exchange  Outcome: Progressing  Intervention: Optimize Oxygenation and Ventilation  Recent Flowsheet Documentation  Taken 2/15/2025 0138 by Mariusz Cruz RN  Head of Bed (HOB) Positioning: HOB at 30-45 degrees

## 2025-02-15 NOTE — PLAN OF CARE
"Goal Outcome Evaluation:    AVS reviewed with pt. At home rx given. No further questions. Discharge to home with  with home health.      Plan of Care Reviewed With: patient    Overall Patient Progress: improvingOverall Patient Progress: improving       Problem: Adult Inpatient Plan of Care  Goal: Plan of Care Review  Description: The Plan of Care Review/Shift note should be completed every shift.  The Outcome Evaluation is a brief statement about your assessment that the patient is improving, declining, or no change.  This information will be displayed automatically on your shift  note.  Outcome: Progressing  Flowsheets (Taken 2/15/2025 0011)  Plan of Care Reviewed With: patient  Overall Patient Progress: improving  Goal: Patient-Specific Goal (Individualized)  Description: You can add care plan individualizations to a care plan. Examples of Individualization might be:  \"Parent requests to be called daily at 9am for status\", \"I have a hard time hearing out of my right ear\", or \"Do not touch me to wake me up as it startles  me\".  Outcome: Progressing  Goal: Absence of Hospital-Acquired Illness or Injury  Outcome: Progressing  Goal: Optimal Comfort and Wellbeing  Outcome: Progressing  Goal: Readiness for Transition of Care  Outcome: Progressing     Problem: Pain Acute  Goal: Optimal Pain Control and Function  Outcome: Progressing     Problem: Gas Exchange Impaired  Goal: Optimal Gas Exchange  Outcome: Progressing         "

## 2025-02-15 NOTE — DISCHARGE SUMMARY
"Hospitalist Discharge Summary  [facility]    Alma Rosa Fishman MRN# [id number]   YOB: 1940 Age: 84 year old     Date of Admission:  2/10/2025  Date of Discharge:  2/15/2025  Admitting Physician:  Mario Le MD  Discharge Physician:  Will Waldron DO  Discharging Service:  Hospitalist     Primary Provider: Joselin Tai          Discharge Diagnosis:     Acute hypoxemic respiratory failure with large right pleural effusion   Fall with left-sided chest wall pain and now left shoulder pain   Presyncope and orthostatic hypotension   Hypertension  Parkinson's disease  Chronic diastolic CHF  RA  GERD             Discharge Disposition:     Discharged to home           Allergies:     Allergies   Allergen Reactions    Contrast Dye Rash, Anaphylaxis, Hives and Swelling     Tolerated CT chest with contrast 6/2021 after pre-treatment with benadryl and solumedrol  Hives / throat swelling    Doxycycline Anaphylaxis    Acetaminophen      Vicodin/Darvocet/Swelling throat    Albuterol      \"Half my tongue swelled.\"   \"Half my tongue swelled.\"       Atenolol Unknown and Other (See Comments)     PN: LW Reaction: swelling of the tongue  (see [facility] records scanned 3/11/2014)  (see [facility] records scanned 3/11/2014)      Darvocet [Propoxyphene N-Apap]     Fluticasone-Salmeterol      PN: tongue swelling      Hydrocodone-Acetaminophen Swelling     throat      Lisinopril      Tongue Swelling    Metformin Hydrochloride      Glucophage caused severe diarrhea    Morphine      MISAEL    Penicillins      Swelling throat    Percocet [Oxycodone-Acetaminophen]     Neomycin-Bacitracin-Polymyxin Hives and Rash              Discharge Medications:     Current Discharge Medication List        START taking these medications    Details   furosemide (LASIX) 20 MG tablet Take 0.5 tablets (10 mg) by mouth daily.  Qty: 30 tablet, Refills: 0    Associated Diagnoses: Pleural effusion on right "           CONTINUE these medications which have NOT CHANGED    Details   acetaminophen (TYLENOL) 500 MG tablet Take 1,000 mg by mouth 3 times daily.      albuterol (PROAIR HFA/PROVENTIL HFA/VENTOLIN HFA) 108 (90 Base) MCG/ACT inhaler Inhale 2 puffs into the lungs every 4 hours as needed for shortness of breath / dyspnea or wheezing  Qty: 6.7 g, Refills: 0    Comments: Pharmacy may dispense brand covered by insurance (Proair, or proventil or ventolin or generic albuterol inhaler)  Associated Diagnoses: Acute respiratory failure with hypoxia (H)      aspirin (ASA) 325 MG EC tablet Take 325 mg by mouth daily.      atorvastatin (LIPITOR) 40 MG tablet Take 40 mg by mouth daily      busPIRone (BUSPAR) 5 MG tablet Take 5 mg by mouth 3 times daily.      Calcium Carb-Cholecalciferol 500-200 MG-UNIT PACK Take 1 tablet by mouth daily       carbidopa-levodopa (SINEMET)  MG tablet Take 2 tablets by mouth 3 times daily      cetirizine (ZYRTEC) 10 MG tablet TAKE 1 TABLET(10 MG) BY MOUTH DAILY  Qty: 30 tablet, Refills: 1    Comments: Needs visit for refills  Associated Diagnoses: Seasonal allergic rhinitis, unspecified trigger      cholecalciferol (VITAMIN D3) 125 mcg (5000 units) capsule Take 125 mcg by mouth daily.      citalopram (CELEXA) 20 MG tablet Take 20 mg by mouth daily.      cyanocobalamin (CYANOCOBALAMIN) 1000 MCG/ML injection Inject 1 mL into the muscle every 30 days      denosumab (PROLIA) 60 MG/ML SOSY injection Inject 60 mg Subcutaneous every 6 months      diazepam (VALIUM) 5 MG tablet Take 5 mg by mouth once. Prior to dental work only      diclofenac (VOLTAREN) 1 % topical gel Apply 4 g topically 4 times daily as needed for moderate pain Plus 2 g to hands tid      folic acid (FOLVITE) 1 MG tablet Take 1 mg by mouth. 3 times daily expect on Wednesday      gabapentin (NEURONTIN) 300 MG capsule Take 600 mg by mouth At Bedtime      !! methotrexate 2.5 MG tablet Take 3 tablets by mouth every 7 days. Every  Wednesday AM      !! methotrexate 2.5 MG tablet Take 4 tablets by mouth every 7 days. Every Wednesday PM      nystatin (MYCOSTATIN) 597492 UNIT/GM external powder Apply topically 2 times daily Apply to affected area topically BID for redness      pantoprazole (PROTONIX) 20 MG EC tablet Take 20 mg by mouth daily      vibegron (GEMTESA) 75 MG TABS tablet Take 75 mg by mouth at bedtime.       !! - Potential duplicate medications found. Please discuss with provider.        STOP taking these medications       cephALEXin (KEFLEX) 250 MG capsule Comments:   Reason for Stopping:                      Condition on Discharge:     Discharge condition: Stable   Discharge vitals: Blood pressure 112/47, pulse 65, temperature 97.6  F (36.4  C), temperature source Temporal, resp. rate 16, weight 75.2 kg (165 lb 12.6 oz), SpO2 95%.   Code status on discharge: DNR      BASIC PHYSICAL EXAMINATION:  GENERAL: No apparent distress.  CARDIOVASCULAR: Regular rate and rhythm without murmurs.  PULMONARY: Clear to auscultation bilaterally.   GASTROINTESTINAL: Abdomen soft, non-tender.  EXTREMITIES: No edema, pulses intact.  NEUROLOGIC: No focal deficits.            History of Illness:   See detailed admission note for full details.               Procedures excluding imaging which is summarized below:     Please see details in the electronic medical record.           Consultations:     PHYSICAL THERAPY ADULT IP CONSULT  CARE MANAGEMENT / SOCIAL WORK IP CONSULT          Significant Results:     Results for orders placed or performed during the hospital encounter of 02/10/25   Chest XR,  PA & LAT    Narrative    EXAM: XR CHEST 2 VIEWS  LOCATION: [facility]  DATE: 2/10/2025    INDICATION: fall, L anterior rib pain  COMPARISON: 12/27/2023       Impression    IMPRESSION: Moderate-sized right pleural effusion has developed with passive atelectasis in the right lung. Reversed right total shoulder arthroplasty has been placed.  Remainder unchanged. Implanted cardiac loop recorder. Left upper quadrant surgical   clips. Moderate degenerative change thoracic spine and bilateral acromioclavicular joints. Calcified aortic atherosclerosis.    CT Head w/o Contrast    Narrative    EXAM: CT HEAD W/O CONTRAST  LOCATION: [facility]  DATE: 2/10/2025    INDICATION: fall, head injury  COMPARISON: None.  TECHNIQUE: Routine CT Head without IV contrast. Multiplanar reformats. Dose reduction techniques were used.    FINDINGS:  INTRACRANIAL CONTENTS: No evidence of acute intracranial hemorrhage or mass effect. Scattered foci of decreased attenuation within the cerebral hemispheric white matter which are nonspecific, though most commonly ascribed to chronic small vessel ischemic   disease. The ventricles and sulci are prominent consistent with mild brain parenchymal volume loss. Gray-white matter differentiation is maintained. The basilar cisterns are patent.    VISUALIZED ORBITS/SINUSES/MASTOIDS: The globes are unremarkable. The partially imaged paranasal sinuses, mastoid air cells and middle ear cavities are unremarkable.     BONES/SOFT TISSUES: The visualized skull base and calvarium are unremarkable.      Impression    IMPRESSION:    1.  No evidence of acute intracranial hemorrhage or mass effect.  2.  Mild nonspecific white matter changes.  3.  Mild brain parenchymal volume loss.   CT Cervical Spine w/o Contrast    Narrative    EXAM: CT CERVICAL SPINE W/O CONTRAST  LOCATION: [facility]  DATE: 2/10/2025    INDICATION: fall  COMPARISON: None.  TECHNIQUE: Routine CT Cervical Spine without IV contrast. Multiplanar reformats. Dose reduction techniques were used.    FINDINGS:  No evidence of acute fracture or subluxation of the cervical spine by CT imaging. The vertebral bodies of the cervical spine have normal stature and alignment. Multilevel degenerative disc disease of the cervical spine with disc height loss,  most   pronounced at C6/C7 and C7/T1. No extraspinal abnormality.     Craniovertebral junction and C1-C2: The odontoid process is well approximated with the anterior body of C1 and well aligned between the lateral masses of C1.    C2-C3: No posterior disc bulge or spinal canal narrowing. No neural foraminal narrowing.     C3-C4: No posterior disc bulge or spinal canal narrowing. Uncovertebral joint disease and facet arthropathy with severe left and moderate right neural foraminal narrowing.     C4-C5: No posterior disc bulge or spinal canal narrowing. No neural foraminal narrowing.     C5-C6: Route bar disc osteophyte complex with severe spinal canal narrowing. Uncovertebral joint disease and facet arthropathy with severe bilateral neural foraminal narrowing.     C6-C7: No posterior disc bulge or spinal canal narrowing. No neural foraminal narrowing.     C7-T1: No posterior disc bulge or spinal canal narrowing. No neural foraminal narrowing.       Impression    IMPRESSION:  1.  No evidence of acute fracture or subluxation of the cervical spine by CT imaging.  2.  Degenerative cervical spondylosis as described above.   Chest CT w/o contrast    Narrative    EXAM: CT CHEST W/O CONTRAST  LOCATION: [facility]  DATE: 2/10/2025    INDICATION: fall, chest rib pain, moderate sized pleural effusion on xray  COMPARISON: CT dated 6/20/2024  TECHNIQUE: CT chest without IV contrast. Multiplanar reformats were obtained.  CONTRAST: None.  LIMITATIONS: High KV/high-dose technique and lack of intravenous contrast    FINDINGS:   LUNGS AND PLEURA: Large right pleural effusion, and associated compressive atelectasis. No pneumothorax.    MEDIASTINUM/AXILLAE: Stable heart size. New small pericardial effusion. No mediastinal hematoma.    CORONARY ARTERY CALCIFICATION: None.    UPPER ABDOMEN: Postoperative changes in the stomach. Splenectomy. Cholecystectomy. 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    MUSCULOSKELETAL: Right shoulder arthroplasty. Implanted cardiac monitoring device. Mild to moderate multifocal degenerative change. No acute bony abnormalities. There are several old right anterior rib fractures.      Impression    IMPRESSION:   1.  Large right pleural effusion with associated compressive atelectasis.  2.  Small pericardial effusion.     XR Ankle Right 3 Views    Narrative    EXAM: XR ANKLE RIGHT G/E 3 VIEWS  LOCATION: [facility]  DATE: 2/10/2025    INDICATION: fall, ankle pain  COMPARISON: None.      Impression    IMPRESSION: No evidence of acute fracture. Mild degenerative arthritis tibiotalar joint and midfoot. Chronic well-corticated ossicles about the medial and lateral malleoli and dorsal talonavicular joint may be degenerative or sequela of remote trauma.   Plantar and Achilles calcaneal spurs. Arterial calcifications.   US Thoracentesis    Narrative    EXAM:   1. RIGHT THORACENTESIS  2. ULTRASOUND GUIDANCE  LOCATION: [facility]  DATE: 2/11/2025    INDICATION: Pleural effusion.    PROCEDURE: Informed consent obtained. Time out performed. The chest was prepped and draped in sterile fashion. 10 mL of 1 % lidocaine was infused into the local soft tissues. Under direct ultrasound guidance, a 5 Japanese catheter system was placed into   the pleural effusion.     1 liter of clear yellow fluid were removed and sent to lab, if requested.    Patient tolerated procedure well.    Ultrasound imaging was obtained and placed in the patient's permanent medical record.      Impression    IMPRESSION:  Status post right ultrasound-guided thoracentesis. 1 L removed.     US Lower Extremity Venous Duplex Bilateral    Narrative    EXAM: US LOWER EXTREMITY VENOUS DUPLEX BILATERAL  LOCATION: [facility]  DATE: 2/11/2025    INDICATION: Bilateral leg swelling.  COMPARISON: None.  TECHNIQUE: Venous Duplex ultrasound  of bilateral lower extremities with and without compression, augmentation and duplex. Color flow and spectral Doppler with waveform analysis performed.    FINDINGS: Exam includes the common femoral, femoral, popliteal veins as well as segmentally visualized deep calf veins and greater saphenous vein.     RIGHT: No deep vein thrombosis. No superficial thrombophlebitis. No popliteal cyst.    LEFT: No deep vein thrombosis. No superficial thrombophlebitis. No popliteal cyst.      Impression    IMPRESSION:  No deep venous thrombosis in the bilateral lower extremities.   XR Chest 2 Views    Narrative    EXAM: XR CHEST 2 VIEWS  LOCATION: [facility]  DATE: 2025    INDICATION: SOB hypoxia  COMPARISON: 2/10/2025      Impression    IMPRESSION: Small right pleural effusion and tiny left pleural effusion. No pneumothorax. Stable enlarged cardiomediastinal silhouette. Right shoulder surgical hardware.   XR Shoulder Left G/E 3vw    Narrative    EXAM: XR SHOULDER LEFT G/E 3 VIEWS  LOCATION: [facility]  DATE: 2025    INDICATION: Left shoulder pain after a fall.  COMPARISON: None.      Impression    IMPRESSION: Left shoulder negative for fracture or dislocation. Superior migration of the humeral head relative to the glenoid, narrowing the acromiohumeral space, compatible with age indeterminant rotator cuff tearing. Mild degenerative arthrosis at the   glenohumeral joint, with small marginal osteophytes. Moderate advanced degenerative arthrosis at the AC joint.   Echocardiogram Complete     Value    LVEF  55-60%    Narrative    [id number]  [id number]  [id number]  [id number]^KAREN^TAWANDA^[facility]  Echocardiography Laboratory  [address] [place] [address]     Name: BRIAN JUNE  MRN: [id number]  : 1940  Study Date: 2025 11:32 AM  Age: 84 yrs  Gender: Female  Patient Location: [facility]  Reason For Study: CHF  Ordering  Physician: TAWANDA JONES  Referring Physician: Joselin Tai  Performed By: Rosalinda Stephens ADDI     BSA: 1.8 m2  Height: 61 in  Weight: 177 lb  HR: 71  BP: 190/113 mmHg  ______________________________________________________________________________  Procedure  Echocardiogram with two-dimensional, color and spectral Doppler.  ______________________________________________________________________________  Interpretation Summary     1. The left ventricle is normal in size. There is mild to moderate concentric  left ventricular hypertrophy. Left ventricular systolic function is normal.  The visual ejection fraction is 55-60%. Grade I or early diastolic  dysfunction. Diastolic Doppler findings (E/E' ratio and/or other parameters)  suggest left ventricular filling pressures are increased. No regional wall  motion abnormalities noted.  2. The right ventricle is normal size. The right ventricular systolic function  is normal.  3. The left atrium is moderately dilated. The right atrium is mildly dilated.  There is no color Doppler evidence of an atrial shunt.  4. Trace to mild mitral and tricuspid regurgitation.  5. Trivial pericardial effusion.  6. No previous study for comparison.  ______________________________________________________________________________  Left Ventricle  The left ventricle is normal in size. There is mild to moderate concentric  left ventricular hypertrophy. Left ventricular systolic function is normal.  The visual ejection fraction is 55-60%. Grade I or early diastolic  dysfunction. Diastolic Doppler findings (E/E' ratio and/or other parameters)  suggest left ventricular filling pressures are increased. No regional wall  motion abnormalities noted.     Right Ventricle  The right ventricle is normal size. The right ventricular systolic function is  normal.     Atria  The left atrium is moderately dilated. The right atrium is mildly dilated.  There is no color Doppler evidence of an  atrial shunt.     Mitral Valve  There is trace to mild mitral regurgitation.     Tricuspid Valve  There is mild (1+) tricuspid regurgitation. The right ventricular systolic  pressure is approximated at 45.7 mmHg plus the right atrial pressure.     Aortic Valve  There is mild trileaflet aortic sclerosis. No aortic regurgitation is present.  No aortic stenosis is present.     Pulmonic Valve  There is trace pulmonic valvular regurgitation. There is no pulmonic valvular  stenosis.     Vessels  The aortic root is normal size. Normal size ascending aorta. Descending aortic  velocity normal. The inferior vena cava is normal.     Pericardium  Trivial pericardial effusion.     Rhythm  Sinus rhythm was noted.  ______________________________________________________________________________  MMode/2D Measurements & Calculations  IVSd: 1.2 cm     LVIDd: 4.6 cm  LVIDs: 2.4 cm  LVPWd: 1.1 cm  IVC diam: 1.7 cm  FS: 47.8 %  LV mass(C)d: 203.9 grams  LV mass(C)dI: 113.7 grams/m2  Ao root diam: 3.2 cm  LVOT diam: 2.1 cm  LVOT area: 3.6 cm2  Ao root diam index Ht(cm/m): 2.1  Ao root diam index BSA (cm/m2): 1.8  LA Volume (BP): 79.9 ml  LA Volume Index (BP): 44.6 ml/m2  RV Base: 4.1 cm     RWT: 0.49  TAPSE: 1.8 cm     Doppler Measurements & Calculations  MV E max renzo: 84.9 cm/sec  MV A max renzo: 101.9 cm/sec  MV E/A: 0.83  MV max P.4 mmHg  MV mean P.4 mmHg  MV V2 VTI: 31.7 cm  MV dec time: 0.23 sec  PA acc time: 0.13 sec  TR max rezno: 338.0 cm/sec  TR max P.7 mmHg  E/E' av.8  Lateral E/e': 20.5  Medial E/e': 21.1  RV S Renzo: 11.9 cm/sec     ______________________________________________________________________________  Report approved by: Julianna Pan MD on 2025 01:02 PM             Transthoracic Echocardiogram Results:  No results found for this or any previous visit (from the past 4320 hours).             Pending Results:     Unresulted Labs Ordered in the Past 30 Days of this Admission       Date and Time Order Name  Status Description    2/10/2025  9:58 PM Pleural fluid Aerobic Bacterial Culture Routine With Gram Stain Preliminary                         Discharge Instructions and Follow-Up:     Discharge instructions and follow-up:   Discharge Procedure Orders   Home Care Referral   Referral Priority: Routine: Next available opening Referral Type: Home Health Therapies & Aides   Number of Visits Requested: 1     Reason for your hospital stay   Order Comments: CHF.     Follow-up and recommended labs and tests    Order Comments: Follow up with primary care provider, Joselin Tai, within 7 days to evaluate medication change and for hospital follow- up.  The following labs/tests are recommended: BMP.    Start Lasix 10 mg daily on Monday 2/17.     Activity   Order Comments: Your activity upon discharge: activity as tolerated     Order Specific Question Answer Comments   Is discharge order? Yes      No CPR- Pre-arrest intubation OK     Order Specific Question Answer Comments   Code status determined by: Discussion with patient/ legal decision maker      Diet   Order Comments: Follow this diet upon discharge: Current Diet:Orders Placed This Encounter      Combination Diet Regular Diet Adult     Order Specific Question Answer Comments   Is discharge order? Yes              Hospital Course:     Alma Rosa Fishman is a 84 year old female with PMH including Parkinson's, chronic diastolic CHF with recent self discontinuation of Lasix, DVT, hypertension, depression, GERD, asthma, type 2 diabetes who presents with a fall episode as well as shortness of breath.  She got up from her recliner this afternoon and tripped over a blanket and fell.  She did not lose consciousness but did have left-sided chest wall pain which was painful with breathing.  She came to the ER due to concern regarding a possible rib fracture.     Here in the emergency room she was hypertensive initially to 159 systolic but later up to 202 systolic and noted to be  hypoxic to the 80s at rest.  Lab workup was relatively unremarkable though BNP was 880, BMP was grossly normal and troponin was detectable at 20.  CBC was grossly normal.  She underwent traumatic workup including CT cervical spine CT head and CT chest without contrast.  There were no evidence of fractures but she was noted to have a large right pleural effusion with associated compressive atelectasis and a small pericardial effusion as well.     At this point we suspect that her effusion is most likely related to CHF but certainly there is still a broad differential including malignant, less likely traumatic or infectious.  Underwent diagnostic and therapeutic thoracentesis with improvement of symptoms and continuing IV diuretics.     Assessment and Plan:   Acute hypoxemic respiratory failure with large right pleural effusion: Suspect this is acute on chronic diastolic CHF in the setting of self discontinuing her diuretic and potentially uncontrolled hypertension.  Evaluated further with a right-sided thoracentesis and an echocardiogram given that there was evidence of a pericardial effusion on her CT scan.  --Given 60 mg IV Lasix in the ER, and a few days of IV Lasix.  Slight bump in creatinine and BUN so stopped IV diuresis yesterday.  --Start oral Lasix 20 mg daily in 2 days.  --Bilateral lower extremity ultrasounds negative for DVT.  --Echocardiogram largely unremarkable.  --Diagnostic and therapeutic thoracentesis completed 2/11 with 1 L of clear fluid removed.  Given that she is 84 she would benefit from cytology which was negative.  Gram stain and cultures negative.  --Hypoxia resolved on ambulatory oxygen testing today.  --Aggressive use of incentive spirometry and ambulation/activity.  --BMP with PCP next week.     Fall with left-sided chest wall pain and now left shoulder pain: Traumatic workup negative for fractures.  Likely has a rib contusion.  Parkinson's certainly predisposes her to falls but it  sounds as though this was mainly mechanical.  Yesterday afternoon she started reporting some left shoulder pain.  --PT consult, recommending discharge home with services  --Numerous allergies to pain medications noted  --Tylenol for pain (she reports she tolerates both, though has numerous narcotic allergies), discontinue ibuprofen  --CXR and left shoulder x-rays negative     Presyncope and orthostatic hypotension: Became relatively hypotensive and presyncopal with with PT.  --Giving a diuretic holiday  --Worked with PT, presyncope and orthostatic hypotension better today but still present to mild degree  --Spouse feels comfortable with patient discharging home as she has [facility] and he is present  --Slow to move between laying, sitting, standing     Accelerated hypertension: Systolic blood pressure up to 202  in the ER.  Some of this may be driven by pain or dyspnea but warrants treatment at this point.  --IV Lasix initially  --Treat pain  --Stop PRN IV hydralazine  --No anti HTN meds prescribed due to orthostasis     History of Parkinson's disease: Resume home Sinemet.  Uses a walker.  Did have another fall around [place].  Lives in [facility] with spouse.     Chronic diastolic CHF: Follows at [facility].  Self discontinued her Lasix.     History of rheumatoid arthritis: Maintained on weekly methotrexate.  She does take Naproxen twice daily as well as gabapentin in the evening.     GERD: Continue home PPI.     Extensive allergy list, but confirmed she tolerates tylenol and ibuprofen     Diet-controlled type 2 diabetes    The patient was seen, examined, and counseled on this day. The hospitalization and plan of care was reviewed with the patient and spouse extensively. All questions were addressed and the patient agreed to follow-up as noted above.      Total time spent in face to face contact with the patient and coordinating discharge was:  35 Minutes    Will Waldron DO MPH  Pending sale to [facility]  [address] [place]  [address]  02/15/2025

## 2025-02-15 NOTE — PLAN OF CARE
"Goal Outcome Evaluation:      Plan of Care Reviewed With: patient    Overall Patient Progress: improving     Outcome Evaluation: Dyspnea with exertion. Encouraged IS use and deep breathing exercises. Saturating at 96% on 1L via NC. Lungs CTA anteriorly and posteriorly. Mild pain in left shoulder and rib cage managed with Tylenol, voltaren gel.    A&O x4. Afebrile with stable VS. Good oral intake without nausea, emesis.     Problem: Adult Inpatient Plan of Care  Goal: Plan of Care Review  Description: The Plan of Care Review/Shift note should be completed every shift.  The Outcome Evaluation is a brief statement about your assessment that the patient is improving, declining, or no change.  This information will be displayed automatically on your shift  note.  Outcome: Progressing  Flowsheets (Taken 2/14/2025 2220)  Outcome Evaluation: Dyspnea with exertion. Encouraged IS use and deep breathing exercises. Lungs CTA anteriorly and posteriorly. Mild pain in left shoulder and rib cage managed with Tylenol, voltaren gel.  Plan of Care Reviewed With: patient  Overall Patient Progress: improving  Goal: Patient-Specific Goal (Individualized)  Description: You can add care plan individualizations to a care plan. Examples of Individualization might be:  \"Parent requests to be called daily at 9am for status\", \"I have a hard time hearing out of my right ear\", or \"Do not touch me to wake me up as it startles  me\".  Outcome: Progressing  Goal: Absence of Hospital-Acquired Illness or Injury  Outcome: Progressing  Intervention: Identify and Manage Fall Risk  Recent Flowsheet Documentation  Taken 2/14/2025 1750 by Lydia Puente, RN  Safety Promotion/Fall Prevention:   activity supervised   assistive device/personal items within reach   lighting adjusted   increase visualization of patient  Intervention: Prevent Skin Injury  Recent Flowsheet Documentation  Taken 2/14/2025 1750 by Lydia Puente, RN  Body Position: position changed " independently  Skin Protection: adhesive use limited  Intervention: Prevent and Manage VTE (Venous Thromboembolism) Risk  Recent Flowsheet Documentation  Taken 2/14/2025 1750 by Lydia Puente RN  VTE Prevention/Management: SCDs off (sequential compression devices)  Intervention: Prevent Infection  Recent Flowsheet Documentation  Taken 2/14/2025 1750 by Lydia Puente, RN  Infection Prevention:   hand hygiene promoted   single patient room provided   rest/sleep promoted  Goal: Optimal Comfort and Wellbeing  Outcome: Progressing  Intervention: Monitor Pain and Promote Comfort  Recent Flowsheet Documentation  Taken 2/14/2025 1552 by Lydia Puente RN  Pain Management Interventions:   medication (see MAR)   repositioned  Goal: Readiness for Transition of Care  Outcome: Progressing     Problem: Pain Acute  Goal: Optimal Pain Control and Function  Outcome: Progressing  Intervention: Develop Pain Management Plan  Recent Flowsheet Documentation  Taken 2/14/2025 1552 by Lydia Puente RN  Pain Management Interventions:   medication (see MAR)   repositioned  Intervention: Prevent or Manage Pain  Recent Flowsheet Documentation  Taken 2/14/2025 1750 by Lydia Puente RN  Sleep/Rest Enhancement: noise level reduced  Medication Review/Management: medications reviewed     Problem: Gas Exchange Impaired  Goal: Optimal Gas Exchange  Outcome: Progressing

## 2025-02-16 LAB
BACTERIA PLR CULT: NO GROWTH
GRAM STAIN RESULT: NORMAL
GRAM STAIN RESULT: NORMAL

## 2025-02-16 NOTE — PLAN OF CARE
Physical Therapy Discharge Summary    Reason for therapy discharge:    Discharged to home with home therapy.    Progress towards therapy goal(s). See goals on Care Plan in [facility] electronic health record for goal details.  Goals partially met.  Barriers to achieving goals:   discharge from facility.    Therapy recommendation(s):    Continued therapy is recommended.  Rationale/Recommendations:  Patient below reported baseline level of function; currently moving with CGA of 1 limited by weakness and pain. Continues to be lightheaded and orthostatic. Anticipate once medically stable, patient should be safe to return home with assist of spouse for mobility and cares; may benefit from Home PT to maximize return to PLOF.

## 2025-04-01 ENCOUNTER — APPOINTMENT (OUTPATIENT)
Dept: CT IMAGING | Facility: CLINIC | Age: 85
End: 2025-04-01
Attending: EMERGENCY MEDICINE
Payer: MEDICARE

## 2025-04-01 ENCOUNTER — APPOINTMENT (OUTPATIENT)
Dept: ULTRASOUND IMAGING | Facility: CLINIC | Age: 85
End: 2025-04-01
Attending: EMERGENCY MEDICINE
Payer: MEDICARE

## 2025-04-01 ENCOUNTER — HOSPITAL ENCOUNTER (EMERGENCY)
Facility: CLINIC | Age: 85
Discharge: HOME | End: 2025-04-01
Attending: EMERGENCY MEDICINE | Admitting: EMERGENCY MEDICINE
Payer: MEDICARE

## 2025-04-01 VITALS
WEIGHT: 183 LBS | SYSTOLIC BLOOD PRESSURE: 156 MMHG | HEART RATE: 71 BPM | RESPIRATION RATE: 18 BRPM | BODY MASS INDEX: 34.58 KG/M2 | DIASTOLIC BLOOD PRESSURE: 121 MMHG | OXYGEN SATURATION: 93 % | TEMPERATURE: 97.6 F

## 2025-04-01 DIAGNOSIS — J90 PLEURAL EFFUSION ON RIGHT: ICD-10-CM

## 2025-04-01 DIAGNOSIS — R06.09 DYSPNEA ON EXERTION: ICD-10-CM

## 2025-04-01 DIAGNOSIS — M79.661 RIGHT CALF PAIN: ICD-10-CM

## 2025-04-01 DIAGNOSIS — M79.89 RIGHT LEG SWELLING: ICD-10-CM

## 2025-04-01 DIAGNOSIS — I31.39 PERICARDIAL EFFUSION: ICD-10-CM

## 2025-04-01 LAB
ALBUMIN SERPL BCG-MCNC: 3.3 G/DL (ref 3.5–5.2)
ALP SERPL-CCNC: 105 U/L (ref 40–150)
ALT SERPL W P-5'-P-CCNC: 9 U/L (ref 0–50)
ANION GAP SERPL CALCULATED.3IONS-SCNC: 14 MMOL/L (ref 7–15)
AST SERPL W P-5'-P-CCNC: ABNORMAL U/L
BASOPHILS # BLD AUTO: 0.1 10E3/UL (ref 0–0.2)
BASOPHILS NFR BLD AUTO: 1 %
BILIRUB SERPL-MCNC: 0.9 MG/DL
BUN SERPL-MCNC: 11.8 MG/DL (ref 8–23)
CALCIUM SERPL-MCNC: 8.6 MG/DL (ref 8.8–10.4)
CHLORIDE SERPL-SCNC: 101 MMOL/L (ref 98–107)
CREAT SERPL-MCNC: 0.62 MG/DL (ref 0.51–0.95)
D DIMER PPP FEU-MCNC: 3.69 UG/ML FEU (ref 0–0.5)
EGFRCR SERPLBLD CKD-EPI 2021: 88 ML/MIN/1.73M2
EOSINOPHIL # BLD AUTO: 0.2 10E3/UL (ref 0–0.7)
EOSINOPHIL NFR BLD AUTO: 2 %
ERYTHROCYTE [DISTWIDTH] IN BLOOD BY AUTOMATED COUNT: 17 % (ref 10–15)
GLUCOSE SERPL-MCNC: 85 MG/DL (ref 70–99)
HCO3 SERPL-SCNC: 24 MMOL/L (ref 22–29)
HCT VFR BLD AUTO: 36.5 % (ref 35–47)
HGB BLD-MCNC: 12.5 G/DL (ref 11.7–15.7)
HOLD SPECIMEN: NORMAL
IMM GRANULOCYTES # BLD: 0.1 10E3/UL
IMM GRANULOCYTES NFR BLD: 1 %
LYMPHOCYTES # BLD AUTO: 0.9 10E3/UL (ref 0.8–5.3)
LYMPHOCYTES NFR BLD AUTO: 9 %
MCH RBC QN AUTO: 36.7 PG (ref 26.5–33)
MCHC RBC AUTO-ENTMCNC: 34.2 G/DL (ref 31.5–36.5)
MCV RBC AUTO: 107 FL (ref 78–100)
MONOCYTES # BLD AUTO: 1 10E3/UL (ref 0–1.3)
MONOCYTES NFR BLD AUTO: 11 %
NEUTROPHILS # BLD AUTO: 7.4 10E3/UL (ref 1.6–8.3)
NEUTROPHILS NFR BLD AUTO: 77 %
NRBC # BLD AUTO: 0 10E3/UL
NRBC BLD AUTO-RTO: 0 /100
PLATELET # BLD AUTO: 227 10E3/UL (ref 150–450)
POTASSIUM SERPL-SCNC: 4.5 MMOL/L (ref 3.4–5.3)
PROT SERPL-MCNC: 6.3 G/DL (ref 6.4–8.3)
RBC # BLD AUTO: 3.41 10E6/UL (ref 3.8–5.2)
SODIUM SERPL-SCNC: 139 MMOL/L (ref 135–145)
TROPONIN T SERPL HS-MCNC: 21 NG/L
TROPONIN T SERPL HS-MCNC: 23 NG/L
WBC # BLD AUTO: 9.7 10E3/UL (ref 4–11)

## 2025-04-01 PROCEDURE — 96374 THER/PROPH/DIAG INJ IV PUSH: CPT | Mod: 59

## 2025-04-01 PROCEDURE — 250N000013 HC RX MED GY IP 250 OP 250 PS 637: Performed by: EMERGENCY MEDICINE

## 2025-04-01 PROCEDURE — 71275 CT ANGIOGRAPHY CHEST: CPT

## 2025-04-01 PROCEDURE — 250N000011 HC RX IP 250 OP 636: Performed by: EMERGENCY MEDICINE

## 2025-04-01 PROCEDURE — 99285 EMERGENCY DEPT VISIT HI MDM: CPT | Mod: 25

## 2025-04-01 PROCEDURE — 93971 EXTREMITY STUDY: CPT | Mod: RT

## 2025-04-01 PROCEDURE — 85379 FIBRIN DEGRADATION QUANT: CPT | Performed by: EMERGENCY MEDICINE

## 2025-04-01 PROCEDURE — 84484 ASSAY OF TROPONIN QUANT: CPT | Performed by: EMERGENCY MEDICINE

## 2025-04-01 PROCEDURE — 36415 COLL VENOUS BLD VENIPUNCTURE: CPT | Performed by: EMERGENCY MEDICINE

## 2025-04-01 PROCEDURE — 93005 ELECTROCARDIOGRAM TRACING: CPT

## 2025-04-01 PROCEDURE — 82435 ASSAY OF BLOOD CHLORIDE: CPT | Performed by: EMERGENCY MEDICINE

## 2025-04-01 PROCEDURE — 250N000011 HC RX IP 250 OP 636: Mod: JZ | Performed by: EMERGENCY MEDICINE

## 2025-04-01 PROCEDURE — 85025 COMPLETE CBC W/AUTO DIFF WBC: CPT | Performed by: EMERGENCY MEDICINE

## 2025-04-01 PROCEDURE — 84155 ASSAY OF PROTEIN SERUM: CPT | Performed by: EMERGENCY MEDICINE

## 2025-04-01 PROCEDURE — 250N000009 HC RX 250: Performed by: EMERGENCY MEDICINE

## 2025-04-01 RX ORDER — METHYLPREDNISOLONE SODIUM SUCCINATE 125 MG/2ML
125 INJECTION INTRAMUSCULAR; INTRAVENOUS ONCE
Status: COMPLETED | OUTPATIENT
Start: 2025-04-01 | End: 2025-04-01

## 2025-04-01 RX ORDER — DIPHENHYDRAMINE HCL 25 MG
25 CAPSULE ORAL ONCE
Status: COMPLETED | OUTPATIENT
Start: 2025-04-01 | End: 2025-04-01

## 2025-04-01 RX ORDER — IOPAMIDOL 755 MG/ML
500 INJECTION, SOLUTION INTRAVASCULAR ONCE
Status: COMPLETED | OUTPATIENT
Start: 2025-04-01 | End: 2025-04-01

## 2025-04-01 RX ADMIN — METHYLPREDNISOLONE SODIUM SUCCINATE 125 MG: 125 INJECTION INTRAMUSCULAR; INTRAVENOUS at 17:21

## 2025-04-01 RX ADMIN — DIPHENHYDRAMINE HYDROCHLORIDE 25 MG: 25 CAPSULE ORAL at 17:21

## 2025-04-01 RX ADMIN — SODIUM CHLORIDE 93 ML: 9 INJECTION, SOLUTION INTRAVENOUS at 18:12

## 2025-04-01 RX ADMIN — IOPAMIDOL 74 ML: 755 INJECTION, SOLUTION INTRAVENOUS at 18:09

## 2025-04-01 ASSESSMENT — ACTIVITIES OF DAILY LIVING (ADL)
ADLS_ACUITY_SCORE: 58

## 2025-04-01 NOTE — ED PROVIDER NOTES
Emergency Department Note      History of Present Illness     Chief Complaint   Leg Swelling      HPI   Alma Rosa Fishman is a 84 year old female with a history of stage 3 kidney failure, Parkinson's, hypertension, hyperlipidemia, and heart failure presenting for evaluation of leg swelling. The patient's  reports that the patient was diagnosed with heart failure about 10 days ago. She experienced swelling in only her right leg at that time. The swelling went away, but it has come back and gotten worse. Today, the patient endorses swelling and pain in her right calf. She has experienced more shortness of breath than baseline since her swelling started. Her  reports that she has been holding her breath and not talking normally as a result. The patient endorses urinary frequency and is on a reduced dose of diuretics. The patient had a blood clot in the past in her right leg. It was a post surgical blood clot and the patient is not currently on blood thinners. She uses a walker or diaz due to her right leg occasionally giving out. The patient denies redness or color changes in her legs, chest pain, abdominal pain, fever, and a new cough.    Independent Historian    as detailed above.    Review of External Notes   I reviewed an office visit note from 3/11/25.    Past Medical History     Medical History and Problem List   Past Medical History:   Diagnosis Date    Asthma     Deep vein thrombosis     Depression     Esophageal reflux 02/18/2004    Essential hypertension, benign     Hyperlipidemia     Iron deficiency anemia 2000    Kidney stone 06/2002    Nonsenile cataract     Osteopenia 01/2004    Restless leg syndrome     Rheumatoid arthritis     Sleep issues     Small bowel obstruction (H) 06/23/2021    Type 2 diabetes mellitus        Medications   acetaminophen (TYLENOL) 500 MG tablet  albuterol (PROAIR HFA/PROVENTIL HFA/VENTOLIN HFA) 108 (90 Base) MCG/ACT inhaler  aspirin (ASA) 325 MG EC  tablet  atorvastatin (LIPITOR) 40 MG tablet  busPIRone (BUSPAR) 5 MG tablet  Calcium Carb-Cholecalciferol 500-200 MG-UNIT PACK  carbidopa-levodopa (SINEMET)  MG tablet  cetirizine (ZYRTEC) 10 MG tablet  cholecalciferol (VITAMIN D3) 125 mcg (5000 units) capsule  citalopram (CELEXA) 20 MG tablet  cyanocobalamin (CYANOCOBALAMIN) 1000 MCG/ML injection  denosumab (PROLIA) 60 MG/ML SOSY injection  diazepam (VALIUM) 5 MG tablet  diclofenac (VOLTAREN) 1 % topical gel  folic acid (FOLVITE) 1 MG tablet  furosemide (LASIX) 20 MG tablet  gabapentin (NEURONTIN) 300 MG capsule  methotrexate 2.5 MG tablet  methotrexate 2.5 MG tablet  nystatin (MYCOSTATIN) 196119 UNIT/GM external powder  pantoprazole (PROTONIX) 20 MG EC tablet  vibegron (GEMTESA) 75 MG TABS tablet        Surgical History   Past Surgical History:   Procedure Laterality Date    APPENDECTOMY      ARTHROPLASTY KNEE Bilateral     CHOLECYSTECTOMY      COLONOSCOPY      ENDOSCOPIC RELEASE CARPAL TUNNEL Bilateral     EYE SURGERY      Gastric bypass NOS      IR LUMBAR PUNCTURE  5/16/2022    IR LUMBAR PUNCTURE  2/10/2023    OPEN REDUCTION INTERNAL FIXATION ANKLE Left     ORTHOPEDIC SURGERY      Total abdominal hysterectomy with bilateral salpingectomy         Physical Exam     Patient Vitals for the past 24 hrs:   BP Temp Pulse Resp SpO2 Weight   04/01/25 1425 (!) 150/76 97.6  F (36.4  C) 70 18 98 % 83 kg (183 lb)     Physical Exam  General: Elderly adult sitting upright  Eyes: PERRL, Conjunctive within normal limits  ENT: Moist mucous membranes, oropharynx clear.   CV: Normal S1S2, no murmur, rub or gallop. Regular rate and rhythm  Resp: Clear to auscultation bilaterally, no wheezes, rales or rhonchi although diminished at the right lower lung. Normal respiratory effort.  GI: Abdomen is soft, nontender and nondistended. No palpable masses. No rebound or guarding.  MSK: Bilateral nonpitting lower extremity edema, right greater than left with posterior mid calf  tenderness in the right lower extremity.  No palpable mass or cord. Normal active range of motion.  Skin: Warm and dry. No rashes or lesions or ecchymoses on visible skin.  Neuro: Alert and oriented. Responds appropriately to all questions and commands. No focal findings appreciated. Normal muscle tone.  Psych: Normal mood and affect. Pleasant.     Diagnostics     Lab Results   Labs Ordered and Resulted from Time of ED Arrival to Time of ED Departure   COMPREHENSIVE METABOLIC PANEL - Abnormal       Result Value    Sodium 139      Potassium 4.5      Carbon Dioxide (CO2) 24      Anion Gap 14      Urea Nitrogen 11.8      Creatinine 0.62      GFR Estimate 88      Calcium 8.6 (*)     Chloride 101      Glucose 85      Alkaline Phosphatase 105      AST        ALT 9      Protein Total 6.3 (*)     Albumin 3.3 (*)     Bilirubin Total 0.9     TROPONIN T, HIGH SENSITIVITY - Abnormal    Troponin T, High Sensitivity 23 (*)    D DIMER QUANTITATIVE - Abnormal    D-Dimer Quantitative 3.69 (*)    CBC WITH PLATELETS AND DIFFERENTIAL - Abnormal    WBC Count 9.7      RBC Count 3.41 (*)     Hemoglobin 12.5      Hematocrit 36.5       (*)     MCH 36.7 (*)     MCHC 34.2      RDW 17.0 (*)     Platelet Count 227      % Neutrophils 77      % Lymphocytes 9      % Monocytes 11      % Eosinophils 2      % Basophils 1      % Immature Granulocytes 1      NRBCs per 100 WBC 0      Absolute Neutrophils 7.4      Absolute Lymphocytes 0.9      Absolute Monocytes 1.0      Absolute Eosinophils 0.2      Absolute Basophils 0.1      Absolute Immature Granulocytes 0.1      Absolute NRBCs 0.0     TROPONIN T, HIGH SENSITIVITY - Abnormal    Troponin T, High Sensitivity 21 (*)        Imaging   CT Chest Pulmonary Embolism w Contrast   Final Result   IMPRESSION:   1.  No evidence for pulmonary emboli.   2.  Large right pleural effusion with compressive atelectasis involving the entire right lower lobe and portions of the right upper lobe. Consider therapeutic  thoracentesis.   3.  Small pericardial effusion.   4.  Sclerotic changes involving several bilateral ribs, possibly representing metastatic disease. This could be further assessed with a whole-body bone scan.    5.  Edematous changes subcutaneous tissues.      US Lower Extremity Venous Duplex Right   Final Result   IMPRESSION:   1.  No deep venous thrombosis in the right lower extremity in the visualized veins of the right lower extremity.      US Thoracentesis    (Results Pending)       EKG   ECG taken at 1502, ECG read at 1516  Normal sinus rhythm  Minimal voltage criteria for LVH, may be normal variant (R in aVL)   Rate 68 bpm. TN interval 124 ms. QRS duration 80 ms. QT/QTc 490/521 ms. P-R-T axes 43 -5 7.    Independent Interpretation   None    ED Course      Medications Administered   Medications   diphenhydrAMINE (BENADRYL) capsule 25 mg (25 mg Oral $Given 4/1/25 1721)   methylPREDNISolone Na Suc (solu-MEDROL) injection 125 mg (125 mg Intravenous $Given 4/1/25 1721)   sodium chloride 0.9 % bag for CT scan flush (93 mLs Intravenous $Given 4/1/25 1812)   iopamidol (ISOVUE-370) solution 500 mL (74 mLs Intravenous $Given 4/1/25 1809)     Discussion of Management   I discussed the patient with the on-call interventional radiologist.  Outpatient thoracentesis discussed, likely cannot be performed tomorrow or the next day.  Patient given information from the interventional radiologist regarding contact to schedule the procedure.    ED Course   ED Course as of 04/01/25 2103 Tue Apr 01, 2025   1519 I obtained history and performed physical exam.   1952 I rechecked on the patient.   2017 I spoke with IR with discussion of thoracentesis.   I reassessed the patient and discussed plan.  They feel comfortable to plan for discharge, this is preferred by the patient and her .  They will follow-up with primary care provider and thoracentesis tomorrow or the next day.    Additional Documentation  None    Medical  Decision Making / Diagnosis     CMS Diagnoses: None    MIPS       None    MDM   Alma Rosa Fishman is a 84 year old female who presents emergency department with concerns for right leg swelling.  She has a relatively recent diagnosis of heart failure and had a pleural effusion noted at that time on hospitalization, drained by thoracentesis.  She is also noting mildly worsening shortness of breath, primarily with exertion.  She is not having any chest pain.  She notes that she has been continue with diuresis and probably would benefit from this.  Advanced imaging here does demonstrate recurrence of the pleural effusion.  She is not hypoxic or tachypneic or in any respiratory distress at rest.  She does not want to be admitted to the hospital does not seem that there is a direct indication for admission.  She can have outpatient thoracentesis, discussed with IR, and can likely have this in the next day or 2.  She will call to obtain an appointment for this procedure.  With regards to the right leg pain and swelling, there is no pitting edema.  Is not discolored.  There is no sign of infection.  The DVT study was negative.  She does not have signs of a pulmonary embolism.  She is not ill-appearing.  There is a small pericardial fusion noted on CT scan.  Not sure of the clinical relevance of this on today's evaluation as there is no sign of cardiac tamponade I do not suspect this is the cause of her symptoms today.  She should continue with diuresis and Close follow-up with her primary care provider.  She should consider pulmonology and possibly cardiology outpatient evaluation.  Outpatient thoracentesis discussed in detail with her and she will obtain this which will likely help overall with her symptoms of shortness of breath on exertion.  She is recommended to return to the emergency department should her symptoms worsen.  All questions were answered prior to discharge.    Disposition   The patient was discharged.      Diagnosis     ICD-10-CM    1. Right leg swelling  M79.89       2. Right calf pain  M79.661       3. Dyspnea on exertion  R06.09 US Thoracentesis      4. Pleural effusion on right  J90 US Thoracentesis      5. Pericardial effusion  I31.39     small           Scribe Disclosure:  I, Laquita Montejo, am serving as a scribe at 3:17 PM on 4/1/2025 to document services personally performed by Abby Mccarthy MD based on my observations and the provider's statements to me.        Abby Mccarthy MD  04/02/25 [id number]

## 2025-04-01 NOTE — ED TRIAGE NOTES
Pt arrives with right leg swelling about 10 days ago, pt was recently admitted for CHF. Endorses pain in the back of the calf. Went to  and referred for US and blood work. Hx of DVT, not currently on thinners.      Triage Assessment (Adult)       Row Name 04/01/25 1423          Triage Assessment    Airway WDL WDL        Respiratory WDL    Respiratory WDL WDL        Cardiac WDL    Cardiac WDL X;all        Chest Pain Assessment    Associated Signs/Symptoms dizziness;hypertension

## 2025-04-02 LAB
ATRIAL RATE - MUSE: 68 BPM
DIASTOLIC BLOOD PRESSURE - MUSE: NORMAL MMHG
INTERPRETATION ECG - MUSE: NORMAL
P AXIS - MUSE: 43 DEGREES
PR INTERVAL - MUSE: 124 MS
QRS DURATION - MUSE: 80 MS
QT - MUSE: 490 MS
QTC - MUSE: 521 MS
R AXIS - MUSE: -5 DEGREES
SYSTOLIC BLOOD PRESSURE - MUSE: NORMAL MMHG
T AXIS - MUSE: 7 DEGREES
VENTRICULAR RATE- MUSE: 68 BPM

## 2025-04-04 ENCOUNTER — HOSPITAL ENCOUNTER (OUTPATIENT)
Dept: ULTRASOUND IMAGING | Facility: HOSPITAL | Age: 85
Discharge: HOME | End: 2025-04-04
Attending: EMERGENCY MEDICINE | Admitting: RADIOLOGY
Payer: MEDICARE

## 2025-04-04 ENCOUNTER — APPOINTMENT (OUTPATIENT)
Dept: RADIOLOGY | Facility: HOSPITAL | Age: 85
End: 2025-04-04
Attending: EMERGENCY MEDICINE
Payer: MEDICARE

## 2025-04-04 ENCOUNTER — HOSPITAL ENCOUNTER (EMERGENCY)
Facility: HOSPITAL | Age: 85
Discharge: HOME | End: 2025-04-04
Attending: EMERGENCY MEDICINE | Admitting: EMERGENCY MEDICINE
Payer: MEDICARE

## 2025-04-04 VITALS
HEART RATE: 65 BPM | RESPIRATION RATE: 17 BRPM | HEIGHT: 63 IN | SYSTOLIC BLOOD PRESSURE: 176 MMHG | DIASTOLIC BLOOD PRESSURE: 78 MMHG | BODY MASS INDEX: 32.43 KG/M2 | OXYGEN SATURATION: 93 % | TEMPERATURE: 97 F | WEIGHT: 183 LBS

## 2025-04-04 DIAGNOSIS — J90 PLEURAL EFFUSION ON RIGHT: ICD-10-CM

## 2025-04-04 DIAGNOSIS — R06.09 DYSPNEA ON EXERTION: ICD-10-CM

## 2025-04-04 DIAGNOSIS — R55 VASOVAGAL NEAR SYNCOPE: ICD-10-CM

## 2025-04-04 DIAGNOSIS — I95.81 HYPOTENSION AFTER PROCEDURE: Primary | ICD-10-CM

## 2025-04-04 LAB
ALBUMIN SERPL BCG-MCNC: 3.1 G/DL (ref 3.5–5.2)
ALBUMIN UR-MCNC: NEGATIVE MG/DL
ALP SERPL-CCNC: 92 U/L (ref 40–150)
ALT SERPL W P-5'-P-CCNC: 10 U/L (ref 0–50)
ANION GAP SERPL CALCULATED.3IONS-SCNC: 8 MMOL/L (ref 7–15)
APPEARANCE UR: CLEAR
AST SERPL W P-5'-P-CCNC: 57 U/L (ref 0–45)
BASOPHILS # BLD AUTO: 0 10E3/UL (ref 0–0.2)
BASOPHILS NFR BLD AUTO: 1 %
BILIRUB DIRECT SERPL-MCNC: 0.2 MG/DL (ref 0–0.3)
BILIRUB SERPL-MCNC: 0.7 MG/DL
BILIRUB UR QL STRIP: NEGATIVE
BUN SERPL-MCNC: 16.2 MG/DL (ref 8–23)
CALCIUM SERPL-MCNC: 8.1 MG/DL (ref 8.8–10.4)
CHLORIDE SERPL-SCNC: 103 MMOL/L (ref 98–107)
COLOR UR AUTO: ABNORMAL
CREAT SERPL-MCNC: 0.71 MG/DL (ref 0.51–0.95)
EGFRCR SERPLBLD CKD-EPI 2021: 84 ML/MIN/1.73M2
EOSINOPHIL # BLD AUTO: 0.2 10E3/UL (ref 0–0.7)
EOSINOPHIL NFR BLD AUTO: 2 %
ERYTHROCYTE [DISTWIDTH] IN BLOOD BY AUTOMATED COUNT: 17.1 % (ref 10–15)
GLUCOSE SERPL-MCNC: 87 MG/DL (ref 70–99)
GLUCOSE UR STRIP-MCNC: NEGATIVE MG/DL
HCO3 SERPL-SCNC: 30 MMOL/L (ref 22–29)
HCT VFR BLD AUTO: 36.6 % (ref 35–47)
HGB BLD-MCNC: 12.5 G/DL (ref 11.7–15.7)
HGB UR QL STRIP: NEGATIVE
HOLD SPECIMEN: NORMAL
HYALINE CASTS: 23 /LPF
IMM GRANULOCYTES # BLD: 0 10E3/UL
IMM GRANULOCYTES NFR BLD: 0 %
KETONES UR STRIP-MCNC: NEGATIVE MG/DL
LEUKOCYTE ESTERASE UR QL STRIP: NEGATIVE
LYMPHOCYTES # BLD AUTO: 0.8 10E3/UL (ref 0.8–5.3)
LYMPHOCYTES NFR BLD AUTO: 10 %
MCH RBC QN AUTO: 36.5 PG (ref 26.5–33)
MCHC RBC AUTO-ENTMCNC: 34.2 G/DL (ref 31.5–36.5)
MCV RBC AUTO: 107 FL (ref 78–100)
MONOCYTES # BLD AUTO: 0.5 10E3/UL (ref 0–1.3)
MONOCYTES NFR BLD AUTO: 6 %
MUCOUS THREADS #/AREA URNS LPF: PRESENT /LPF
NEUTROPHILS # BLD AUTO: 6.9 10E3/UL (ref 1.6–8.3)
NEUTROPHILS NFR BLD AUTO: 81 %
NITRATE UR QL: NEGATIVE
NRBC # BLD AUTO: 0 10E3/UL
NRBC BLD AUTO-RTO: 0 /100
PH UR STRIP: 7 [PH] (ref 5–7)
PLATELET # BLD AUTO: 288 10E3/UL (ref 150–450)
POTASSIUM SERPL-SCNC: 3.8 MMOL/L (ref 3.4–5.3)
PROT SERPL-MCNC: 5.7 G/DL (ref 6.4–8.3)
RBC # BLD AUTO: 3.42 10E6/UL (ref 3.8–5.2)
RBC URINE: 1 /HPF
SODIUM SERPL-SCNC: 141 MMOL/L (ref 135–145)
SP GR UR STRIP: 1.02 (ref 1–1.03)
SQUAMOUS EPITHELIAL: 1 /HPF
TRANSITIONAL EPI: <1 /HPF
TROPONIN T SERPL HS-MCNC: 23 NG/L
TROPONIN T SERPL HS-MCNC: 25 NG/L
UROBILINOGEN UR STRIP-MCNC: NORMAL MG/DL
WBC # BLD AUTO: 8.5 10E3/UL (ref 4–11)
WBC URINE: 6 /HPF

## 2025-04-04 PROCEDURE — 82947 ASSAY GLUCOSE BLOOD QUANT: CPT | Performed by: EMERGENCY MEDICINE

## 2025-04-04 PROCEDURE — 81003 URINALYSIS AUTO W/O SCOPE: CPT | Performed by: EMERGENCY MEDICINE

## 2025-04-04 PROCEDURE — 93005 ELECTROCARDIOGRAM TRACING: CPT | Performed by: STUDENT IN AN ORGANIZED HEALTH CARE EDUCATION/TRAINING PROGRAM

## 2025-04-04 PROCEDURE — 99285 EMERGENCY DEPT VISIT HI MDM: CPT | Mod: 25 | Performed by: EMERGENCY MEDICINE

## 2025-04-04 PROCEDURE — 85025 COMPLETE CBC W/AUTO DIFF WBC: CPT | Performed by: EMERGENCY MEDICINE

## 2025-04-04 PROCEDURE — 71045 X-RAY EXAM CHEST 1 VIEW: CPT

## 2025-04-04 PROCEDURE — 36415 COLL VENOUS BLD VENIPUNCTURE: CPT | Performed by: EMERGENCY MEDICINE

## 2025-04-04 PROCEDURE — 82248 BILIRUBIN DIRECT: CPT | Performed by: EMERGENCY MEDICINE

## 2025-04-04 PROCEDURE — 272N000710 US THORACENTESIS

## 2025-04-04 PROCEDURE — 82310 ASSAY OF CALCIUM: CPT | Performed by: EMERGENCY MEDICINE

## 2025-04-04 PROCEDURE — 84484 ASSAY OF TROPONIN QUANT: CPT | Performed by: EMERGENCY MEDICINE

## 2025-04-04 PROCEDURE — 272N000042 US THORACENTESIS

## 2025-04-04 ASSESSMENT — COLUMBIA-SUICIDE SEVERITY RATING SCALE - C-SSRS
6. HAVE YOU EVER DONE ANYTHING, STARTED TO DO ANYTHING, OR PREPARED TO DO ANYTHING TO END YOUR LIFE?: NO
1. IN THE PAST MONTH, HAVE YOU WISHED YOU WERE DEAD OR WISHED YOU COULD GO TO SLEEP AND NOT WAKE UP?: NO
2. HAVE YOU ACTUALLY HAD ANY THOUGHTS OF KILLING YOURSELF IN THE PAST MONTH?: NO

## 2025-04-04 ASSESSMENT — ACTIVITIES OF DAILY LIVING (ADL)
ADLS_ACUITY_SCORE: 58

## 2025-04-04 NOTE — ED PROVIDER NOTES
Emergency Department Encounter     Evaluation Date & Time:   2025 12:33 PM    CHIEF COMPLAINT:  Dizziness and Hypotension      Triage Note:     FINAL IMPRESSION:    ICD-10-CM    1. Hypotension after procedure  I95.81       2. Vasovagal near syncope  R55           Impression and Plan     ED COURSE & MEDICAL DECISION MAKIN:45 PM I met with the patient, obtained history, performed an initial exam, and discussed options and plan for diagnostics and treatment here in the ED.   ED Course as of 25 1511      1255 After patient was in ultrasound and had 1.5 L removed from her right chest as part of a thoracentesis, she appeared altered and rapid response was called from the ultrasound lobby.  Patient's blood pressure was low.  Suspect this is vagal due to redistribution of fluids.  She is an otherwise slender frail elderly female.  She did not eat very much today but then again has not eaten very much for some time.  Her  notes poor appetite for some time and she appears clinically dehydrated.  No lab work noted today so we will repeat some blood work here today.  She has only chest pain when she takes a deep breath on the right side consistent with just having had that fluid removed and this will trigger an occasional cough which she notes.  So, symptoms do not sound consistent with cardiac ischemia but certainly this is a stress on an 84-year-old's we will get sequential troponins to rule out a non-STEMI related to the procedure.  Low threshold to admit to the hospital but will discuss with the patient and her significant other at their preferences once we get initial blood work and observation time back making sure that her blood pressure is stable.  For now as this was triggered by CHF based on my review of her chart, will hold off on giving fluid bolus since her blood pressure has now stabilized on its own and she appears back to her normal mentation.  No severe headache, no  "persistent chest or back pain to suggest other vascular cause of her symptoms.   1357 Blood pressures and mental status holding stable.  Troponin is slightly above normal for gender will repeat as planned.   1506 She has been doing well since she is here.  She feels much better than she did previously.  Her color looks good.  She and her  are comfortable with plan to discharge home if the second troponin is not significantly changed.  They do a follow-up set with cardiology for within the next week at [facility].   1510 No significant changes in trop.  Patient discharged.       At the conclusion of the encounter I discussed the results of all the tests and the disposition. The questions were answered. The patient or family acknowledged understanding and was agreeable with the care plan.          0 minutes of critical care time        MEDICATIONS GIVEN IN THE EMERGENCY DEPARTMENT:  Medications - No data to display    NEW PRESCRIPTIONS STARTED AT TODAY'S ED VISIT:  New Prescriptions    No medications on file       HPI     HPI     History obtained by patient, nurse, and .     Alma Rosa Fishman is a 84 year old female with a pertinent history of parkinson's disease, fibromyalgia, rheumatoid arthritis, who presents to this ED for evaluation of hypotension.     Per nurse, the patient was getting a thoracentesis this morning at outpatient ultrasound she got 1.3 L taken off. She suddenly became hypotensive 70-80's systolic. Patient has gotten thoracentesis in the past, her prior thoracentesis was ~5 weeks ago.     The patient reports chest tightness and headache.     Per , the patient had come out of ultrasound looking \"out of it.\" The patient has been experiencing a decreased appetite over the last couple of weeks. She has been holding onto chairs to get around her house, she uses a walker when going outside.     Per chart review, the patient was seen at [facility] Emergency Department on " 4/1/2025 for evaluation of right lower extremity edema. The DVT study was negative. No signs of pulmonary embolism. There was a small pericardial fusion noted on CT scan. She was later discharged home.     REVIEW OF SYSTEMS:  Review of Systems  remainder of systems are all otherwise negative.        Medical History     Past Medical History:   Diagnosis Date    Asthma     Deep vein thrombosis     Depression     Esophageal reflux 02/18/2004    Essential hypertension, benign     Hyperlipidemia     Iron deficiency anemia 2000    Kidney stone 06/2002    Nonsenile cataract     Osteopenia 01/2004    Restless leg syndrome     Rheumatoid arthritis     Sleep issues     Small bowel obstruction (H) 06/23/2021    Type 2 diabetes mellitus        Past Surgical History:   Procedure Laterality Date    APPENDECTOMY      ARTHROPLASTY KNEE Bilateral     CHOLECYSTECTOMY      COLONOSCOPY      ENDOSCOPIC RELEASE CARPAL TUNNEL Bilateral     EYE SURGERY      Gastric bypass NOS      IR LUMBAR PUNCTURE  5/16/2022    IR LUMBAR PUNCTURE  2/10/2023    OPEN REDUCTION INTERNAL FIXATION ANKLE Left     ORTHOPEDIC SURGERY      Total abdominal hysterectomy with bilateral salpingectomy         Family History   Problem Relation Age of Onset    Cardiovascular Father         3 major MI's d: age 72    Cancer Father         blood and bone    Diabetes Father         Type 2    Circulatory Mother         stomach aneurysm; d: age 69    Hypertension Mother     Cancer Maternal Grandmother         bladder    Cancer Paternal Grandmother         stomach       Social History     Tobacco Use    Smoking status: Former    Smokeless tobacco: Never   Substance Use Topics    Alcohol use: Yes     Comment: 2 drinks per week    Drug use: No       acetaminophen (TYLENOL) 500 MG tablet  albuterol (PROAIR HFA/PROVENTIL HFA/VENTOLIN HFA) 108 (90 Base) MCG/ACT inhaler  aspirin (ASA) 325 MG EC tablet  atorvastatin (LIPITOR) 40 MG tablet  busPIRone (BUSPAR) 5 MG tablet  Calcium  "Carb-Cholecalciferol 500-200 MG-UNIT PACK  carbidopa-levodopa (SINEMET)  MG tablet  cetirizine (ZYRTEC) 10 MG tablet  cholecalciferol (VITAMIN D3) 125 mcg (5000 units) capsule  citalopram (CELEXA) 20 MG tablet  cyanocobalamin (CYANOCOBALAMIN) 1000 MCG/ML injection  denosumab (PROLIA) 60 MG/ML SOSY injection  diazepam (VALIUM) 5 MG tablet  diclofenac (VOLTAREN) 1 % topical gel  folic acid (FOLVITE) 1 MG tablet  furosemide (LASIX) 20 MG tablet  gabapentin (NEURONTIN) 300 MG capsule  methotrexate 2.5 MG tablet  methotrexate 2.5 MG tablet  nystatin (MYCOSTATIN) 541149 UNIT/GM external powder  pantoprazole (PROTONIX) 20 MG EC tablet  vibegron (GEMTESA) 75 MG TABS tablet        Physical Exam     First Vitals:  Patient Vitals for the past 24 hrs:   BP Temp Temp src Pulse Resp SpO2 Height Weight   04/04/25 1406 (!) 179/80 -- -- 64 17 93 % -- --   04/04/25 1351 (!) 184/83 -- -- 63 19 93 % -- --   04/04/25 1336 -- -- -- 62 14 93 % -- --   04/04/25 1330 (!) 177/78 -- -- 62 13 93 % -- --   04/04/25 1321 (!) 171/77 -- -- 60 18 95 % -- --   04/04/25 1306 (!) 172/75 -- -- 60 15 -- -- --   04/04/25 1251 (!) 165/68 -- -- 59 13 93 % -- --   04/04/25 1242 -- -- -- 61 23 92 % -- --   04/04/25 1236 (!) 144/62 97.1  F (36.2  C) Oral 66 20 (!) 90 % 1.6 m (5' 3\") 83 kg (183 lb)       PHYSICAL EXAM:   Constitutional:   Lying semi-upright.    HENT:  Normocephalic, posterior pharynx wnl, mucous membranes dry and dark pink    Eyes:  PERRL, EOMI, Conjunctiva normal, No discharge, no scleral icterus.  Respiratory:  Breathing easily, air popping in right lung, left lung clear to auscultation.   Cardiovascular:  Regular rate and rhythm, nl s1s2 0 murmurs, rubs, or gallops.  Peripheral pulses dp, pt, and radial are wnl.  Trace peripheral edema   GI:  Bowel sounds normal, Soft, No tenderness, No flank tenderness, nondistended.  :No CVA tenderness.   Musculoskeletal:  Moves all extremities.  No erythematous or swollen major joints, "   Integument:  Dry, small puncture shalonda covered by a band-aid on the right posterior lower back. No drainage.   Neurologic:  Alert & oriented x 3, Normal motor function, Normal sensory function, No focal deficits noted. Normal speech.  Psychiatric:  Affect normal, Judgment normal, Mood normal.     Results     LAB AND RADIOLOGY:  All pertinent labs reviewed and interpreted  Results for orders placed or performed during the hospital encounter of 04/04/25   XR Chest Port 1 View     Status: None    Narrative    EXAM: XR CHEST PORT 1 VIEW  LOCATION: [facility]  DATE: 4/4/2025    INDICATION: hypotension s p thoracentesis on r this afternoon  COMPARISON: CT pulmonary angiogram 4/1/2025; images from ultrasound-guided thoracentesis, earlier today      Impression    IMPRESSION:     Small residual pleural effusion is present in the right base and adjacent atelectasis. No pneumothorax. A few thin bands of atelectasis in the right midlung. No findings to suggest reexpansion edema.    The left lung is well-expanded, without interstitial or alveolar opacities.    Cardiac silhouette is normal in size. Mitral annular calcifications are present. Loop recorder projects over the lower heart. Vascular pedicle is normal.    Surgical clips in the epigastrium. Reverse right shoulder arthroplasty.   [place] Draw     Status: None    Narrative    The following orders were created for panel order [place] Draw.  Procedure                               Abnormality         Status                     ---------                               -----------         ------                     Extra Blue Top Tube[[id number]]                             Final result               Extra Red Top Tube[[id number]]                              Final result               Extra Green Top (Lithiu...[[id number]]                      Final result               Extra Purple Top Tube[[id number]]                           Final result                  Please view results for these tests on the individual orders.   Extra Blue Top Tube     Status: None   Result Value Ref Range    Hold Specimen JIC    Extra Red Top Tube     Status: None   Result Value Ref Range    Hold Specimen JIC    Extra Green Top (Lithium Heparin) Tube     Status: None   Result Value Ref Range    Hold Specimen JIC    Extra Purple Top Tube     Status: None   Result Value Ref Range    Hold Specimen JIC    Basic metabolic panel     Status: Abnormal   Result Value Ref Range    Sodium 141 135 - 145 mmol/L    Potassium 3.8 3.4 - 5.3 mmol/L    Chloride 103 98 - 107 mmol/L    Carbon Dioxide (CO2) 30 (H) 22 - 29 mmol/L    Anion Gap 8 7 - 15 mmol/L    Urea Nitrogen 16.2 8.0 - 23.0 mg/dL    Creatinine 0.71 0.51 - 0.95 mg/dL    GFR Estimate 84 >60 mL/min/1.73m2    Calcium 8.1 (L) 8.8 - 10.4 mg/dL    Glucose 87 70 - 99 mg/dL   Hepatic function panel     Status: Abnormal   Result Value Ref Range    Protein Total 5.7 (L) 6.4 - 8.3 g/dL    Albumin 3.1 (L) 3.5 - 5.2 g/dL    Bilirubin Total 0.7 <=1.2 mg/dL    Alkaline Phosphatase 92 40 - 150 U/L    AST 57 (H) 0 - 45 U/L    ALT 10 0 - 50 U/L    Bilirubin Direct 0.20 0.00 - 0.30 mg/dL   Troponin T, High Sensitivity     Status: Abnormal   Result Value Ref Range    Troponin T, High Sensitivity 25 (H) <=14 ng/L   CBC with platelets and differential     Status: Abnormal   Result Value Ref Range    WBC Count 8.5 4.0 - 11.0 10e3/uL    RBC Count 3.42 (L) 3.80 - 5.20 10e6/uL    Hemoglobin 12.5 11.7 - 15.7 g/dL    Hematocrit 36.6 35.0 - 47.0 %     (H) 78 - 100 fL    MCH 36.5 (H) 26.5 - 33.0 pg    MCHC 34.2 31.5 - 36.5 g/dL    RDW 17.1 (H) 10.0 - 15.0 %    Platelet Count 288 150 - 450 10e3/uL    % Neutrophils 81 %    % Lymphocytes 10 %    % Monocytes 6 %    % Eosinophils 2 %    % Basophils 1 %    % Immature Granulocytes 0 %    NRBCs per 100 WBC 0 <1 /100    Absolute Neutrophils 6.9 1.6 - 8.3 10e3/uL    Absolute Lymphocytes 0.8 0.8 - 5.3 10e3/uL    Absolute  Monocytes 0.5 0.0 - 1.3 10e3/uL    Absolute Eosinophils 0.2 0.0 - 0.7 10e3/uL    Absolute Basophils 0.0 0.0 - 0.2 10e3/uL    Absolute Immature Granulocytes 0.0 <=0.4 10e3/uL    Absolute NRBCs 0.0 10e3/uL   Troponin T, High Sensitivity     Status: Abnormal   Result Value Ref Range    Troponin T, High Sensitivity 23 (H) <=14 ng/L   UA with Microscopic reflex to Culture     Status: Abnormal    Specimen: Urine, Clean Catch   Result Value Ref Range    Color Urine Light Yellow Colorless, Straw, Light Yellow, Yellow    Appearance Urine Clear Clear    Glucose Urine Negative Negative mg/dL    Bilirubin Urine Negative Negative    Ketones Urine Negative Negative mg/dL    Specific Gravity Urine 1.017 1.001 - 1.030    Blood Urine Negative Negative    pH Urine 7.0 5.0 - 7.0    Protein Albumin Urine Negative Negative mg/dL    Urobilinogen Urine Normal Normal mg/dL    Nitrite Urine Negative Negative    Leukocyte Esterase Urine Negative Negative    Mucus Urine Present (A) None Seen /LPF    RBC Urine 1 <=2 /HPF    WBC Urine 6 (H) <=5 /HPF    Squamous Epithelials Urine 1 <=1 /HPF    Transitional Epithelials Urine <1 <=1 /HPF    Hyaline Casts Urine 23 (H) <=2 /LPF    Narrative    Urine Culture not indicated   CBC with platelets differential     Status: Abnormal    Narrative    The following orders were created for panel order CBC with platelets differential.  Procedure                               Abnormality         Status                     ---------                               -----------         ------                     CBC with platelets and ...[[id number]]  Abnormal            Final result                 Please view results for these tests on the individual orders.   Results for orders placed or performed during the hospital encounter of 04/04/25   US Thoracentesis     Status: None    Narrative    EXAM:   1. RIGHT THORACENTESIS  2. ULTRASOUND GUIDANCE  LOCATION: [facility]  DATE:  "4/4/2025    INDICATION: Pleural effusion.    PROCEDURE: Informed consent obtained. Time out performed. The chest was prepped and draped in sterile fashion. 10 mL of 1 % lidocaine was infused into the local soft tissues. Under direct ultrasound guidance, a 5 Romanian catheter system was placed into   the pleural effusion.     1.3 liters of clear yellow fluid were removed and sent to lab, if requested.    Patient developed hypotension after the procedure, a \"rapid response\" was activated and patient was brought to the [facility] Emergency Room.    Ultrasound imaging was obtained and placed in the patient's permanent medical record.      Impression    IMPRESSION:  Status post right ultrasound-guided thoracentesis.    Reference CPT Code: 06444         ECG:    Performed at: 12:40:33    Impression: Sinus rhythm with Premature atrial complexes. Nonspecific ST abnormality. Prolonged QT.     Rate: 62  Rhythm: Sinus  Axis: 38  KY Interval: 140  QRS Interval: 82  QTc Interval: 497  ST Changes: Nonspecific ST abnormality. T wave inversion no linger evident in Inferior leads. Nonspecific T wave abnormality no longer evident in Anterior leads.   Comparison: 01-Apr-2025 15:02; Premature atrial complexes are now Present. T wave inversion no longer evident in Inferior leads. Nonspecific T wave abnormality no longer evident in Anterior leads.     I have independently reviewed and interpreted the EKS(s) documented above    PROCEDURES:  Procedures:      [facility] Documentation     Medical Decision Making    Obtained supplemental history:Supplemental history obtained?: yes.  Her , and nursing staff in transfer with rapid response team  Reviewed external records: External records reviewed?: Documented in chart  Care impacted by chronic illness:Documented in Chart  Did you consider but not order tests?: Work up considered but not performed and documented in chart, if applicable  Did you interpret images independently?: " Independent interpretation of ECG and images noted in documentation, when applicable.  Consultation discussion with other provider:Did you involve another provider (consultant, , pharmacy, etc.)?: I discussed the care with another health care provider, see documentation for details.    Discharge. I recommended the patient continue their current prescription strength medication(s):  . See documentation for any additional details.    MIPS (CTPE, Dental pain, Quiroz, Sinusitis, Asthma/COPD, Head Trauma): Not Applicable    SEPSIS: None    The creation of this record is based on the scribe s observations of the work being performed by Gypsy Tolbert and the provider s statements to them. This document has been checked and approved by MD Cristela Singletary MD  Emergency Medicine  [facility] EMERGENCY DEPARTMENT         Cristela Oquendo MD  04/04/25 [id number]

## 2025-04-04 NOTE — ED TRIAGE NOTES
"Patient arrives from outpatient US thoracentesis for fluids around the lungs per . 1.3 liters were taken off when patient became hypotensive (70s-80s systolic) and dizzy. Per , when patient came out of procedure she was appeared \"out of it,\" and expressed concerns to staff who then called a rapid response. Patient feels fatigued at this time, but no longer feeling dizzy.         "

## 2025-04-04 NOTE — DISCHARGE INSTRUCTIONS
Return to the emergency department as needed if you again become quite weak or confused for repeat evaluation.    Otherwise try to eat something today for sure and to work on hydrating yourself today.    Follow-up with cardiology as planned.

## 2025-04-09 LAB
ATRIAL RATE - MUSE: 62 BPM
DIASTOLIC BLOOD PRESSURE - MUSE: 62 MMHG
INTERPRETATION ECG - MUSE: NORMAL
P AXIS - MUSE: 73 DEGREES
PR INTERVAL - MUSE: 140 MS
QRS DURATION - MUSE: 82 MS
QT - MUSE: 490 MS
QTC - MUSE: 497 MS
R AXIS - MUSE: 38 DEGREES
SYSTOLIC BLOOD PRESSURE - MUSE: 144 MMHG
T AXIS - MUSE: 55 DEGREES
VENTRICULAR RATE- MUSE: 62 BPM

## 2025-05-11 ENCOUNTER — APPOINTMENT (OUTPATIENT)
Dept: GENERAL RADIOLOGY | Facility: CLINIC | Age: 85
DRG: 291 | End: 2025-05-11
Attending: EMERGENCY MEDICINE
Payer: MEDICARE

## 2025-05-11 ENCOUNTER — HOSPITAL ENCOUNTER (INPATIENT)
Facility: CLINIC | Age: 85
DRG: 291 | End: 2025-05-11
Attending: EMERGENCY MEDICINE | Admitting: INTERNAL MEDICINE
Payer: MEDICARE

## 2025-05-11 DIAGNOSIS — M25.511 CHRONIC RIGHT SHOULDER PAIN: ICD-10-CM

## 2025-05-11 DIAGNOSIS — F41.9 ANXIETY AND DEPRESSION: ICD-10-CM

## 2025-05-11 DIAGNOSIS — F32.A ANXIETY AND DEPRESSION: ICD-10-CM

## 2025-05-11 DIAGNOSIS — Z90.81 H/O SPLENECTOMY: ICD-10-CM

## 2025-05-11 DIAGNOSIS — G89.4 CHRONIC PAIN DISORDER: ICD-10-CM

## 2025-05-11 DIAGNOSIS — U07.1 2019 NOVEL CORONAVIRUS DISEASE (COVID-19): ICD-10-CM

## 2025-05-11 DIAGNOSIS — J10.1 INFLUENZA A: ICD-10-CM

## 2025-05-11 DIAGNOSIS — L21.9 SEBORRHEIC DERMATITIS, UNSPECIFIED: ICD-10-CM

## 2025-05-11 DIAGNOSIS — Z85.828 H/O NONMELANOMA SKIN CANCER: ICD-10-CM

## 2025-05-11 DIAGNOSIS — J90 PLEURAL EFFUSION ON RIGHT: ICD-10-CM

## 2025-05-11 DIAGNOSIS — J90 PLEURAL EFFUSION: ICD-10-CM

## 2025-05-11 DIAGNOSIS — D68.51 FACTOR V LEIDEN: ICD-10-CM

## 2025-05-11 DIAGNOSIS — G62.9 NEUROPATHY: ICD-10-CM

## 2025-05-11 DIAGNOSIS — R41.3 MEMORY LOSS: ICD-10-CM

## 2025-05-11 DIAGNOSIS — N39.46 MIXED STRESS AND URGE URINARY INCONTINENCE: ICD-10-CM

## 2025-05-11 DIAGNOSIS — F43.12 CHRONIC POST-TRAUMATIC STRESS DISORDER: ICD-10-CM

## 2025-05-11 DIAGNOSIS — F33.1 MODERATE EPISODE OF RECURRENT MAJOR DEPRESSIVE DISORDER (H): ICD-10-CM

## 2025-05-11 DIAGNOSIS — J98.01 BRONCHOSPASM: ICD-10-CM

## 2025-05-11 DIAGNOSIS — K21.00 GASTROESOPHAGEAL REFLUX DISEASE WITH ESOPHAGITIS, UNSPECIFIED WHETHER HEMORRHAGE: ICD-10-CM

## 2025-05-11 DIAGNOSIS — L28.0 LICHENIFICATION AND LICHEN SIMPLEX CHRONICUS: ICD-10-CM

## 2025-05-11 DIAGNOSIS — R11.14 BILIOUS VOMITING WITH NAUSEA: ICD-10-CM

## 2025-05-11 DIAGNOSIS — M25.512 CHRONIC LEFT SHOULDER PAIN: ICD-10-CM

## 2025-05-11 DIAGNOSIS — I51.89 DIASTOLIC DYSFUNCTION: ICD-10-CM

## 2025-05-11 DIAGNOSIS — I50.9 ACUTE CONGESTIVE HEART FAILURE, UNSPECIFIED HEART FAILURE TYPE (H): ICD-10-CM

## 2025-05-11 DIAGNOSIS — G89.29 CHRONIC BILATERAL LOW BACK PAIN WITHOUT SCIATICA: ICD-10-CM

## 2025-05-11 DIAGNOSIS — Z79.899 HIGH RISK MEDICATION USE: ICD-10-CM

## 2025-05-11 DIAGNOSIS — R60.0 BILATERAL LOWER EXTREMITY EDEMA: ICD-10-CM

## 2025-05-11 DIAGNOSIS — Z98.84 HISTORY OF GASTRIC BYPASS: ICD-10-CM

## 2025-05-11 DIAGNOSIS — G47.411 CATAPLEXY AND NARCOLEPSY: ICD-10-CM

## 2025-05-11 DIAGNOSIS — Z87.440 HISTORY OF UTI: ICD-10-CM

## 2025-05-11 DIAGNOSIS — M15.0 PRIMARY OSTEOARTHRITIS INVOLVING MULTIPLE JOINTS: ICD-10-CM

## 2025-05-11 DIAGNOSIS — J96.01 ACUTE RESPIRATORY FAILURE WITH HYPOXIA (H): ICD-10-CM

## 2025-05-11 DIAGNOSIS — D50.9 MICROCYTIC ANEMIA: ICD-10-CM

## 2025-05-11 DIAGNOSIS — I31.39 PERICARDIAL EFFUSION: ICD-10-CM

## 2025-05-11 DIAGNOSIS — M06.09 RHEUMATOID ARTHRITIS OF MULTIPLE SITES WITH NEGATIVE RHEUMATOID FACTOR (H): ICD-10-CM

## 2025-05-11 DIAGNOSIS — R79.89 ELEVATED PARATHYROID HORMONE: ICD-10-CM

## 2025-05-11 DIAGNOSIS — D12.6 COLON ADENOMAS: ICD-10-CM

## 2025-05-11 DIAGNOSIS — D51.0 PERNICIOUS ANEMIA: ICD-10-CM

## 2025-05-11 DIAGNOSIS — M81.0 AGE-RELATED OSTEOPOROSIS WITHOUT CURRENT PATHOLOGICAL FRACTURE: ICD-10-CM
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
## 2025-05-11 DIAGNOSIS — G89.29 CHRONIC LEFT SHOULDER PAIN: ICD-10-CM

## 2025-05-11 DIAGNOSIS — M79.7 FIBROMYALGIA: ICD-10-CM

## 2025-05-11 DIAGNOSIS — G89.29 CHRONIC RIGHT SHOULDER PAIN: ICD-10-CM

## 2025-05-11 DIAGNOSIS — I10 ESSENTIAL HYPERTENSION, BENIGN: ICD-10-CM

## 2025-05-11 DIAGNOSIS — E66.01 MORBID OBESITY (H): ICD-10-CM

## 2025-05-11 DIAGNOSIS — M54.50 CHRONIC BILATERAL LOW BACK PAIN WITHOUT SCIATICA: ICD-10-CM

## 2025-05-11 DIAGNOSIS — H90.3 BILATERAL SENSORINEURAL HEARING LOSS: ICD-10-CM

## 2025-05-11 DIAGNOSIS — R53.1 GENERALIZED WEAKNESS: Primary | ICD-10-CM

## 2025-05-11 DIAGNOSIS — Z85.828 HISTORY OF SCC (SQUAMOUS CELL CARCINOMA) OF SKIN: ICD-10-CM

## 2025-05-11 DIAGNOSIS — F09 COGNITIVE DYSFUNCTION: ICD-10-CM

## 2025-05-11 DIAGNOSIS — R13.19 ESOPHAGEAL DYSPHAGIA: ICD-10-CM

## 2025-05-11 DIAGNOSIS — K22.4 ESOPHAGEAL DYSMOTILITY: ICD-10-CM

## 2025-05-11 DIAGNOSIS — M47.816 FACET ARTHRITIS OF LUMBAR REGION: ICD-10-CM

## 2025-05-11 LAB
ALBUMIN SERPL BCG-MCNC: 2.8 G/DL (ref 3.5–5.2)
ALBUMIN UR-MCNC: NEGATIVE MG/DL
ALP SERPL-CCNC: 92 U/L (ref 40–150)
ALT SERPL W P-5'-P-CCNC: 7 U/L (ref 0–50)
ANION GAP SERPL CALCULATED.3IONS-SCNC: 10 MMOL/L (ref 7–15)
APPEARANCE UR: CLEAR
AST SERPL W P-5'-P-CCNC: 25 U/L (ref 0–45)
BASOPHILS # BLD AUTO: 0 10E3/UL (ref 0–0.2)
BASOPHILS NFR BLD AUTO: 0 %
BILIRUB SERPL-MCNC: 0.9 MG/DL
BILIRUB UR QL STRIP: NEGATIVE
BUN SERPL-MCNC: 16 MG/DL (ref 8–23)
BURR CELLS BLD QL SMEAR: SLIGHT
CALCIUM SERPL-MCNC: 7.5 MG/DL (ref 8.8–10.4)
CHLORIDE SERPL-SCNC: 105 MMOL/L (ref 98–107)
COLOR UR AUTO: YELLOW
CREAT SERPL-MCNC: 0.79 MG/DL (ref 0.51–0.95)
EGFRCR SERPLBLD CKD-EPI 2021: 74 ML/MIN/1.73M2
EOSINOPHIL # BLD AUTO: 0.1 10E3/UL (ref 0–0.7)
EOSINOPHIL NFR BLD AUTO: 1 %
ERYTHROCYTE [DISTWIDTH] IN BLOOD BY AUTOMATED COUNT: 16.3 % (ref 10–15)
EST. AVERAGE GLUCOSE BLD GHB EST-MCNC: 91 MG/DL
FRAGMENTS BLD QL SMEAR: SLIGHT
GLUCOSE BLDC GLUCOMTR-MCNC: 121 MG/DL (ref 70–99)
GLUCOSE BLDC GLUCOMTR-MCNC: 73 MG/DL (ref 70–99)
GLUCOSE SERPL-MCNC: 97 MG/DL (ref 70–99)
GLUCOSE UR STRIP-MCNC: 300 MG/DL
HBA1C MFR BLD: 4.8 %
HCO3 SERPL-SCNC: 24 MMOL/L (ref 22–29)
HCT VFR BLD AUTO: 32.2 % (ref 35–47)
HGB BLD-MCNC: 10.7 G/DL (ref 11.7–15.7)
HGB UR QL STRIP: NEGATIVE
HOLD SPECIMEN: NORMAL
HYALINE CASTS: 9 /LPF
IMM GRANULOCYTES # BLD: 0.1 10E3/UL
IMM GRANULOCYTES NFR BLD: 1 %
INR PPP: 0.97 (ref 0.85–1.15)
KETONES UR STRIP-MCNC: NEGATIVE MG/DL
LDH SERPL L TO P-CCNC: 404 U/L (ref 0–250)
LEUKOCYTE ESTERASE UR QL STRIP: ABNORMAL
LYMPHOCYTES # BLD AUTO: 0.5 10E3/UL (ref 0.8–5.3)
LYMPHOCYTES NFR BLD AUTO: 5 %
MCH RBC QN AUTO: 35.8 PG (ref 26.5–33)
MCHC RBC AUTO-ENTMCNC: 33.2 G/DL (ref 31.5–36.5)
MCV RBC AUTO: 108 FL (ref 78–100)
MONOCYTES # BLD AUTO: 0.3 10E3/UL (ref 0–1.3)
MONOCYTES NFR BLD AUTO: 3 %
MUCOUS THREADS #/AREA URNS LPF: PRESENT /LPF
NEUTROPHILS # BLD AUTO: 9.9 10E3/UL (ref 1.6–8.3)
NEUTROPHILS NFR BLD AUTO: 91 %
NITRATE UR QL: NEGATIVE
NRBC # BLD AUTO: 0 10E3/UL
NRBC BLD AUTO-RTO: 0 /100
NT-PROBNP SERPL-MCNC: 987 PG/ML (ref 0–624)
PH UR STRIP: 5.5 [PH] (ref 5–7)
PLAT MORPH BLD: ABNORMAL
PLATELET # BLD AUTO: 207 10E3/UL (ref 150–450)
POTASSIUM SERPL-SCNC: 3.9 MMOL/L (ref 3.4–5.3)
PROT SERPL-MCNC: 5.3 G/DL (ref 6.4–8.3)
PROT SERPL-MCNC: 5.6 G/DL (ref 6.4–8.3)
PROTHROMBIN TIME: 13 SECONDS (ref 11.8–14.8)
RBC # BLD AUTO: 2.99 10E6/UL (ref 3.8–5.2)
RBC MORPH BLD: ABNORMAL
RBC URINE: 1 /HPF
SODIUM SERPL-SCNC: 139 MMOL/L (ref 135–145)
SP GR UR STRIP: 1.02 (ref 1–1.03)
SQUAMOUS EPITHELIAL: 1 /HPF
TROPONIN T SERPL HS-MCNC: 21 NG/L
TROPONIN T SERPL HS-MCNC: 25 NG/L
TSH SERPL DL<=0.005 MIU/L-ACNC: 1.83 UIU/ML (ref 0.3–4.2)
UROBILINOGEN UR STRIP-MCNC: 2 MG/DL
VIT B12 SERPL-MCNC: 615 PG/ML (ref 232–1245)
WBC # BLD AUTO: 10.8 10E3/UL (ref 4–11)
WBC URINE: 9 /HPF

## 2025-05-11 PROCEDURE — 36415 COLL VENOUS BLD VENIPUNCTURE: CPT | Performed by: EMERGENCY MEDICINE

## 2025-05-11 PROCEDURE — 81003 URINALYSIS AUTO W/O SCOPE: CPT | Performed by: EMERGENCY MEDICINE

## 2025-05-11 PROCEDURE — 83880 ASSAY OF NATRIURETIC PEPTIDE: CPT | Performed by: EMERGENCY MEDICINE

## 2025-05-11 PROCEDURE — 84484 ASSAY OF TROPONIN QUANT: CPT | Performed by: EMERGENCY MEDICINE

## 2025-05-11 PROCEDURE — 250N000013 HC RX MED GY IP 250 OP 250 PS 637: Performed by: INTERNAL MEDICINE

## 2025-05-11 PROCEDURE — 120N000001 HC R&B MED SURG/OB

## 2025-05-11 PROCEDURE — 82247 BILIRUBIN TOTAL: CPT | Performed by: EMERGENCY MEDICINE

## 2025-05-11 PROCEDURE — 85025 COMPLETE CBC W/AUTO DIFF WBC: CPT | Performed by: EMERGENCY MEDICINE

## 2025-05-11 PROCEDURE — 85610 PROTHROMBIN TIME: CPT | Performed by: INTERNAL MEDICINE

## 2025-05-11 PROCEDURE — 93005 ELECTROCARDIOGRAM TRACING: CPT

## 2025-05-11 PROCEDURE — 99285 EMERGENCY DEPT VISIT HI MDM: CPT | Mod: 25

## 2025-05-11 PROCEDURE — 71046 X-RAY EXAM CHEST 2 VIEWS: CPT

## 2025-05-11 PROCEDURE — 83036 HEMOGLOBIN GLYCOSYLATED A1C: CPT | Performed by: INTERNAL MEDICINE

## 2025-05-11 PROCEDURE — 84155 ASSAY OF PROTEIN SERUM: CPT | Performed by: EMERGENCY MEDICINE

## 2025-05-11 PROCEDURE — 83615 LACTATE (LD) (LDH) ENZYME: CPT | Performed by: EMERGENCY MEDICINE

## 2025-05-11 PROCEDURE — 84443 ASSAY THYROID STIM HORMONE: CPT | Performed by: EMERGENCY MEDICINE

## 2025-05-11 PROCEDURE — 250N000011 HC RX IP 250 OP 636: Mod: JZ | Performed by: INTERNAL MEDICINE

## 2025-05-11 PROCEDURE — 82607 VITAMIN B-12: CPT | Performed by: INTERNAL MEDICINE

## 2025-05-11 PROCEDURE — 258N000003 HC RX IP 258 OP 636: Performed by: INTERNAL MEDICINE

## 2025-05-11 PROCEDURE — 99223 1ST HOSP IP/OBS HIGH 75: CPT | Mod: AI | Performed by: INTERNAL MEDICINE

## 2025-05-11 RX ORDER — GABAPENTIN 300 MG/1
600 CAPSULE ORAL AT BEDTIME
Status: DISCONTINUED | OUTPATIENT
Start: 2025-05-12 | End: 2025-05-19

## 2025-05-11 RX ORDER — ONDANSETRON 4 MG/1
4 TABLET, ORALLY DISINTEGRATING ORAL EVERY 6 HOURS PRN
Status: DISCONTINUED | OUTPATIENT
Start: 2025-05-11 | End: 2025-05-23 | Stop reason: HOSPADM

## 2025-05-11 RX ORDER — BUSPIRONE HYDROCHLORIDE 5 MG/1
5 TABLET ORAL 3 TIMES DAILY
Status: DISCONTINUED | OUTPATIENT
Start: 2025-05-12 | End: 2025-05-23 | Stop reason: HOSPADM

## 2025-05-11 RX ORDER — PANTOPRAZOLE SODIUM 20 MG/1
20 TABLET, DELAYED RELEASE ORAL DAILY
Status: DISCONTINUED | OUTPATIENT
Start: 2025-05-12 | End: 2025-05-23 | Stop reason: HOSPADM

## 2025-05-11 RX ORDER — SENNOSIDES 8.6 MG
325 CAPSULE ORAL DAILY
Status: DISCONTINUED | OUTPATIENT
Start: 2025-05-12 | End: 2025-05-23 | Stop reason: HOSPADM

## 2025-05-11 RX ORDER — SPIRONOLACTONE 25 MG/1
25 TABLET ORAL DAILY
Status: DISCONTINUED | OUTPATIENT
Start: 2025-05-12 | End: 2025-05-23 | Stop reason: HOSPADM

## 2025-05-11 RX ORDER — CIPROFLOXACIN 500 MG/1
500 TABLET, FILM COATED ORAL 2 TIMES DAILY
Status: ON HOLD | COMMUNITY
Start: 2025-05-07 | End: 2025-05-23

## 2025-05-11 RX ORDER — PROCHLORPERAZINE MALEATE 5 MG/1
5 TABLET ORAL EVERY 6 HOURS PRN
Status: DISCONTINUED | OUTPATIENT
Start: 2025-05-11 | End: 2025-05-23 | Stop reason: HOSPADM

## 2025-05-11 RX ORDER — SPIRONOLACTONE 25 MG/1
25 TABLET ORAL DAILY
COMMUNITY
Start: 2025-04-09

## 2025-05-11 RX ORDER — AMOXICILLIN 250 MG
2 CAPSULE ORAL 2 TIMES DAILY PRN
Status: DISCONTINUED | OUTPATIENT
Start: 2025-05-11 | End: 2025-05-23 | Stop reason: HOSPADM

## 2025-05-11 RX ORDER — ACETAMINOPHEN 325 MG/1
975 TABLET ORAL EVERY 8 HOURS PRN
Status: DISCONTINUED | OUTPATIENT
Start: 2025-05-11 | End: 2025-05-12

## 2025-05-11 RX ORDER — FUROSEMIDE 20 MG/1
20 TABLET ORAL DAILY
COMMUNITY

## 2025-05-11 RX ORDER — FUROSEMIDE 20 MG/1
20 TABLET ORAL DAILY
Status: DISCONTINUED | OUTPATIENT
Start: 2025-05-12 | End: 2025-05-11

## 2025-05-11 RX ORDER — CARBIDOPA AND LEVODOPA 25; 100 MG/1; MG/1
2 TABLET ORAL 3 TIMES DAILY
Status: DISCONTINUED | OUTPATIENT
Start: 2025-05-12 | End: 2025-05-23 | Stop reason: HOSPADM

## 2025-05-11 RX ORDER — CIPROFLOXACIN 500 MG/1
500 TABLET, FILM COATED ORAL 2 TIMES DAILY
Status: COMPLETED | OUTPATIENT
Start: 2025-05-11 | End: 2025-05-14

## 2025-05-11 RX ORDER — CALCIUM CARBONATE 500 MG/1
1000 TABLET, CHEWABLE ORAL 4 TIMES DAILY PRN
Status: DISCONTINUED | OUTPATIENT
Start: 2025-05-11 | End: 2025-05-23 | Stop reason: HOSPADM

## 2025-05-11 RX ORDER — NICOTINE POLACRILEX 4 MG
15-30 LOZENGE BUCCAL
Status: DISCONTINUED | OUTPATIENT
Start: 2025-05-11 | End: 2025-05-23 | Stop reason: HOSPADM

## 2025-05-11 RX ORDER — SODIUM CHLORIDE 9 MG/ML
INJECTION, SOLUTION INTRAVENOUS CONTINUOUS
Status: DISCONTINUED | OUTPATIENT
Start: 2025-05-11 | End: 2025-05-14

## 2025-05-11 RX ORDER — METHYLPREDNISOLONE SODIUM SUCCINATE 40 MG/ML
40 INJECTION INTRAMUSCULAR; INTRAVENOUS 2 TIMES DAILY
Status: COMPLETED | OUTPATIENT
Start: 2025-05-11 | End: 2025-05-12

## 2025-05-11 RX ORDER — DEXTROSE MONOHYDRATE 25 G/50ML
25-50 INJECTION, SOLUTION INTRAVENOUS
Status: DISCONTINUED | OUTPATIENT
Start: 2025-05-11 | End: 2025-05-23 | Stop reason: HOSPADM

## 2025-05-11 RX ORDER — CETIRIZINE HYDROCHLORIDE 10 MG/1
10 TABLET ORAL DAILY
Status: DISCONTINUED | OUTPATIENT
Start: 2025-05-12 | End: 2025-05-23 | Stop reason: HOSPADM

## 2025-05-11 RX ORDER — FOLIC ACID 1 MG/1
1 TABLET ORAL DAILY
Status: DISCONTINUED | OUTPATIENT
Start: 2025-05-12 | End: 2025-05-12 | Stop reason: DRUGHIGH

## 2025-05-11 RX ORDER — ATORVASTATIN CALCIUM 40 MG/1
40 TABLET, FILM COATED ORAL DAILY
Status: DISCONTINUED | OUTPATIENT
Start: 2025-05-12 | End: 2025-05-23 | Stop reason: HOSPADM

## 2025-05-11 RX ORDER — DIPHENHYDRAMINE HCL 25 MG
25 CAPSULE ORAL ONCE
Status: COMPLETED | OUTPATIENT
Start: 2025-05-12 | End: 2025-05-12

## 2025-05-11 RX ORDER — ONDANSETRON 2 MG/ML
4 INJECTION INTRAMUSCULAR; INTRAVENOUS EVERY 6 HOURS PRN
Status: DISCONTINUED | OUTPATIENT
Start: 2025-05-11 | End: 2025-05-23 | Stop reason: HOSPADM

## 2025-05-11 RX ORDER — CITALOPRAM HYDROBROMIDE 20 MG/1
20 TABLET ORAL DAILY
Status: DISCONTINUED | OUTPATIENT
Start: 2025-05-12 | End: 2025-05-23 | Stop reason: HOSPADM

## 2025-05-11 RX ORDER — AMOXICILLIN 250 MG
1 CAPSULE ORAL 2 TIMES DAILY PRN
Status: DISCONTINUED | OUTPATIENT
Start: 2025-05-11 | End: 2025-05-23 | Stop reason: HOSPADM

## 2025-05-11 RX ORDER — LIDOCAINE 40 MG/G
CREAM TOPICAL
Status: DISCONTINUED | OUTPATIENT
Start: 2025-05-11 | End: 2025-05-23 | Stop reason: HOSPADM

## 2025-05-11 RX ADMIN — METHYLPREDNISOLONE SODIUM SUCCINATE 40 MG: 40 INJECTION, POWDER, FOR SOLUTION INTRAMUSCULAR; INTRAVENOUS at 21:26

## 2025-05-11 RX ADMIN — SODIUM CHLORIDE: 0.9 INJECTION, SOLUTION INTRAVENOUS at 21:25

## 2025-05-11 RX ADMIN — CIPROFLOXACIN 500 MG: 500 TABLET ORAL at 21:27

## 2025-05-11 ASSESSMENT — ACTIVITIES OF DAILY LIVING (ADL)
ADLS_ACUITY_SCORE: 58
ADLS_ACUITY_SCORE: 45
ADLS_ACUITY_SCORE: 58
ADLS_ACUITY_SCORE: 60
ADLS_ACUITY_SCORE: 58
ADLS_ACUITY_SCORE: 60

## 2025-05-11 ASSESSMENT — COLUMBIA-SUICIDE SEVERITY RATING SCALE - C-SSRS
2. HAVE YOU ACTUALLY HAD ANY THOUGHTS OF KILLING YOURSELF IN THE PAST MONTH?: NO
6. HAVE YOU EVER DONE ANYTHING, STARTED TO DO ANYTHING, OR PREPARED TO DO ANYTHING TO END YOUR LIFE?: NO
1. IN THE PAST MONTH, HAVE YOU WISHED YOU WERE DEAD OR WISHED YOU COULD GO TO SLEEP AND NOT WAKE UP?: NO

## 2025-05-11 NOTE — ED TRIAGE NOTES
"Arrives barely ambulatory from the community with her . States that the patient fell due to her legs \"turning to jelly\".     Patient  states \"I known which wrong with her\" reports patient has heart failure. Reports patient was \"just in the ED the other day in [place]\".       Also reports that the patient has a known UTI.       "

## 2025-05-11 NOTE — ED NOTES
"[facility]  ED Nurse Handoff Report    ED Chief complaint: Leg Swelling and Generalized Weakness  . ED Diagnosis:   Final diagnoses:   Acute congestive heart failure, unspecified heart failure type (H)   Pleural effusion on right   History of UTI       Allergies:   Allergies   Allergen Reactions    Contrast Dye Rash, Anaphylaxis, Hives and Swelling     Tolerated CT chest with contrast 6/2021 after pre-treatment with benadryl and solumedrol  Hives / throat swelling    Doxycycline Anaphylaxis    Acetaminophen      Vicodin/Darvocet/Swelling throat    Albuterol      \"Half my tongue swelled.\"   \"Half my tongue swelled.\"       Atenolol Unknown and Other (See Comments)     PN: LW Reaction: swelling of the tongue  (see [facility] records scanned 3/11/2014)  (see [facility] records scanned 3/11/2014)      Darvocet [Propoxyphene N-Apap]     Fluticasone-Salmeterol      PN: tongue swelling      Hydrocodone-Acetaminophen Swelling     throat      Lisinopril      Tongue Swelling    Metformin Hydrochloride      Glucophage caused severe diarrhea    Morphine      MISAEL    Penicillins      Swelling throat    Percocet [Oxycodone-Acetaminophen]     Neomycin-Bacitracin-Polymyxin Hives and Rash       Code Status: DNR    Activity level - Baseline/Home:  in bed.  Activity Level - Current:   in bed.   Lift room needed: No.   Bariatric: No   Needed: No   Isolation: No.   Infection: Not Applicable.     Respiratory status: Room air    Vital Signs (within 30 minutes):   Vitals:    05/11/25 1059 05/11/25 1122 05/11/25 1200 05/11/25 1205   BP: 108/52 (!) 153/71     Pulse: 73 70     Resp: 18      Temp: 97.5  F (36.4  C)      TempSrc: Temporal      SpO2: 97%  (!) 89% 94%       Cardiac Rhythm:  ,      Pain level:    Patient confused: No.   Patient Falls Risk: bed/chair alarm on, nonskid shoes/slippers when out of bed, arm band in place, and patient and family education.   Elimination Status: Has voided     Patient Report - " Initial Complaint: weakness.   Focused Assessment: Alma Rosa Fishman is a 84 year old female  who presents to the ED via/accompanied by  with a chief complaint of increased weakness, fall and near syncope history provided by the patient and her .  The Patient's  reports that she was walking and suddenly became very weak nearly falling to the ground but he caught her and lowered her down to a seated position.  He reports her eyes rolled in the back of her head and although she did not lose complete consciousness, she was confused for time.  She has reportedly had falls from time to time the past but for her within the last week.  She is currently treated for a UTI w/ ciprofloxacin after being evaluated in the emergency department 5/7/2025 with a negative head CT at that time.  Patient's  reports concern about her being at home right now given her level of weakness.  Patient denies palpitations, chest pain, new shortness of breath, fevers, chills, vomiting.  She endorses dysuria and approximately 3 pound weight gain in the last 1 to 2 days.        Abnormal Results:   Labs Ordered and Resulted from Time of ED Arrival to Time of ED Departure   COMPREHENSIVE METABOLIC PANEL - Abnormal       Result Value    Sodium 139      Potassium 3.9      Carbon Dioxide (CO2) 24      Anion Gap 10      Urea Nitrogen 16.0      Creatinine 0.79      GFR Estimate 74      Calcium 7.5 (*)     Chloride 105      Glucose 97      Alkaline Phosphatase 92      AST 25      ALT 7      Protein Total 5.3 (*)     Albumin 2.8 (*)     Bilirubin Total 0.9     TROPONIN T, HIGH SENSITIVITY - Abnormal    Troponin T, High Sensitivity 25 (*)    NT-PROBNP - Abnormal    NT-proBNP 987 (*)    CBC WITH PLATELETS AND DIFFERENTIAL - Abnormal    WBC Count 10.8      RBC Count 2.99 (*)     Hemoglobin 10.7 (*)     Hematocrit 32.2 (*)      (*)     MCH 35.8 (*)     MCHC 33.2      RDW 16.3 (*)     Platelet Count 207      % Neutrophils 91       % Lymphocytes 5      % Monocytes 3      % Eosinophils 1      % Basophils 0      % Immature Granulocytes 1      NRBCs per 100 WBC 0      Absolute Neutrophils 9.9 (*)     Absolute Lymphocytes 0.5 (*)     Absolute Monocytes 0.3      Absolute Eosinophils 0.1      Absolute Basophils 0.0      Absolute Immature Granulocytes 0.1      Absolute NRBCs 0.0     RBC AND PLATELET MORPHOLOGY - Abnormal    RBC Morphology Confirmed RBC Indices      Platelet Assessment        Value: Automated Count Confirmed. Platelet morphology is normal.    [place] Cells Slight (*)     RBC Fragments Slight (*)    TROPONIN T, HIGH SENSITIVITY - Abnormal    Troponin T, High Sensitivity 21 (*)    PROTEIN TOTAL - Abnormal    Protein Total 5.6 (*)    TSH WITH FREE T4 REFLEX - Normal    TSH 1.83     ROUTINE UA WITH MICROSCOPIC REFLEX TO CULTURE   GLUCOSE FLUID   LACTATE DEHYDROGENASE FLUID   LACTATE DEHYDROGENASE   PROTEIN FLUID   AEROBIC BACTERIAL CULTURE ROUTINE   CELL COUNT WITH DIFFERENTIAL FLUID        Chest XR,  PA & LAT   Final Result   IMPRESSION: Normal heart size and pulmonary vascularity. Medium sized right pleural effusion with compressive atelectasis right lower lung. This is increased from previous. Left lung clear. Cardiac recording device      US Thoracentesis    (Results Pending)       Treatments provided: See MAR  Family Comments: NA  OBS brochure/video discussed/provided to patient:  N/A  ED Medications: Medications - No data to display    Drips infusing:  No  For the majority of the shift this patient was Green.   Interventions performed were NA.    Sepsis treatment initiated: No    Cares/treatment/interventions/medications to be completed following ED care:     ED Nurse Name: Pita Lyman RN  3:34 PM  RECEIVING UNIT ED HANDOFF REVIEW    Above ED Nurse Handoff Report was reviewed: Yes  Reviewed by: Nidia Sanchez RN on May 11, 2025 at 5:14 PM   ELISABETH Mayfield called the ED to inform them the note was read: Yes

## 2025-05-11 NOTE — ED PROVIDER NOTES
Emergency Department Note      History of Present Illness     Chief Complaint   Leg Swelling and Generalized Weakness      HPI   HPI   Alma Rosa Fishman is a 84 year old female with a past medical history significant for CHF, type 2 diabetes, hypertension  who presents to the ED via/accompanied by  with a chief complaint of increased weakness, fall and near syncope history provided by the patient and her .  The Patient's  reports that she was walking and suddenly became very weak nearly falling to the ground but he caught her and lowered her down to a seated position.  He reports her eyes rolled in the back of her head and although she did not lose complete consciousness, she was confused for time.  She has reportedly had falls from time to time the past but for her within the last week.  She is currently treated for a UTI w/ ciprofloxacin after being evaluated in the emergency department 5/7/2025 with a negative head CT at that time.  Patient's  reports concern about her being at home right now given her level of weakness.  Patient denies palpitations, chest pain, new shortness of breath, fevers, chills, vomiting.  She endorses dysuria and approximately 3 pound weight gain in the last 1 to 2 days.    Independent Historian    as detailed above.    Review of External Notes   [facility] ED visit on 5/7/2025    Past Medical History     Medical History and Problem List   Past Medical History:   Diagnosis Date    Asthma     Deep vein thrombosis     Depression     Esophageal reflux 02/18/2004    Essential hypertension, benign     Hyperlipidemia     Iron deficiency anemia 2000    Kidney stone 06/2002    Nonsenile cataract     Osteopenia 01/2004    Restless leg syndrome     Rheumatoid arthritis     Sleep issues     Small bowel obstruction (H) 06/23/2021    Type 2 diabetes mellitus        Medications   acetaminophen (TYLENOL) 500 MG tablet  albuterol (PROAIR HFA/PROVENTIL HFA/VENTOLIN HFA)  108 (90 Base) MCG/ACT inhaler  aspirin (ASA) 325 MG EC tablet  atorvastatin (LIPITOR) 40 MG tablet  busPIRone (BUSPAR) 5 MG tablet  Calcium Carb-Cholecalciferol 500-200 MG-UNIT PACK  carbidopa-levodopa (SINEMET)  MG tablet  cetirizine (ZYRTEC) 10 MG tablet  cholecalciferol (VITAMIN D3) 125 mcg (5000 units) capsule  citalopram (CELEXA) 20 MG tablet  cyanocobalamin (CYANOCOBALAMIN) 1000 MCG/ML injection  denosumab (PROLIA) 60 MG/ML SOSY injection  diazepam (VALIUM) 5 MG tablet  diclofenac (VOLTAREN) 1 % topical gel  folic acid (FOLVITE) 1 MG tablet  furosemide (LASIX) 20 MG tablet  gabapentin (NEURONTIN) 300 MG capsule  methotrexate 2.5 MG tablet  methotrexate 2.5 MG tablet  pantoprazole (PROTONIX) 20 MG EC tablet  vibegron (GEMTESA) 75 MG TABS tablet        Surgical History   Past Surgical History:   Procedure Laterality Date    APPENDECTOMY      ARTHROPLASTY KNEE Bilateral     CHOLECYSTECTOMY      COLONOSCOPY      ENDOSCOPIC RELEASE CARPAL TUNNEL Bilateral     EYE SURGERY      Gastric bypass NOS      IR LUMBAR PUNCTURE  5/16/2022    IR LUMBAR PUNCTURE  2/10/2023    OPEN REDUCTION INTERNAL FIXATION ANKLE Left     ORTHOPEDIC SURGERY      Total abdominal hysterectomy with bilateral salpingectomy         Physical Exam     Patient Vitals for the past 24 hrs:   BP Temp Temp src Pulse Resp SpO2   05/11/25 1205 -- -- -- -- -- 94 %   05/11/25 1200 -- -- -- -- -- (!) 89 %   05/11/25 1122 (!) 153/71 -- -- 70 -- --   05/11/25 1059 108/52 97.5  F (36.4  C) Temporal 73 18 97 %     Physical Exam  Constitutional: Well developed, chronic ill, nontox appearance  Head: Atraumatic.   Neck:  no stridor  Eyes: no scleral icterus  Cardiovascular: RRR, 2+ bilat radial pulses  Pulmonary/Chest: nml resp effort, diminished breath sounds in the right lung base   Abdominal: ND, soft, NT, no rebound or guarding   Ext: Warm, well perfused, bilateral trace lower extremity edema  Neurological: A&O, symmetric facies, moves ext x4  Skin: Skin  is warm and dry.   Psychiatric: Behavior is normal. Thought content normal.   Nursing note and vitals reviewed.    Diagnostics     Lab Results   Labs Ordered and Resulted from Time of ED Arrival to Time of ED Departure   COMPREHENSIVE METABOLIC PANEL - Abnormal       Result Value    Sodium 139      Potassium 3.9      Carbon Dioxide (CO2) 24      Anion Gap 10      Urea Nitrogen 16.0      Creatinine 0.79      GFR Estimate 74      Calcium 7.5 (*)     Chloride 105      Glucose 97      Alkaline Phosphatase 92      AST 25      ALT 7      Protein Total 5.3 (*)     Albumin 2.8 (*)     Bilirubin Total 0.9     TROPONIN T, HIGH SENSITIVITY - Abnormal    Troponin T, High Sensitivity 25 (*)    NT-PROBNP - Abnormal    NT-proBNP 987 (*)    ROUTINE UA WITH MICROSCOPIC REFLEX TO CULTURE - Abnormal    Color Urine Yellow      Appearance Urine Clear      Glucose Urine 300 (*)     Bilirubin Urine Negative      Ketones Urine Negative      Specific Gravity Urine 1.019      Blood Urine Negative      pH Urine 5.5      Protein Albumin Urine Negative      Urobilinogen Urine 2.0 (*)     Nitrite Urine Negative      Leukocyte Esterase Urine Trace (*)     Mucus Urine Present (*)     RBC Urine 1      WBC Urine 9 (*)     Squamous Epithelials Urine 1      Hyaline Casts Urine 9 (*)    CBC WITH PLATELETS AND DIFFERENTIAL - Abnormal    WBC Count 10.8      RBC Count 2.99 (*)     Hemoglobin 10.7 (*)     Hematocrit 32.2 (*)      (*)     MCH 35.8 (*)     MCHC 33.2      RDW 16.3 (*)     Platelet Count 207      % Neutrophils 91      % Lymphocytes 5      % Monocytes 3      % Eosinophils 1      % Basophils 0      % Immature Granulocytes 1      NRBCs per 100 WBC 0      Absolute Neutrophils 9.9 (*)     Absolute Lymphocytes 0.5 (*)     Absolute Monocytes 0.3      Absolute Eosinophils 0.1      Absolute Basophils 0.0      Absolute Immature Granulocytes 0.1      Absolute NRBCs 0.0     RBC AND PLATELET MORPHOLOGY - Abnormal    RBC Morphology Confirmed RBC  Indices      Platelet Assessment        Value: Automated Count Confirmed. Platelet morphology is normal.    Wilmar Cells Slight (*)     RBC Fragments Slight (*)    TROPONIN T, HIGH SENSITIVITY - Abnormal    Troponin T, High Sensitivity 21 (*)    PROTEIN TOTAL - Abnormal    Protein Total 5.6 (*)    TSH WITH FREE T4 REFLEX - Normal    TSH 1.83     GLUCOSE FLUID   LACTATE DEHYDROGENASE FLUID   LACTATE DEHYDROGENASE   PROTEIN FLUID   AEROBIC BACTERIAL CULTURE ROUTINE   CELL COUNT WITH DIFFERENTIAL FLUID       Imaging   Chest XR,  PA & LAT   Final Result   IMPRESSION: Normal heart size and pulmonary vascularity. Medium sized right pleural effusion with compressive atelectasis right lower lung. This is increased from previous. Left lung clear. Cardiac recording device      US Thoracentesis    (Results Pending)       EKG   ECG results from 05/11/25   EKG 12 lead     Value    Systolic Blood Pressure     Diastolic Blood Pressure     Ventricular Rate 66    Atrial Rate 66    ID Interval 122    QRS Duration 86        QTc 505    P Axis 33    R AXIS 37    T Axis 42    Interpretation ECG      Sinus rhythm  Prolonged QT  Abnormal ECG  When compared with ECG of 04-Apr-2025 12:40,  Premature atrial complexes are no longer Present  Read at 1146 by Leroy Domingo MD  No significant change from previous       Independent Interpretation   EKG as noted above   chest x-ray significant for right pleural effusion with no pneumothorax      ED Course      Medications Administered   Medications - No data to display    Procedures   Procedures     Discussion of Management   Admitting HospitalistChrist    ED Course        Additional Documentation  Social determinants include age increasing risk for presentation to the emergency department    Medical Decision Making / Diagnosis     CMS Diagnoses: None    MIPS       None    MDM     84 year old female presenting w/ fall, generalized weakness, near syncope    Differential diagnosis  includes CHF exacerbation, worsening pleural effusion, UTI, electrolyte abnormality, sepsis, severe sepsis.  Chest x-ray demonstrated intervally worse pleural effusion.  EKG interpretation as noted above.  Labs significant for mild anemia, hypoalbuminemia, normal WBC.  Ultrasound guided thoracentesis ordered from the emergency department patient admitted to the hospital service with urinalysis pending for further evaluation management of likely UTI and CHF exacerbation.  Patient has been counseled on results, diagnosis and disposition.  They are understanding and agreeable to plan.  The patient was admitted in guarded condition.      Disposition   The patient was admitted to the hospital.     Diagnosis     ICD-10-CM    1. Acute congestive heart failure, unspecified heart failure type (H)  I50.9       2. Pleural effusion on right  J90       3. History of UTI  Z87.440            Discharge Medications   New Prescriptions    No medications on file                  Leroy Domingo MD  05/11/25 1549     show

## 2025-05-11 NOTE — H&P
"[facility] History and Physical    Alma Rosa Fishman MRN# [id number]   Age: 84 year old YOB: 1940     Date of Admission:  5/11/2025    Home clinic: [facility] in [place]  Primary care provider: Joselin Tai          Assessment and Plan:   Assessment:   Alma Rosa Fishman is a 84 year old woman with history of large right pleural effusion (2/2025) and HFpEF who came to attention today with generalized weakness and fall.      She was just seen in the ED at [place], [place] on 5/7 where she presented due to fall, the cause of which was not clear. She was found to have moderate-size right sided effusion, mild hypokalemia and UTI. She was given Cipro x 7 days for presumed UTI, though the sample was apparently \"contaminated with multiple squamous cells\". Because her breathing and oxygenation were ok, she was not offered thoracentesis at that time.  She was offered admission for assessment and poss TCU dispo, but deferred.     PMH includes cholecystectomy, gastric stapling, urinary incontinence, parkinsonism, NIDDM, HTN and RA on low dose, weekly methotrexate and prednisone.     DX:  Syncopal event.  She \"fell at home\" but is describing LOC. She does have an implanted loop recorder. Initial check indicates orthostatic VS are strongly positive.   Severe generalized weakness.  Unclear to what degree this is really a change.  Recurrent right-sided pleural effusion with significant shortness of breath dyspnea on exertion.  Previously this has been ascribed to HFpEF.  Hypoproteinemia, hypoalbuminemia with normal INR.  Although the patient is very concerned about liver disease (she is apparently scheduled to meet with a specialist at [place] with plan for biopsy) it does not appear that her hypoproteinemia is related to liver dysfunction.  She does have a previously documented diagnosis of malnutrition, possibly related to remote gastric stapling?  Unexplained weight loss. Incidentally noted to " "have several areas of sclerotic lesions in ribs on CT from April 1, 2025. She has hx anaphylaxis to IV dye, but she has tolerated this when she was pretreated with steroids.   History of recurrent UTIs.  The patient had been diagnosed with UTI on 5/7 though this was apparently a \"clean-catch\".  The result today on a catheterized specimen shows trace leukocyte esterase and 9 white cells per high-powered field.  Atypical parkinsonism.  Macrocytosis.  The patient receives vitamin B12 injections along with folic acid.    Chronic urinary incontinence.      Plan:   1.  Admit to inpatient on telemetry.  2.  Ultrasound-guided thoracentesis.  Based on the assessment/plan from Dr. Prince's note on 5/5, the patient has been referred on to pulmonary medicine for possible further evaluation and management of the recurrent pleural effusion.  3.  Continue usual home meds including completion of her Cipro that was started on 5/8 for presumed UTI.   4.  In view of the patient's apparent syncopal event and her report that standing often result in her developing \"lightheadedness\", will ask for orthostatics twice daily. Consider adding midodrine. Continue usual meds for CHF for now (Jardiance, Lasix 20 mg daily and spironolactone 25 mg daily).  5.  ISS.    6.  Solumedrol 40 mg now and in the morning before CT Abd/pelvis to look for source of unexplained weight loss, early satiety. Also add a dose of benadryl before she goes down.              Chief Complaint:     Initial concern was for a fall but the patient describes having lost consciousness at home.  She has been falling more frequently recently, complains of significant dyspnea on exertion and has had an implanted loop recorder placed for evaluation.     History is obtained from the patient, electronic health record, and emergency department physician     Alma Rosa Fishman I vague about some of the details, but apparently got up to walk several feet to her recliner when she abruptly " "passed out.  She does not recall any palpitations and her  indicates that she did not become profoundly pale or drenched in sweat.  She did not hurt herself because he was behind her and caught her.  He did notice that \"er eyes rolled back in her head\" at some point shortly after the original fall.     The patient has had multiple other falls this past week or so.  She does notice that she becomes \"lightheaded when she stands up but she also incidentally indicates that she sometimes becomes lightheaded without standing.  She also describes some \"room spinning\" with these episodes though she does not feel that it is really disorienting, persistent or reproducible.    Ms. Diaz is denies fevers, sweats and chills.  She has lost a significant amount a weight over the past couple of months but also includes that she has gained some water weight in the last several days.  She says that she just gets full very easily.  She denies cough and indicates that her inhaler does not seem to help at all.  She has not had any chest pain and denies any palpitations or awareness of her heart beating either associated with her falls or otherwise.  She has not had nausea or vomiting.        Past Medical History:     Past Medical History:   Diagnosis Date    Asthma     Deep vein thrombosis     Depression     Esophageal reflux 02/18/2004    EGD: NL; Gastric Motility Study: small HH and GERD    Essential hypertension, benign     Hyperlipidemia     Iron deficiency anemia 2000    colonoscopy and EGD done in 2000 were negative    Kidney stone 06/2002    s/p stent placement    Nonsenile cataract     Osteopenia 01/2004    Right hip osteopenia by DEXA    Restless leg syndrome     RLS    Rheumatoid arthritis     Sleep issues     on Ritalin; managed by [facility]    Small bowel obstruction (H) 06/23/2021    Type 2 diabetes mellitus              Past Surgical History:      Past Surgical History:   Procedure " Laterality Date    APPENDECTOMY      ARTHROPLASTY KNEE Bilateral     CHOLECYSTECTOMY      COLONOSCOPY      ENDOSCOPIC RELEASE CARPAL TUNNEL Bilateral     EYE SURGERY      Gastric bypass NOS      IR LUMBAR PUNCTURE  5/16/2022    IR LUMBAR PUNCTURE  2/10/2023    OPEN REDUCTION INTERNAL FIXATION ANKLE Left     ORTHOPEDIC SURGERY      Total abdominal hysterectomy with bilateral salpingectomy               Social History:     Social History     Tobacco Use    Smoking status: Former    Smokeless tobacco: Never   Substance Use Topics    Alcohol use: Yes     Comment: 2 drinks per week            Immunizations:     Immunization History   Administered Date(s) Administered    COVID-19 12+ (Pfizer) 11/10/2023, 10/29/2024    COVID-19 Bivalent 12+ (Pfizer) 03/13/2023    COVID-19 MONOVALENT 12+ (Pfizer) 02/02/2021, 02/23/2021, 08/30/2021    COVID-19 Monovalent 12+ (Pfizer 2022) 04/05/2022    Flu, Unspecified 10/06/2004, 10/02/2006, 10/12/2007, 10/27/2008, 10/27/2009, 09/10/2010, 09/07/2011, 11/03/2012, 10/23/2014    HIB(PRP-OMP)(PedvaxHIB) 05/24/2021    Hepatitis A (VAQTA)(ADULT 19+) 01/07/2002    Influenza (High Dose) Trivalent,PF (Fluzone) 09/28/2015, 10/20/2016, 10/05/2017, 10/19/2018, 10/16/2019    Influenza (IIV3) PF 11/01/2002, 11/07/2003, 10/23/2014    Influenza Vaccine 65+ (FLUAD) 09/17/2020    Meningococcal ACWY (Menactra ) 07/19/2021    Meningococcal ACWY (Menveo ) 05/24/2021    Meningococcal B (Bexsero ) 05/24/2021, 07/01/2021    Pneumo Conj 13-V (2010&after) 03/20/2015, 05/24/2021    Pneumococcal 23 valent 01/09/2004, 10/02/2006, 09/12/2013, 07/19/2021    TDAP (Adacel,Boostrix) 02/07/2007    Td (Adult), Adsorbed 09/09/1997, 02/07/2015    Tdap (Adult) Unspecified Formulation 02/06/2015    Zoster recombinant adjuvanted (Shingrix) 11/04/2019, 01/23/2020    Zoster vaccine, live 03/11/2011, 06/10/2015             Allergies:     Allergies   Allergen Reactions    Contrast Dye Rash, Anaphylaxis, Hives and Swelling      "Tolerated CT chest with contrast 6/2021 after pre-treatment with benadryl and solumedrol  Hives / throat swelling    Doxycycline Anaphylaxis    Acetaminophen      Vicodin/Darvocet/Swelling throat    Albuterol      \"Half my tongue swelled.\"   \"Half my tongue swelled.\"       Atenolol Unknown and Other (See Comments)     PN: [facility] Reaction: swelling of the tongue  (see [facility] records scanned 3/11/2014)  (see [facility] records scanned 3/11/2014)      Darvocet [Propoxyphene N-Apap]     Fluticasone-Salmeterol      PN: tongue swelling      Hydrocodone-Acetaminophen Swelling     throat      Lisinopril      Tongue Swelling    Metformin Hydrochloride      Glucophage caused severe diarrhea    Morphine      MISAEL    Penicillins      Swelling throat    Percocet [Oxycodone-Acetaminophen]     Neomycin-Bacitracin-Polymyxin Hives and Rash             Medications:     Medications Prior to Admission   Medication Sig Dispense Refill Last Dose/Taking    acetaminophen (TYLENOL) 500 MG tablet Take 1,000 mg by mouth 2 times daily.   5/11/2025 Morning    albuterol (PROAIR HFA/PROVENTIL HFA/VENTOLIN HFA) 108 (90 Base) MCG/ACT inhaler Inhale 2 puffs into the lungs every 4 hours as needed for shortness of breath / dyspnea or wheezing 6.7 g 0 Taking As Needed    aspirin (ASA) 325 MG EC tablet Take 325 mg by mouth daily.   5/11/2025 Morning    atorvastatin (LIPITOR) 40 MG tablet Take 40 mg by mouth daily   5/11/2025 Morning    busPIRone (BUSPAR) 5 MG tablet Take 5 mg by mouth 3 times daily.   5/11/2025 Morning    Calcium Carb-Cholecalciferol 500-200 MG-UNIT PACK Take 1 tablet by mouth daily    5/11/2025 Morning    carbidopa-levodopa (SINEMET)  MG tablet Take 2 tablets by mouth 3 times daily   5/11/2025 Morning    cephALEXin (KEFLEX) 250 MG capsule Take 250 mg by mouth daily.   5/11/2025 Morning    cetirizine (ZYRTEC) 10 MG tablet TAKE 1 TABLET(10 MG) BY MOUTH DAILY 30 tablet 1 5/11/2025 Morning    cholecalciferol (VITAMIN D3) 125 mcg (5000 " units) capsule Take 125 mcg by mouth daily.   5/11/2025 Morning    ciprofloxacin (CIPRO) 500 MG tablet Take 500 mg by mouth 2 times daily.   5/11/2025 Morning    citalopram (CELEXA) 20 MG tablet Take 20 mg by mouth daily.   5/11/2025 Morning    cyanocobalamin (CYANOCOBALAMIN) 1000 MCG/ML injection Inject 1 mL into the muscle every 30 days   Unknown    denosumab (PROLIA) 60 MG/ML SOSY injection Inject 60 mg Subcutaneous every 6 months   More than a month    diazepam (VALIUM) 5 MG tablet Take 5 mg by mouth once. Prior to dental work only   Unknown    diclofenac (VOLTAREN) 1 % topical gel Apply 4 g topically 4 times daily as needed for moderate pain Plus 2 g to hands tid   Unknown    empagliflozin (JARDIANCE) 10 MG TABS tablet Take 10 mg by mouth every morning (before breakfast).   5/11/2025 Morning    folic acid (FOLVITE) 1 MG tablet Take 1 mg by mouth. 3 times daily expect on Wednesday 5/11/2025 Morning    furosemide (LASIX) 20 MG tablet Take 20 mg by mouth daily.   5/11/2025 Morning    gabapentin (NEURONTIN) 300 MG capsule Take 600 mg by mouth At Bedtime   5/10/2025 Bedtime    methotrexate 2.5 MG tablet Take 3 tablets by mouth every 7 days. Every Wednesday AM   Past Week Morning    methotrexate 2.5 MG tablet Take 4 tablets by mouth every 7 days. Every Wednesday PM   Past Week Evening    pantoprazole (PROTONIX) 20 MG EC tablet Take 20 mg by mouth daily   5/11/2025 Morning    spironolactone (ALDACTONE) 25 MG tablet Take 25 mg by mouth daily.   5/11/2025 Morning    vibegron (GEMTESA) 75 MG TABS tablet Take 75 mg by mouth at bedtime.   5/10/2025 Bedtime             Review of Systems:     A comprehensive review of systems was performed and found to be negative except as described in this note           Physical Exam:   Vitals were reviewed  Temp: 98.2  F (36.8  C) Temp src: Oral BP: (!) 182/69 Pulse: 76   Resp: 18 SpO2: 96 % O2 Device: Nasal cannula Oxygen Delivery: 2 LPM  Constitutional: Awake, alert, cooperative, no  apparent distress, and appears stated age.  Eyes: Lids and lashes normal, pupils equal, round and reactive to light, extra ocular muscles intact, sclera clear, conjunctiva normal.  ENT: Normocephalic, without obvious abnormality, atraumatic.  No facial muscular asymmetry.  Dentition is very poor.  Neck: Supple, symmetrical, trachea midline, no adenopathy, thyroid symmetric, not enlarged and no tenderness, skin normal.  Hematologic / Lymphatic: No cervical lymphadenopathy and no supraclavicular lymphadenopathy.  Back: Symmetric, no curvature, spinous processes are non-tender on palpation, paraspinous muscles are non-tender on palpation, no costal vertebral tenderness.  Lungs: No increased work of breathing.  No wheeze or rales.  Dullness on the right base.  Cardiovascular: Regular rate and rhythm and no murmur noted.  Abdomen: Normal bowel sounds, soft, non-distended, non-tender, no masses palpated, no hepatosplenomegaly.  Musculoskeletal: No redness, warmth, or swelling of the joints.  Tone is normal.  Neurologic: Awake, alert, oriented to name, place and time.  Cranial nerves II-XII are grossly intact.    Neuropsychiatric: Normal affect, mood, orientation, memory and insight.  Skin: No rashes, erythema, pallor, petechia or purpura.          Data:     Results for orders placed or performed during the hospital encounter of 05/11/25 (from the past 24 hours)   [facility]a Draw    Narrative    The following orders were created for panel order [facility] Draw.  Procedure                               Abnormality         Status                     ---------                               -----------         ------                     Extra Green Top (Lithiu...[[id number]]                      Final result               Extra Purple Top Tube[[id number]]                                                        Please view results for these tests on the individual orders.   CBC with platelets differential    Narrative    The following  orders were created for panel order CBC with platelets differential.  Procedure                               Abnormality         Status                     ---------                               -----------         ------                     CBC with platelets and ...[[id number]]  Abnormal            Final result               RBC and Platelet Morpho...[[id number]]  Abnormal            Final result                 Please view results for these tests on the individual orders.   Comprehensive metabolic panel   Result Value Ref Range    Sodium 139 135 - 145 mmol/L    Potassium 3.9 3.4 - 5.3 mmol/L    Carbon Dioxide (CO2) 24 22 - 29 mmol/L    Anion Gap 10 7 - 15 mmol/L    Urea Nitrogen 16.0 8.0 - 23.0 mg/dL    Creatinine 0.79 0.51 - 0.95 mg/dL    GFR Estimate 74 >60 mL/min/1.73m2    Calcium 7.5 (L) 8.8 - 10.4 mg/dL    Chloride 105 98 - 107 mmol/L    Glucose 97 70 - 99 mg/dL    Alkaline Phosphatase 92 40 - 150 U/L    AST 25 0 - 45 U/L    ALT 7 0 - 50 U/L    Protein Total 5.3 (L) 6.4 - 8.3 g/dL    Albumin 2.8 (L) 3.5 - 5.2 g/dL    Bilirubin Total 0.9 <=1.2 mg/dL   Troponin T, High Sensitivity   Result Value Ref Range    Troponin T, High Sensitivity 25 (H) <=14 ng/L   TSH with free T4 reflex   Result Value Ref Range    TSH 1.83 0.30 - 4.20 uIU/mL   NT-proBNP   Result Value Ref Range    NT-proBNP 987 (H) 0 - 624 pg/mL   Extra Green Top (Lithium Heparin) Tube   Result Value Ref Range    Hold Specimen JIC    CBC with platelets and differential   Result Value Ref Range    WBC Count 10.8 4.0 - 11.0 10e3/uL    RBC Count 2.99 (L) 3.80 - 5.20 10e6/uL    Hemoglobin 10.7 (L) 11.7 - 15.7 g/dL    Hematocrit 32.2 (L) 35.0 - 47.0 %     (H) 78 - 100 fL    MCH 35.8 (H) 26.5 - 33.0 pg    MCHC 33.2 31.5 - 36.5 g/dL    RDW 16.3 (H) 10.0 - 15.0 %    Platelet Count 207 150 - 450 10e3/uL    % Neutrophils 91 %    % Lymphocytes 5 %    % Monocytes 3 %    % Eosinophils 1 %    % Basophils 0 %    % Immature Granulocytes 1 %    NRBCs per 100  WBC 0 <1 /100    Absolute Neutrophils 9.9 (H) 1.6 - 8.3 10e3/uL    Absolute Lymphocytes 0.5 (L) 0.8 - 5.3 10e3/uL    Absolute Monocytes 0.3 0.0 - 1.3 10e3/uL    Absolute Eosinophils 0.1 0.0 - 0.7 10e3/uL    Absolute Basophils 0.0 0.0 - 0.2 10e3/uL    Absolute Immature Granulocytes 0.1 <=0.4 10e3/uL    Absolute NRBCs 0.0 10e3/uL   RBC and Platelet Morphology   Result Value Ref Range    RBC Morphology Confirmed RBC Indices     Platelet Assessment  Automated Count Confirmed. Platelet morphology is normal.     Automated Count Confirmed. Platelet morphology is normal.    Beverly Cells Slight (A) None Seen    RBC Fragments Slight (A) None Seen   Vitamin B12   Result Value Ref Range    Vitamin B12 615 232 - 1,245 pg/mL   EKG 12 lead   Result Value Ref Range    Systolic Blood Pressure  mmHg    Diastolic Blood Pressure  mmHg    Ventricular Rate 66 BPM    Atrial Rate 66 BPM    MS Interval 122 ms    QRS Duration 86 ms     ms    QTc 505 ms    P Axis 33 degrees    R AXIS 37 degrees    T Axis 42 degrees    Interpretation ECG       Sinus rhythm  Prolonged QT  Abnormal ECG  When compared with ECG of 04-Apr-2025 12:40,  Premature atrial complexes are no longer Present     Chest XR,  PA & LAT    Narrative    EXAM: XR CHEST 2 VIEWS  LOCATION: [facility]  DATE: 5/11/2025    INDICATION: increased weakness  COMPARISON: 4/4/2025         Impression    IMPRESSION: Normal heart size and pulmonary vascularity. Medium sized right pleural effusion with compressive atelectasis right lower lung. This is increased from previous. Left lung clear. Cardiac recording device   Troponin T, High Sensitivity   Result Value Ref Range    Troponin T, High Sensitivity 21 (H) <=14 ng/L   Protein total   Result Value Ref Range    Protein Total 5.6 (L) 6.4 - 8.3 g/dL   UA with Microscopic reflex to Culture    Specimen: Urine, Catheter   Result Value Ref Range    Color Urine Yellow Colorless, Straw, Light Yellow, Yellow    Appearance  Urine Clear Clear    Glucose Urine 300 (A) Negative mg/dL    Bilirubin Urine Negative Negative    Ketones Urine Negative Negative mg/dL    Specific Gravity Urine 1.019 1.003 - 1.035    Blood Urine Negative Negative    pH Urine 5.5 5.0 - 7.0    Protein Albumin Urine Negative Negative mg/dL    Urobilinogen Urine 2.0 (A) Normal mg/dL    Nitrite Urine Negative Negative    Leukocyte Esterase Urine Trace (A) Negative    Mucus Urine Present (A) None Seen /LPF    RBC Urine 1 <=2 /HPF    WBC Urine 9 (H) <=5 /HPF    Squamous Epithelials Urine 1 <=1 /HPF    Hyaline Casts Urine 9 (H) <=2 /LPF    Narrative    Urine Culture not indicated   Lactate Dehydrogenase   Result Value Ref Range    Lactate Dehydrogenase 404 (H) 0 - 250 U/L   INR   Result Value Ref Range    INR 0.97 0.85 - 1.15    PT 13.0 11.8 - 14.8 Seconds      All cardiac studies reviewed by me.   All imaging studies reviewed by me.      Attestation:  I have reviewed today's vital signs, notes, medications, labs and imaging.     Toney Polo MD

## 2025-05-11 NOTE — PHARMACY-ADMISSION MEDICATION HISTORY
Pharmacy Intern Admission Medication History    Admission medication history is complete. The information provided in this note is only as accurate as the sources available at the time of the update.    Information Source(s): Family member () and CareEverywhere/SureScripts via in-person    Pertinent Information: None     Changes made to PTA medication list:  Added: Cephalexin, Ciprofloxacin, Jardiance, Spironolactone   Deleted: Nystatin powder   Changed:   Furosemide 20mg: half a tab daily-> full tab daily   Tylenol 500m tabs TID-> 2 tabs BID    Allergies reviewed with patient and updates made in EHR: no    Medication History Completed By: Gina Kramer [facility] 2025 3:50 PM    PTA Med List   Medication Sig Note Last Dose/Taking    acetaminophen (TYLENOL) 500 MG tablet Take 1,000 mg by mouth 2 times daily.  2025 Morning    albuterol (PROAIR HFA/PROVENTIL HFA/VENTOLIN HFA) 108 (90 Base) MCG/ACT inhaler Inhale 2 puffs into the lungs every 4 hours as needed for shortness of breath / dyspnea or wheezing  Taking As Needed    aspirin (ASA) 325 MG EC tablet Take 325 mg by mouth daily.  2025 Morning    atorvastatin (LIPITOR) 40 MG tablet Take 40 mg by mouth daily  2025 Morning    busPIRone (BUSPAR) 5 MG tablet Take 5 mg by mouth 3 times daily.  2025 Morning    Calcium Carb-Cholecalciferol 500-200 MG-UNIT PACK Take 1 tablet by mouth daily   2025 Morning    carbidopa-levodopa (SINEMET)  MG tablet Take 2 tablets by mouth 3 times daily  2025 Morning    cephALEXin (KEFLEX) 250 MG capsule Take 250 mg by mouth daily. 2025: Indication: UTI prophylaxis  2025 Morning    cetirizine (ZYRTEC) 10 MG tablet TAKE 1 TABLET(10 MG) BY MOUTH DAILY  2025 Morning    cholecalciferol (VITAMIN D3) 125 mcg (5000 units) capsule Take 125 mcg by mouth daily.  2025 Morning    ciprofloxacin (CIPRO) 500 MG tablet Take 500 mg by mouth 2 times daily. 2025: Indication: UTI  2025  Morning    citalopram (CELEXA) 20 MG tablet Take 20 mg by mouth daily.  5/11/2025 Morning    cyanocobalamin (CYANOCOBALAMIN) 1000 MCG/ML injection Inject 1 mL into the muscle every 30 days  Unknown    denosumab (PROLIA) 60 MG/ML SOSY injection Inject 60 mg Subcutaneous every 6 months  More than a month    diazepam (VALIUM) 5 MG tablet Take 5 mg by mouth once. Prior to dental work only  Unknown    diclofenac (VOLTAREN) 1 % topical gel Apply 4 g topically 4 times daily as needed for moderate pain Plus 2 g to hands tid  Unknown    empagliflozin (JARDIANCE) 10 MG TABS tablet Take 10 mg by mouth every morning (before breakfast).  5/11/2025 Morning    folic acid (FOLVITE) 1 MG tablet Take 1 mg by mouth. 3 times daily expect on Wednesday 5/11/2025 Morning    furosemide (LASIX) 20 MG tablet Take 20 mg by mouth daily.  5/11/2025 Morning    gabapentin (NEURONTIN) 300 MG capsule Take 600 mg by mouth At Bedtime  5/10/2025 Bedtime    methotrexate 2.5 MG tablet Take 3 tablets by mouth every 7 days. Every Wednesday AM  Past Week Morning    methotrexate 2.5 MG tablet Take 4 tablets by mouth every 7 days. Every Wednesday PM  Past Week Evening    pantoprazole (PROTONIX) 20 MG EC tablet Take 20 mg by mouth daily  5/11/2025 Morning    spironolactone (ALDACTONE) 25 MG tablet Take 25 mg by mouth daily.  5/11/2025 Morning    vibegron (GEMTESA) 75 MG TABS tablet Take 75 mg by mouth at bedtime.  5/10/2025 Bedtime

## 2025-05-12 ENCOUNTER — APPOINTMENT (OUTPATIENT)
Dept: ULTRASOUND IMAGING | Facility: CLINIC | Age: 85
DRG: 291 | End: 2025-05-12
Attending: EMERGENCY MEDICINE
Payer: MEDICARE

## 2025-05-12 ENCOUNTER — APPOINTMENT (OUTPATIENT)
Dept: OCCUPATIONAL THERAPY | Facility: CLINIC | Age: 85
DRG: 291 | End: 2025-05-12
Attending: INTERNAL MEDICINE
Payer: MEDICARE

## 2025-05-12 ENCOUNTER — APPOINTMENT (OUTPATIENT)
Dept: CT IMAGING | Facility: CLINIC | Age: 85
DRG: 291 | End: 2025-05-12
Attending: INTERNAL MEDICINE
Payer: MEDICARE

## 2025-05-12 LAB
% LINING CELLS, BODY FLUID: 1 %
ANION GAP SERPL CALCULATED.3IONS-SCNC: 10 MMOL/L (ref 7–15)
APPEARANCE FLD: ABNORMAL
ATRIAL RATE - MUSE: 66 BPM
BUN SERPL-MCNC: 14.7 MG/DL (ref 8–23)
CALCIUM SERPL-MCNC: 7.4 MG/DL (ref 8.8–10.4)
CHLORIDE SERPL-SCNC: 107 MMOL/L (ref 98–107)
COLOR FLD: ABNORMAL
CREAT SERPL-MCNC: 0.85 MG/DL (ref 0.51–0.95)
DIASTOLIC BLOOD PRESSURE - MUSE: NORMAL MMHG
EGFRCR SERPLBLD CKD-EPI 2021: 68 ML/MIN/1.73M2
ERYTHROCYTE [DISTWIDTH] IN BLOOD BY AUTOMATED COUNT: 16.3 % (ref 10–15)
GLUCOSE BLDC GLUCOMTR-MCNC: 107 MG/DL (ref 70–99)
GLUCOSE BLDC GLUCOMTR-MCNC: 125 MG/DL (ref 70–99)
GLUCOSE BLDC GLUCOMTR-MCNC: 134 MG/DL (ref 70–99)
GLUCOSE BLDC GLUCOMTR-MCNC: 144 MG/DL (ref 70–99)
GLUCOSE BLDC GLUCOMTR-MCNC: 162 MG/DL (ref 70–99)
GLUCOSE BODY FLUID SOURCE: NORMAL
GLUCOSE FLD-MCNC: 123 MG/DL
GLUCOSE SERPL-MCNC: 126 MG/DL (ref 70–99)
HCO3 SERPL-SCNC: 22 MMOL/L (ref 22–29)
HCT VFR BLD AUTO: 29.3 % (ref 35–47)
HGB BLD-MCNC: 10 G/DL (ref 11.7–15.7)
INTERPRETATION ECG - MUSE: NORMAL
LD BODY BODY FLUID SOURCE: NORMAL
LDH FLD L TO P-CCNC: 197 U/L
LDH SERPL L TO P-CCNC: 332 U/L (ref 0–250)
LVEF ECHO: NORMAL
LYMPHOCYTES NFR FLD MANUAL: 4 %
MAGNESIUM SERPL-MCNC: 2 MG/DL (ref 1.7–2.3)
MCH RBC QN AUTO: 36.4 PG (ref 26.5–33)
MCHC RBC AUTO-ENTMCNC: 34.1 G/DL (ref 31.5–36.5)
MCV RBC AUTO: 107 FL (ref 78–100)
MONOS+MACROS NFR FLD MANUAL: 78 %
NEUTS BAND NFR FLD MANUAL: 17 %
P AXIS - MUSE: 33 DEGREES
PHOSPHATE SERPL-MCNC: 3.1 MG/DL (ref 2.5–4.5)
PLATELET # BLD AUTO: 183 10E3/UL (ref 150–450)
POTASSIUM SERPL-SCNC: 5.2 MMOL/L (ref 3.4–5.3)
PR INTERVAL - MUSE: 122 MS
PROT FLD-MCNC: 2.1 G/DL
PROT SERPL-MCNC: 5.1 G/DL (ref 6.4–8.3)
PROTEIN BODY FLUID SOURCE: NORMAL
QRS DURATION - MUSE: 86 MS
QT - MUSE: 482 MS
QTC - MUSE: 505 MS
R AXIS - MUSE: 37 DEGREES
RBC # BLD AUTO: 2.75 10E6/UL (ref 3.8–5.2)
SODIUM SERPL-SCNC: 139 MMOL/L (ref 135–145)
SPECIMEN SOURCE FLD: ABNORMAL
SYSTOLIC BLOOD PRESSURE - MUSE: NORMAL MMHG
T AXIS - MUSE: 42 DEGREES
VENTRICULAR RATE- MUSE: 66 BPM
WBC # BLD AUTO: 9.1 10E3/UL (ref 4–11)
WBC # FLD AUTO: 345 /UL

## 2025-05-12 PROCEDURE — 250N000011 HC RX IP 250 OP 636: Mod: JZ | Performed by: INTERNAL MEDICINE

## 2025-05-12 PROCEDURE — 83615 LACTATE (LD) (LDH) ENZYME: CPT | Performed by: INTERNAL MEDICINE

## 2025-05-12 PROCEDURE — 258N000003 HC RX IP 258 OP 636: Performed by: INTERNAL MEDICINE

## 2025-05-12 PROCEDURE — 88341 IMHCHEM/IMCYTCHM EA ADD ANTB: CPT | Mod: TC | Performed by: INTERNAL MEDICINE

## 2025-05-12 PROCEDURE — 87070 CULTURE OTHR SPECIMN AEROBIC: CPT | Performed by: INTERNAL MEDICINE

## 2025-05-12 PROCEDURE — 89050 BODY FLUID CELL COUNT: CPT | Performed by: INTERNAL MEDICINE

## 2025-05-12 PROCEDURE — 80048 BASIC METABOLIC PNL TOTAL CA: CPT | Performed by: INTERNAL MEDICINE

## 2025-05-12 PROCEDURE — 120N000001 HC R&B MED SURG/OB

## 2025-05-12 PROCEDURE — 71260 CT THORAX DX C+: CPT

## 2025-05-12 PROCEDURE — 999N000127 HC STATISTIC PERIPHERAL IV START W US GUIDANCE

## 2025-05-12 PROCEDURE — 97166 OT EVAL MOD COMPLEX 45 MIN: CPT | Mod: GO

## 2025-05-12 PROCEDURE — 32555 ASPIRATE PLEURA W/ IMAGING: CPT

## 2025-05-12 PROCEDURE — 84157 ASSAY OF PROTEIN OTHER: CPT | Performed by: INTERNAL MEDICINE

## 2025-05-12 PROCEDURE — 83735 ASSAY OF MAGNESIUM: CPT | Performed by: INTERNAL MEDICINE

## 2025-05-12 PROCEDURE — 99232 SBSQ HOSP IP/OBS MODERATE 35: CPT | Performed by: HOSPITALIST

## 2025-05-12 PROCEDURE — 85027 COMPLETE CBC AUTOMATED: CPT | Performed by: INTERNAL MEDICINE

## 2025-05-12 PROCEDURE — 84100 ASSAY OF PHOSPHORUS: CPT | Performed by: INTERNAL MEDICINE

## 2025-05-12 PROCEDURE — 250N000011 HC RX IP 250 OP 636: Performed by: INTERNAL MEDICINE

## 2025-05-12 PROCEDURE — 250N000012 HC RX MED GY IP 250 OP 636 PS 637: Performed by: INTERNAL MEDICINE

## 2025-05-12 PROCEDURE — 250N000013 HC RX MED GY IP 250 OP 250 PS 637: Performed by: INTERNAL MEDICINE

## 2025-05-12 PROCEDURE — 0W993ZZ DRAINAGE OF RIGHT PLEURAL CAVITY, PERCUTANEOUS APPROACH: ICD-10-PCS | Performed by: EMERGENCY MEDICINE

## 2025-05-12 PROCEDURE — 250N000009 HC RX 250: Performed by: RADIOLOGY

## 2025-05-12 PROCEDURE — 250N000009 HC RX 250: Performed by: INTERNAL MEDICINE

## 2025-05-12 PROCEDURE — 36415 COLL VENOUS BLD VENIPUNCTURE: CPT | Performed by: INTERNAL MEDICINE

## 2025-05-12 PROCEDURE — 82945 GLUCOSE OTHER FLUID: CPT | Performed by: INTERNAL MEDICINE

## 2025-05-12 PROCEDURE — 97535 SELF CARE MNGMENT TRAINING: CPT | Mod: GO

## 2025-05-12 PROCEDURE — 84155 ASSAY OF PROTEIN SERUM: CPT | Performed by: INTERNAL MEDICINE

## 2025-05-12 RX ORDER — LORAZEPAM 2 MG/ML
0.5 INJECTION INTRAMUSCULAR ONCE
Status: DISCONTINUED | OUTPATIENT
Start: 2025-05-12 | End: 2025-05-12

## 2025-05-12 RX ORDER — IBUPROFEN 600 MG/1
600 TABLET, FILM COATED ORAL EVERY 8 HOURS PRN
Status: DISCONTINUED | OUTPATIENT
Start: 2025-05-12 | End: 2025-05-23 | Stop reason: HOSPADM

## 2025-05-12 RX ORDER — FOLIC ACID 1 MG/1
1 TABLET ORAL
Status: DISCONTINUED | OUTPATIENT
Start: 2025-05-12 | End: 2025-05-23 | Stop reason: HOSPADM

## 2025-05-12 RX ORDER — IOPAMIDOL 755 MG/ML
500 INJECTION, SOLUTION INTRAVASCULAR ONCE
Status: COMPLETED | OUTPATIENT
Start: 2025-05-12 | End: 2025-05-12

## 2025-05-12 RX ORDER — FUROSEMIDE 20 MG/1
20 TABLET ORAL DAILY
Status: DISCONTINUED | OUTPATIENT
Start: 2025-05-13 | End: 2025-05-23 | Stop reason: HOSPADM

## 2025-05-12 RX ADMIN — BUSPIRONE HYDROCHLORIDE 5 MG: 5 TABLET ORAL at 10:25

## 2025-05-12 RX ADMIN — CARBIDOPA AND LEVODOPA 2 TABLET: 25; 100 TABLET ORAL at 22:01

## 2025-05-12 RX ADMIN — CARBIDOPA AND LEVODOPA 2 TABLET: 25; 100 TABLET ORAL at 10:25

## 2025-05-12 RX ADMIN — CARBIDOPA AND LEVODOPA 2 TABLET: 25; 100 TABLET ORAL at 16:27

## 2025-05-12 RX ADMIN — CITALOPRAM HYDROBROMIDE 20 MG: 20 TABLET ORAL at 10:25

## 2025-05-12 RX ADMIN — Medication 1 SPRAY: at 22:02

## 2025-05-12 RX ADMIN — ASPIRIN 325 MG: 325 TABLET, COATED ORAL at 10:25

## 2025-05-12 RX ADMIN — ATORVASTATIN CALCIUM 40 MG: 40 TABLET, FILM COATED ORAL at 10:25

## 2025-05-12 RX ADMIN — GABAPENTIN 600 MG: 300 CAPSULE ORAL at 22:00

## 2025-05-12 RX ADMIN — EMPAGLIFLOZIN 10 MG: 10 TABLET, FILM COATED ORAL at 10:26

## 2025-05-12 RX ADMIN — CETIRIZINE HYDROCHLORIDE 10 MG: 10 TABLET, FILM COATED ORAL at 10:25

## 2025-05-12 RX ADMIN — BUSPIRONE HYDROCHLORIDE 5 MG: 5 TABLET ORAL at 22:01

## 2025-05-12 RX ADMIN — PANTOPRAZOLE SODIUM 20 MG: 20 TABLET, DELAYED RELEASE ORAL at 10:25

## 2025-05-12 RX ADMIN — CIPROFLOXACIN 500 MG: 500 TABLET ORAL at 10:25

## 2025-05-12 RX ADMIN — CIPROFLOXACIN 500 MG: 500 TABLET ORAL at 22:01

## 2025-05-12 RX ADMIN — IBUPROFEN 600 MG: 600 TABLET ORAL at 02:00

## 2025-05-12 RX ADMIN — FOLIC ACID 1 MG: 1 TABLET ORAL at 10:25

## 2025-05-12 RX ADMIN — SPIRONOLACTONE 25 MG: 25 TABLET, FILM COATED ORAL at 10:26

## 2025-05-12 RX ADMIN — SODIUM CHLORIDE 65 ML: 9 INJECTION, SOLUTION INTRAVENOUS at 08:32

## 2025-05-12 RX ADMIN — Medication 125 MCG: at 10:29

## 2025-05-12 RX ADMIN — BUSPIRONE HYDROCHLORIDE 5 MG: 5 TABLET ORAL at 16:27

## 2025-05-12 RX ADMIN — FOLIC ACID 1 MG: 1 TABLET ORAL at 12:40

## 2025-05-12 RX ADMIN — FOLIC ACID 1 MG: 1 TABLET ORAL at 16:27

## 2025-05-12 RX ADMIN — SODIUM CHLORIDE: 0.9 INJECTION, SOLUTION INTRAVENOUS at 20:56

## 2025-05-12 RX ADMIN — Medication 1 SPRAY: at 18:24

## 2025-05-12 RX ADMIN — METHYLPREDNISOLONE SODIUM SUCCINATE 40 MG: 40 INJECTION, POWDER, FOR SOLUTION INTRAMUSCULAR; INTRAVENOUS at 07:01

## 2025-05-12 RX ADMIN — IOPAMIDOL 90 ML: 755 INJECTION, SOLUTION INTRAVENOUS at 08:32

## 2025-05-12 RX ADMIN — VIBEGRON 75 MG: 75 TABLET, FILM COATED ORAL at 22:01

## 2025-05-12 RX ADMIN — DIPHENHYDRAMINE HYDROCHLORIDE 25 MG: 25 CAPSULE ORAL at 07:01

## 2025-05-12 RX ADMIN — LIDOCAINE HYDROCHLORIDE 10 ML: 10 INJECTION, SOLUTION EPIDURAL; INFILTRATION; INTRACAUDAL; PERINEURAL at 09:15

## 2025-05-12 RX ADMIN — INSULIN ASPART 1 UNITS: 100 INJECTION, SOLUTION INTRAVENOUS; SUBCUTANEOUS at 18:24

## 2025-05-12 RX ADMIN — GABAPENTIN 600 MG: 300 CAPSULE ORAL at 02:00

## 2025-05-12 ASSESSMENT — ACTIVITIES OF DAILY LIVING (ADL)
ADLS_ACUITY_SCORE: 45
DEPENDENT_IADLS:: CLEANING;COOKING;LAUNDRY;SHOPPING;MEAL PREPARATION;MEDICATION MANAGEMENT
ADLS_ACUITY_SCORE: 45

## 2025-05-12 NOTE — PLAN OF CARE
"Patient is alert as well as orient to person, place and time. VSS on RA. Denies pain. Tele: SR. Assist of 1 using gait belt and walker. Intermittent dizziness with movement. Continue BID orthostatics. PIV to right FA infusing IVF. Thoracenteses with 1100 ml off today. Echo and Abd CT complete. PT to evaluate and give discharge recommendations.      Goal Outcome Evaluation:      Plan of Care Reviewed With: patient, significant other    Overall Patient Progress: improvingOverall Patient Progress: improving    Outcome Evaluation: AOx4. Now on RA. Tele: SR. Thora done. Abd CT done. Echo done.          Problem: Adult Inpatient Plan of Care  Goal: Plan of Care Review  Description: The Plan of Care Review/Shift note should be completed every shift.  The Outcome Evaluation is a brief statement about your assessment that the patient is improving, declining, or no change.  This information will be displayed automatically on your shiftnote.  Outcome: Progressing  Flowsheets (Taken 5/12/2025 1851)  Outcome Evaluation: AOx4. Now on RA. Tele: SR. Thora done. Abd CT done. Echo done.  Plan of Care Reviewed With:   patient   significant other  Overall Patient Progress: improving  Goal: Patient-Specific Goal (Individualized)  Description: You can add care plan individualizations to a care plan. Examples of Individualization might be:  \"Parent requests to be called daily at 9am for status\", \"I have a hard time hearing out of my right ear\", or \"Do not touch me to wake me up as it startlesme\".  Outcome: Progressing  Goal: Absence of Hospital-Acquired Illness or Injury  Outcome: Progressing  Intervention: Identify and Manage Fall Risk  Recent Flowsheet Documentation  Taken 5/12/2025 0800 by Thea Sullivan RN  Safety Promotion/Fall Prevention: safety round/check completed  Intervention: Prevent and Manage VTE (Venous Thromboembolism) Risk  Recent Flowsheet Documentation  Taken 5/12/2025 0800 by Thea Sullivan RN  VTE Prevention/Management: " SCDs off (sequential compression devices)  Intervention: Prevent Infection  Recent Flowsheet Documentation  Taken 5/12/2025 0800 by Thea Sullivan RN  Infection Prevention: single patient room provided  Goal: Optimal Comfort and Wellbeing  Outcome: Progressing  Goal: Readiness for Transition of Care  Outcome: Progressing     Problem: Delirium  Goal: Optimal Coping  Outcome: Progressing  Goal: Improved Behavioral Control  Outcome: Progressing  Goal: Improved Attention and Thought Clarity  Outcome: Progressing  Goal: Improved Sleep  Outcome: Progressing     Problem: Fall Injury Risk  Goal: Absence of Fall and Fall-Related Injury  Outcome: Progressing  Intervention: Promote Injury-Free Environment  Recent Flowsheet Documentation  Taken 5/12/2025 0800 by Thea Sullivan RN  Safety Promotion/Fall Prevention: safety round/check completed     Problem: Skin Injury Risk Increased  Goal: Skin Health and Integrity  Outcome: Progressing  Intervention: Plan: Nurse Driven Intervention: Moisture Management  Recent Flowsheet Documentation  Taken 5/12/2025 0800 by Thea Sullivan RN  Moisture Interventions:   Encourage regular toileting   No brief in bed  Intervention: Plan: Nurse Driven Intervention: Friction and Shear  Recent Flowsheet Documentation  Taken 5/12/2025 0800 by Thea Sullivan RN  Friction/Shear Interventions: HOB 30 degrees or less  Intervention: Optimize Skin Protection  Recent Flowsheet Documentation  Taken 5/12/2025 0815 by Thea Sullivan RN  Activity Management: up in chair

## 2025-05-12 NOTE — PLAN OF CARE
"Pt. A&Ox4, up with assist of 1, gait belt and walker, resting in bed overnight, Tele: SR, BGL at [id number]. Plan for CT of abdomen and possible thoracentesis. Pt. Denies any needs at this time. Will continue with POC.     Goal Outcome Evaluation:      Plan of Care Reviewed With: patient    Overall Patient Progress: improvingOverall Patient Progress: improving    Outcome Evaluation: A&Ox3, c/o dizziness upon sitting, able to transfer to [facility] with assist of 1, Plan for CT of abdomen and possible Thoracentesis today      Problem: Adult Inpatient Plan of Care  Goal: Plan of Care Review  Description: The Plan of Care Review/Shift note should be completed every shift.  The Outcome Evaluation is a brief statement about your assessment that the patient is improving, declining, or no change.  This information will be displayed automatically on your shiftnote.  5/12/2025 0650 by Kalani Redman RN  Outcome: Progressing  Flowsheets (Taken 5/12/2025 0650)  Outcome Evaluation: A&Ox3, c/o dizziness upon sitting, able to transfer to [facility] with assist of 1, Plan for CT of abdomen and possible Thoracentesis today  Plan of Care Reviewed With: patient  Overall Patient Progress: improving  5/12/2025 0612 by Kalani Redman RN  Outcome: Progressing  Flowsheets (Taken 5/12/2025 0612)  Plan of Care Reviewed With: patient  Overall Patient Progress: improving  Goal: Patient-Specific Goal (Individualized)  Description: You can add care plan individualizations to a care plan. Examples of Individualization might be:  \"Parent requests to be called daily at 9am for status\", \"I have a hard time hearing out of my right ear\", or \"Do not touch me to wake me up as it startlesme\".  5/12/2025 0650 by Kalani Redman RN  Outcome: Progressing  5/12/2025 0612 by Kalani Redman RN  Outcome: Progressing  Goal: Absence of Hospital-Acquired Illness or Injury  5/12/2025 0650 by Kalani Redman RN  Outcome: Progressing  5/12/2025 0612 by Kalani Redman " MYAH LINDA  Outcome: Progressing  Intervention: Identify and Manage Fall Risk  Recent Flowsheet Documentation  Taken 5/12/2025 0000 by Kalani Redman RN  Safety Promotion/Fall Prevention: activity supervised  Intervention: Prevent Skin Injury  Recent Flowsheet Documentation  Taken 5/12/2025 0000 by Kalani Redman RN  Body Position: position changed independently  Skin Protection: adhesive use limited  Intervention: Prevent and Manage VTE (Venous Thromboembolism) Risk  Recent Flowsheet Documentation  Taken 5/12/2025 0000 by Kalani Redman RN  VTE Prevention/Management: SCDs off (sequential compression devices)  Intervention: Prevent Infection  Recent Flowsheet Documentation  Taken 5/12/2025 0000 by Kalani Redman RN  Infection Prevention: rest/sleep promoted  Goal: Optimal Comfort and Wellbeing  5/12/2025 0650 by Kalani Redman RN  Outcome: Progressing  5/12/2025 0612 by Kalani Redman RN  Outcome: Progressing  Intervention: Provide Person-Centered Care  Recent Flowsheet Documentation  Taken 5/12/2025 0000 by Kalani Redman RN  Trust Relationship/Rapport: care explained  Goal: Readiness for Transition of Care  5/12/2025 0650 by Kalani Redman RN  Outcome: Progressing  5/12/2025 0612 by Kalani Redman RN  Outcome: Progressing     Problem: Delirium  Goal: Optimal Coping  5/12/2025 0650 by Kalani Redman RN  Outcome: Progressing  5/12/2025 0612 by Kalani Redman RN  Outcome: Progressing  Goal: Improved Behavioral Control  5/12/2025 0650 by Kalani Redman RN  Outcome: Progressing  5/12/2025 0612 by Kalani Redman RN  Outcome: Progressing  Intervention: Minimize Safety Risk  Recent Flowsheet Documentation  Taken 5/12/2025 0000 by Kalani Redman RN  Trust Relationship/Rapport: care explained  Goal: Improved Attention and Thought Clarity  5/12/2025 0650 by Kalani Redman RN  Outcome: Progressing  5/12/2025 0612 by Kalani Redman RN  Outcome: Progressing  Intervention: Maximize Cognitive  Function  Recent Flowsheet Documentation  Taken 5/12/2025 0000 by Kalani Redman RN  Sensory Stimulation Regulation:   care clustered   lighting decreased   quiet environment promoted  Reorientation Measures: clock in view  Goal: Improved Sleep  5/12/2025 0650 by Kalani Redman RN  Outcome: Progressing  5/12/2025 0612 by Kalani Redman RN  Outcome: Progressing  Intervention: Promote Sleep  Recent Flowsheet Documentation  Taken 5/12/2025 0000 by Kalani Redman RN  Sleep/Rest Enhancement: awakenings minimized     Problem: Fall Injury Risk  Goal: Absence of Fall and Fall-Related Injury  5/12/2025 0650 by Kalani Redman RN  Outcome: Progressing  5/12/2025 0612 by Kalani Redman RN  Outcome: Progressing  Intervention: Identify and Manage Contributors  Recent Flowsheet Documentation  Taken 5/12/2025 0000 by Kalani Redman RN  Medication Review/Management: medications reviewed  Intervention: Promote Injury-Free Environment  Recent Flowsheet Documentation  Taken 5/12/2025 0000 by Kalani Redman RN  Safety Promotion/Fall Prevention: activity supervised

## 2025-05-12 NOTE — PROGRESS NOTES
[facility]    History and Physical     Gita Bustamante Patient Status:  Emergency    10/29/1952 MRN [id number]   Location [facility] EMERGENCY DEPARTMENT Attending Daryl Wright MD   Hosp Day # 0 PCP SUKHWINDER THOMPSON MD     History of Present Illness: Gita Bustamante is a 71 year old female with no significant PMHx presented to the ER with her  after a fall. The patient fell down 5 concrete steps after her grandchildren ran into her.   The patient did not lose consciousness and did not injury any of her bony prominences.   She has poor short term memory.    Past Medical History:History reviewed. No pertinent past medical history.     Past Surgical History:   Past Surgical History:   Procedure Laterality Date    LETITIA LOCALIZATION WIRE 1 SITE LEFT (CPT=19281)      2019    NEEDLE BIOPSY RIGHT         Social History:  reports that she has never smoked. She has never used smokeless tobacco.    Family History:   Family History   Problem Relation Age of Onset    Breast Cancer Neg        Allergies: No Known Allergies    Medications:    No current facility-administered medications on file prior to encounter.     No current outpatient medications on file prior to encounter.       Review of Systems:   A comprehensive 14 point review of systems was completed.    Pertinent positives and negatives noted in the HPI.    Physical Exam:    /66   Pulse 63   Temp 96.8 °F (36 °C)   Resp 17   Ht 5' 6\" (1.676 m)   Wt 160 lb (72.6 kg)   SpO2 100%   BMI 25.82 kg/m²   General: No acute distress. Alert and oriented x 3. Abrasion at R parietal region.  Respiratory: Clear to auscultation bilaterally. No wheezes. No rhonchi.  Cardiovascular: S1, S2. Regular rate and rhythm. No murmurs, no rubs or gallops. Equal pulses.   Abdomen: Soft, nontender, nondistended.  Positive bowel sounds. No rebound, guarding or organomegaly.  Neurologic: No focal neurological deficits. CNII-XII grossly  Right thoracentesis completed per Dr. Restrepo for 1100 ml clear min fluid, patient tolerated well. Fluid to lab for diagnostics as ordered. Patient transferred to room via cart in stable condition.   intact.  Musculoskeletal: Moves all extremities.  Extremities: No edema or cyanosis.      Diagnostic Data:      Labs:  Recent Labs   Lab 12/28/23  0737   WBC 5.2   HGB 11.3*   MCV 86.8   .0       No results for input(s): \"GLU\", \"BUN\", \"CREATSERUM\", \"GFRAA\", \"GFRNAA\", \"CA\", \"ALB\", \"NA\", \"K\", \"CL\", \"CO2\", \"ALKPHO\", \"AST\", \"ALT\", \"BILT\", \"TP\" in the last 168 hours.    CrCl cannot be calculated (Patient's most recent lab result is older than the maximum 7 days allowed.).    No results for input(s): \"PTP\", \"INR\" in the last 168 hours.    No results for input(s): \"TROP\", \"CK\" in the last 168 hours.    Imaging: Imaging data reviewed in Epic.      ASSESSMENT / PLAN:     Parietal subgaleal hematoma 2/2 fall  CT reviewed  Continue neurochecks  Supportive management  PRN zofran  PT/OT  ?distal radial impact fracture  Normal range of motion  Follow up with ortho as outpatient  GOC  Full code  ~35 minutes spend      Quality:  DVT Prophylaxis: SCDs  CODE status: Not on file  Carter: No    Plan of care discussed with ED physician    Vivian Lambert MD  12/28/2023                  The 21st Century Cures Act makes medical notes like these available to patients in the interest of transparency. Please be advised this is a medical document. Medical documents are intended to carry relevant information, facts as evident, and the clinical opinion of the practitioner. The medical note is intended as peer to peer communication and may appear blunt or direct. It is written in medical language and may contain abbreviations or verbiage that are unfamiliar.

## 2025-05-12 NOTE — PROGRESS NOTES
"   05/12/25 2528   Appointment Info   Signing Clinician's Name / Credentials (OT) Janeen Man, OTR/L   Living Environment   People in Home spouse   Current Living Arrangements condominium   Home Accessibility no concerns   Transportation Anticipated family or friend will provide   Living Environment Comments Pt lives w/ spouse in condo, no stairs. Pt has walk-in shower w/ chair and grab bars. Commode over toilet. Laundry in unit. pt and spouse report frequently visiting second house, 1 [address]EST, tub shower.   Self-Care   Usual Activity Tolerance moderate   Current Activity Tolerance fair   Equipment Currently Used at Home walker, rolling;commode chair;grab bar, tub/shower;shower chair   Fall history within last six months yes   Number of times patient has fallen within last six months   (Spouse reporting at least 10 in last 6 mos)   Activity/Exercise/Self-Care Comment Pt receives assist from spouse w/ ADLs as needed. Pt uses 4WW for baseline mobility.   Instrumental Activities of Daily Living (IADL)   IADL Comments Spouse provides assist w/ all IADLs (med mgmt, cooking, cleaning, laundry). Per spouse, pt unable to complete IADLs ind d/t dec act lashonda and fatigue. Spouse drives.   General Information   Onset of Illness/Injury or Date of Surgery 05/11/25   Referring Physician Toney Polo MD   Patient/Family Therapy Goal Statement (OT) to get better   Additional Occupational Profile Info/Pertinent History of Current Problem Per chart, \"Alma Rosa Fishman is a 84-year-old female with a past history of Parkinson's Disease, a recurrent large right pleural effusion and HFpEF presents with syncopal episode, weakness and again another right-sided pleural effusion'   Existing Precautions/Restrictions fall;oxygen therapy device and L/min   Cognitive Status Examination   Orientation Status orientation to person, place and time   Affect/Mental Status (Cognitive) confused   Follows Commands verbal cues/prompting required;repetition of " directions required   Cognitive Status Comments Pt w/ occasional WFD, reports this is baseline d/t Parkinsons. Pt occasionally confused, forgetful during session requiring repetition of cues   Visual Perception   Visual Impairment/Limitations corrective lenses full-time   Sensory   Sensory Comments Pt reports baseline Raynauds causing numbness   Pain Assessment   Patient Currently in Pain Yes, see Vital Sign flowsheet  (Pt reporting back pain)   Range of Motion Comprehensive   Comment, General Range of Motion Not formally assessed, BUE ROM limitations d/t previous shoulder injuries   Strength Comprehensive (MMT)   Comment, General Manual Muscle Testing (MMT) Assessment Generalized weakness noted   Coordination   Coordination Comments Pt reports some baseline FMC deficits in BUE   Bed Mobility   Bed Mobility supine-sit;sit-supine   Supine-Sit Benzie (Bed Mobility) contact guard;verbal cues   Sit-Supine Benzie (Bed Mobility) moderate assist (50% patient effort);2 person assist   Assistive Device (Bed Mobility) bed rails   Transfers   Transfers sit-stand transfer   Sit-Stand Transfer   Sit-Stand Benzie (Transfers) contact guard   Assistive Device (Sit-Stand Transfers) walker, front-wheeled   Balance   Balance Comments Not formally assessed, pt w/ dizziness upon standing   Activities of Daily Living   BADL Assessment/Intervention lower body dressing;grooming;toileting   Lower Body Dressing Assessment/Training   Benzie Level (Lower Body Dressing) maximum assist (25% patient effort)   Grooming Assessment/Training   Benzie Level (Grooming) supervision;set up   Position (Grooming) edge of bed sitting   Toileting   Benzie Level (Toileting) moderate assist (50% patient effort)   Comment, (Toileting) Per clinical judgement   Clinical Impression   Criteria for Skilled Therapeutic Interventions Met (OT) Yes, treatment indicated   OT Diagnosis Impaired I/ADL independence   OT Problem  List-Impairments impacting ADL problems related to;activity tolerance impaired;balance;cognition;mobility;range of motion (ROM);strength;pain   Assessment of Occupational Performance 3-5 Performance Deficits   Identified Performance Deficits dressing, toileting, g/h, functional mobility   Planned Therapy Interventions (OT) ADL retraining;cognition;risk factor education;progressive activity/exercise;home program guidelines;strengthening;ROM   Clinical Decision Making Complexity (OT) detailed assessment/moderate complexity   Risk & Benefits of therapy have been explained evaluation/treatment results reviewed;care plan/treatment goals reviewed;risks/benefits reviewed;current/potential barriers reviewed;participants voiced agreement with care plan;participants included;patient;spouse/significant other;daughter   OT Total Evaluation Time   OT Eval, Moderate Complexity Minutes (76455) 12   OT Goals   Therapy Frequency (OT) 5 times/week   OT Predicted Duration/Target Date for Goal Attainment 05/19/25   OT Goals Hygiene/Grooming;Lower Body Dressing;Toilet Transfer/Toileting;Cognition   OT: Hygiene/Grooming independent  (seated)   OT: Lower Body Dressing Minimal assist;using adaptive equipment   OT: Toilet Transfer/Toileting Supervision/stand-by assist;toilet transfer;cleaning and garment management;using adaptive equipment   OT: Cognitive Patient/caregiver will verbalize understanding of cognitive assessment results/recommendations as needed for safe discharge planning   Interventions   Interventions Quick Adds Self-Care/Home Management   Self-Care/Home Management   Self-Care/Home Mgmt/ADL, Compensatory, Meal Prep Minutes (39874) 50   Symptoms Noted During/After Treatment (Meal Preparation/Planning Training) dizziness;shortness of breath;color change   Treatment Detail/Skilled Intervention Pt greeted semi-supine in bed w/ alarm off, agreeable to OT. Spouse and dtr present during session. Pt occasionally confused, forgetful  during session requiring repetition of cues. Pt on 2L NC O2 during session, SpO2 92-97% during session. Pt semi-supine<>sit EOB w/ bed rails CGA. Pt attempted fig 4 method for LB dressing, unable to achieve. Total A to thread feet through brief. Pt STS w/ FWW CGA, Mod A to pull brief up on hips. Pt tolerated standing ~2 min, requesting to sit. Pt completed EOB g/h (oral cares, washed face, combed hair) w/ set up, Min A for combing hair. Pt STS w/ FWW CGA, tolerated ~2 min standing, reporting dizziness and requesting to sit. Pt becoming pale, leaning to L side w/ difficulties responding to VCs. Spouse reporting similar symptoms prior to syncope at home. Pt sit<>supine Mod Ax2. Pt reporting improved symptoms upon supine. nursing asst present, Total Ax2 boost to HOB. Discussed DC rec of TCU, pt and family in agreement. Pt in bed at end of session, all needs in reach. Nursing asst attempting to activate bed alarm, not working.   OT Discharge Planning   OT Plan monitor vitals/orthostatics?, g/h EOB vs sinkside, inc activity lashonda, monitor cog   OT Discharge Recommendation (DC Rec) Transitional Care Facility   OT Rationale for DC Rec Pt functioning below baseline, limited by dec act lashonda, dizziness, wkns, pain. Spouse assist pt w/ I/ADLs at baseline, reports unable to safely assist pt at home at this time d/t high falls risk. Rec TCU upon DC to progress activity lashonda for I/ADL ind and safety. IP OT will continue to follow   OT Brief overview of current status Goals of therapy will be to address safe mobility and make recs for d/c to next level of care. Pt and RN will continue to follow all falls risk precautions as documented by RN staff while hospitalized.   OT Total Distance Amb During Session (feet) 0   Total Session Time   Timed Code Treatment Minutes 50   Total Session Time (sum of timed and untimed services) 62

## 2025-05-12 NOTE — CONSULTS
Care Management Initial Consult    General Information  Assessment completed with: Patient,         Primary Care Provider verified and updated as needed:     Readmission within the last 30 days: no previous admission in last 30 days      Reason for Consult: discharge planning  Advance Care Planning:            Communication Assessment  Patient's communication style: spoken language (English or Bilingual)    Hearing Difficulty or Deaf: no   Wear Glasses or Blind: yes    Cognitive  Cognitive/Neuro/Behavioral: WDL        Orientation: oriented x 4             Living Environment:   People in home: spouse     Current living Arrangements: condominium      Able to return to prior arrangements: yes       Family/Social Support:  Care provided by: spouse/significant other, self  Provides care for:       Support system:            Description of Support System:           Current Resources:   Patient receiving home care services: No        Community Resources: None  Equipment currently used at home: walker, rolling, commode chair, grab bar, tub/shower, shower chair  Supplies currently used at home: Incontinence Supplies    Employment/Financial:  Employment Status:          Financial Concerns:             Does the patient's insurance plan have a 3 day qualifying hospital stay waiver?  No    Lifestyle & Psychosocial Needs:  Social Drivers of Health     Food Insecurity: Low Risk  (5/11/2025)    Food Insecurity     Within the past 12 months, did you worry that your food would run out before you got money to buy more?: No     Within the past 12 months, did the food you bought just not last and you didn t have money to get more?: No   Depression: Not at risk (10/21/2024)    Received from [facility]    PHQ-2     PHQ-2 Total: 0   Housing Stability: Low Risk  (5/11/2025)    Housing Stability     Do you have housing? : Yes     Are you worried about losing your housing?: No   Tobacco Use: Low Risk   (5/8/2025)    Received from [facility]    Patient History     Smoking Tobacco Use: Never     Smokeless Tobacco Use: Never     Passive Exposure: Not on file   Financial Resource Strain: Low Risk  (5/11/2025)    Financial Resource Strain     Within the past 12 months, have you or your family members you live with been unable to get utilities (heat, electricity) when it was really needed?: No   Alcohol Use: Not At Risk (3/1/2023)    Received from [facility]    AUDIT-C     Frequency of Alcohol Consumption: 4 or more times a week     Average Number of Drinks: 1 or 2     Frequency of Binge Drinking: Never   Transportation Needs: Low Risk  (5/11/2025)    Transportation Needs     Within the past 12 months, has lack of transportation kept you from medical appointments, getting your medicines, non-medical meetings or appointments, work, or from getting things that you need?: No   Physical Activity: Inactive (5/22/2024)    Received from [facility]    Exercise Vital Sign     Days of Exercise per Week: 0 days     Minutes of Exercise per Session: 0 min   Interpersonal Safety: Low Risk  (5/11/2025)    Interpersonal Safety     Do you feel physically and emotionally safe where you currently live?: Yes     Within the past 12 months, have you been hit, slapped, kicked or otherwise physically hurt by someone?: No     Within the past 12 months, have you been humiliated or emotionally abused in other ways by your partner or ex-partner?: No   Stress: Stress Concern Present (3/1/2023)    Received from [facility]    [place] [employer]ational [employer] - Occupational Stress Questionnaire     Feeling of Stress : To some extent   Social Connections: Moderately Isolated (3/1/2023)    Received from [facility]    Social Connection and Isolation Panel [NHANES]     Frequency of Communication with Friends and Family: More than three times a week     Frequency of Social Gatherings with Friends and Family: Patient declined     Attends Episcopal  Services: Never     Active Member of Clubs or Organizations: No     Attends Club or Organization Meetings: Never     Marital Status:    Health Literacy: Not on file       Functional Status:  Prior to admission patient needed assistance:   Dependent ADLs:: Bathing, Ambulation-walker  Dependent IADLs:: Cleaning, Cooking, Laundry, Shopping, Meal Preparation, Medication Management  Assesssment of Functional Status: Not at baseline with ADL Functioning    Mental Health Status:          Chemical Dependency Status:                Values/Beliefs:  Spiritual, Cultural Beliefs, Druze Practices, Values that affect care:                 Discussed  Partnership in Safe Discharge Planning  document with patient/family: No    Additional Information:    SW spoke with pt spouse Vadim to discuss discharge planning. Pt lives in a condo with Vadim and ambulates with a walker at baseline. Vadim assists with transport and showers. Vadim shares that he is currently going to tour TCUs and would like a call tomorrow to give referral options. Discussed transport, Vadim would like to decide closer to discharge for [facility] vs family transporting the pt. SW explained that we typically like to send around 3 referrals to begin and encouraged Vadim to identify multiple choices. He expressed understanding and denies questions for SW at this time. Pt will need a 3 day IP stay for TCU coverage.     Next Steps: follow up with pt spouse for referral choices.     Thea Stoll/YASMINE Hardy, LGRAE  Inpatient Care Coordination  Emergency Room /Float  [phone]    Thea Stoll, SW

## 2025-05-12 NOTE — CONSULTS
"CLINICAL NUTRITION SERVICES - ASSESSMENT NOTE    Registered Dietitian Interventions:  Continue current diet order per provider    Nutrition supplements: BID. Fruit Punch SF Gel+ at 2 PM, Ensure Max Chocolate at 8 PM    Adding MVI/M d/t concern for lack of nutrient availability as a result of gastric stapling  - added note to please give dose 2 hours after first daily Cipro dose to avoid drug-nutrient interaction    Encouraged smaller, more frequent meals throughout the day with a goal of 4-6 intakes.    Discussed food sources of protein and importance of adequate intakes    Appreciate encouragement with consistent intakes as able    Future/Additional Recommendations:  Please watch for coughing with intake of foods and/or liquids and consult SLP if indicated     REASON FOR ASSESSMENT  Provider order - weight loss, nutrition supplementation and positive nutrition risk admission screen    PMH: large right pleural effusion (2/2025), HFpEF, cholecystectomy, gastric stapling (2013), urinary incontinence, parkinsonism, NIDDM, HTN and RA on low dose, malnutrition, weekly methotrexate and prednisone     Per EMR, pt presented to ED on 5/11 with generalized weakness and a fall.     INFORMATION OBTAINED  Assessed patient in room. Pt was very pleasant throughout conversation, but struggled to find words and would often lose her train of thought/forget the conversation.    NUTRITION HISTORY  - Information obtained from chart review and pt in room.  - Diet at home: Pt reported that she eats a regular diet at home.  - Usual intakes: Reports 2-3 meals per day with intakes of about 1/3 of meal each time.  - Barriers to PO intakes: Reports weakness from parkinsonism, general lack of appetite, forgetful of meal times, difficulty chewing, and stated \"it is hard for her to get her brain to tell her throat to swallow\" so she sometimes has difficulty with swallowing.   - Use of oral supplements: Does not take any regularly  - " "Chewing/swallowing issues: Reported occasional difficulty swallowing both foods and liquids. Stated it is \"sometimes hard for her brain to tell her throat to swallow\"  - Allergies: NKFA    Per H&P, pt noted to have significant unexplained weight loss and early satiety.     Per review of visit to [place] on 5/9/25, \"she appears to have developing protein calorie malnutrition and may have a more progressive underlying neurologic problem given the difficulty with the initiating swallowing, diplopia and holding her head up toward the end of the day\"    Pt reported that her  frequently has to remind her to eat and that he \"pesters her\" about not eating enough. Pt also stated that multiple doctors have told her that she needs to eat more protein.    CURRENT NUTRITION ORDERS  Diet: Regular    CURRENT INTAKE/TOLERANCE  Per nursing flowsheet, 50-75% intakes and fair-good appetite x2 instances of documentation.     Per [facility], pt ordered well portioned dinner 5/11, 3 well portioned meals on 5/12, and a well portioned breakfast so far today.     LABS  Nutrition-relevant labs: Reviewed  Noted: Phos 2.3(L)    MEDICATIONS  Nutrition-relevant medications: Reviewed  Noted: Sinimet TID (1000, 1600, 2200), Vitamin D3, Folic Acid, sliding scale Novolog, IVF @ 50 ml/hr, 25 mg Spironolactone daily, Jardiance, 20 mg Lasix daily      ANTHROPOMETRICS  Height: 157.5 cm (5' 2\")  Most Recent Weight: 82.7 kg (182 lb 4.8 oz)  IBW: 50.1 kg  % IBW: 165%  BMI (kg/m ): Body mass index is 32.83 kg/m . (Obesity Class I BMI 30-34.9)  Weight History:  Wt Readings from Last 10 Encounters:   05/13/25 82.7 kg (182 lb 4.8 oz)   04/04/25 83 kg (183 lb)   04/01/25 83 kg (183 lb)   02/13/25 75.2 kg (165 lb 12.6 oz)   09/07/24 81.6 kg (180 lb)   12/28/23 92.2 kg (203 lb 4.2 oz)   07/13/21 90.3 kg (199 lb)   07/01/21 88.4 kg (194 lb 14.4 oz)   06/23/21 87.7 kg (193 lb 4.8 oz)   05/21/21 98.3 kg (216 lb 12.8 oz)     Care Everywhere:  06/11/24 : 93.4 kg " (206 lb)  -11.46% BW x 11 months (does not meet criteria)    No significant weight loss noted within the last 8 months.    Pt now states that she thinks she is actually gaining weight?    ASSESSED NUTRITION NEEDS  Dosing Weight: 82.7 kg, based on actual wt  Estimated Energy Needs: 1499+ kcals/day (Bryce St Jeor w/ AF 1.2+)  Justification: Repletion  Estimated Protein Needs: 66.2-82.7 grams protein/day (0.8 - 1 grams of pro/kg)  Justification: Very minimum for maintenance, Pt on Sinemet  Estimated Fluid Needs: 1 ml/kcal or per provider    SYSTEM AND PHYSICAL FINDINGS    5/12: R Thoracentesis w/ 1100 ml out.     GI symptoms:   - Stools: LBM 5/12 (x1)  - Emesis: None documented this admission    Skin/wounds:   - Edema: Trace (1+) to bilateral legs, Mild (2+) to bilateral feet  - Pressure Injuries/Nonhealing wounds: None currently documented    MALNUTRITION  % Intake: </=75% for >/= 1 month (severe)  % Weight Loss: Weight loss does not meet criteria   Subcutaneous Fat Loss: Orbital: Mild and Buccal: Mild  Muscle Loss: Temples (temporalis muscle): Moderate, Clavicles (pectoralis and deltoids): Moderate, Shoulders (deltoids): Moderate, Interosseous muscles: Moderate, and Scapula (latissimus dorsi, trapezious, deltoids): Moderate  Fluid Accumulation/Edema: Does not meet criteria - 1-2+ but attributable to CHF, therefore, not used as diagnostic criterion for malnutrition as cannot say it is a true indicator of nutritional status.  Malnutrition Diagnosis: Moderate malnutrition in the context of chronic illness  Malnutrition Present on Admission: Yes    NUTRITION DIAGNOSIS  Inadequate oral intake related to difficulty with self feeding d/t parkinsonism and poor appetite as evidenced by reported intakes, muscle/fat wasting, and meets malnutrition criteria.    INTERVENTIONS  See nutrition interventions above    Goals  Patient to consume % of nutritionally adequate meal trays TID, or the equivalent with  "supplements/snacks.     Monitoring/Evaluation  Progress toward goals will be monitored and evaluated per policy.        Bianca Marie RD, LD  Clinical Dietitian  [facility] Message Group: \"Dietitian [[place]]\"  Office Phone: [phone]  Pagers: 3rd floor/ICU: [phone]  All other floors: [phone]  Weekend/holiday: [phone]    "

## 2025-05-12 NOTE — PROGRESS NOTES
[facility]    Medicine Progress Note - Hospitalist Service    Date of Admission:  5/11/2025    Assessment & Plan   Alma Rosa Fishman is a 84-year-old female with a past history of Parkinson's Disease, a recurrent large right pleural effusion and HFpEF presents with syncopal episode, weakness and again another right-sided pleural effusion    Pleural effusion: This is recurrent.  She last had this drained in 4/4.  She previous to that had it drained in February.  This is thought to be secondary to her heart failure.  She is proven to have difficulty tolerating diuretics due to or orthostasis and falls.  - On 5/12 she had 1 L removed via ultrasound-guided thoracentesis.  Laboratory studies pending on this fluid analysis but initial cell counts are suggestive of transudative effusion.  - In the context of difficult to manage fluid status I do wonder if she would benefit from regularly scheduled chest x-rays maybe every 2 weeks and her primary care clinic in once her effusion becomes large enough maybe she could be scheduled for proactive outpatient thoracentesis.  Her primary care physician may be prominent in coordinating this.  -She has been maintained on her PTA spironolactone  - Will resume her PTA Lasix 20 mg daily on 5/13  -Echocardiogram shows a dilated RV but no new change in ejection fraction.  2.  Syncopal episode: Not likely cardiogenic.  Most likely vasovagal or in the context of orthostasis.  He does have a history of Parkinson's disease of the certainly could also be just a mechanical fall.  - PT and OT consult  3. Parkinsons disease: This is stable and at her baseline.  - Continue PTA Sinemet  4.  Type 2 diabetes: Controlled.  Her last hemoglobin A1c was 4.8 on 5/11.  Sugars have been stable.  - Continue PTA Jardiance   - On sliding scale  5.  Depression: At baseline.  Continue with PTA BuSpar, Celexa  6.  Weight loss: Patient endorses significant weight loss over the last several  "months.  - Endorses poor appetite.  Very low caloric intake.  Making it difficult to ascertain her fluid balance.  Nutrition consult  7.  Disposition: When she is seen by physical therapy.  I do think she needs little bit better of an outpatient follow-up plan to manage these recurrent pleural effusions particularly if she is having trouble continuing on her diuretics.  She should be able to return to her prior living arrangement with her .        Diet: Regular Diet Adult    DVT Prophylaxis: Pneumatic Compression Devices  Qurioz Catheter: Not present  Lines: None     Cardiac Monitoring: ACTIVE order. Indication: Acute decompensated heart failure (48 hours)  Code Status: Full Code      Clinically Significant Risk Factors Present on Admission               # Hypoalbuminemia: Lowest albumin = 2.8 g/dL at 5/11/2025 11:32 AM, will monitor as appropriate   # Drug Induced Platelet Defect: home medication list includes an antiplatelet medication   # Hypertension: Noted on problem list  # Chronic heart failure with preserved ejection fraction: heart failure noted on problem list and last echo with EF >50%     # Anemia: based on hgb <11       # Obesity: Estimated body mass index is 32.83 kg/m  as calculated from the following:    Height as of this encounter: 1.575 m (5' 2\").    Weight as of this encounter: 81.4 kg (179 lb 8 oz).       # Asthma: noted on problem list        Social Drivers of Health    Physical Activity: Inactive (5/22/2024)    Received from [facility]    Exercise Vital Sign     Days of Exercise per Week: 0 days     Minutes of Exercise per Session: 0 min   Stress: Stress Concern Present (3/1/2023)    Received from [facility]    [employer]izen [employer] [place] of Occupational Health - Occupational Stress Questionnaire     Feeling of Stress : To some extent   Social Connections: Moderately Isolated (3/1/2023)    Received from [facility]    Social Connection and Isolation Panel [NHANES]     Frequency of Communication " with Friends and Family: More than three times a week     Frequency of Social Gatherings with Friends and Family: Patient declined     Attends Episcopalian Services: Never     Active Member of Clubs or Organizations: No     Attends Club or Organization Meetings: Never     Marital Status:           Disposition Plan     Medically Ready for Discharge: Anticipated in 2-4 Days             Roberto Tomas MD  Hospitalist Service  [facility]  Securely message with [employer] (more info)  Text page via [employer] Paging/Directory   ______________________________________________________________________    Interval History   Stable overnight.  Seen following her thoracentesis with improved and more comfortable breathing denies any fevers, chills, nausea or vomiting.    Physical Exam   Vital Signs: Temp: 98.2  F (36.8  C) Temp src: Oral BP: (!) 169/73 Pulse: 69   Resp: 16 SpO2: 98 % O2 Device: Nasal cannula Oxygen Delivery: 2 LPM  Weight: 179 lbs 8 oz  GEN: pleasant, tired  CHEST: CTA B  CV: RRR  ABD: soft    ----------------------------------------------------------------------------------------    Medical Decision Making       45 MINUTES SPENT BY ME on the date of service doing chart review, history, exam, documentation & further activities per the note.

## 2025-05-12 NOTE — PLAN OF CARE
"Goal Outcome Evaluation:      Plan of Care Reviewed With: patient    Overall Patient Progress: improvingOverall Patient Progress: improving    Outcome Evaluation: denies pain/N/V/SOB    A & O x 4, VSS, Tele SR, LS diminished, ow sat 97% 2L NC, lowfat diet up A x 1/gb/w. IVF infusing. New PIV/ left arm. PT/OT following. Plan: CT tomorrow. Will continue POC and monitoring.   Problem: Adult Inpatient Plan of Care  Goal: Plan of Care Review  Description: The Plan of Care Review/Shift note should be completed every shift.  The Outcome Evaluation is a brief statement about your assessment that the patient is improving, declining, or no change.  This information will be displayed automatically on your shiftnote.  Outcome: Progressing  Flowsheets (Taken 5/11/2025 4517)  Outcome Evaluation: denies pain/N/V/SOB  Plan of Care Reviewed With: patient  Overall Patient Progress: improving  Goal: Patient-Specific Goal (Individualized)  Description: You can add care plan individualizations to a care plan. Examples of Individualization might be:  \"Parent requests to be called daily at 9am for status\", \"I have a hard time hearing out of my right ear\", or \"Do not touch me to wake me up as it startlesme\".  Outcome: Progressing  Goal: Absence of Hospital-Acquired Illness or Injury  Outcome: Progressing  Intervention: Identify and Manage Fall Risk  Recent Flowsheet Documentation  Taken 5/11/2025 1746 by Nidia Sanchez RN  Safety Promotion/Fall Prevention: activity supervised  Intervention: Prevent Skin Injury  Recent Flowsheet Documentation  Taken 5/11/2025 1746 by Nidia Sanchez RN  Body Position:   position changed independently   side-lying 30 degrees  Skin Protection: adhesive use limited  Intervention: Prevent and Manage VTE (Venous Thromboembolism) Risk  Recent Flowsheet Documentation  Taken 5/11/2025 1746 by Nidia Sanchez RN  VTE Prevention/Management: SCDs off (sequential compression devices)  Intervention: Prevent " Infection  Recent Flowsheet Documentation  Taken 5/11/2025 1746 by Nidia Sanchez RN  Infection Prevention: cohorting utilized  Goal: Optimal Comfort and Wellbeing  Outcome: Progressing  Goal: Readiness for Transition of Care  Outcome: Progressing  Intervention: Mutually Develop Transition Plan  Recent Flowsheet Documentation  Taken 5/11/2025 2200 by Nidia Sanchez RN  Equipment Currently Used at Home: walker, rolling     Problem: Delirium  Goal: Optimal Coping  Outcome: Progressing  Goal: Improved Behavioral Control  Outcome: Progressing  Intervention: Minimize Safety Risk  Recent Flowsheet Documentation  Taken 5/11/2025 1746 by Nidia Sanchez RN  Enhanced Safety Measures: anti-tipping device on wheelchairs  Goal: Improved Attention and Thought Clarity  Outcome: Progressing  Goal: Improved Sleep  Outcome: Progressing  Intervention: Promote Sleep  Recent Flowsheet Documentation  Taken 5/11/2025 1746 by Nidia Sanchez RN  Sleep/Rest Enhancement: awakenings minimized     Problem: Fall Injury Risk  Goal: Absence of Fall and Fall-Related Injury  Outcome: Progressing  Intervention: Identify and Manage Contributors  Recent Flowsheet Documentation  Taken 5/11/2025 1746 by Nidia Sanchez RN  Medication Review/Management: medications reviewed  Intervention: Promote Injury-Free Environment  Recent Flowsheet Documentation  Taken 5/11/2025 1746 by Nidia Sanchez RN  Safety Promotion/Fall Prevention: activity supervised

## 2025-05-13 ENCOUNTER — APPOINTMENT (OUTPATIENT)
Dept: PHYSICAL THERAPY | Facility: CLINIC | Age: 85
DRG: 291 | End: 2025-05-13
Attending: INTERNAL MEDICINE
Payer: MEDICARE

## 2025-05-13 ENCOUNTER — APPOINTMENT (OUTPATIENT)
Dept: OCCUPATIONAL THERAPY | Facility: CLINIC | Age: 85
DRG: 291 | End: 2025-05-13
Attending: HOSPITALIST
Payer: MEDICARE

## 2025-05-13 LAB
GLUCOSE BLDC GLUCOMTR-MCNC: 116 MG/DL (ref 70–99)
GLUCOSE BLDC GLUCOMTR-MCNC: 121 MG/DL (ref 70–99)
GLUCOSE BLDC GLUCOMTR-MCNC: 139 MG/DL (ref 70–99)
GLUCOSE BLDC GLUCOMTR-MCNC: 69 MG/DL (ref 70–99)
GLUCOSE BLDC GLUCOMTR-MCNC: 71 MG/DL (ref 70–99)
GLUCOSE BLDC GLUCOMTR-MCNC: 71 MG/DL (ref 70–99)
GLUCOSE BLDC GLUCOMTR-MCNC: 74 MG/DL (ref 70–99)
GLUCOSE BLDC GLUCOMTR-MCNC: 75 MG/DL (ref 70–99)
GLUCOSE BLDC GLUCOMTR-MCNC: 78 MG/DL (ref 70–99)
GLUCOSE BLDC GLUCOMTR-MCNC: 82 MG/DL (ref 70–99)
GLUCOSE BLDC GLUCOMTR-MCNC: 82 MG/DL (ref 70–99)
MAGNESIUM SERPL-MCNC: 1.9 MG/DL (ref 1.7–2.3)
PHOSPHATE SERPL-MCNC: 2.3 MG/DL (ref 2.5–4.5)
POTASSIUM SERPL-SCNC: 4.2 MMOL/L (ref 3.4–5.3)

## 2025-05-13 PROCEDURE — 120N000001 HC R&B MED SURG/OB

## 2025-05-13 PROCEDURE — 84100 ASSAY OF PHOSPHORUS: CPT | Performed by: HOSPITALIST

## 2025-05-13 PROCEDURE — 250N000009 HC RX 250: Performed by: HOSPITALIST

## 2025-05-13 PROCEDURE — 84132 ASSAY OF SERUM POTASSIUM: CPT | Performed by: INTERNAL MEDICINE

## 2025-05-13 PROCEDURE — 250N000013 HC RX MED GY IP 250 OP 250 PS 637: Performed by: HOSPITALIST

## 2025-05-13 PROCEDURE — 97535 SELF CARE MNGMENT TRAINING: CPT | Mod: GO

## 2025-05-13 PROCEDURE — 83735 ASSAY OF MAGNESIUM: CPT | Performed by: HOSPITALIST

## 2025-05-13 PROCEDURE — 258N000001 HC RX 258: Performed by: INTERNAL MEDICINE

## 2025-05-13 PROCEDURE — 97161 PT EVAL LOW COMPLEX 20 MIN: CPT | Mod: GP | Performed by: PHYSICAL THERAPIST

## 2025-05-13 PROCEDURE — 97530 THERAPEUTIC ACTIVITIES: CPT | Mod: GP | Performed by: PHYSICAL THERAPIST

## 2025-05-13 PROCEDURE — 36415 COLL VENOUS BLD VENIPUNCTURE: CPT | Performed by: HOSPITALIST

## 2025-05-13 PROCEDURE — 99232 SBSQ HOSP IP/OBS MODERATE 35: CPT | Performed by: HOSPITALIST

## 2025-05-13 PROCEDURE — 99207 PR NO BILLABLE SERVICE THIS VISIT: CPT | Performed by: HOSPITALIST

## 2025-05-13 PROCEDURE — 250N000013 HC RX MED GY IP 250 OP 250 PS 637: Performed by: INTERNAL MEDICINE

## 2025-05-13 PROCEDURE — 250N000011 HC RX IP 250 OP 636: Performed by: INTERNAL MEDICINE

## 2025-05-13 PROCEDURE — 258N000003 HC RX IP 258 OP 636: Performed by: HOSPITALIST

## 2025-05-13 RX ORDER — MULTIPLE VITAMINS W/ MINERALS TAB 9MG-400MCG
1 TAB ORAL DAILY
Status: DISCONTINUED | OUTPATIENT
Start: 2025-05-13 | End: 2025-05-23 | Stop reason: HOSPADM

## 2025-05-13 RX ADMIN — FUROSEMIDE 20 MG: 20 TABLET ORAL at 09:37

## 2025-05-13 RX ADMIN — EMPAGLIFLOZIN 10 MG: 10 TABLET, FILM COATED ORAL at 09:38

## 2025-05-13 RX ADMIN — BUSPIRONE HYDROCHLORIDE 5 MG: 5 TABLET ORAL at 21:07

## 2025-05-13 RX ADMIN — SPIRONOLACTONE 25 MG: 25 TABLET, FILM COATED ORAL at 09:38

## 2025-05-13 RX ADMIN — DEXTROSE 15 G: 15 GEL ORAL at 18:34

## 2025-05-13 RX ADMIN — Medication 1 SPRAY: at 09:39

## 2025-05-13 RX ADMIN — CETIRIZINE HYDROCHLORIDE 10 MG: 10 TABLET, FILM COATED ORAL at 09:38

## 2025-05-13 RX ADMIN — Medication 1 SPRAY: at 21:19

## 2025-05-13 RX ADMIN — FOLIC ACID 1 MG: 1 TABLET ORAL at 13:55

## 2025-05-13 RX ADMIN — PANTOPRAZOLE SODIUM 20 MG: 20 TABLET, DELAYED RELEASE ORAL at 09:36

## 2025-05-13 RX ADMIN — CARBIDOPA AND LEVODOPA 2 TABLET: 25; 100 TABLET ORAL at 16:40

## 2025-05-13 RX ADMIN — DEXTROSE MONOHYDRATE 25 ML: 25 INJECTION, SOLUTION INTRAVENOUS at 20:30

## 2025-05-13 RX ADMIN — CIPROFLOXACIN 500 MG: 500 TABLET ORAL at 21:07

## 2025-05-13 RX ADMIN — FOLIC ACID 1 MG: 1 TABLET ORAL at 16:40

## 2025-05-13 RX ADMIN — VIBEGRON 75 MG: 75 TABLET, FILM COATED ORAL at 22:28

## 2025-05-13 RX ADMIN — ASPIRIN 325 MG: 325 TABLET, COATED ORAL at 08:34

## 2025-05-13 RX ADMIN — DICLOFENAC SODIUM 4 G: 10 GEL TOPICAL at 14:03

## 2025-05-13 RX ADMIN — DEXTROSE 15 G: 15 GEL ORAL at 19:26

## 2025-05-13 RX ADMIN — Medication 1 SPRAY: at 13:55

## 2025-05-13 RX ADMIN — FOLIC ACID 1 MG: 1 TABLET ORAL at 09:37

## 2025-05-13 RX ADMIN — SODIUM PHOSPHATE, MONOBASIC, MONOHYDRATE AND SODIUM PHOSPHATE, DIBASIC, ANHYDROUS 15 MMOL: 142; 276 INJECTION, SOLUTION INTRAVENOUS at 10:16

## 2025-05-13 RX ADMIN — CARBIDOPA AND LEVODOPA 2 TABLET: 25; 100 TABLET ORAL at 21:07

## 2025-05-13 RX ADMIN — Medication 125 MCG: at 09:38

## 2025-05-13 RX ADMIN — IBUPROFEN 600 MG: 600 TABLET ORAL at 09:36

## 2025-05-13 RX ADMIN — PROCHLORPERAZINE EDISYLATE 5 MG: 5 INJECTION INTRAMUSCULAR; INTRAVENOUS at 18:01

## 2025-05-13 RX ADMIN — BUSPIRONE HYDROCHLORIDE 5 MG: 5 TABLET ORAL at 09:38

## 2025-05-13 RX ADMIN — IBUPROFEN 600 MG: 600 TABLET ORAL at 00:26

## 2025-05-13 RX ADMIN — CARBIDOPA AND LEVODOPA 2 TABLET: 25; 100 TABLET ORAL at 09:39

## 2025-05-13 RX ADMIN — ONDANSETRON 4 MG: 2 INJECTION, SOLUTION INTRAMUSCULAR; INTRAVENOUS at 21:25

## 2025-05-13 RX ADMIN — CITALOPRAM HYDROBROMIDE 20 MG: 20 TABLET ORAL at 09:36

## 2025-05-13 RX ADMIN — CIPROFLOXACIN 500 MG: 500 TABLET ORAL at 09:38

## 2025-05-13 RX ADMIN — GABAPENTIN 600 MG: 300 CAPSULE ORAL at 21:07

## 2025-05-13 RX ADMIN — BUSPIRONE HYDROCHLORIDE 5 MG: 5 TABLET ORAL at 16:40

## 2025-05-13 RX ADMIN — ATORVASTATIN CALCIUM 40 MG: 40 TABLET, FILM COATED ORAL at 09:37

## 2025-05-13 RX ADMIN — Medication 1 TABLET: at 13:55

## 2025-05-13 ASSESSMENT — ACTIVITIES OF DAILY LIVING (ADL)
ADLS_ACUITY_SCORE: 45
ADLS_ACUITY_SCORE: 42
ADLS_ACUITY_SCORE: 45
ADLS_ACUITY_SCORE: 42
ADLS_ACUITY_SCORE: 45
ADLS_ACUITY_SCORE: 42
ADLS_ACUITY_SCORE: 45
ADLS_ACUITY_SCORE: 42
ADLS_ACUITY_SCORE: 45
ADLS_ACUITY_SCORE: 42

## 2025-05-13 NOTE — PROVIDER NOTIFICATION
MD paged: Pt reports right sided CP. Rates 3/10 at rest and 5/10 w/ activity. Radiating down right arm. Did have right sided thoracentesis today.     MD came to see pt. No concerns and agrees with pt with cause likely to be muscle pain. Will continue to monitor.

## 2025-05-13 NOTE — PLAN OF CARE
"Temp: 97.5  F (36.4  C) Temp src: Oral BP: (!) 154/72 Pulse: 81   Resp: 16 SpO2: 95 % O2 Device: None (Room air)       A&O, intermittent confusion. VSS. Up 1A GB walker, pivot due to positive orthostatics. Crackles RLL, 1-2+ Edema BLE. PO lasix 20mg. NS @ 50 ml/hr. Low urinary output, some urinary retention (145 ml). Pt on ciprofloxacin for UTI. Tele: SR. Phos replaced today. Positive orthostatics, asymptomatic, see flowsheets. PT/OT. SW following, discharge planning to TCU. Continue plan of care.    Addendum 1810: Patient having nausea and emesis after starting to eat dinner meal. Pre meal BG 69. Given apple juice prior to meal. After nausea and emesis, Given PRN IV compazine, wafting alcohol pads, cool cloth, saltine crackers. BG recheck still < 80. Given PRN glucose gel 15 g. Symptoms improving and no further emesis at this time.    Goal Outcome Evaluation:      Plan of Care Reviewed With: patient    Overall Patient Progress: improvingOverall Patient Progress: improving    Outcome Evaluation: Crackles RLL. Lasix PO. Low Urine output, some retention. Tele SR. Replaced phos today. Dc TCU.      Problem: Adult Inpatient Plan of Care  Goal: Plan of Care Review  Description: The Plan of Care Review/Shift note should be completed every shift.  The Outcome Evaluation is a brief statement about your assessment that the patient is improving, declining, or no change.  This information will be displayed automatically on your shiftnote.  Outcome: Progressing  Flowsheets (Taken 5/13/2025 1741)  Outcome Evaluation: Crackles RLL. Lasix PO. Low Urine output, some retention. Tele SR. Replaced phos today. Dc TCU.  Plan of Care Reviewed With: patient  Overall Patient Progress: improving  Goal: Patient-Specific Goal (Individualized)  Description: You can add care plan individualizations to a care plan. Examples of Individualization might be:  \"Parent requests to be called daily at 9am for status\", \"I have a hard time hearing out of " "my right ear\", or \"Do not touch me to wake me up as it startlesme\".  Outcome: Progressing  Goal: Absence of Hospital-Acquired Illness or Injury  Outcome: Progressing  Intervention: Identify and Manage Fall Risk  Recent Flowsheet Documentation  Taken 5/13/2025 1630 by Nell Stallworth RN  Safety Promotion/Fall Prevention:   activity supervised   clutter free environment maintained   nonskid shoes/slippers when out of bed   room near nurse's station   room organization consistent   safety round/check completed   supervised activity  Taken 5/13/2025 0953 by Nell Stallworth RN  Safety Promotion/Fall Prevention:   activity supervised   clutter free environment maintained   nonskid shoes/slippers when out of bed   room near nurse's station   room organization consistent   safety round/check completed   supervised activity  Intervention: Prevent Skin Injury  Recent Flowsheet Documentation  Taken 5/13/2025 1630 by Nell Stallworth RN  Body Position:   position changed independently   weight shifting   legs elevated  Intervention: Prevent and Manage VTE (Venous Thromboembolism) Risk  Recent Flowsheet Documentation  Taken 5/13/2025 1630 by Nell Stallworth RN  VTE Prevention/Management: SCDs off (sequential compression devices)  Taken 5/13/2025 0953 by Nell Stallworth RN  VTE Prevention/Management:   SCDs off (sequential compression devices)   patient refused intervention  Intervention: Prevent Infection  Recent Flowsheet Documentation  Taken 5/13/2025 1630 by Nell Stallworth RN  Infection Prevention:   hand hygiene promoted   single patient room provided   rest/sleep promoted  Taken 5/13/2025 0953 by Nell Stallworth RN  Infection Prevention:   hand hygiene promoted   single patient room provided   rest/sleep promoted  Goal: Optimal Comfort and Wellbeing  Outcome: Progressing  Intervention: Monitor Pain and Promote Comfort  Recent Flowsheet Documentation  Taken 5/13/2025 1020 by Nell Stallworth RN  Pain Management " Interventions:   rest   repositioned  Taken 5/13/2025 0946 by Nell Stallworth RN  Pain Management Interventions: medication (see MAR)  Intervention: Provide Person-Centered Care  Recent Flowsheet Documentation  Taken 5/13/2025 1630 by Nell Stallworth RN  Trust Relationship/Rapport:   care explained   choices provided   thoughts/feelings acknowledged  Taken 5/13/2025 0953 by Nell Stallworth RN  Trust Relationship/Rapport:   care explained   choices provided   thoughts/feelings acknowledged  Goal: Readiness for Transition of Care  Outcome: Progressing     Problem: Delirium  Goal: Improved Attention and Thought Clarity  Outcome: Progressing     Problem: Fall Injury Risk  Goal: Absence of Fall and Fall-Related Injury  Outcome: Progressing  Intervention: Identify and Manage Contributors  Recent Flowsheet Documentation  Taken 5/13/2025 1630 by Nell Stallworth RN  Medication Review/Management: medications reviewed  Taken 5/13/2025 0953 by Nell Stallworth RN  Medication Review/Management: medications reviewed  Intervention: Promote Injury-Free Environment  Recent Flowsheet Documentation  Taken 5/13/2025 1630 by Nell Stallworth RN  Safety Promotion/Fall Prevention:   activity supervised   clutter free environment maintained   nonskid shoes/slippers when out of bed   room near nurse's station   room organization consistent   safety round/check completed   supervised activity  Taken 5/13/2025 0953 by Nell Stallworth RN  Safety Promotion/Fall Prevention:   activity supervised   clutter free environment maintained   nonskid shoes/slippers when out of bed   room near nurse's station   room organization consistent   safety round/check completed   supervised activity

## 2025-05-13 NOTE — PROGRESS NOTES
[facility]    Medicine Progress Note - Hospitalist Service    Date of Admission:  5/11/2025    Assessment & Plan   Alma Rosa Fishman is a 84-year-old female with a past history of Parkinson's Disease, a recurrent large right pleural effusion and HFpEF presents with syncopal episode, weakness and again another right-sided pleural effusion     Right pleural effusion, recurrent.     Acute hypoxic respiratory failure     She last had this drained in April  and in February.  This is thought to be secondary to her heart failure.  She is proven to have difficulty tolerating diuretics due to or orthostasis and falls.  - On 5/12 she had 1 L removed via ultrasound-guided thoracentesis.  Laboratory studies pending on this fluid analysis but initial cell counts are suggestive of transudative effusion.  - If frequency of recurrent accumulation dictates, she may need scheduled thoracentesis.  - She has been maintained on her PTA spironolactone  - PTA Lasix 20 mg daily resumed 5/13  -Echocardiogram shows a dilated RV but no new change in ejection fraction.  2.  Syncopal episode: Not likely cardiogenic.  Most likely vasovagal or in the context of orthostasis.  She does have a history of Parkinson's disease hence a mechanical fall not excluded.  - PT and OT consult  3. Parkinsons disease: Stable and at her baseline.  - Continue PTA Sinemet  4.  Type 2 diabetes: Controlled.  Her last hemoglobin A1c was 4.8 on 5/11.  Sugars have been stable.  - Continue PTA Jardiance   - On sliding scale  5.  Depression: At baseline.  Continue with PTA BuSpar, Celexa  6.  Malnutrition Diagnosis: Moderate malnutrition in the context of chronic illness   7.  Mild to moderate pHTN        Disposition: Will follow physical therapy recommendations. Consider CORE clinic follow-up for better volume mgt            Diet: Cardiac diet   DVT Prophylaxis: Pneumatic Compression Devices  Quiroz Catheter: Not present  Lines: None     Cardiac  "Monitoring: ACTIVE order. Indication: Acute decompensated heart failure (48 hours)  Code Status: Full Code      Clinically Significant Risk Factors               # Hypoalbuminemia: Lowest albumin = 2.8 g/dL at 5/11/2025 11:32 AM, will monitor as appropriate     # Hypertension: Noted on problem list  # Chronic heart failure with preserved ejection fraction: heart failure noted on problem list and last echo with EF >50%           # Obesity: Estimated body mass index is 33.34 kg/m  as calculated from the following:    Height as of this encounter: 1.575 m (5' 2\").    Weight as of this encounter: 82.7 kg (182 lb 4.8 oz)., PRESENT ON ADMISSION     # Asthma: noted on problem list        Social Drivers of Health    Physical Activity: Inactive (5/22/2024)    Received from [facility]    Exercise Vital Sign     Days of Exercise per Week: 0 days     Minutes of Exercise per Session: 0 min   Stress: Stress Concern Present (3/1/2023)    Received from [facility]    [employer] - Occupational Stress Questionnaire     Feeling of Stress : To some extent   Social Connections: Moderately Isolated (3/1/2023)    Received from [facility]    Social Connection and Isolation Panel [NHANES]     Frequency of Communication with Friends and Family: More than three times a week     Frequency of Social Gatherings with Friends and Family: Patient declined     Attends [employer] Services: Never     Active Member of Clubs or Organizations: No     Attends Club or Organization Meetings: Never     Marital Status:           Disposition Plan     Medically Ready for Discharge: Anticipated Tomorrow    Rodo George MD  Hospitalist Service  [facility]  Securely message with [employer] (more info)  Text page via [facility] Paging/Directory   ______________________________________________________________________    Interval History   She is feeling better, no chest pain or shortness of breath.    Physical " Exam   Vital Signs: Temp: 98.9  F (37.2  C) Temp src: Oral BP: (!) 158/70 Pulse: 72   Resp: 17 SpO2: 98 % O2 Device: None (Room air) Oxygen Delivery: (S) 1 LPM  Weight: 182 lbs 4.8 oz    GEN:  Alert, oriented x 3, appears comfortable, NAD.  HEENT:  Normocephalic/atraumatic, no scleral icterus, no nasal discharge, mouth moist.  CV:  Regular rate and rhythm, no murmur or JVD.  S1 + S2 noted, no S3 or S4.  LUNGS: Coarse to auscultation bilaterally with bibasilar crackles.  Symmetric chest rise on inhalation noted.  ABD:  Active bowel sounds, soft, non-tender/non-distended.  No rebound/guarding/rigidity.  EXT: Bilateral LE edema, 1+.  No cyanosis.  No joint synovitis noted.  SKIN:  Dry to touch, no exanthems noted in the visualized areas.         Medical Decision Making       46 MINUTES SPENT BY ME on the date of service doing chart review, history, exam, documentation & further activities per the note.      Data     I have personally reviewed the following data over the past 24 hrs:    N/A  \   N/A   / N/A     N/A N/A N/A /  139 (H)   4.2 N/A N/A \     ALT: N/A AST: N/A AP: N/A TBILI: N/A   ALB: N/A TOT PROTEIN: 5.1 (L) LIPASE: N/A       Imaging results reviewed over the past 24 hrs:   Recent Results (from the past 24 hours)   CT Chest/Abdomen/Pelvis w Contrast    Narrative    EXAM: CT CHEST/ABDOMEN/PELVIS W CONTRAST  LOCATION: [facility]  DATE: 5/12/2025    INDICATION: Weight loss over the past 2 months, early satiety, sclerotic lesions in ribs noted on Chest CT from April.  COMPARISON: CTs, most recently chest CT dated 4/1/2025.  TECHNIQUE: CT scan of the chest, abdomen, and pelvis was performed following injection of IV contrast. Multiplanar reformats were obtained. Dose reduction techniques were used.   CONTRAST: 90mL Isovue 370    FINDINGS:   LUNGS AND PLEURA: Unchanged moderate right pleural effusion with overlying compressive atelectasis. The left lung is clear without effusion. No  pneumothorax.    MEDIASTINUM/AXILLAE: Cardiac size is within normal limits. No pericardial effusion. Mitral annular calcification. Normal caliber thoracic aorta. No lymphadenopathy.    CORONARY ARTERY CALCIFICATION: Mild.    HEPATOBILIARY: Cholecystectomy. Stable mild biliary duct prominence, consistent with postcholecystectomy reservoir effect. No worrisome liver lesions.    PANCREAS: Surgically absent.    SPLEEN: Normal.    ADRENAL GLANDS: Normal.    KIDNEYS/BLADDER: Normal.    BOWEL: Postoperative changes of partial gastrectomy with gastrojejunostomy. The bowel is nondistended without inflammatory change. No evidence for appendicitis.    LYMPH NODES: Normal.    VASCULATURE: Atherosclerosis. 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 Normal caliber abdominal aorta. 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    PELVIC ORGANS: Hysterectomy. No adnexal masses. Trace pelvic free fluid.    MUSCULOSKELETAL: Anasarca. Reverse right shoulder arthroplasty. Severe degenerative changes of the right hip and moderate degenerative changes of the left hip. Mild degenerative changes elsewhere. Marked diffuse osteopenia, consistent with osteoporosis.   No aggressive osseous lesions. There are some areas of sclerosis in the ribs, which correlate with fracture deformities. These include multiple old healed fracture deformities. There are nondisplaced fractures of the left second through ninth ribs, with   increasing surrounding bony callus, consistent with healing fractures. Old healed right clavicular fracture deformity.      Impression    IMPRESSION:  1.  Atherosclerosis. 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 Correlate for mesenteric angina.    2.  No mass or lymphadenopathy in the chest, abdomen or pelvis.    3.  Osteoporosis. Rib sclerosis correlates with rib  fractures. There are healing fractures of the left second through ninth ribs.    4.  Unchanged moderate right pleural effusion.    5.  Anasarca.    6.  Additional stable incidental findings, as detailed in the body of the report.     US Thoracentesis    Narrative    EXAM:   1. RIGHT THORACENTESIS  2. ULTRASOUND GUIDANCE  LOCATION: [facility]  DATE: 2025    INDICATION: Pleural effusion.    PROCEDURE: Informed consent obtained. Time out performed. The chest was prepped and draped in sterile fashion. 10 mL of 1 % lidocaine was infused into the local soft tissues. Under direct ultrasound guidance, a 5 Liberian catheter system was placed into   the pleural effusion.     1.1 liters of clear yellow fluid were removed and sent to lab, if requested.    Patient tolerated procedure well.    Ultrasound imaging was obtained and placed in the patient's permanent medical record.      Impression    IMPRESSION:  Status post right ultrasound-guided thoracentesis.  1.1 L removed.   Echocardiogram Limited   Result Value    LVEF  60-65%    Narrative    [id number]  [id number]  [id number]  [id number]^MART^MIKE^AIMEE     [facility]  Echocardiography Laboratory  [address] [place] [address]     Name: BRIAN JUNE  MRN: [id number]  : 1940  Study Date: 2025 11:15 AM  Age: 84 yrs  Gender: Female  Patient Location: [facility]  Reason For Study: CHF  Ordering Physician: MIKE CEVALLOS  Performed By: Nilsa Herrera     BSA: 1.8 m2  Height: 62 in  Weight: 179 lb  HR: 71  BP: 146/56 mmHg  ______________________________________________________________________________  Procedure  Limited Echocardiogram with two-dimensional, color and spectral Doppler.  ______________________________________________________________________________  Interpretation Summary     The visual ejection fraction is 60-65%.  Grade I or early diastolic dysfunction.  The left atrium is mild to moderately dilated.  Right  ventricular systolic pressure is elevated, consistent with mild to  moderate pulmonary hypertension. Might be slightly lower compared with most  recent study.  ______________________________________________________________________________  Left Ventricle  The left ventricle is normal in size. There is normal left ventricular wall  thickness. A sigmoid septum is present. The visual ejection fraction is 60-  65%. Grade I or early diastolic dysfunction.     Right Ventricle  The right ventricle is normal in structure, function and size.     Atria  The left atrium is mild to moderately dilated. Right atrial size is normal.     Mitral Valve  The mitral valve leaflets appear thickened, but open well.     Tricuspid Valve  Normal tricuspid valve. There is mild (1+) tricuspid regurgitation. Right  ventricular systolic pressure is elevated, consistent with mild to moderate  pulmonary hypertension.     Aortic Valve  There is mild trileaflet aortic sclerosis.     Pulmonic Valve  Normal pulmonic valve.     Vessels  The aortic root is normal size.     Pericardium  There is no pericardial effusion.     Rhythm  Sinus rhythm was noted.  ______________________________________________________________________________  MMode/2D Measurements & Calculations     IVSd: 0.92 cm  LVIDd: 5.3 cm  LVIDs: 3.3 cm  LVPWd: 1.00 cm  IVC diam: 1.0 cm  FS: 37.5 %  LV mass(C)d: 186.5 grams  LV mass(C)dI: 102.3 grams/m2  Ao root diam: 3.1 cm  LVOT diam: 2.1 cm  LVOT area: 3.6 cm2  Ao root diam index Ht(cm/m): 2.0  Ao root diam index BSA (cm/m2): 1.7  LA Volume (BP): 67.5 ml  LA Volume Index (BP): 37.1 ml/m2     RWT: 0.38  TAPSE: 2.4 cm     Doppler Measurements & Calculations  MV E max christina: 73.4 cm/sec  MV A max christina: 99.6 cm/sec  MV E/A: 0.74  MV max P.6 mmHg  MV mean P.7 mmHg  MV V2 VTI: 24.7 cm  MVA(VTI): 2.9 cm2  MV dec slope: 279.9 cm/sec2  MV dec time: 0.26 sec  Ao V2 max: 179.3 cm/sec  Ao max P.0 mmHg  Ao V2 mean: 121.9 cm/sec  Ao mean  P.8 mmHg  Ao V2 VTI: 37.4 cm  ROSARIO(I,D): 1.9 cm2  ROSARIO(V,D): 1.9 cm2  LV V1 max PG: 3.8 mmHg  LV V1 max: 96.9 cm/sec  LV V1 VTI: 20.1 cm  SV(LVOT): 72.3 ml  SI(LVOT): 39.6 ml/m2  PA V2 max: 81.0 cm/sec  PA max P.6 mmHg  PA acc time: 0.08 sec  AV Renzo Ratio (DI): 0.54  ROSARIO Index (cm2/m2): 1.1  E/E' av.4  Lateral E/e': 14.8  Medial E/e': 14.0     RV S Renzo: 11.4 cm/sec     ______________________________________________________________________________  Report approved by: Roberto Joseph MD on 2025 12:56 PM

## 2025-05-13 NOTE — PROGRESS NOTES
Care Management Follow Up    Length of Stay (days): 2    Expected Discharge Date: 05/14/2025     Concerns to be Addressed: discharge planning     Patient plan of care discussed at interdisciplinary rounds: Yes    Anticipated Discharge Disposition:  TCU      Anticipated Discharge Services: none   Anticipated Discharge DME: none     Patient/family educated on Medicare website which has current facility and service quality ratings:  yes  Education Provided on the Discharge Plan:  yes  Patient/Family in Agreement with the Plan:  yes    Referrals Placed by CM/SW:  TCU    Discussed  Partnership in Safe Discharge Planning  document with patient/family: No     Handoff Completed: No, handoff not indicated or clinically appropriate    Additional Information:  CM following for discharge to Transitional Care Unit. Patient was assessed by Physical Therapy/Occupational Therapy with recommendations for Transitional Care Unit. CM spoke to spouse yesterday who was touring a facility. Call placed to spouse, Vadim, this morning and he states he would like a referral sent to [facility] and [facility]. He states patient has been there in the past and he spoke to Kelly Nichols [phone] about availability yesterday. Referral was sent via Epic. Will follow up with patient and spouse with response.     Next Steps: referral for Transitional Care Unit sent, anticipate patient will by discharge ready tomorrow.    ADDENDUM 1350:  Response received from [facility] that they can accept patient when she is medically ready. They have a private room, [place], with a shared bathroom. They can take her between  tomorrow if she will be ready. Updated spouse and patient who are agreeable to the plan and would like to accept the bed at [facility]. Updated MD on bed availability. Spouse will transport on day of discharge. Anticipate possible discharge tomorrow.        Janeen Murrell RN BSN CM  Inpatient Care  Coordination  [facility]  [phone]

## 2025-05-13 NOTE — PROGRESS NOTES
"   05/13/25 0907   Appointment Info   Signing Clinician's Name / Credentials (PT) Moon Wells, PT   Rehab Comments (PT) Orthostatic BP   Living Environment   People in Home spouse   Current Living Arrangements condominium   Home Accessibility no concerns   Transportation Anticipated family or friend will provide   Living Environment Comments per chart: \"Pt lives w/ spouse in condo, no stairs. Pt has walk-in shower w/ chair and grab bars. Commode over toilet. Laundry in unit. pt and spouse report frequently visiting second house, 1 FOREST, tub shower.\"   Self-Care   Usual Activity Tolerance moderate   Current Activity Tolerance fair   Equipment Currently Used at Home walker, rolling;commode chair;grab bar, tub/shower;shower chair   Fall history within last six months yes   Number of times patient has fallen within last six months   (more than 10 in the last 6 months; patient having difficulty quantifying)   Activity/Exercise/Self-Care Comment Pt receives assist from spouse w/ ADLs as needed. Pt uses 4WW for baseline mobility.   General Information   Onset of Illness/Injury or Date of Surgery 05/11/25   Referring Physician Toney Polo MD   Patient/Family Therapy Goals Statement (PT) not stated   Pertinent History of Current Problem (include personal factors and/or comorbidities that impact the POC) Alma Rosa Fishman is a 84-year-old female with a past history of Parkinson's Disease, a recurrent large right pleural effusion and HFpEF presents with syncopal episode, weakness and again another right-sided pleural effusion; See medical record for further information   Existing Precautions/Restrictions fall;other (see comments)  (Orthostatic BP)   General Observations patient sitting at EOB with nursing upon therapist arrival; checking Orthostatics; patient not symptomatic   Cognition   Cognitive Status Comments patient reports memory impairment at baseline; spouse assists with meds; defer to OT for further testing   Pain " Assessment   Patient Currently in Pain Yes, see Vital Sign flowsheet  (LE's tender to touch)   Integumentary/Edema   Integumentary/Edema Comments edema in LE's; tender to touch; pitting edema   Posture    Posture Comments forward flexed at head and trunk; lists to the right   Range of Motion (ROM)   ROM Comment UE's limited; prior right rotator cuff issues; LE's functional   Strength (Manual Muscle Testing)   Strength Comments functional weakness noted with mobility; below baseline   Bed Mobility   Comment, (Bed Mobility) NT; patient up at EOB with nursing upon therapist arrival   Transfers   Comment, (Transfers) sit>stand with mod A and use of walker; unsteady   Gait/Stairs (Locomotion)   Comment, (Gait/Stairs) min A with use of walker   Balance   Balance Comments unsteady initially; some LOB backward and to the right   Sensory Examination   Sensory Perception Comments denies numbness and tingling   Clinical Impression   Criteria for Skilled Therapeutic Intervention Yes, treatment indicated   PT Diagnosis (PT) impaired functional mobility   Influenced by the following impairments edema in LE's; functional weakness; orthostatic BP's; decreased activity tolerance; impaired balance; impaired cognition; decreased safety awareness   Functional limitations due to impairments impaired independence with mobility and cares secondary to above deficits   Clinical Presentation (PT Evaluation Complexity) evolving   Clinical Presentation Rationale clinical judgement; level of assist   Clinical Decision Making (Complexity) low complexity   Planned Therapy Interventions (PT) balance training;bed mobility training;gait training;strengthening;transfer training;progressive activity/exercise   Risk & Benefits of therapy have been explained evaluation/treatment results reviewed;care plan/treatment goals reviewed;risks/benefits reviewed;current/potential barriers reviewed;participants voiced agreement with care plan;participants  included;patient   Clinical Impression Comments patient below reported baseline level of function   PT Total Evaluation Time   PT Karishmaal, Low Complexity Minutes (52061) 12   Physical Therapy Goals   PT Frequency 5x/week   PT Predicted Duration/Target Date for Goal Attainment 05/17/25   PT Goals Bed Mobility;Transfers;Gait   PT: Bed Mobility Independent;Supine to/from sit   PT: Transfers Supervision/stand-by assist;Sit to/from stand;Assistive device   PT: Gait Supervision/stand-by assist;Rolling walker  (75 feet)   PT Discharge Planning   PT Discharge Recommendation (DC Rec) Transitional Care Facility   PT Rationale for DC Rec Patient below baseline level of function and currently limited by orthostatic hypotension, weakness, and impaired activity tolerance; patient would benefit from TCU prior to return home with assist of spouse; doesn't appear safe to return home with spouse in current condition; continued high falls risk   PT Brief overview of current status A x 1 with walker; limited by orthostatic BP   PT Total Distance Amb During Session (feet) 1

## 2025-05-13 NOTE — PLAN OF CARE
"/67 (BP Location: Left arm, Patient Position: Semi-Zendejas's, Cuff Size: Adult Regular)   Pulse 76   Temp 98.1  F (36.7  C) (Oral)   Resp 16   Ht 1.575 m (5' 2\")   Wt 82.7 kg (182 lb 4.8 oz)   SpO2 93%   BMI 33.34 kg/m      1900 - 0700:     VS: VSS on RA except BP soft on standing orthostatic BP.  Pain: Reported CP on right radiating down R arm. MD notified and came to assess pt. Likely muscle related per MD. Ibuprofen given per pt request.   Lines: PIV to R w/ NS at 50 ml/hr.  Cognitive: Aox4.  Respiratory: LS crackles. WNIG reported.  Cardiac: Denies CP. Tele: SR.  Peripheral neurovascular: Trace edema to BLE. Pt denies numbness, tingling, and tenderness. Pulses 2+.  GI: Last BM . Denies N/V.  : Due to void.   Skin: Bandage on right side from paracentesis. See flowsheet for assessment.   Diet: Regular w/ CHO count.  Activity: A1 Gb and walker.   Labs: K, Mg, and Phos in for AM draw. B, 139.   Plan: Continue orthostatic BP BID. Monitor for dizziness. Continue to promote walking to increase activity tolerance. Discharge TCU vs home. Continue w/ POC.     Goal Outcome Evaluation:      Plan of Care Reviewed With: patient    Overall Patient Progress: improvingOverall Patient Progress: improving    Outcome Evaluation: Aox4. Fall bundle in place. No changes in skin.      Problem: Adult Inpatient Plan of Care  Goal: Plan of Care Review  Description: The Plan of Care Review/Shift note should be completed every shift.  The Outcome Evaluation is a brief statement about your assessment that the patient is improving, declining, or no change.  This information will be displayed automatically on your shiftnote.  Outcome: Progressing  Flowsheets (Taken 2025)  Outcome Evaluation: Aox4. Fall bundle in place. No changes in skin.  Plan of Care Reviewed With: patient  Overall Patient Progress: improving  Goal: Patient-Specific Goal (Individualized)  Description: You can add care plan individualizations " "to a care plan. Examples of Individualization might be:  \"Parent requests to be called daily at 9am for status\", \"I have a hard time hearing out of my right ear\", or \"Do not touch me to wake me up as it startlesme\".  Outcome: Progressing  Goal: Absence of Hospital-Acquired Illness or Injury  Outcome: Progressing  Intervention: Identify and Manage Fall Risk  Recent Flowsheet Documentation  Taken 5/12/2025 2001 by Beatriz Rm RN  Safety Promotion/Fall Prevention: safety round/check completed  Intervention: Prevent Skin Injury  Recent Flowsheet Documentation  Taken 5/12/2025 2001 by Beatriz Rm RN  Body Position: position changed independently  Intervention: Prevent and Manage VTE (Venous Thromboembolism) Risk  Recent Flowsheet Documentation  Taken 5/12/2025 2001 by Beatriz Rm RN  VTE Prevention/Management:   SCDs off (sequential compression devices)   patient refused intervention  Intervention: Prevent Infection  Recent Flowsheet Documentation  Taken 5/12/2025 2001 by Beatriz Rm RN  Infection Prevention:   single patient room provided   rest/sleep promoted   personal protective equipment utilized   hand hygiene promoted   equipment surfaces disinfected   environmental surveillance performed   cohorting utilized  Goal: Optimal Comfort and Wellbeing  Outcome: Progressing  Intervention: Monitor Pain and Promote Comfort  Recent Flowsheet Documentation  Taken 5/12/2025 2001 by Beatriz Rm RN  Pain Management Interventions: MD notified (comment)  Goal: Readiness for Transition of Care  Outcome: Progressing     Problem: Delirium  Goal: Optimal Coping  Outcome: Adequate for Care Transition  Goal: Improved Behavioral Control  Outcome: Adequate for Care Transition  Intervention: Minimize Safety Risk  Recent Flowsheet Documentation  Taken 5/12/2025 2001 by Beatriz Rm RN  Enhanced Safety Measures: review medications for side effects with activity  Goal: Improved Attention and Thought " Clarity  Outcome: Progressing  Goal: Improved Sleep  Outcome: Adequate for Care Transition     Problem: Fall Injury Risk  Goal: Absence of Fall and Fall-Related Injury  Outcome: Progressing  Intervention: Identify and Manage Contributors  Recent Flowsheet Documentation  Taken 5/12/2025 2001 by Beatriz Rm RN  Medication Review/Management: medications reviewed  Intervention: Promote Injury-Free Environment  Recent Flowsheet Documentation  Taken 5/12/2025 2001 by Beatriz Rm, RN  Safety Promotion/Fall Prevention: safety round/check completed     Problem: Skin Injury Risk Increased  Goal: Skin Health and Integrity  Outcome: Adequate for Care Transition  Intervention: Optimize Skin Protection  Recent Flowsheet Documentation  Taken 5/12/2025 2001 by Beatriz Rm, RN  Activity Management:   standing at bedside   back to bed  Head of Bed (HOB) Positioning: HOB at 20-30 degrees

## 2025-05-14 ENCOUNTER — APPOINTMENT (OUTPATIENT)
Dept: GENERAL RADIOLOGY | Facility: CLINIC | Age: 85
DRG: 291 | End: 2025-05-14
Attending: HOSPITALIST
Payer: MEDICARE

## 2025-05-14 ENCOUNTER — APPOINTMENT (OUTPATIENT)
Dept: PHYSICAL THERAPY | Facility: CLINIC | Age: 85
DRG: 291 | End: 2025-05-14
Payer: MEDICARE

## 2025-05-14 LAB
GLUCOSE BLDC GLUCOMTR-MCNC: 102 MG/DL (ref 70–99)
GLUCOSE BLDC GLUCOMTR-MCNC: 116 MG/DL (ref 70–99)
GLUCOSE BLDC GLUCOMTR-MCNC: 119 MG/DL (ref 70–99)
GLUCOSE BLDC GLUCOMTR-MCNC: 67 MG/DL (ref 70–99)
GLUCOSE BLDC GLUCOMTR-MCNC: 83 MG/DL (ref 70–99)
GLUCOSE BLDC GLUCOMTR-MCNC: 89 MG/DL (ref 70–99)
GLUCOSE BLDC GLUCOMTR-MCNC: 94 MG/DL (ref 70–99)
GLUCOSE BLDC GLUCOMTR-MCNC: 96 MG/DL (ref 70–99)
HOLD SPECIMEN: NORMAL
MAGNESIUM SERPL-MCNC: 1.9 MG/DL (ref 1.7–2.3)
PATH REPORT.COMMENTS IMP SPEC: NORMAL
PATH REPORT.FINAL DX SPEC: NORMAL
PATH REPORT.GROSS SPEC: NORMAL
PATH REPORT.MICROSCOPIC SPEC OTHER STN: NORMAL
PHOSPHATE SERPL-MCNC: 3.5 MG/DL (ref 2.5–4.5)
POTASSIUM SERPL-SCNC: 4.6 MMOL/L (ref 3.4–5.3)
RADIOLOGIST FLAGS: ABNORMAL

## 2025-05-14 PROCEDURE — 84132 ASSAY OF SERUM POTASSIUM: CPT | Performed by: HOSPITALIST

## 2025-05-14 PROCEDURE — 88342 IMHCHEM/IMCYTCHM 1ST ANTB: CPT | Mod: 26 | Performed by: PATHOLOGY

## 2025-05-14 PROCEDURE — 99207 PR NO BILLABLE SERVICE THIS VISIT: CPT | Performed by: HOSPITALIST

## 2025-05-14 PROCEDURE — 36415 COLL VENOUS BLD VENIPUNCTURE: CPT | Performed by: HOSPITALIST

## 2025-05-14 PROCEDURE — 88112 CYTOPATH CELL ENHANCE TECH: CPT | Mod: 26 | Performed by: PATHOLOGY

## 2025-05-14 PROCEDURE — 83735 ASSAY OF MAGNESIUM: CPT | Performed by: HOSPITALIST

## 2025-05-14 PROCEDURE — 999N000127 HC STATISTIC PERIPHERAL IV START W US GUIDANCE

## 2025-05-14 PROCEDURE — 71046 X-RAY EXAM CHEST 2 VIEWS: CPT

## 2025-05-14 PROCEDURE — 88305 TISSUE EXAM BY PATHOLOGIST: CPT | Mod: 26 | Performed by: PATHOLOGY

## 2025-05-14 PROCEDURE — 120N000001 HC R&B MED SURG/OB

## 2025-05-14 PROCEDURE — 97530 THERAPEUTIC ACTIVITIES: CPT | Mod: GP

## 2025-05-14 PROCEDURE — 88341 IMHCHEM/IMCYTCHM EA ADD ANTB: CPT | Mod: 26 | Performed by: PATHOLOGY

## 2025-05-14 PROCEDURE — 99232 SBSQ HOSP IP/OBS MODERATE 35: CPT | Performed by: HOSPITALIST

## 2025-05-14 PROCEDURE — 84145 PROCALCITONIN (PCT): CPT | Performed by: HOSPITALIST

## 2025-05-14 PROCEDURE — 250N000013 HC RX MED GY IP 250 OP 250 PS 637: Performed by: INTERNAL MEDICINE

## 2025-05-14 PROCEDURE — 250N000011 HC RX IP 250 OP 636: Performed by: HOSPITALIST

## 2025-05-14 PROCEDURE — 258N000001 HC RX 258: Performed by: INTERNAL MEDICINE

## 2025-05-14 PROCEDURE — 84100 ASSAY OF PHOSPHORUS: CPT | Performed by: HOSPITALIST

## 2025-05-14 PROCEDURE — 258N000003 HC RX IP 258 OP 636: Performed by: HOSPITALIST

## 2025-05-14 PROCEDURE — 250N000013 HC RX MED GY IP 250 OP 250 PS 637: Performed by: HOSPITALIST

## 2025-05-14 RX ORDER — DEXTROSE MONOHYDRATE AND SODIUM CHLORIDE 5; .9 G/100ML; G/100ML
INJECTION, SOLUTION INTRAVENOUS CONTINUOUS
Status: ACTIVE | OUTPATIENT
Start: 2025-05-14 | End: 2025-05-15

## 2025-05-14 RX ORDER — FUROSEMIDE 10 MG/ML
20 INJECTION INTRAMUSCULAR; INTRAVENOUS ONCE
Status: COMPLETED | OUTPATIENT
Start: 2025-05-14 | End: 2025-05-14

## 2025-05-14 RX ORDER — TAMSULOSIN HYDROCHLORIDE 0.4 MG/1
0.4 CAPSULE ORAL DAILY
Status: DISCONTINUED | OUTPATIENT
Start: 2025-05-14 | End: 2025-05-19

## 2025-05-14 RX ADMIN — DEXTROSE AND SODIUM CHLORIDE: 5; 900 INJECTION, SOLUTION INTRAVENOUS at 15:01

## 2025-05-14 RX ADMIN — BUSPIRONE HYDROCHLORIDE 5 MG: 5 TABLET ORAL at 18:11

## 2025-05-14 RX ADMIN — CARBIDOPA AND LEVODOPA 2 TABLET: 25; 100 TABLET ORAL at 18:11

## 2025-05-14 RX ADMIN — VIBEGRON 75 MG: 75 TABLET, FILM COATED ORAL at 21:01

## 2025-05-14 RX ADMIN — Medication 1 SPRAY: at 18:11

## 2025-05-14 RX ADMIN — DEXTROSE MONOHYDRATE 25 ML: 25 INJECTION, SOLUTION INTRAVENOUS at 12:18

## 2025-05-14 RX ADMIN — CARBIDOPA AND LEVODOPA 2 TABLET: 25; 100 TABLET ORAL at 21:01

## 2025-05-14 RX ADMIN — Medication 1 SPRAY: at 21:09

## 2025-05-14 RX ADMIN — GABAPENTIN 600 MG: 300 CAPSULE ORAL at 21:01

## 2025-05-14 RX ADMIN — FUROSEMIDE 20 MG: 10 INJECTION, SOLUTION INTRAMUSCULAR; INTRAVENOUS at 12:04

## 2025-05-14 RX ADMIN — CARBIDOPA AND LEVODOPA 2 TABLET: 25; 100 TABLET ORAL at 12:07

## 2025-05-14 RX ADMIN — TAMSULOSIN HYDROCHLORIDE 0.4 MG: 0.4 CAPSULE ORAL at 12:07

## 2025-05-14 RX ADMIN — BUSPIRONE HYDROCHLORIDE 5 MG: 5 TABLET ORAL at 21:00

## 2025-05-14 ASSESSMENT — ACTIVITIES OF DAILY LIVING (ADL)
ADLS_ACUITY_SCORE: 45
ADLS_ACUITY_SCORE: 45
ADLS_ACUITY_SCORE: 42
ADLS_ACUITY_SCORE: 42
ADLS_ACUITY_SCORE: 45
ADLS_ACUITY_SCORE: 45
ADLS_ACUITY_SCORE: 42
ADLS_ACUITY_SCORE: 43
ADLS_ACUITY_SCORE: 42
ADLS_ACUITY_SCORE: 43
ADLS_ACUITY_SCORE: 45
ADLS_ACUITY_SCORE: 42
ADLS_ACUITY_SCORE: 42
ADLS_ACUITY_SCORE: 45
ADLS_ACUITY_SCORE: 45
ADLS_ACUITY_SCORE: 43
ADLS_ACUITY_SCORE: 45
ADLS_ACUITY_SCORE: 43

## 2025-05-14 NOTE — PROGRESS NOTES
Care Management Follow Up    Length of Stay (days): 3    Expected Discharge Date: 05/15/2025     Concerns to be Addressed: discharge planning     Patient plan of care discussed at interdisciplinary rounds: Yes    Anticipated Discharge Disposition:  [facility]     Anticipated Discharge Services:  none  Anticipated Discharge DME: none     Patient/family educated on Medicare website which has current facility and service quality ratings: yes   Education Provided on the Discharge Plan:  yes, call placed to spouse  Patient/Family in Agreement with the Plan:  yes    Referrals Placed by CM/SW:  TCU    Discussed  Partnership in Safe Discharge Planning  document with patient/family: No     Handoff Completed: No, handoff not indicated or clinically appropriate    Additional Information:  CM following for discharge to Transitional Care Unit. Per MD today, patient will not be ready for discharge to Transitional Care Unit today. Updated [facility] that patient will not be discharging today and they were ok with the plan. CM will update them when patient is medically ready for discharge. Call placed to patient's spouse and updated him that patient would not be discharging today but [facility] will still have a bed when patient is ready to discharge. He was appreciative of the update and is on his way to the hospital.     Next Steps: continue to follow for discharge to [facility].  will transport.             Janeen Murrell RN BSN CM  Inpatient Care Coordination  [facility]  [phone]

## 2025-05-14 NOTE — PLAN OF CARE
"Pt Aox4 intermittent confusion. Pt N/V . PRN given. GB WC. On tele SR. NC 1L O2. Congested cough. BLE edema.  Urinary rentention w/ straight cath. On K, MG protocol. On IV lasix.     Goal Outcome Evaluation:      Plan of Care Reviewed With: patient    Overall Patient Progress: improvingOverall Patient Progress: improving      Problem: Adult Inpatient Plan of Care  Goal: Plan of Care Review  Description: The Plan of Care Review/Shift note should be completed every shift.  The Outcome Evaluation is a brief statement about your assessment that the patient is improving, declining, or no change.  This information will be displayed automatically on your shiftnote.  Outcome: Progressing  Flowsheets (Taken 5/14/2025 1596)  Plan of Care Reviewed With: patient  Overall Patient Progress: improving  Goal: Patient-Specific Goal (Individualized)  Description: You can add care plan individualizations to a care plan. Examples of Individualization might be:  \"Parent requests to be called daily at 9am for status\", \"I have a hard time hearing out of my right ear\", or \"Do not touch me to wake me up as it startlesme\".  Outcome: Progressing  Goal: Absence of Hospital-Acquired Illness or Injury  Outcome: Progressing  Intervention: Identify and Manage Fall Risk  Recent Flowsheet Documentation  Taken 5/13/2025 2125 by Gulshan Walker RN  Safety Promotion/Fall Prevention:   activity supervised   clutter free environment maintained   nonskid shoes/slippers when out of bed   room near nurse's station   room organization consistent   safety round/check completed   supervised activity  Intervention: Prevent Skin Injury  Recent Flowsheet Documentation  Taken 5/13/2025 2125 by Gulshan Walker, RN  Body Position:   position changed independently   weight shifting   legs elevated  Intervention: Prevent and Manage VTE (Venous Thromboembolism) Risk  Recent Flowsheet Documentation  Taken 5/13/2025 2125 by Gulshan Walker RN  VTE Prevention/Management: SCDs " off (sequential compression devices)  Intervention: Prevent Infection  Recent Flowsheet Documentation  Taken 5/13/2025 2125 by Gulshan Walker RN  Infection Prevention:   hand hygiene promoted   single patient room provided   rest/sleep promoted  Goal: Optimal Comfort and Wellbeing  Outcome: Progressing  Intervention: Provide Person-Centered Care  Recent Flowsheet Documentation  Taken 5/13/2025 2125 by Gulshan Walker RN  Trust Relationship/Rapport:   care explained   choices provided   thoughts/feelings acknowledged  Goal: Readiness for Transition of Care  Outcome: Progressing     Problem: Delirium  Goal: Improved Attention and Thought Clarity  Outcome: Progressing     Problem: Fall Injury Risk  Goal: Absence of Fall and Fall-Related Injury  Outcome: Progressing  Intervention: Identify and Manage Contributors  Recent Flowsheet Documentation  Taken 5/13/2025 2125 by Gulshan Walker RN  Medication Review/Management: medications reviewed  Intervention: Promote Injury-Free Environment  Recent Flowsheet Documentation  Taken 5/13/2025 2125 by Gulshan Walker RN  Safety Promotion/Fall Prevention:   activity supervised   clutter free environment maintained   nonskid shoes/slippers when out of bed   room near nurse's station   room organization consistent   safety round/check completed   supervised activity

## 2025-05-14 NOTE — CONSULTS
[facility] - [facility] Consultation     Alma Rosa Fishman  [id number]   [facility] [id number]  84 year old female     Admission Date/Time: 5/11/2025  Primary Care Provider: Joselin Tai     We were asked to see the patient in consultation by Dr. George  for evaluation of new onset of nausea and vomiting.    ASSESSMENT:    Alma Rosa Fishman is a 84 year old female with PMH of Parkinson's Disease who was admitted to [facility] on 5/11/2025 with concerns for large pleural effusion.  She also had more generalzied weakness at home.    Last evening had an episode of nausea and emesis.  Blood sugar was 69 prior to meal.  She was given glucose gel, and anti-emetic.  She remained symptom free overnight, however today continues to have some nausea.    -nausea and vomiting on and off for several months, also intermittent dysphagia symptoms per patient's      -patient was planning on following up with [facility] regarding above and regarding US evidence of liver lesion noted in April, 2025 (patient showed me results on his phone)    Differentials include: esophageal dysphagia 2/2 underlying motility issues from Parkinson's, esophagitis, stricture,  GERD, less likely peptic ulcer, or gastric malignancy.        RECOMMENDATIONS:  -plan for EGD with Dr. Rollins on 5/15 in OR    -Continue PPI    -NPO at midnight, ok for clear liquid diet until then     Case discussed with Dr. Rollins .    60 minutes of total time was spent providing patient care, including patient evaluation, reviewing documentation/test results, and     Rosalee Wellington NP   [facility]  [phone]  ________________________________________________________________________        CC:      HPI:  Alma Rosa Fishman is a 84 year old female with PMH of Parkinson's Disease who was admitted to [facility] on 5/11/2025 with concerns for large pleural effusion.  Had also had weakness at home.    Patient underwent a thoracentesis on 5/12/25 (one  "liter removed).  Fluid analysis suggestive of transudative effusion.  Diuretic titration has been challenging in the setting of falls and weakness at home.      Last evening had an episode of nausea and emesis.  Blood sugar was 69 prior to meal.  She was given glucose gel, and anti-emetic.  She remained symptom free overnight, however today continues to have some nausea.  Last night,  believes that she may have been dizzy at the time of the vomiting, and that it was mostly phlegm production.  Today, however thought there was actual bile in the vomit.     Patient's  also notes that she has had \"on and off\" concerns with swallowing, food getting stuck in her throat.  He also endorses that patient has experienced on and off vomiting over the past several months.     As of note, patient also had plans to follow up at the [facility] regarding recent US demonstrating a possible liver hemangioma.   is interested in further work up related to \"genetics\" and determining if cancerous.  He realizes that there would like be \"nothing they could do for patient.\" We discussed continuing with follow up at [facility], likely CT or MRI.     Plan was discharge to TCU today.     ROS: A comprehensive ten point review of systems was negative aside from those in mentioned in the HPI.      PAST MEDICAL HISTORY:  Patient Active Problem List    Diagnosis Date Noted    History of UTI 05/11/2025     Priority: Medium    Pericardial effusion 02/10/2025     Priority: Medium    Hypertension 02/10/2025     Priority: Medium    Pleural effusion on right 02/10/2025     Priority: Medium    Acute respiratory failure with hypoxia (H) 02/10/2025     Priority: Medium    Fall, initial encounter 02/10/2025     Priority: Medium    Acute congestive heart failure, unspecified heart failure type (H) 02/10/2025     Priority: Medium    Influenza A 12/28/2023     Priority: Medium    Generalized weakness 12/28/2023     Priority: Medium    " Bronchospasm 12/28/2023     Priority: Medium    Fibromyalgia 07/13/2021     Priority: Medium    H/O splenectomy 07/13/2021     Priority: Medium    Esophageal dysmotility 06/23/2021     Priority: Medium    Factor V Leiden 05/28/2021     Priority: Medium    2019 novel coronavirus disease (COVID-19) 04/19/2021     Priority: Medium    Cognitive dysfunction 04/01/2021     Priority: Medium    High risk medication use 05/14/2020     Priority: Medium    Age-related osteoporosis without current pathological fracture 01/16/2020     Priority: Medium    Chronic right shoulder pain 01/14/2020     Priority: Medium    Bilateral sensorineural hearing loss 12/30/2019     Priority: Medium    Osteoarthritis of left shoulder 12/28/2019     Priority: Medium    Chronic left shoulder pain 12/28/2019     Priority: Medium    Complete tear of left rotator cuff 12/28/2019     Priority: Medium    Moderate episode of recurrent major depressive disorder (H) 12/09/2019     Priority: Medium    Chronic post-traumatic stress disorder 12/09/2019     Priority: Medium    Mixed stress and urge urinary incontinence 11/04/2019     Priority: Medium    Chronic pain disorder 10/31/2019     Priority: Medium    Rheumatoid arthritis of multiple sites with negative rheumatoid factor (H) 08/01/2019     Priority: Medium    Diastolic dysfunction 08/23/2014     Priority: Medium     Formatting of this note might be different from the original.  Grade II on TTE 8/2014      Microcytic anemia 08/21/2014     Priority: Medium    Bilateral lower extremity edema 08/21/2014     Priority: Medium    Lichenification and lichen simplex chronicus 04/17/2014     Priority: Medium    History of SCC (squamous cell carcinoma) of skin 04/16/2014     Priority: Medium     Formatting of this note might be different from the original.  IMO Update      Seborrheic dermatitis, unspecified 04/16/2014     Priority: Medium     Formatting of this note might be different from the  original.  Updated per 10/1/17 IMO import      H/O nonmelanoma skin cancer 04/16/2014     Priority: Medium    Elevated parathyroid hormone 04/11/2014     Priority: Medium     Formatting of this note might be different from the original.  Discovered incidentally 2014 at [facility] for angioedema work up      History of gastric bypass 05/09/2013     Priority: Medium    Spinal stenosis of lumbar region 06/19/2012     Priority: Medium     Formatting of this note might be different from the original.  She has limited duration of ambulation      Facet arthritis of lumbar region 06/19/2012     Priority: Medium    Memory loss 11/01/2011     Priority: Medium    CKD (chronic kidney disease) stage 3, GFR 30-59 ml/min (H) 11/01/2011     Priority: Medium    Anxiety and depression 09/02/2010     Priority: Medium    Colon adenomas 08/18/2010     Priority: Medium     Formatting of this note might be different from the original.  Over 5 poylps due every 5 years, last checked 1009  IMO Update 10/11      Asthma 08/18/2010     Priority: Medium     Formatting of this note might be different from the original.  Usually in the fall, no maintenance medications, uses in the fall only      Raynaud's syndrome 02/13/2008     Priority: Medium     Formatting of this note might be different from the original.  Raynaud's Phenomena      Other B-complex deficiencies 06/13/2007     Priority: Medium    Cataplexy and narcolepsy 10/06/2004     Priority: Medium     Formatting of this note might be different from the original.  LW Onset:  [id number]  ; Narcolepsy  NOS      Pernicious anemia 09/23/2004     Priority: Medium    Primary osteoarthritis involving multiple joints 06/07/2003     Priority: Medium     Formatting of this note might be different from the original.  DJD      Morbid obesity (H) 06/07/2003     Priority: Medium    Chronic bilateral low back pain without sciatica 06/07/2003     Priority: Medium     Formatting of this note might be  "different from the original.  Pain Low Back      Diabetes mellitus, type 2 (H) 11/27/2002     Priority: Medium     Problem list name updated by automated process. Provider to review      Esophageal reflux 11/27/2002     Priority: Medium    Essential hypertension, benign 11/27/2002     Priority: Medium    Hyperlipidemia 11/27/2002     Priority: Medium     Problem list name updated by automated process. Provider to review       SOCIAL HISTORY:  Social History     Tobacco Use    Smoking status: Former    Smokeless tobacco: Never   Substance Use Topics    Alcohol use: Yes     Comment: 2 drinks per week    Drug use: No     FAMILY HISTORY:  Family History   Problem Relation Age of Onset    Cardiovascular Father         3 major MI's d: age 72    Cancer Father         blood and bone    Diabetes Father         Type 2    Circulatory Mother         stomach aneurysm; d: age 69    Hypertension Mother     Cancer Maternal Grandmother         bladder    Cancer Paternal Grandmother         stomach     ALLERGIES:   Allergies   Allergen Reactions    Contrast Dye Rash, Anaphylaxis, Hives and Swelling     Tolerated CT chest with contrast 6/2021 after pre-treatment with benadryl and solumedrol  Hives / throat swelling    Doxycycline Anaphylaxis    Acetaminophen      Vicodin/Darvocet/Swelling throat    Albuterol      \"Half my tongue swelled.\"   \"Half my tongue swelled.\"       Atenolol Unknown and Other (See Comments)     PN: LW Reaction: swelling of the tongue  (see [facility] records scanned 3/11/2014)  (see [facility] records scanned 3/11/2014)      Darvocet [Propoxyphene N-Apap]     Fluticasone-Salmeterol      PN: tongue swelling      Hydrocodone-Acetaminophen Swelling     throat      Lisinopril      Tongue Swelling    Metformin Hydrochloride      Glucophage caused severe diarrhea    Morphine      MISAEL    Penicillins      Swelling throat    Percocet [Oxycodone-Acetaminophen]     Neomycin-Bacitracin-Polymyxin Hives and Rash     MEDICATIONS: "   Current Facility-Administered Medications   Medication Dose Route Frequency Provider Last Rate Last Admin    artificial saliva (BIOTENE MT) solution 1 spray  1 spray Mouth/Throat 4x Daily oTney Polo MD   1 spray at 05/13/25 2119    aspirin (ASA) EC tablet 325 mg  325 mg Oral Daily Toney Polo MD   325 mg at 05/13/25 0834    atorvastatin (LIPITOR) tablet 40 mg  40 mg Oral Daily Toney Polo MD   40 mg at 05/13/25 0937    benzocaine-menthol (CHLORASEPTIC) 6-10 MG lozenge 1 lozenge  1 lozenge Buccal Q1H PRN Toney Polo MD        busPIRone (BUSPAR) tablet 5 mg  5 mg Oral TID Toney Polo MD   5 mg at 05/13/25 2107    calcium carbonate (TUMS) chewable tablet 1,000 mg  1,000 mg Oral 4x Daily PRN Toney Polo MD        carbidopa-levodopa (SINEMET)  MG per tablet 2 tablet  2 tablet Oral TID Toney Polo MD   2 tablet at 05/14/25 1207    cetirizine (zyrTEC) tablet 10 mg  10 mg Oral Daily Toney Polo MD   10 mg at 05/13/25 0938    cholecalciferol (VITAMIN D3) capsule 125 mcg  125 mcg Oral Daily Toney Polo MD   125 mcg at 05/13/25 0938    citalopram (celeXA) tablet 20 mg  20 mg Oral Daily Toney Polo MD   20 mg at 05/13/25 0936    glucose gel 15-30 g  15-30 g Oral Q15 Min PRN Toney Polo MD   15 g at 05/13/25 1926    Or    dextrose 50 % injection 25-50 mL  25-50 mL Intravenous Q15 Min PRToney Brown MD   25 mL at 05/13/25 2030    Or    glucagon injection 1 mg  1 mg Subcutaneous Q15 Min PRN Toney Polo MD        diclofenac (VOLTAREN) 1 % topical gel 4 g  4 g Topical 4x Daily PRN Toney Polo MD   4 g at 05/13/25 1403    [Held by provider] empagliflozin (JARDIANCE) tablet 10 mg  10 mg Oral QAM AC Toney Polo MD   10 mg at 05/13/25 0938    folic acid (FOLVITE) tablet 1 mg  1 mg Oral 3 times per day on Sunday Monday Tuesday Thursday Friday Saturday Toney Polo MD   1 mg at 05/13/25 1640    furosemide (LASIX) tablet 20 mg  20 mg Oral Daily Rodo George MD    20 mg at 05/13/25 0937    gabapentin (NEURONTIN) capsule 600 mg  600 mg Oral At Bedtime Toney Polo MD   600 mg at 05/13/25 2107    ibuprofen (ADVIL/MOTRIN) tablet 600 mg  600 mg Oral Q8H PRN Toney Polo MD   600 mg at 05/13/25 0936    insulin aspart (NovoLOG) injection (RAPID ACTING)  1-7 Units Subcutaneous TID AC Toney Polo MD   1 Units at 05/12/25 1824    insulin aspart (NovoLOG) injection (RAPID ACTING)  1-5 Units Subcutaneous At Bedtime Toney Polo MD        lidocaine (LMX4) cream   Topical Q1H PRN Toney Polo MD        lidocaine 1 % 0.1-1 mL  0.1-1 mL Other Q1H PRN Toney Polo MD        multivitamin w/minerals (THERA-VIT-M) tablet 1 tablet  1 tablet Oral Daily Rodo George MD   1 tablet at 05/13/25 1355    ondansetron (ZOFRAN ODT) ODT tab 4 mg  4 mg Oral Q6H PRN Toney Polo MD        Or    ondansetron (ZOFRAN) injection 4 mg  4 mg Intravenous Q6H PRToney Brown MD   4 mg at 05/13/25 2125    pantoprazole (PROTONIX) EC tablet 20 mg  20 mg Oral Daily Toney Polo MD   20 mg at 05/13/25 0936    prochlorperazine (COMPAZINE) injection 5 mg  5 mg Intravenous Q6H PRToney Brown MD   5 mg at 05/13/25 1801    Or    prochlorperazine (COMPAZINE) tablet 5 mg  5 mg Oral Q6H PRToney Borwn MD        senna-docusate (SENOKOT-S/PERICOLACE) 8.6-50 MG per tablet 1 tablet  1 tablet Oral BID Toney Obregon MD        Or    senna-docusate (SENOKOT-S/PERICOLACE) 8.6-50 MG per tablet 2 tablet  2 tablet Oral BID Toney Obregon MD        sodium chloride (PF) 0.9% PF flush 3 mL  3 mL Intracatheter Q8H [facility] Toney Polo MD   3 mL at 05/13/25 0536    sodium chloride (PF) 0.9% PF flush 3 mL  3 mL Intracatheter q1 min prn Toney Polo MD        sodium chloride 0.9 % infusion   Intravenous Continuous Toney Polo MD 50 mL/hr at 05/13/25 2222 Rate Verify at 05/13/25 2222    spironolactone (ALDACTONE) tablet 25 mg  25 mg Oral Daily Toney Polo MD   25 mg at 05/13/25 0979  "   tamsulosin (FLOMAX) capsule 0.4 mg  0.4 mg Oral Daily Rodo George MD   0.4 mg at 05/14/25 1207    vibegron (GEMTESA) tablet 75 mg  75 mg Oral At Bedtime Toney Polo MD   75 mg at 05/13/25 2228       PHYSICAL EXAM:   /64 (BP Location: Left arm, Patient Position: Semi-Zendejas's, Cuff Size: Adult Regular)   Pulse 84   Temp 98.6  F (37  C) (Oral)   Resp 18   Ht 1.575 m (5' 2\")   Wt 90.4 kg (199 lb 4.7 oz)   SpO2 97%   BMI 36.45 kg/m     GEN: Alert, oriented x3, communicative and in NAD.d.  HRT: RRR  LUNGS: CTA  ABD: , ND, +BS, no guarding or pain to palpation, no rebound, no HSM.  SKIN: No rash, jaundice or spider angiomata  NEURO: CN grossly intact     ADDITIONAL DATA:   I reviewed the patient's new clinical lab test results.   Recent Labs   Lab Test 05/12/25  0700 05/11/25  1628 05/11/25  1132 04/04/25  1245   WBC 9.1  --  10.8 8.5   HGB 10.0*  --  10.7* 12.5   *  --  108* 107*     --  207 288   INR  --  0.97  --   --      Recent Labs   Lab Test 05/14/25  0812 05/13/25  0618 05/12/25  0700 05/11/25  1132 04/04/25  1245   POTASSIUM 4.6 4.2 5.2 3.9 3.8   CHLORIDE  --   --  107 105 103   CO2  --   --  22 24 30*   BUN  --   --  14.7 16.0 16.2   CR  --   --  0.85 0.79 0.71   ANIONGAP  --   --  10 10 8     Recent Labs   Lab Test 05/11/25  1527 05/11/25  1132 04/04/25  1422 04/04/25  1245 04/01/25  1554 03/21/24  1120 04/07/23  1446 06/22/21  2210 05/13/21  2027 05/05/21  1855 04/20/21  0005 04/19/21  1420   ALBUMIN  --  2.8*  --  3.1* 3.3*  --   --  3.1*   < > 2.9*  --  3.1*   BILITOTAL  --  0.9  --  0.7 0.9  --   --  0.5   < > 0.7  --  1.0   ALT  --  7  --  10 9  --   --  19   < > 37  --  37   AST  --  25  --  57*  --   --   --  23   < > 23  --  29   PROTEIN Negative  --  Negative  --   --  Negative   < >  --    < >  --    < >  --    LIPASE  --   --   --   --   --   --   --  127  --  279  --  84    < > = values in this interval not displayed.        IMAGING:  I reviewed the patient's " new imaging results.

## 2025-05-14 NOTE — PROGRESS NOTES
[facility]    Medicine Progress Note - Hospitalist Service    Date of Admission:  5/11/2025    Assessment & Plan   Alma Rosa Fishman is a 84-year-old female with a past history of Parkinson's Disease, a recurrent large right pleural effusion and HFpEF presents with syncopal episode, weakness and again another right-sided pleural effusion     Right pleural effusion, recurrent.     Acute hypoxic respiratory failure     She last had this drained in April  and in February.  This is thought to be secondary to her heart failure.  She is proven to have difficulty tolerating diuretics due to or orthostasis and falls.  - On 5/12 she had 1 L removed via ultrasound-guided thoracentesis.  Laboratory studies pending on this fluid analysis but initial cell counts are suggestive of transudative effusion.  - If frequency of recurrent accumulation dictates, she may need scheduled thoracentesis.  - She has been maintained on her PTA spironolactone  - PTA Lasix 20 mg daily resumed 5/13  -Echocardiogram shows a dilated RV but no new change in ejection fraction.  2.  Syncopal episode: Not likely cardiogenic.  Most likely vasovagal or in the context of orthostasis.  She does have a history of Parkinson's disease hence a mechanical fall not excluded.  - PT and OT consult  3. Parkinsons disease: Stable and at her baseline.  - Continue PTA Sinemet  4.  Type 2 diabetes: Controlled.  Her last hemoglobin A1c was 4.8 on 5/11.  Sugars have been stable.  - Continue PTA Jardiance   - On sliding scale  5.  Depression: At baseline.  Continue with PTA BuSpar, Celexa  6.  Malnutrition Diagnosis: Moderate malnutrition in the context of chronic illness   7.  Mild to moderate pHTN  9.  N/V. Complicated h/o gastric stappling, poss liver cirrhosis, Parkinson's and polypharmacy.   [facility] Consulted  10. Small iatrogenic R pneumothorax. Just follow up      Disposition: Will follow physical therapy recommendations. Consider [facility] clinic  "follow-up for better volume mgt        Diet: Cardiac diet   DVT Prophylaxis: Pneumatic Compression Devices  Quiroz Catheter: Not present  Lines: None     Cardiac Monitoring: ACTIVE order. Indication: Acute decompensated heart failure (48 hours)  Code Status: Full Code      Clinically Significant Risk Factors               # Hypoalbuminemia: Lowest albumin = 2.8 g/dL at 5/11/2025 11:32 AM, will monitor as appropriate     # Hypertension: Noted on problem list    # Acute heart failure with preserved ejection fraction: heart failure noted on problem list, last echo with EF >50%, and receiving IV diuretics         # Severe Obesity: Estimated body mass index is 36.45 kg/m  as calculated from the following:    Height as of this encounter: 1.575 m (5' 2\").    Weight as of this encounter: 90.4 kg (199 lb 4.7 oz). with complications, PRESENT ON ADMISSION  # Moderate Malnutrition: based on nutrition assessment and treatment provided per dietitian's recommendations., PRESENT ON ADMISSION   # Asthma: noted on problem list        Social Drivers of Health    Physical Activity: Inactive (5/22/2024)    Received from [facility]    Exercise Vital Sign     Days of Exercise per Week: 0 days     Minutes of Exercise per Session: 0 min   Stress: Stress Concern Present (3/1/2023)    Received from [facility]    [employer] [place] [employer] - Occupational Stress Questionnaire     Feeling of Stress : To some extent   Social Connections: Moderately Isolated (3/1/2023)    Received from [facility]    Social Connection and Isolation Panel [NHANES]     Frequency of Communication with Friends and Family: More than three times a week     Frequency of Social Gatherings with Friends and Family: Patient declined     Attends [employer] Services: Never     Active Member of Clubs or Organizations: No     Attends Club or Organization Meetings: Never     Marital Status:           Disposition Plan     Medically Ready for Discharge: " Anticipated Tomorrow    Rodo George MD  Hospitalist Service  [facility]  Securely message with Milk (more info)  Text page via [email or web address] Paging/Directory   ______________________________________________________________________    Interval History   She is not well with N/V, no chest pain or shortness of breath.    Physical Exam   Vital Signs: Temp: 98.6  F (37  C) Temp src: Oral BP: 125/64 Pulse: 84   Resp: 18 SpO2: 97 % O2 Device: Nasal cannula Oxygen Delivery: 2 LPM  Weight: 199 lbs 4.73 oz    GEN:  Alert, oriented x 3, appears comfortable, NAD.  HEENT:  Normocephalic/atraumatic, no scleral icterus, no nasal discharge, mouth moist.  CV:  Regular rate and rhythm, no murmur or JVD.  S1 + S2 noted, no S3 or S4.  LUNGS: Coarse to auscultation bilaterally with bibasilar crackles.  Symmetric chest rise on inhalation noted.  ABD:  Active bowel sounds, soft, non-tender/non-distended.  No rebound/guarding/rigidity.  EXT: Bilateral LE edema, 1+.  No cyanosis.  No joint synovitis noted.  SKIN:  Dry to touch, no exanthems noted in the visualized areas.         Medical Decision Making       49 MINUTES SPENT BY ME on the date of service doing chart review, history, exam, documentation & further activities per the note.      Data     I have personally reviewed the following data over the past 24 hrs:    N/A  \   N/A   / N/A     N/A N/A N/A /  102 (H)   4.6 N/A N/A \       Imaging results reviewed over the past 24 hrs:   Recent Results (from the past 24 hours)   XR Chest 2 Views   Result Value    Radiologist flags Small right apical pneumothorax (AA)    Narrative    EXAM: XR CHEST 2 VIEWS  LOCATION: [facility]  DATE: 5/14/2025    INDICATION: Dyspnea. Right pleural effusion.  COMPARISON: 5/11/2025.      Impression    IMPRESSION: Small right pleural effusion has decreased in size, consistent with interval thoracentesis. There is a small right pneumothorax. Airspace opacity in the  right perihilar and lower lung regions has increased in prominence, and may be related to   atelectasis, infection, or edema. The left lung is clear. Right shoulder arthroplasty. There are multiple healing left rib fractures.    [Critical Result: Small right apical pneumothorax]    Finding was identified on 5/14/2025 9:10 AM CDT.     Dr. George was contacted by me on 5/14/2025 9:10 AM CDT and verbalized understanding of the critical result.

## 2025-05-14 NOTE — CONSULTS
[facility]    Received C[facility] Clinic Consult from Dr George on 5/14/25.    Reviewed Alma Rosa's chart and note they are admitted on 5/11/25 for CHF exacerbation and pleural effusion. Echocardiogram on 5/12/25 shows LVEF 60-65%.  This is their 2nd admission(s) in the past year for concerns of heart failure.    Given above information, Alma Rosa meets criteria for general cardiology as patients EF is >40%.     Alma Rosa follows cardiology at [facility].   Noted in chart that she has cardiology follow-up at [place] on 6/3/25. Recommend she keep appt.    Bedside RN to provide CHF education and folder    CM/HF education topics:   Low sodium  Fluid Restriction  Daily weights  Symptoms of HF to be reported to [facility] Cardiology team    Education materials provided:    Low sodium food and drink handout  Low sodium food product examples  Already prepared low salt meal delivery services  HF stoplight tool  Guide to HF booklet        Future Appointments   Date Time Provider Department Center   5/15/2025  9:00 AM Eryn Concepcion OTR [facility]         Please call with any further questions.    Noreen Paz, AKIN RN  [facility]- [place], [place]  [facility] Clinic Care Coordinator  [phone]

## 2025-05-15 ENCOUNTER — ANESTHESIA EVENT (OUTPATIENT)
Dept: SURGERY | Facility: CLINIC | Age: 85
End: 2025-05-15
Payer: MEDICARE

## 2025-05-15 ENCOUNTER — ANESTHESIA (OUTPATIENT)
Dept: SURGERY | Facility: CLINIC | Age: 85
End: 2025-05-15
Payer: MEDICARE

## 2025-05-15 ENCOUNTER — APPOINTMENT (OUTPATIENT)
Dept: GENERAL RADIOLOGY | Facility: CLINIC | Age: 85
DRG: 291 | End: 2025-05-15
Attending: STUDENT IN AN ORGANIZED HEALTH CARE EDUCATION/TRAINING PROGRAM
Payer: MEDICARE

## 2025-05-15 VITALS
BODY MASS INDEX: 34.44 KG/M2 | OXYGEN SATURATION: 94 % | TEMPERATURE: 98.3 F | WEIGHT: 187.17 LBS | SYSTOLIC BLOOD PRESSURE: 136 MMHG | HEART RATE: 89 BPM | HEIGHT: 62 IN | DIASTOLIC BLOOD PRESSURE: 57 MMHG | RESPIRATION RATE: 18 BRPM

## 2025-05-15 LAB
BACTERIA PLR CULT: NORMAL
BASE EXCESS BLDV CALC-SCNC: 0.8 MMOL/L (ref -3–3)
D DIMER PPP FEU-MCNC: 5.52 UG/ML FEU (ref 0–0.5)
ERYTHROCYTE [DISTWIDTH] IN BLOOD BY AUTOMATED COUNT: 16.1 % (ref 10–15)
GLUCOSE BLDC GLUCOMTR-MCNC: 104 MG/DL (ref 70–99)
GLUCOSE BLDC GLUCOMTR-MCNC: 106 MG/DL (ref 70–99)
GLUCOSE BLDC GLUCOMTR-MCNC: 108 MG/DL (ref 70–99)
GLUCOSE BLDC GLUCOMTR-MCNC: 109 MG/DL (ref 70–99)
GLUCOSE BLDC GLUCOMTR-MCNC: 121 MG/DL (ref 70–99)
GLUCOSE BLDC GLUCOMTR-MCNC: 90 MG/DL (ref 70–99)
GLUCOSE BLDC GLUCOMTR-MCNC: 96 MG/DL (ref 70–99)
GRAM STAIN RESULT: NORMAL
GRAM STAIN RESULT: NORMAL
HCO3 BLDV-SCNC: 27 MMOL/L (ref 21–28)
HCT VFR BLD AUTO: 26.7 % (ref 35–47)
HGB BLD-MCNC: 9 G/DL (ref 11.7–15.7)
MAGNESIUM SERPL-MCNC: 1.9 MG/DL (ref 1.7–2.3)
MCH RBC QN AUTO: 36.6 PG (ref 26.5–33)
MCHC RBC AUTO-ENTMCNC: 33.7 G/DL (ref 31.5–36.5)
MCV RBC AUTO: 109 FL (ref 78–100)
NT-PROBNP SERPL-MCNC: 1937 PG/ML (ref 0–624)
O2/TOTAL GAS SETTING VFR VENT: 2 %
OXYHGB MFR BLDV: 55 % (ref 70–75)
PCO2 BLDV: 45 MM HG (ref 40–50)
PH BLDV: 7.38 [PH] (ref 7.32–7.43)
PHOSPHATE SERPL-MCNC: 3.1 MG/DL (ref 2.5–4.5)
PLATELET # BLD AUTO: 125 10E3/UL (ref 150–450)
PO2 BLDV: 35 MM HG (ref 25–47)
POTASSIUM SERPL-SCNC: 4.5 MMOL/L (ref 3.4–5.3)
PROCALCITONIN SERPL IA-MCNC: 0.1 NG/ML
PROCALCITONIN SERPL IA-MCNC: 0.13 NG/ML
RBC # BLD AUTO: 2.46 10E6/UL (ref 3.8–5.2)
SAO2 % BLDV: 55.9 % (ref 70–75)
WBC # BLD AUTO: 23.8 10E3/UL (ref 4–11)

## 2025-05-15 PROCEDURE — 84100 ASSAY OF PHOSPHORUS: CPT | Performed by: HOSPITALIST

## 2025-05-15 PROCEDURE — 99233 SBSQ HOSP IP/OBS HIGH 50: CPT | Performed by: HOSPITALIST

## 2025-05-15 PROCEDURE — 120N000001 HC R&B MED SURG/OB

## 2025-05-15 PROCEDURE — 83880 ASSAY OF NATRIURETIC PEPTIDE: CPT | Performed by: STUDENT IN AN ORGANIZED HEALTH CARE EDUCATION/TRAINING PROGRAM

## 2025-05-15 PROCEDURE — 250N000013 HC RX MED GY IP 250 OP 250 PS 637: Performed by: HOSPITALIST

## 2025-05-15 PROCEDURE — 85379 FIBRIN DEGRADATION QUANT: CPT | Performed by: STUDENT IN AN ORGANIZED HEALTH CARE EDUCATION/TRAINING PROGRAM

## 2025-05-15 PROCEDURE — 99222 1ST HOSP IP/OBS MODERATE 55: CPT | Performed by: NURSE PRACTITIONER

## 2025-05-15 PROCEDURE — 71045 X-RAY EXAM CHEST 1 VIEW: CPT

## 2025-05-15 PROCEDURE — 250N000011 HC RX IP 250 OP 636: Performed by: INTERNAL MEDICINE

## 2025-05-15 PROCEDURE — 250N000011 HC RX IP 250 OP 636: Mod: JZ | Performed by: STUDENT IN AN ORGANIZED HEALTH CARE EDUCATION/TRAINING PROGRAM

## 2025-05-15 PROCEDURE — 83735 ASSAY OF MAGNESIUM: CPT | Performed by: HOSPITALIST

## 2025-05-15 PROCEDURE — 36415 COLL VENOUS BLD VENIPUNCTURE: CPT | Performed by: HOSPITALIST

## 2025-05-15 PROCEDURE — 250N000013 HC RX MED GY IP 250 OP 250 PS 637: Performed by: INTERNAL MEDICINE

## 2025-05-15 PROCEDURE — 85027 COMPLETE CBC AUTOMATED: CPT | Performed by: STUDENT IN AN ORGANIZED HEALTH CARE EDUCATION/TRAINING PROGRAM

## 2025-05-15 PROCEDURE — 84145 PROCALCITONIN (PCT): CPT | Performed by: STUDENT IN AN ORGANIZED HEALTH CARE EDUCATION/TRAINING PROGRAM

## 2025-05-15 PROCEDURE — 84132 ASSAY OF SERUM POTASSIUM: CPT | Performed by: HOSPITALIST

## 2025-05-15 PROCEDURE — 250N000011 HC RX IP 250 OP 636: Performed by: NURSE PRACTITIONER

## 2025-05-15 PROCEDURE — 82805 BLOOD GASES W/O2 SATURATION: CPT | Performed by: HOSPITALIST

## 2025-05-15 PROCEDURE — 250N000011 HC RX IP 250 OP 636: Performed by: HOSPITALIST

## 2025-05-15 RX ORDER — FUROSEMIDE 10 MG/ML
40 INJECTION INTRAMUSCULAR; INTRAVENOUS ONCE
Status: DISCONTINUED | OUTPATIENT
Start: 2025-05-15 | End: 2025-05-15

## 2025-05-15 RX ORDER — FUROSEMIDE 10 MG/ML
20 INJECTION INTRAMUSCULAR; INTRAVENOUS ONCE
Status: COMPLETED | OUTPATIENT
Start: 2025-05-15 | End: 2025-05-15

## 2025-05-15 RX ORDER — SODIUM CHLORIDE, SODIUM LACTATE, POTASSIUM CHLORIDE, CALCIUM CHLORIDE 600; 310; 30; 20 MG/100ML; MG/100ML; MG/100ML; MG/100ML
INJECTION, SOLUTION INTRAVENOUS CONTINUOUS
Status: CANCELLED | OUTPATIENT
Start: 2025-05-15

## 2025-05-15 RX ORDER — CEFEPIME HYDROCHLORIDE 2 G/1
2 INJECTION, POWDER, FOR SOLUTION INTRAVENOUS EVERY 24 HOURS
Status: DISCONTINUED | OUTPATIENT
Start: 2025-05-15 | End: 2025-05-16

## 2025-05-15 RX ADMIN — SPIRONOLACTONE 25 MG: 25 TABLET, FILM COATED ORAL at 09:36

## 2025-05-15 RX ADMIN — Medication 1 SPRAY: at 22:31

## 2025-05-15 RX ADMIN — BUSPIRONE HYDROCHLORIDE 5 MG: 5 TABLET ORAL at 09:15

## 2025-05-15 RX ADMIN — PANTOPRAZOLE SODIUM 40 MG: 40 INJECTION, POWDER, FOR SOLUTION INTRAVENOUS at 09:24

## 2025-05-15 RX ADMIN — CARBIDOPA AND LEVODOPA 2 TABLET: 25; 100 TABLET ORAL at 17:46

## 2025-05-15 RX ADMIN — CARBIDOPA AND LEVODOPA 2 TABLET: 25; 100 TABLET ORAL at 09:18

## 2025-05-15 RX ADMIN — Medication 1 SPRAY: at 09:36

## 2025-05-15 RX ADMIN — BUSPIRONE HYDROCHLORIDE 5 MG: 5 TABLET ORAL at 22:30

## 2025-05-15 RX ADMIN — VIBEGRON 75 MG: 75 TABLET, FILM COATED ORAL at 22:34

## 2025-05-15 RX ADMIN — FUROSEMIDE 20 MG: 10 INJECTION, SOLUTION INTRAMUSCULAR; INTRAVENOUS at 09:23

## 2025-05-15 RX ADMIN — Medication 1 SPRAY: at 17:47

## 2025-05-15 RX ADMIN — CARBIDOPA AND LEVODOPA 2 TABLET: 25; 100 TABLET ORAL at 22:30

## 2025-05-15 RX ADMIN — Medication 1 SPRAY: at 13:05

## 2025-05-15 RX ADMIN — FOLIC ACID 1 MG: 1 TABLET ORAL at 13:01

## 2025-05-15 RX ADMIN — CEFEPIME 2 G: 2 INJECTION, POWDER, FOR SOLUTION INTRAVENOUS at 06:05

## 2025-05-15 RX ADMIN — Medication 1 TABLET: at 13:01

## 2025-05-15 RX ADMIN — ATORVASTATIN CALCIUM 40 MG: 40 TABLET, FILM COATED ORAL at 09:15

## 2025-05-15 RX ADMIN — FOLIC ACID 1 MG: 1 TABLET ORAL at 09:15

## 2025-05-15 RX ADMIN — GABAPENTIN 600 MG: 300 CAPSULE ORAL at 22:30

## 2025-05-15 RX ADMIN — CETIRIZINE HYDROCHLORIDE 10 MG: 10 TABLET, FILM COATED ORAL at 09:18

## 2025-05-15 RX ADMIN — Medication 125 MCG: at 09:18

## 2025-05-15 RX ADMIN — CITALOPRAM HYDROBROMIDE 20 MG: 20 TABLET ORAL at 09:20

## 2025-05-15 RX ADMIN — FOLIC ACID 1 MG: 1 TABLET ORAL at 17:46

## 2025-05-15 RX ADMIN — ASPIRIN 325 MG: 325 TABLET, COATED ORAL at 09:20

## 2025-05-15 RX ADMIN — FUROSEMIDE 20 MG: 10 INJECTION, SOLUTION INTRAMUSCULAR; INTRAVENOUS at 06:05

## 2025-05-15 RX ADMIN — BUSPIRONE HYDROCHLORIDE 5 MG: 5 TABLET ORAL at 17:47

## 2025-05-15 RX ADMIN — TAMSULOSIN HYDROCHLORIDE 0.4 MG: 0.4 CAPSULE ORAL at 09:20

## 2025-05-15 ASSESSMENT — ACTIVITIES OF DAILY LIVING (ADL)
ADLS_ACUITY_SCORE: 58
ADLS_ACUITY_SCORE: 45
ADLS_ACUITY_SCORE: 58
ADLS_ACUITY_SCORE: 55
ADLS_ACUITY_SCORE: 50
ADLS_ACUITY_SCORE: 45
ADLS_ACUITY_SCORE: 58
ADLS_ACUITY_SCORE: 50
ADLS_ACUITY_SCORE: 45
ADLS_ACUITY_SCORE: 50
ADLS_ACUITY_SCORE: 45
ADLS_ACUITY_SCORE: 55
ADLS_ACUITY_SCORE: 58
ADLS_ACUITY_SCORE: 45
ADLS_ACUITY_SCORE: 58
ADLS_ACUITY_SCORE: 45
ADLS_ACUITY_SCORE: 45
ADLS_ACUITY_SCORE: 58
ADLS_ACUITY_SCORE: 45
ADLS_ACUITY_SCORE: 45

## 2025-05-15 NOTE — PLAN OF CARE
"Temp: 98.2  F (36.8  C) Temp src: Oral BP: 131/43 Pulse: 91   Resp: 17 SpO2: 95 % O2 Device: Nasal cannula Oxygen Delivery: 2 LPM     Alert, intermittent confusion. 1-2A pivot, increased weakness today due to current condition. D5 NS @ 50 ml/hr. More emesis this AM, now improved. GI consult today, EGD tomorrow, NPO midnight. Flomax for urinary retention, able to void some today. Continue plan of care.    Goal Outcome Evaluation:      Plan of Care Reviewed With: patient, spouse, child    Overall Patient Progress: decliningOverall Patient Progress: declining    Outcome Evaluation: Small pneumo R side. Some additional emesis this am with improvement. Tele SR. D5 NS @ 50 ml/hr. EGD tomorrow. Flomax, still with urinary retention, no straight cath today.          Problem: Adult Inpatient Plan of Care  Goal: Plan of Care Review  Description: The Plan of Care Review/Shift note should be completed every shift.  The Outcome Evaluation is a brief statement about your assessment that the patient is improving, declining, or no change.  This information will be displayed automatically on your shiftnote.  Outcome: Not Progressing  Flowsheets (Taken 5/14/2025 1903)  Outcome Evaluation: Small pneumo R side. Some additional emesis this am with improvement. Tele SR. D5 NS @ 50 ml/hr. EGD tomorrow. Flomax, still with urinary retention, no straight cath today.  Plan of Care Reviewed With:   patient   spouse   child  Overall Patient Progress: declining     Problem: Adult Inpatient Plan of Care  Goal: Patient-Specific Goal (Individualized)  Description: You can add care plan individualizations to a care plan. Examples of Individualization might be:  \"Parent requests to be called daily at 9am for status\", \"I have a hard time hearing out of my right ear\", or \"Do not touch me to wake me up as it startlesme\".  Outcome: Progressing  Goal: Absence of Hospital-Acquired Illness or Injury  Outcome: Progressing  Intervention: Identify and Manage " Fall Risk  Recent Flowsheet Documentation  Taken 5/14/2025 0815 by Nlel Stallworth RN  Safety Promotion/Fall Prevention:   activity supervised   clutter free environment maintained   nonskid shoes/slippers when out of bed   safety round/check completed   room organization consistent   supervised activity  Intervention: Prevent Skin Injury  Recent Flowsheet Documentation  Taken 5/14/2025 0815 by Nell Stallworth RN  Body Position: position changed independently  Skin Protection:   adhesive use limited   tubing/devices free from skin contact   transparent dressing maintained  Intervention: Prevent and Manage VTE (Venous Thromboembolism) Risk  Recent Flowsheet Documentation  Taken 5/14/2025 0815 by Nell Stallworth RN  VTE Prevention/Management: SCDs off (sequential compression devices)  Goal: Optimal Comfort and Wellbeing  Outcome: Progressing  Intervention: Provide Person-Centered Care  Recent Flowsheet Documentation  Taken 5/14/2025 0815 by Nell Stallworth RN  Trust Relationship/Rapport:   care explained   choices provided   thoughts/feelings acknowledged  Goal: Readiness for Transition of Care  Outcome: Progressing     Problem: Delirium  Goal: Improved Attention and Thought Clarity  Outcome: Progressing     Problem: Fall Injury Risk  Goal: Absence of Fall and Fall-Related Injury  Outcome: Progressing  Intervention: Identify and Manage Contributors  Recent Flowsheet Documentation  Taken 5/14/2025 0815 by Nell Stallworth RN  Medication Review/Management: medications reviewed  Intervention: Promote Injury-Free Environment  Recent Flowsheet Documentation  Taken 5/14/2025 0815 by Nell Stallworth RN  Safety Promotion/Fall Prevention:   activity supervised   clutter free environment maintained   nonskid shoes/slippers when out of bed   safety round/check completed   room organization consistent   supervised activity

## 2025-05-15 NOTE — PROGRESS NOTES
Care Management Follow Up    Length of Stay (days): 4    Expected Discharge Date: 05/19/2025     Concerns to be Addressed: discharge planning     Patient plan of care discussed at interdisciplinary rounds: Yes    Anticipated Discharge Disposition:  TCU     Additional Information:  Patient will not be medically ready for discharge to TCU today. Update sent to Kelly in admissions at [facility] regarding discharge plan. Will update TCU when we know more about discharge plan.       Next Steps: Coordinate discharge to [facility] and [facility] TCU when medically ready for discharge.       Gayle Salazar RN  Care Coordinator  [facility]  [phone]

## 2025-05-15 NOTE — PROGRESS NOTES
[facility]    Medicine Progress Note - Hospitalist Service    Date of Admission:  5/11/2025    Assessment & Plan   Alma Rosa Fishman is a 84-year-old female with a past history of Parkinson's Disease, a recurrent large right pleural effusion and HFpEF presents with syncopal episode, weakness and again another right-sided pleural effusion     Right pleural effusion, recurrent.      Acute hypoxic respiratory failure    Leukocytosis, infiltrates suspicious for infection     She last had this drained in April  and in February.  This is thought to be secondary to her heart failure.  She is proven to have difficulty tolerating diuretics due to or orthostasis and falls.  - On 5/12 she had 1 L removed via ultrasound-guided thoracentesis.  Laboratory studies pending on this fluid analysis but initial cell counts are suggestive of transudative effusion.  - If frequency of recurrent accumulation dictates, she may need scheduled thoracentesis.  - She has been maintained on her PTA spironolactone  - PTA Lasix 20 mg IV daily   -Echocardiogram shows a dilated RV but no new change in ejection fraction.  2.  Syncopal episode: Not likely cardiogenic.  Most likely vasovagal or in the context of orthostasis.  She does have a history of Parkinson's disease hence a mechanical fall not excluded.  - PT and OT consult  3. Parkinsons disease: Stable and at her baseline.  - Continue PTA Sinemet  4.  Type 2 diabetes: Controlled.  Her last hemoglobin A1c was 4.8 on 5/11.  Sugars have been stable.  - Continue PTA Jardiance   - On sliding scale  5.  Depression: At baseline.  Continue with PTA BuSpar, Celexa  6.  Malnutrition Diagnosis: Moderate malnutrition in the context of chronic illness   7.  Mild to moderate pHTN  9.  N/V. Complicated h/o gastric stappling, poss liver cirrhosis, Parkinson's and polypharmacy.   [facility] Consulted  10. Small iatrogenic R pneumothorax. Just follow up  11. Palliative consult for goal of  "cares.  Appreciate assistance      Disposition:  likely to home.     Diet: Cardiac diet   DVT Prophylaxis: Pneumatic Compression Devices  Quiroz Catheter: Not present  Lines: None     Cardiac Monitoring: ACTIVE order. Indication: Acute decompensated heart failure (48 hours)  Code Status: Full Code      Clinically Significant Risk Factors               # Hypoalbuminemia: Lowest albumin = 2.8 g/dL at 5/11/2025 11:32 AM, will monitor as appropriate   # Thrombocytopenia: Lowest platelets = 125 in last 2 days, will monitor for bleeding   # Hypertension: Noted on problem list    # Acute heart failure with preserved ejection fraction: heart failure noted on problem list, last echo with EF >50%, and receiving IV diuretics           # Obesity: Estimated body mass index is 34.23 kg/m  as calculated from the following:    Height as of this encounter: 1.575 m (5' 2\").    Weight as of this encounter: 84.9 kg (187 lb 2.7 oz)., PRESENT ON ADMISSION  # Moderate Malnutrition: based on nutrition assessment and treatment provided per dietitian's recommendations., PRESENT ON ADMISSION   # Asthma: noted on problem list        Social Drivers of Health    Physical Activity: Inactive (5/22/2024)    Received from [facility]    Exercise Vital Sign     Days of Exercise per Week: 0 days     Minutes of Exercise per Session: 0 min   Stress: Stress Concern Present (3/1/2023)    Received from [facility]    [employer] - Occupational Stress Questionnaire     Feeling of Stress : To some extent   Social Connections: Moderately Isolated (3/1/2023)    Received from [facility]    Social Connection and Isolation Panel [NHANES]     Frequency of Communication with Friends and Family: More than three times a week     Frequency of Social Gatherings with Friends and Family: Patient declined     Attends Restorationism Services: Never     Active Member of Clubs or Organizations: No     Attends Club or Organization Meetings: Never     " Marital Status:           Disposition Plan     Medically Ready for Discharge: Anticipated Tomorrow    Rodo George MD  Hospitalist Service  [facility]  Securely message with [employer] (more info)  Text page via [employer] Paging/Directory   ______________________________________________________________________    Interval History   She had hypoxemia overnight ans was placed on 10 LNC, pneumonia is suspected though no fever and normal PCT. Shortness of breath. Weaned off to 2 - 3 LNC.  EGD re-schedule.    Daughter updated. Family interested to have palliative meeting    Physical Exam   Vital Signs: Temp: 98.6  F (37  C) Temp src: Oral BP: 137/53 Pulse: 94   Resp: 20 SpO2: 94 % O2 Device: Oxymask Oxygen Delivery: 2 LPM  Weight: 187 lbs 2.73 oz    GEN:  Alert, cooperative, appears comfortable, NAD.  HEENT:  Normocephalic/atraumatic, no scleral icterus, no nasal discharge, mouth moist.  CV:  Regular rate and rhythm, no murmur or JVD.  S1 + S2 noted, no S3 or S4.  LUNGS: Coarse to auscultation bilaterally with bibasilar crackles.  Symmetric chest rise on inhalation noted.  ABD:  Active bowel sounds, soft, non-tender/non-distended.  No rebound/guarding/rigidity.  EXT: Bilateral LE edema, 1+.  No cyanosis.  No joint synovitis noted.  SKIN:  Dry to touch, no exanthems noted in the visualized areas.         Medical Decision Making       50 MINUTES SPENT BY ME on the date of service doing chart review, history, exam, documentation & further activities per the note.      Data     I have personally reviewed the following data over the past 24 hrs:    23.8 (H)  \   9.0 (L)   / 125 (L)     N/A N/A N/A /  109 (H)   4.5 N/A N/A \     Trop: N/A BNP: 1,937 (H)     Procal: 0.13 CRP: N/A Lactic Acid: N/A       INR:  N/A PTT:  N/A   D-dimer:  5.52 (H) Fibrinogen:  N/A       Imaging results reviewed over the past 24 hrs:   Recent Results (from the past 24 hours)   XR Chest Port 1 View    Narrative    EXAM: XR  CHEST PORT 1 VIEW  LOCATION: [facility]  DATE: 5/15/2025    INDICATION: increasing o2 requirement  COMPARISON: 05/14/2025      Impression    IMPRESSION: Borderline enlarged cardiac silhouette. Small right apical pneumothorax measuring approximately 8 mm, not significantly changed from prior. Right mid and lower lung pulmonary opacities, likely combination of airspace disease and layering   pleural effusion, slightly improved from prior. Bilateral posterior rib fractures.

## 2025-05-15 NOTE — PLAN OF CARE
"[id number]    Patient is Alert to self, very pleasant. VSS on 2L O2 via oxymask, LS dim with crackles. On tele, SR. Diet advanced to mod carb, EGD postponed until Monday d/t O2 desat overnight. No complaints of N/V/abdominal pain. Maxipime Q24h. ACHS + carb count, takes pills whole in applesauce. Purewick in place, mild BLE. GI and Palliative following - had family care conference - code status changed to DNR/DNI.     Goal Outcome Evaluation:      Plan of Care Reviewed With: patient    Overall Patient Progress: improvingOverall Patient Progress: improving    Outcome Evaluation: VSS on 2L O2 via oxymask, ACHS, repos as tolerated, Tele, palliative following- now DNR/DNI      Problem: Adult Inpatient Plan of Care  Goal: Plan of Care Review  Description: The Plan of Care Review/Shift note should be completed every shift.  The Outcome Evaluation is a brief statement about your assessment that the patient is improving, declining, or no change.  This information will be displayed automatically on your shiftnote.  Outcome: Progressing  Flowsheets (Taken 5/15/2025 1759)  Outcome Evaluation: VSS on 2L O2 via oxymask, ACHS, repos as tolerated, Tele, palliative following- now DNR/DNI  Plan of Care Reviewed With: patient  Overall Patient Progress: improving  Goal: Patient-Specific Goal (Individualized)  Description: You can add care plan individualizations to a care plan. Examples of Individualization might be:  \"Parent requests to be called daily at 9am for status\", \"I have a hard time hearing out of my right ear\", or \"Do not touch me to wake me up as it startlesme\".  Outcome: Progressing  Goal: Absence of Hospital-Acquired Illness or Injury  Outcome: Progressing  Intervention: Identify and Manage Fall Risk  Recent Flowsheet Documentation  Taken 5/15/2025 1745 by Lili Lea RN  Safety Promotion/Fall Prevention:   safety round/check completed   lighting adjusted   patient and family education  Taken 5/15/2025 1600 by " Lili Lea RN  Safety Promotion/Fall Prevention:   safety round/check completed   lighting adjusted   patient and family education  Intervention: Prevent Skin Injury  Recent Flowsheet Documentation  Taken 5/15/2025 1745 by Lili Lea RN  Body Position: turned  Taken 5/15/2025 1600 by Lili Lea RN  Body Position: turned  Goal: Optimal Comfort and Wellbeing  Outcome: Progressing  Intervention: Provide Person-Centered Care  Recent Flowsheet Documentation  Taken 5/15/2025 1600 by Lili Lea RN  Trust Relationship/Rapport: care explained  Goal: Readiness for Transition of Care  Outcome: Progressing     Problem: Delirium  Goal: Improved Attention and Thought Clarity  Outcome: Progressing     Problem: Fall Injury Risk  Goal: Absence of Fall and Fall-Related Injury  Outcome: Progressing  Intervention: Identify and Manage Contributors  Recent Flowsheet Documentation  Taken 5/15/2025 1745 by Lili Lea RN  Medication Review/Management: medications reviewed  Taken 5/15/2025 1600 by Lili Lea RN  Medication Review/Management: medications reviewed  Intervention: Promote Injury-Free Environment  Recent Flowsheet Documentation  Taken 5/15/2025 1745 by Lili Lea RN  Safety Promotion/Fall Prevention:   safety round/check completed   lighting adjusted   patient and family education  Taken 5/15/2025 1600 by Lili Lea RN  Safety Promotion/Fall Prevention:   safety round/check completed   lighting adjusted   patient and family education

## 2025-05-15 NOTE — PLAN OF CARE
"Alert with intermittent confusion. Pt with increase oxygen need overnight 5-10L. On 5L oxymask. Lung sounds: coarse crackles, wheezing. Intermittent nausea, slightly improved. Plan for Egd 5//15. Q4 BG stable. BLE edema +2. Exernal cath in place. Provider notified about pt increase of oxygen and overall status. Repeated chest xray, IV lasix and IV abx. Continue of care.         Goal Outcome Evaluation:      Plan of Care Reviewed With: patient    Overall Patient Progress: decliningOverall Patient Progress: declining    Problem: Adult Inpatient Plan of Care  Goal: Plan of Care Review  Description: The Plan of Care Review/Shift note should be completed every shift.  The Outcome Evaluation is a brief statement about your assessment that the patient is improving, declining, or no change.  This information will be displayed automatically on your shiftnote.  5/15/2025 0747 by Gulshan Walker RN  Outcome: Not Progressing  5/15/2025 0747 by Gulshan Walker RN  Outcome: Not Progressing  Flowsheets (Taken 5/15/2025 0747)  Plan of Care Reviewed With: patient  Overall Patient Progress: declining  5/15/2025 0746 by Gulshan Walker RN  Outcome: Not Progressing  Flowsheets (Taken 5/15/2025 0746)  Overall Patient Progress: declining  Goal: Patient-Specific Goal (Individualized)  Description: You can add care plan individualizations to a care plan. Examples of Individualization might be:  \"Parent requests to be called daily at 9am for status\", \"I have a hard time hearing out of my right ear\", or \"Do not touch me to wake me up as it startlesme\".  5/15/2025 0747 by Gulshan Walker RN  Outcome: Not Progressing  5/15/2025 0747 by Gulshan Walker RN  Outcome: Not Progressing  5/15/2025 0746 by Gulshan Walker RN  Outcome: Not Progressing  Goal: Absence of Hospital-Acquired Illness or Injury  5/15/2025 0747 by Gulshan Walker RN  Outcome: Not Progressing  5/15/2025 0747 by Gulshan Walker RN  Outcome: Not Progressing  5/15/2025 0746 by Gulshan Walker" RN  Outcome: Not Progressing  Intervention: Prevent Skin Injury  Recent Flowsheet Documentation  Taken 5/15/2025 0000 by Gulshan Walker RN  Body Position:   foot of bed elevated   heels elevated   legs elevated   log-rolled  Taken 5/14/2025 2200 by Gulshan Walker RN  Body Position:   right   turned   log-rolled  Taken 5/14/2025 2000 by Gulshan Walker RN  Body Position:   left   turned   log-rolled  Goal: Optimal Comfort and Wellbeing  5/15/2025 0747 by Gulshan Walker RN  Outcome: Not Progressing  5/15/2025 0747 by Gulshan Walker RN  Outcome: Not Progressing  5/15/2025 0746 by Gulshan Walker RN  Outcome: Not Progressing  Goal: Readiness for Transition of Care  5/15/2025 0747 by Gulshan Walker RN  Outcome: Not Progressing  5/15/2025 0747 by Gulshan Walker RN  Outcome: Not Progressing  5/15/2025 0746 by Gulshan Walker RN  Outcome: Not Progressing     Problem: Delirium  Goal: Improved Attention and Thought Clarity  5/15/2025 0747 by Gulshan Walker RN  Outcome: Not Progressing  5/15/2025 0747 by Gulshan Walker RN  Outcome: Not Progressing  5/15/2025 0746 by Gulshan Walker RN  Outcome: Not Progressing     Problem: Fall Injury Risk  Goal: Absence of Fall and Fall-Related Injury  5/15/2025 0747 by Gulshan Walker RN  Outcome: Not Progressing  5/15/2025 0747 by Gulshan Walker RN  Outcome: Not Progressing  5/15/2025 0746 by Gulshan Walker RN  Outcome: Not Progressing

## 2025-05-15 NOTE — PLAN OF CARE
Patient with increased O2 needs, now on 10L sating low 90's.    CXR with small pneumothorax on R although this was on previous CXR and unchanged. Suspect this was a complication of recent thoracentesis. Effusion about the same size.    Cause of acute worsening unclear given pneumothorax and effusion not worse than previous. Will given 20mg IV lasix now, if not better in a few hours would recommend CT chest. Will make day rounder aware.    Luis Fernando Smith MD

## 2025-05-15 NOTE — PROGRESS NOTES
"[facility] Progress Note     IMPRESSION:  Alma Rosa Fishman is a 84 year old female with PMH of Parkinson's Disease who was admitted to [facility] on 2025 with concerns for large pleural effusion.  Had also had weakness at home.   On evening of , had an episode of nausea and emesis.  Blood sugar was 69 prior to meal.  She was given glucose gel, and anti-emetic.  She remained symptom free overnight, however today continued to have nausea and one episode of vomiting throughout the day of .    - noted that patient has had several months of intermittent dysphagia symptoms as well as intermittent nausea    -EGD planned for today, to rule out esophagitis, gastritis, duodenitis, stricture, EOE, H pylori.  However, patient requiring oxygen overnight, up to 10 L with increased work of breathing       RECOMMENDATIONS:  -due to respiratory decompensation overnight, will defer EGD today-at this point in time, we will plan on doing this on  as it is non-urgent-I will update Vadim, patient's      -antibiotics per primary team for potential hospital acquired pneumonia    -I will changed her PPI to IV (currently on 20 mg Protonix oral daily, will adjust to 40 mg IV daily)     20 minutes of total time was spent providing patient care, including patient evaluation, reviewing documentation/test results, and     Rosalee Wellington NP   [facility]  [phone]  ________________________________________________________________________      SUBJECTIVE:    Resting in bed.  Denies abdominal pain or nausea.      OBJECTIVE:  /54 (BP Location: Right arm)   Pulse 94   Temp 98.9  F (37.2  C) (Oral)   Resp 20   Ht 1.575 m (5' 2\")   Wt 84.9 kg (187 lb 2.7 oz)   SpO2 94%   BMI 34.23 kg/m    Temp (24hrs), Av.6  F (37  C), Min:98  F (36.7  C), Max:99.1  F (37.3  C)    Patient Vitals for the past 72 hrs:   Weight   05/15/25 0428 84.9 kg (187 lb 2.7 oz)   25 0700 " 90.4 kg (199 lb 4.7 oz)   05/13/25 0525 82.7 kg (182 lb 4.8 oz)       Intake/Output Summary (Last 24 hours) at 5/15/2025 0820  Last data filed at 5/15/2025 0503  Gross per 24 hour   Intake --   Output 700 ml   Net -700 ml        PHYSICAL EXAM  GEN: Alert, oriented x3, communicative and in NAD.    HRT: RRR  LUNGS: CTA  ABD: ND, +BS, no guarding or pain to palpation, no rebound, no HSM.  SKIN: No rash, jaundice or spider angiomata      LABS:  I have reviewed the patient's new clinical lab results:     Recent Labs   Lab Test 05/15/25  0622 05/12/25  0700 05/11/25  1628 05/11/25  1132   WBC 23.8* 9.1  --  10.8   HGB 9.0* 10.0*  --  10.7*   * 107*  --  108*   * 183  --  207   INR  --   --  0.97  --      Recent Labs   Lab Test 05/15/25  0622 05/14/25  0812 05/13/25  0618 05/12/25  0700 05/11/25  1132 04/04/25  1245   POTASSIUM 4.5 4.6 4.2 5.2 3.9 3.8   CHLORIDE  --   --   --  107 105 103   CO2  --   --   --  22 24 30*   BUN  --   --   --  14.7 16.0 16.2   CR  --   --   --  0.85 0.79 0.71   ANIONGAP  --   --   --  10 10 8     Recent Labs   Lab Test 05/11/25  1527 05/11/25  1132 04/04/25  1422 04/04/25  1245 04/01/25  1554 03/21/24  1120 04/07/23  1446 06/22/21  2210 05/13/21 2027 05/05/21  1855 04/20/21  0005 04/19/21  1420   ALBUMIN  --  2.8*  --  3.1* 3.3*  --   --  3.1*   < > 2.9*  --  3.1*   BILITOTAL  --  0.9  --  0.7 0.9  --   --  0.5   < > 0.7  --  1.0   ALT  --  7  --  10 9  --   --  19   < > 37  --  37   AST  --  25  --  57*  --   --   --  23   < > 23  --  29   PROTEIN Negative  --  Negative  --   --  Negative   < >  --    < >  --    < >  --    LIPASE  --   --   --   --   --   --   --  127  --  279  --  84    < > = values in this interval not displayed.

## 2025-05-15 NOTE — CONSULTS
Palliative Care Consultation Note  [facility]      Patient: Alma Rosa Fishman  Date of Admission:  5/11/2025    Requesting Clinician / Team: Hospital Medicine  Reason for consult: Goals of care  Patient and family support       Recommendations & Counseling     GOALS OF CARE:   Life-prolonging with limits - DNR, Do NOT intubate  Family care conference scheduled 5/16 at 11am  Patient states understanding of her chronic illness trajectories and her functional decline.  States she is ready to move into a nursing facility.  Daughters at the bedside during visit, verbalize understanding of their mother's wishes, request conference with Vadim, patient's  tomorrow as he is sick today and unable to be in the hospital this afternoon.    ADVANCE CARE PLANNING:  Advance Directive has been requested.  There is no POLST form on file, defer to patient and/or next of kin for decisions   Code status: Full Code    MEDICAL MANAGEMENT:   We are not actively managing symptoms at this time.    PSYCHOSOCIAL/SPIRITUAL SUPPORT:  Family - 4 adult daughters: Savi, Rachele, Anna, Nan, spouse Vadim.  [place] community: Taoist   Appreciate spiritual care support    Palliative Care will continue to follow. Thank you for the consult and allowing us to aid in the care of Alma Rosa Fishman.    These recommendations have been discussed with primary team, nursing staff, case management.    PRISCILLA Ballard CNP  [facility], Palliative Care  Securely message with the [employer] Web Console (learn more here) or  Text page via [facility] Paging/Directory         Assessment      Alma Rosa Fishman is a 84 year old female with a past medical history of PD, HFpEF, recurrent right pleural effusions, recently diagnosed HFpEF, CKD3, hypertension, hyperlipidemia, frequent falls, s/p loop recorder ([place]) who presented on 5/11/2025 with generalized weakness and fall. Patient also has recent dx of liver mass of unknown etiology, frequent  falls (monthly x 10 months), ongoing shortness of breath.    Recent hospital presentations in March and April for similar fall events and dyspnea.    Today, the patient was seen for:  Goals of care  Patient and family discussion    History of Present Illness   Met with Alma Rosa and daughters Savi and Ilsa at the bedside.  Savi contacted two sisters via video visit as well.   I introduced our role as an extra layer of support and how we help patients and families dealing with serious, potentially life-limiting illnesses. I explained the composition of the palliative care team.  Palliative care helps patients and families navigate their care while focusing on the whole person; providing emotional, social and spiritual support  Palliative care often assists with symptom management, information sharing about what to expect from the illness, available treatment options and what effect those options may have on the disease course, and provide effective communication and caring support.    Reviewed Alma Rosa's recent hospitalizations and progressive illnesses.  Outlined medical information and their understanding of prognosis and illness trajectory.  Alma Rosa was initially resting during conversation but woke up and was very engaged in the conversation.  She outlined Vadim's care for her at home and their recent search for living facility.  She discussed his physical limitations in caring for her and her desire to have a higher level of assistance and to be geographically closer to her daughters.  Alma Rosa discussed her knowledge of her progressive illness and that she will not be able to return to her previous functional level.  She stated that she is uncertain if Vadim understands that and states that he gets frustrated with her physical limitations.      Prognosis, Goals, & Planning:   Functional Status just prior to this current hospitalization:  Outpatient Palliative Performance Score (PPS) 50%  Extensive disease. Normal or reduced  intake; normal LOC or confusion; little ambulation (mainly sit/lie), ADLs w/much assistance, unable to do any work.      Prognosis, Goals, and/or Advance Care Planning:  We discussed general treatment options (full/restorative, selective/conservatives, and comfort only/hospice). We then discussed how these specifically apply to Alma Rosa.  Based on this discussion, Alma Rosa has decided to update code status to DNR, Do NOT intubate.  Plan set to explore further goals and ACP on 5/16 at 11am    Code Status was addressed today:   Yes, We discussed potential risks and rationale of attempting cardiac resuscitation, intubation, and mechanical ventilation.  We also discussed probability of survival as well as quality of life implications.  Based on this discussion, patient or surrogate response/decision: DNR/DNI      Patient's decision making preferences: shared with support from loved ones        Patient has decision-making capacity today for complex decisions: Intact - patient forgetful at times, but demonstrates knowledge of life-limiting illness and repercussions of hospitalizations, medical interventions and associated impact on quality of life.          Coping, Meaning, & Spirituality:   Mood, coping, and/or meaning in the context of serious illness were addressed today: Yes    Social:   Living situation: lives with significant other/spouse  Important relationships/caregivers: Spouse Vadim, daughters (4), grandchildren  Contributing stressors (financial, substance abuse, relationship concerns, etc.)  Patient states her  has financial stresses that drive his decision making for his needs and for her medical care needs.    - Patient and family also state stress of her increasing medical needs and her 's capacity to meet her physical needs.  Note his stress and difficulty with accepting assistance from daughters or seeking additional medical resources.    Medications:  Reviewed this patient's medication profile  and medications from this hospitalization.   [place] Board of Pharmacy Data Base Reviewed: Yes:   reviewed - no record of controlled substances prescribed..    ROS:  Comprehensive ROS is reviewed and is negative except as here & per HPI:     Physical Exam   Vital Signs with Ranges  Temp:  [98  F (36.7  C)-99.1  F (37.3  C)] 98.6  F (37  C)  Pulse:  [91-94] 94  Resp:  [14-20] 20  BP: (131-158)/(39-65) 137/53  SpO2:  [88 %-97 %] 94 %  Wt Readings from Last 10 Encounters:   05/15/25 84.9 kg (187 lb 2.7 oz)   04/04/25 83 kg (183 lb)   04/01/25 83 kg (183 lb)   02/13/25 75.2 kg (165 lb 12.6 oz)   09/07/24 81.6 kg (180 lb)   12/28/23 92.2 kg (203 lb 4.2 oz)   07/13/21 90.3 kg (199 lb)   07/01/21 88.4 kg (194 lb 14.4 oz)   06/23/21 87.7 kg (193 lb 4.8 oz)   05/21/21 98.3 kg (216 lb 12.8 oz)     187 lbs 2.73 oz    PHYSICAL EXAM:  Constitutional: alert, no distress, and underweight   Cardiovascular: RRR  Respiratory: rhonchi bibasilar, oxygen mask in place  Psychiatric: mentation appears normal and affect normal/bright  Abdomen: Abdomen soft, non-tender. BS normal.   NEURO: alert and oriented, forgetful to some assessment questions. No focal neurological deficits, limited movement due to edema.    Data reviewed:  Results for orders placed or performed during the hospital encounter of 05/11/25 (from the past 24 hours)   Glucose by meter   Result Value Ref Range    GLUCOSE BY METER POCT 94 70 - 99 mg/dL   Glucose by meter   Result Value Ref Range    GLUCOSE BY METER POCT 116 (H) 70 - 99 mg/dL   Glucose by meter   Result Value Ref Range    GLUCOSE BY METER POCT 121 (H) 70 - 99 mg/dL   XR Chest Port 1 View    Narrative    EXAM: XR CHEST PORT 1 VIEW  LOCATION: [facility]  DATE: 5/15/2025    INDICATION: increasing o2 requirement  COMPARISON: 05/14/2025      Impression    IMPRESSION: Borderline enlarged cardiac silhouette. Small right apical pneumothorax measuring approximately 8 mm, not significantly  changed from prior. Right mid and lower lung pulmonary opacities, likely combination of airspace disease and layering   pleural effusion, slightly improved from prior. Bilateral posterior rib fractures.   Glucose by meter   Result Value Ref Range    GLUCOSE BY METER POCT 108 (H) 70 - 99 mg/dL   Potassium   Result Value Ref Range    Potassium 4.5 3.4 - 5.3 mmol/L   Magnesium   Result Value Ref Range    Magnesium 1.9 1.7 - 2.3 mg/dL   Phosphorus   Result Value Ref Range    Phosphorus 3.1 2.5 - 4.5 mg/dL   NT-proBNP   Result Value Ref Range    NT-proBNP 1,937 (H) 0 - 624 pg/mL   CBC with platelets   Result Value Ref Range    WBC Count 23.8 (H) 4.0 - 11.0 10e3/uL    RBC Count 2.46 (L) 3.80 - 5.20 10e6/uL    Hemoglobin 9.0 (L) 11.7 - 15.7 g/dL    Hematocrit 26.7 (L) 35.0 - 47.0 %     (H) 78 - 100 fL    MCH 36.6 (H) 26.5 - 33.0 pg    MCHC 33.7 31.5 - 36.5 g/dL    RDW 16.1 (H) 10.0 - 15.0 %    Platelet Count 125 (L) 150 - 450 10e3/uL   Procalcitonin   Result Value Ref Range    Procalcitonin 0.13 <0.50 ng/mL   D dimer quantitative   Result Value Ref Range    D-Dimer Quantitative 5.52 (H) 0.00 - 0.50 ug/mL FEU    Narrative    This D-dimer assay is intended for use in conjunction with a clinical pretest probability assessment model to exclude pulmonary embolism (PE) and deep venous thrombosis (DVT) in outpatients suspected of PE or DVT. The cut-off value is 0.50 ug/mL FEU.    For patients 50 years of age or older, the application of age-adjusted cut-off values for D-Dimer may increase the specificity without significant effect on sensitivity. The literature suggested calculation age adjusted cut-off in ug/L = age in years x 10 ug/L. The results in this laboratory are reported as ug/mL rather than ug/L. The calculation for age adjusted cut off in ug/mL= age in years x 0.01 ug/mL. For example, the cut off for a 76 year old male is 76 x 0.01 ug/mL = 0.76 ug/mL (760 ug/L).    M Radha et al. Age adjusted D-dimer cut-off  levels to rule out pulmonary embolism: The ADJUST-PE Study. EDOUARD 2014;311:8511-4626.; HJ Ze et al. Diagnostic accuracy of conventional or age adjusted D-dimer cutoff values in older patients with suspected venous thromboembolism. Systemic review and meta-analysis. BMJ 2013:346:f2492.   Blood gas venous   Result Value Ref Range    pH Venous 7.38 7.32 - 7.43    pCO2 Venous 45 40 - 50 mm Hg    pO2 Venous 35 25 - 47 mm Hg    Bicarbonate Venous 27 21 - 28 mmol/L    Base Excess/Deficit Venous 0.8 -3.0 - 3.0 mmol/L    FIO2 2     Oxyhemoglobin Venous 55 (L) 70 - 75 %    O2 Sat, Venous 55.9 (L) 70.0 - 75.0 %    Narrative    In healthy individuals, oxyhemoglobin (O2Hb) and oxygen saturation (SO2) are approximately equal. In the presence of dyshemoglobins, oxyhemoglobin can be considerably lower than oxygen saturation.   Glucose by meter   Result Value Ref Range    GLUCOSE BY METER POCT 104 (H) 70 - 99 mg/dL   Glucose by meter   Result Value Ref Range    GLUCOSE BY METER POCT 109 (H) 70 - 99 mg/dL       Medical Decision Making       Please see A&P for additional details of medical decision making.  MANAGEMENT DISCUSSED with the following over the past 24 hours: hospital medicine, nursing staff   NOTE(S)/MEDICAL RECORDS REVIEWED over the past 24 hours: Outside medical records, current H&P, consultant notes, nursing notes  Tests REVIEWED in the past 24 hours:  - See lab/imaging results included in the data section of the note  SUPPLEMENTAL HISTORY, in addition to the patient's history, over the past 24 hours obtained from:   - adult children  Medical complexity over the past 24 hours:  - Decision to DE-ESCALATE CARE based on prognosis  - DNR/DNI code status

## 2025-05-15 NOTE — PROGRESS NOTES
Patient with increasing oxygen requirement.  He was on 1 to 2 L nasal cannula but now requiring 10 L oxy mask.  Patient was seen and examined at bedside.  She appears comfortable on oxy mask and did not noticed to have increased work of breathing.  She was confused and disoriented x 3.  She was able to follow commands.  She was noticed to have coughing, unclear if this is new or old.  Chest x-ray obtained which revealed a stable right apical pneumothorax, right mid and lower lung pulmonary opacities likely combination of airspace disease and layering pleural effusion, bilateral posterior rib fractures.    Patient admitted to the hospital with acute hypoxic respiratory failure in the setting of recurrent right pleural effusion is status post thoracentesis on 5/12.  Upon admission CT scan was obtained which did not reveal any airspace disease however noted to have developed rib fractures, appears to be healing.     -Check CBC, Pro-Earnest, D-dimer, and NT proBNP  - Stop IV fluids  - Serial chest x-rays given right apical pneumothorax  - Will start patient on IV cefepime given increasing oxygen requirements and new right mid and lower lung pulmonary opacities which could be infectious, will cover for hospital-acquired pneumonia.  - Consider giving additional IV Lasix, pending NT proBNP results

## 2025-05-15 NOTE — PLAN OF CARE
"Goal Outcome Evaluation:      Plan of Care Reviewed With: patient    Overall Patient Progress: improvingOverall Patient Progress: improving    Outcome Evaluation: A&O to self only. VSS. O2 Sat 94% on oxygen, 3 LPM via oxymask. WING. Denies pain or nausia. C/o dizziness with activity. BLE edema present. Tele SR. IV Lasix given.      Problem: Adult Inpatient Plan of Care  Goal: Plan of Care Review  Description: The Plan of Care Review/Shift note should be completed every shift.  The Outcome Evaluation is a brief statement about your assessment that the patient is improving, declining, or no change.  This information will be displayed automatically on your shiftnote.  Outcome: Progressing  Flowsheets (Taken 5/15/2025 1540)  Outcome Evaluation: A&O to self only. VSS. O2 Sat 94% on oxygen, 3 LPM via oxymask. WING. Denies pain or nausia. C/o dizziness with activity. BLE edema present. Tele SR. IV Lasix given.  Plan of Care Reviewed With: patient  Overall Patient Progress: improving  Goal: Patient-Specific Goal (Individualized)  Description: You can add care plan individualizations to a care plan. Examples of Individualization might be:  \"Parent requests to be called daily at 9am for status\", \"I have a hard time hearing out of my right ear\", or \"Do not touch me to wake me up as it startlesme\".  Outcome: Progressing  Goal: Absence of Hospital-Acquired Illness or Injury  Outcome: Progressing  Intervention: Identify and Manage Fall Risk  Recent Flowsheet Documentation  Taken 5/15/2025 0900 by Ashwini Calderon RN  Safety Promotion/Fall Prevention:   activity supervised   safety round/check completed  Intervention: Prevent Skin Injury  Recent Flowsheet Documentation  Taken 5/15/2025 0900 by Ashwini Calderon, RN  Body Position:   supine, head elevated   supine, legs elevated  Intervention: Prevent Infection  Recent Flowsheet Documentation  Taken 5/15/2025 0900 by Ashwini Calderon, RN  Infection Prevention:   hand hygiene promoted   " single patient room provided  Goal: Optimal Comfort and Wellbeing  Outcome: Progressing  Intervention: Provide Person-Centered Care  Recent Flowsheet Documentation  Taken 5/15/2025 0900 by Ashwini Calderon, RN  Trust Relationship/Rapport: care explained  Goal: Readiness for Transition of Care  Outcome: Progressing

## 2025-05-15 NOTE — PROGRESS NOTES
SPIRITUAL HEALTH SERVICES Progress Note  MS 3    Called by unit staff to support patient's 14 year-old granddaughter, Paloma.    Paloma and her mother drove to  from [place], [place] near [place], to visit patient.    Paloma became overwhelmed during a family care conference in patient's room; and made her way to the family lounge to cry.    She is very close to patient.  This is the first time anyone in her life has become seriously ill.    I helped Paloma talk through her feelings and answered a few questions.    Paloma and her grandma are both Sikh. This is a comfort to Paloma.    She regained her composure and decided to watch a school awards ceremony online; as she needed to miss it to be at the hospital.    No additional needs.  [facility] remains available for further support upon request.    Rev. Susie Rowley M.Div.  Staff   Phone  [phone]

## 2025-05-16 LAB
ANION GAP SERPL CALCULATED.3IONS-SCNC: 8 MMOL/L (ref 7–15)
BASE EXCESS BLDV CALC-SCNC: 2.3 MMOL/L (ref -3–3)
BASOPHILS # BLD AUTO: 0 10E3/UL (ref 0–0.2)
BASOPHILS NFR BLD AUTO: 0 %
BUN SERPL-MCNC: 12.8 MG/DL (ref 8–23)
CALCIUM SERPL-MCNC: 7.4 MG/DL (ref 8.8–10.4)
CHLORIDE SERPL-SCNC: 110 MMOL/L (ref 98–107)
CREAT SERPL-MCNC: 0.7 MG/DL (ref 0.51–0.95)
D DIMER PPP FEU-MCNC: 4.68 UG/ML FEU (ref 0–0.5)
EGFRCR SERPLBLD CKD-EPI 2021: 86 ML/MIN/1.73M2
EOSINOPHIL # BLD AUTO: 0.5 10E3/UL (ref 0–0.7)
EOSINOPHIL NFR BLD AUTO: 3 %
ERYTHROCYTE [DISTWIDTH] IN BLOOD BY AUTOMATED COUNT: 16.5 % (ref 10–15)
GLUCOSE BLDC GLUCOMTR-MCNC: 102 MG/DL (ref 70–99)
GLUCOSE BLDC GLUCOMTR-MCNC: 119 MG/DL (ref 70–99)
GLUCOSE BLDC GLUCOMTR-MCNC: 135 MG/DL (ref 70–99)
GLUCOSE BLDC GLUCOMTR-MCNC: 85 MG/DL (ref 70–99)
GLUCOSE BLDC GLUCOMTR-MCNC: 96 MG/DL (ref 70–99)
GLUCOSE SERPL-MCNC: 86 MG/DL (ref 70–99)
HCO3 BLDV-SCNC: 28 MMOL/L (ref 21–28)
HCO3 SERPL-SCNC: 24 MMOL/L (ref 22–29)
HCT VFR BLD AUTO: 22.8 % (ref 35–47)
HGB BLD-MCNC: 7.9 G/DL (ref 11.7–15.7)
HOLD SPECIMEN: NORMAL
IMM GRANULOCYTES # BLD: 0.1 10E3/UL
IMM GRANULOCYTES NFR BLD: 1 %
LACTATE SERPL-SCNC: 0.7 MMOL/L (ref 0.7–2)
LYMPHOCYTES # BLD AUTO: 0.5 10E3/UL (ref 0.8–5.3)
LYMPHOCYTES NFR BLD AUTO: 3 %
MAGNESIUM SERPL-MCNC: 1.7 MG/DL (ref 1.7–2.3)
MCH RBC QN AUTO: 37.1 PG (ref 26.5–33)
MCHC RBC AUTO-ENTMCNC: 34.6 G/DL (ref 31.5–36.5)
MCV RBC AUTO: 107 FL (ref 78–100)
MONOCYTES # BLD AUTO: 1.3 10E3/UL (ref 0–1.3)
MONOCYTES NFR BLD AUTO: 7 %
NEUTROPHILS # BLD AUTO: 16 10E3/UL (ref 1.6–8.3)
NEUTROPHILS NFR BLD AUTO: 87 %
NRBC # BLD AUTO: 0 10E3/UL
NRBC BLD AUTO-RTO: 0 /100
O2/TOTAL GAS SETTING VFR VENT: 2 %
OXYHGB MFR BLDV: 76 % (ref 70–75)
PCO2 BLDV: 46 MM HG (ref 40–50)
PH BLDV: 7.39 [PH] (ref 7.32–7.43)
PHOSPHATE SERPL-MCNC: 2.1 MG/DL (ref 2.5–4.5)
PLATELET # BLD AUTO: 105 10E3/UL (ref 150–450)
PO2 BLDV: 46 MM HG (ref 25–47)
POTASSIUM SERPL-SCNC: 3.7 MMOL/L (ref 3.4–5.3)
POTASSIUM SERPL-SCNC: 3.8 MMOL/L (ref 3.4–5.3)
RBC # BLD AUTO: 2.13 10E6/UL (ref 3.8–5.2)
SAO2 % BLDV: 77.3 % (ref 70–75)
SODIUM SERPL-SCNC: 142 MMOL/L (ref 135–145)
WBC # BLD AUTO: 18.4 10E3/UL (ref 4–11)

## 2025-05-16 PROCEDURE — 250N000013 HC RX MED GY IP 250 OP 250 PS 637: Performed by: HOSPITALIST

## 2025-05-16 PROCEDURE — 84132 ASSAY OF SERUM POTASSIUM: CPT | Performed by: HOSPITALIST

## 2025-05-16 PROCEDURE — 85379 FIBRIN DEGRADATION QUANT: CPT | Performed by: HOSPITALIST

## 2025-05-16 PROCEDURE — 99418 PROLNG IP/OBS E/M EA 15 MIN: CPT | Performed by: NURSE PRACTITIONER

## 2025-05-16 PROCEDURE — 82805 BLOOD GASES W/O2 SATURATION: CPT | Performed by: HOSPITALIST

## 2025-05-16 PROCEDURE — 250N000011 HC RX IP 250 OP 636: Mod: JZ | Performed by: STUDENT IN AN ORGANIZED HEALTH CARE EDUCATION/TRAINING PROGRAM

## 2025-05-16 PROCEDURE — 99233 SBSQ HOSP IP/OBS HIGH 50: CPT | Performed by: NURSE PRACTITIONER

## 2025-05-16 PROCEDURE — 99233 SBSQ HOSP IP/OBS HIGH 50: CPT | Performed by: HOSPITALIST

## 2025-05-16 PROCEDURE — 250N000013 HC RX MED GY IP 250 OP 250 PS 637: Performed by: INTERNAL MEDICINE

## 2025-05-16 PROCEDURE — 83605 ASSAY OF LACTIC ACID: CPT | Performed by: HOSPITALIST

## 2025-05-16 PROCEDURE — 85041 AUTOMATED RBC COUNT: CPT | Performed by: HOSPITALIST

## 2025-05-16 PROCEDURE — 120N000001 HC R&B MED SURG/OB

## 2025-05-16 PROCEDURE — 250N000011 HC RX IP 250 OP 636: Performed by: NURSE PRACTITIONER

## 2025-05-16 PROCEDURE — 84100 ASSAY OF PHOSPHORUS: CPT | Performed by: HOSPITALIST

## 2025-05-16 PROCEDURE — 82374 ASSAY BLOOD CARBON DIOXIDE: CPT | Performed by: HOSPITALIST

## 2025-05-16 PROCEDURE — 999N000127 HC STATISTIC PERIPHERAL IV START W US GUIDANCE

## 2025-05-16 PROCEDURE — 250N000011 HC RX IP 250 OP 636: Mod: JW | Performed by: HOSPITALIST

## 2025-05-16 PROCEDURE — 83735 ASSAY OF MAGNESIUM: CPT | Performed by: HOSPITALIST

## 2025-05-16 PROCEDURE — 36415 COLL VENOUS BLD VENIPUNCTURE: CPT | Performed by: HOSPITALIST

## 2025-05-16 RX ORDER — DIPHENHYDRAMINE HYDROCHLORIDE 50 MG/ML
25 INJECTION, SOLUTION INTRAMUSCULAR; INTRAVENOUS ONCE
Status: COMPLETED | OUTPATIENT
Start: 2025-05-17 | End: 2025-05-17

## 2025-05-16 RX ORDER — DIPHENHYDRAMINE HCL 25 MG
25 CAPSULE ORAL ONCE
Status: COMPLETED | OUTPATIENT
Start: 2025-05-16 | End: 2025-05-16

## 2025-05-16 RX ORDER — CEFEPIME HYDROCHLORIDE 2 G/1
2 INJECTION, POWDER, FOR SOLUTION INTRAVENOUS EVERY 12 HOURS
Status: DISCONTINUED | OUTPATIENT
Start: 2025-05-16 | End: 2025-05-19

## 2025-05-16 RX ORDER — METHYLPREDNISOLONE SODIUM SUCCINATE 40 MG/ML
20 INJECTION INTRAMUSCULAR; INTRAVENOUS ONCE
Status: COMPLETED | OUTPATIENT
Start: 2025-05-16 | End: 2025-05-16

## 2025-05-16 RX ORDER — METHYLPREDNISOLONE SODIUM SUCCINATE 40 MG/ML
30 INJECTION INTRAMUSCULAR; INTRAVENOUS ONCE
Status: COMPLETED | OUTPATIENT
Start: 2025-05-16 | End: 2025-05-16

## 2025-05-16 RX ADMIN — CARBIDOPA AND LEVODOPA 2 TABLET: 25; 100 TABLET ORAL at 22:29

## 2025-05-16 RX ADMIN — BUSPIRONE HYDROCHLORIDE 5 MG: 5 TABLET ORAL at 16:59

## 2025-05-16 RX ADMIN — GABAPENTIN 600 MG: 300 CAPSULE ORAL at 22:29

## 2025-05-16 RX ADMIN — CARBIDOPA AND LEVODOPA 2 TABLET: 25; 100 TABLET ORAL at 09:43

## 2025-05-16 RX ADMIN — Medication 1 SPRAY: at 22:30

## 2025-05-16 RX ADMIN — CEFEPIME 2 G: 2 INJECTION, POWDER, FOR SOLUTION INTRAVENOUS at 18:48

## 2025-05-16 RX ADMIN — VIBEGRON 75 MG: 75 TABLET, FILM COATED ORAL at 22:29

## 2025-05-16 RX ADMIN — METHYLPREDNISOLONE SODIUM SUCCINATE 30 MG: 40 INJECTION, POWDER, FOR SOLUTION INTRAMUSCULAR; INTRAVENOUS at 18:58

## 2025-05-16 RX ADMIN — ASPIRIN 325 MG: 325 TABLET, COATED ORAL at 09:46

## 2025-05-16 RX ADMIN — Medication 1 SPRAY: at 14:23

## 2025-05-16 RX ADMIN — CEFEPIME 2 G: 2 INJECTION, POWDER, FOR SOLUTION INTRAVENOUS at 06:49

## 2025-05-16 RX ADMIN — FOLIC ACID 1 MG: 1 TABLET ORAL at 14:07

## 2025-05-16 RX ADMIN — SPIRONOLACTONE 25 MG: 25 TABLET, FILM COATED ORAL at 09:40

## 2025-05-16 RX ADMIN — CARBIDOPA AND LEVODOPA 2 TABLET: 25; 100 TABLET ORAL at 17:00

## 2025-05-16 RX ADMIN — BUSPIRONE HYDROCHLORIDE 5 MG: 5 TABLET ORAL at 22:29

## 2025-05-16 RX ADMIN — CETIRIZINE HYDROCHLORIDE 10 MG: 10 TABLET, FILM COATED ORAL at 09:40

## 2025-05-16 RX ADMIN — Medication 125 MCG: at 09:40

## 2025-05-16 RX ADMIN — PANTOPRAZOLE SODIUM 40 MG: 40 INJECTION, POWDER, FOR SOLUTION INTRAVENOUS at 09:48

## 2025-05-16 RX ADMIN — POTASSIUM & SODIUM PHOSPHATES POWDER PACK 280-160-250 MG 1 PACKET: 280-160-250 PACK at 09:35

## 2025-05-16 RX ADMIN — CITALOPRAM HYDROBROMIDE 20 MG: 20 TABLET ORAL at 10:04

## 2025-05-16 RX ADMIN — POTASSIUM & SODIUM PHOSPHATES POWDER PACK 280-160-250 MG 1 PACKET: 280-160-250 PACK at 18:46

## 2025-05-16 RX ADMIN — FOLIC ACID 1 MG: 1 TABLET ORAL at 16:59

## 2025-05-16 RX ADMIN — ATORVASTATIN CALCIUM 40 MG: 40 TABLET, FILM COATED ORAL at 09:42

## 2025-05-16 RX ADMIN — METHYLPREDNISOLONE SODIUM SUCCINATE 20 MG: 40 INJECTION, POWDER, FOR SOLUTION INTRAMUSCULAR; INTRAVENOUS at 14:09

## 2025-05-16 RX ADMIN — TAMSULOSIN HYDROCHLORIDE 0.4 MG: 0.4 CAPSULE ORAL at 09:46

## 2025-05-16 RX ADMIN — Medication 1 SPRAY: at 17:02

## 2025-05-16 RX ADMIN — POTASSIUM & SODIUM PHOSPHATES POWDER PACK 280-160-250 MG 1 PACKET: 280-160-250 PACK at 14:06

## 2025-05-16 RX ADMIN — FOLIC ACID 1 MG: 1 TABLET ORAL at 09:46

## 2025-05-16 RX ADMIN — BUSPIRONE HYDROCHLORIDE 5 MG: 5 TABLET ORAL at 09:41

## 2025-05-16 RX ADMIN — Medication 1 SPRAY: at 09:48

## 2025-05-16 RX ADMIN — Medication 1 TABLET: at 14:07

## 2025-05-16 ASSESSMENT — ACTIVITIES OF DAILY LIVING (ADL)
ADLS_ACUITY_SCORE: 64
ADLS_ACUITY_SCORE: 60
ADLS_ACUITY_SCORE: 55
ADLS_ACUITY_SCORE: 64
ADLS_ACUITY_SCORE: 55
ADLS_ACUITY_SCORE: 64
ADLS_ACUITY_SCORE: 64
ADLS_ACUITY_SCORE: 60
ADLS_ACUITY_SCORE: 64
ADLS_ACUITY_SCORE: 55
ADLS_ACUITY_SCORE: 64
ADLS_ACUITY_SCORE: 60
ADLS_ACUITY_SCORE: 55
ADLS_ACUITY_SCORE: 64
ADLS_ACUITY_SCORE: 55
ADLS_ACUITY_SCORE: 64
ADLS_ACUITY_SCORE: 55

## 2025-05-16 NOTE — PROGRESS NOTES
[facility] Progress Note     IMPRESSION:  Alma Rosa Fishman is a 84 year old female with PMH of Parkinson's Disease who was admitted to [facility] on 5/11/2025 with concerns for large pleural effusion.  Had also had weakness at home.   On evening of 5/13, had an episode of nausea and emesis.  Blood sugar was 69 prior to meal.  She was given glucose gel, and anti-emetic.  She remained symptom free overnight, however today continued to have nausea and one episode of vomiting throughout the day of 5/14.     - noted that patient has had several months of intermittent dysphagia symptoms as well as intermittent nausea     -EGD planned for today, to rule out esophagitis, gastritis, duodenitis, stricture, EOE, H pylori.  However, patient requiring oxygen overnight, up to 10 L with increased work of breathing    RECOMMENDATIONS:  due to respiratory decompensation overnight, will defer EGD today-at this point in time, we will plan on doing this on Monday 5/19 as it is non-urgent-Patient's  was updated yesterday in this regard.  Tentative plan will be EGD, in OR, with Dr. Redd, Monday 5/19 around 1 pm.     -antibiotics per primary team for potential hospital acquired pneumonia     -I will changed her PPI to IV (currently on 20 mg Protonix oral daily, will adjust to 40 mg IV daily)    -GI service will be available for urgent needs over the weekend, otherwise will plan on seeing patient for EGD, inpatient, Monday 5/19.      15 minutes of total time was spent providing patient care, including patient evaluation, reviewing documentation/test results, and     Rosalee Wellington NP   [facility]  [phone]  ________________________________________________________________________      SUBJECTIVE:    Sitting up in bed.  Sleepy.  Work of breathing improved from yesterday.      OBJECTIVE:  /43 (BP Location: Right arm)   Pulse 71   Temp 98.3  F (36.8  C) (Oral)   Resp 18   Ht 1.575 m  "(5' 2\")   Wt 85.6 kg (188 lb 11.4 oz)   SpO2 94%   BMI 34.52 kg/m    Temp (24hrs), Av.5  F (36.9  C), Min:98.2  F (36.8  C), Max:98.9  F (37.2  C)    Patient Vitals for the past 72 hrs:   Weight   25 0649 85.6 kg (188 lb 11.4 oz)   05/15/25 0428 84.9 kg (187 lb 2.7 oz)   25 0700 90.4 kg (199 lb 4.7 oz)       Intake/Output Summary (Last 24 hours) at 2025 08  Last data filed at 2025 07  Gross per 24 hour   Intake --   Output 2400 ml   Net -2400 ml        PHYSICAL EXAM  GEN: Alert, oriented x2, communicative and in NAD.    LUNGS: CTA  ABD: , ND, +BS, no guarding or pain to palpation, no rebound, no HSM.  SKIN: No rash, jaundice or spider angiomata      LABS:  I have reviewed the patient's new clinical lab results:     Recent Labs   Lab Test 25  0608 05/15/25  0625  0725  1628   WBC 18.4* 23.8* 9.1  --    HGB 7.9* 9.0* 10.0*  --    * 109* 107*  --    * 125* 183  --    INR  --   --   --  0.97     Recent Labs   Lab Test 25  0608 05/15/25  0622 25  0812 25  0618 25  0700 25  1132   POTASSIUM 3.8  3.7 4.5 4.6   < > 5.2 3.9   CHLORIDE 110*  --   --   --  107 105   CO2 24  --   --   --  22 24   BUN 12.8  --   --   --  14.7 16.0   CR 0.70  --   --   --  0.85 0.79   ANIONGAP 8  --   --   --  10 10    < > = values in this interval not displayed.     Recent Labs   Lab Test 25  1527 25  1132 25  1422 25  1245 25  1554 24  1120 23  1446 21  2210 21  1855 21  0005 21  1420   ALBUMIN  --  2.8*  --  3.1* 3.3*  --   --  3.1*   < > 2.9*  --  3.1*   BILITOTAL  --  0.9  --  0.7 0.9  --   --  0.5   < > 0.7  --  1.0   ALT  --  7  --  10 9  --   --  19   < > 37  --  37   AST  --  25  --  57*  --   --   --  23   < > 23  --  29   PROTEIN Negative  --  Negative  --   --  Negative   < >  --    < >  --    < >  --    LIPASE  --   --   --   --   --   --   --  127  --  " 279  --  84    < > = values in this interval not displayed.         IMAGING:  Reviewed

## 2025-05-16 NOTE — PROGRESS NOTES
Care Management Follow Up    Length of Stay (days): 5    Expected Discharge Date: 05/19/2025     Concerns to be Addressed: discharge planning     Patient plan of care discussed at interdisciplinary rounds: Yes    Anticipated Discharge Disposition:  TCU    Patient/family educated on Medicare website which has current facility and service quality ratings:  yes  Education Provided on the Discharge Plan:  yes  Patient/Family in Agreement with the Plan:  yes    Referrals Placed by CM/SW:  TCU    Discussed  Partnership in Safe Discharge Planning  document with patient/family: Yes     Handoff Completed: No, handoff not indicated or clinically appropriate    Additional Information:  Writer spoke with pt's spouse Vadim this morning regarding discharge plan. Vadim states that pt has a planned procedure on Monday and, will not be able to discharge until after this. Writer updated [facility] admissions Kelly who states that they will still have a bed for patient early next week for patient to admit. Vadim was also wondering if he can bring in pt's home purewick system for patient to use while at TCU. Kelly is checking with her DON on this and will get back to us. Spouse plans to be here by 1030 for 1100 goals of care meeting with family and care team. Senior linkage line (PAS) updated with new potential admit date of 5/19.       Next Steps: Coordinate discharge to TCU when medically ready.       Gayle Salazar RN  Care Coordinator  [facility]  [phone]

## 2025-05-16 NOTE — PROGRESS NOTES
PALLIATIVE CARE PROGRESS NOTE       Patient Name: Alma Rosa Fishman  Date of Admission: 5/11/2025   Today the patient was seen for: goals of care, family conference     Recommendations & Counseling       GOALS OF CARE:   Life-prolonging with limits  - DNR, DNI  FCC held, resulting goal is to hear from PT and medical team regarding recommendation for TCU and realistic chance of functional recovery.  Patient aware of complex medical conditions that contribute to functional decline and difficulty with recovery from acute hospitalization.  Family and patient question utility of interventions such as CTA and EGD.  They wish to discuss benefits of procedures and their likelihood of impacting plan of care vs. Risk of procedures and if they would not be advised given overall medical condition and decline.    ADVANCE CARE PLANNING:  Advance Directive has been requested.  There is no POLST form on file, defer to patient and/or next of kin for decisions   Code status: No CPR- Do NOT Intubate    MEDICAL MANAGEMENT:   We are not actively managing symptoms at this time.    PSYCHOSOCIAL/SPIRITUAL:  Family - 4 adult daughters: Savi, Rachele, Anna, Nan, spouse Vadim.  [place] community: Sikh   Appreciate spiritual care support    Palliative Care will continue to follow. Thank you for the consult and allowing us to aid in the care of Alma Rosa Fishman.    These recommendations have been discussed with patient and family, primary hospitalist, nursing staff, case management.    PRISCILLA Ballard CNP  [facility], Palliative Care  Securely message with the [employer] Web Console (learn more here) or  Text page via [facility] Paging/Directory        Assessment          Alma Rosa Fishman is a 84 year old female with a past medical history of PD, HFpEF, recurrent right pleural effusions, recently diagnosed HFpEF, CKD3, hypertension, hyperlipidemia, frequent falls, s/p loop recorder ([place]) who presented on  5/11/2025 with generalized weakness and fall. Patient also has recent dx of liver mass of unknown etiology, frequent falls (monthly x 10 months), ongoing shortness of breath.     Recent hospital presentations in March and April for similar fall events and dyspnea.     Today, the patient was seen for:  Goals of care  Patient and family discussion         Interval History:     Multidisciplinary collaboration:  EGD recommended/scheduled for Monday 5/19 around 1pm.  CTA today to rule out PE.      Patient/family narrative  Met with Alma Rosa,  Vadim and four daughters at the bedside.    Reviewed current hospitalization and multiple medical comorbidities that Alma Rosa has dealt with in recent months.  Reviewed current plan of care and imaging scheduled for today.      Reviewed conversation from yesterday, including discussion regarding code status and the values that she holds surrounding her definition of quality of life.    Inquired about their understanding of Alma Rosa's medical conditions.  Alma Rosa outlined her heart disease, her parkinson's, her gait instability, her dysphagia and her recurrent falls as factors in her functional decline and current state.  She noted that she recognizes that her body is declining and that she will need to change her residence and level of care at the time of discharge.      Reviewed care plans including seeking rehabilitation at TCU or, alternatively, discharging to nursing facility from the hospital with conservative, hospice approach to care.  Family raised concerns regarding Alma Rosa's ability to participate at TCU due to her low blood pressure.  Discussed ongoing PT evaluations to make that determination.  Discussed concerns for more falls and possible need for more adaptive interventions such as wheelchair for mobility.  Alma Rosa discussed her participation in PT previously and that she would not mind participating again. Acknowledged the medical limitations thus far in the hospital and  ongoing assessments.    Family asked about EGD and the risks/benefits of the procedure.  Discussed what changes could be made to Alma Rosa's plan of care if something was discovered on the EGD.  Suggested that if no changes to medication plan based on findings, then perhaps intervention is potentially more risk than benefit. Family mentioned long history of coughing and stated that they were told it was from her Parkinson's. Wondered if it was just part of her disease progression.  Alma Rosa verbalized agreement to family's thoughts, and wished to hear more opinions from hospital medicine.    Discussed individualized care in patient's with chronic, progressive illness and the recommendation to consider risks and benefits for Alma Rosa when undergoing medical interventions if the suggested treatments do not align with her goals of care or comfort.  Discussed patient's with recurrent hospitalizations and chronic illnesses as falling into a cyclical pattern for care and offered the discussion surrounding transitioning out of that pattern if Alma Rosa was no longer seeing benefit in her function or quality of life.  They verbalized understanding.  Will await further discussion with hospitalist and PT.    Review of Systems:     Besides above, ROS was reviewed and is unremarkable        Physical Exam:   Temp:  [98.2  F (36.8  C)-98.9  F (37.2  C)] 98.3  F (36.8  C)  Pulse:  [71-89] 76  Resp:  [18-20] 20  BP: (130-151)/(43-60) 151/58  SpO2:  [94 %-96 %] 96 %  188 lbs 11.42 oz    Physical Exam  HENT:      Mouth/Throat:      Mouth: Mucous membranes are moist.      Pharynx: Oropharynx is clear.   Cardiovascular:      Rate and Rhythm: Normal rate.      Pulses: Normal pulses.   Pulmonary:      Effort: Pulmonary effort is normal.      Comments: Nasal cannula  Abdominal:      General: Bowel sounds are normal.   Skin:     General: Skin is warm and dry.   Neurological:      General: No focal deficit present.      Mental Status: She is alert.    Psychiatric:         Mood and Affect: Mood normal.         Behavior: Behavior normal.         Thought Content: Thought content normal.         Judgment: Judgment normal.             Data Reviewed:     Results for orders placed or performed during the hospital encounter of 05/11/25 (from the past 24 hours)   Glucose by meter   Result Value Ref Range    GLUCOSE BY METER POCT 90 70 - 99 mg/dL   Glucose by meter   Result Value Ref Range    GLUCOSE BY METER POCT 106 (H) 70 - 99 mg/dL   Glucose by meter   Result Value Ref Range    GLUCOSE BY METER POCT 96 70 - 99 mg/dL   Glucose by meter   Result Value Ref Range    GLUCOSE BY METER POCT 102 (H) 70 - 99 mg/dL   Potassium   Result Value Ref Range    Potassium 3.7 3.4 - 5.3 mmol/L   Magnesium   Result Value Ref Range    Magnesium 1.7 1.7 - 2.3 mg/dL   Phosphorus   Result Value Ref Range    Phosphorus 2.1 (L) 2.5 - 4.5 mg/dL   Extra Purple Top EDTA (LAB USE ONLY)   Result Value Ref Range    Hold Specimen [facility]    CBC with Platelets & Differential    Narrative    The following orders were created for panel order CBC with Platelets & Differential.  Procedure                               Abnormality         Status                     ---------                               -----------         ------                     CBC with platelets and ...[[id number]]  Abnormal            Final result                 Please view results for these tests on the individual orders.   Basic metabolic panel   Result Value Ref Range    Sodium 142 135 - 145 mmol/L    Potassium 3.8 3.4 - 5.3 mmol/L    Chloride 110 (H) 98 - 107 mmol/L    Carbon Dioxide (CO2) 24 22 - 29 mmol/L    Anion Gap 8 7 - 15 mmol/L    Urea Nitrogen 12.8 8.0 - 23.0 mg/dL    Creatinine 0.70 0.51 - 0.95 mg/dL    GFR Estimate 86 >60 mL/min/1.73m2    Calcium 7.4 (L) 8.8 - 10.4 mg/dL    Glucose 86 70 - 99 mg/dL   CBC with platelets and differential   Result Value Ref Range    WBC Count 18.4 (H) 4.0 - 11.0 10e3/uL    RBC Count 2.13  (L) 3.80 - 5.20 10e6/uL    Hemoglobin 7.9 (L) 11.7 - 15.7 g/dL    Hematocrit 22.8 (L) 35.0 - 47.0 %     (H) 78 - 100 fL    MCH 37.1 (H) 26.5 - 33.0 pg    MCHC 34.6 31.5 - 36.5 g/dL    RDW 16.5 (H) 10.0 - 15.0 %    Platelet Count 105 (L) 150 - 450 10e3/uL    % Neutrophils 87 %    % Lymphocytes 3 %    % Monocytes 7 %    % Eosinophils 3 %    % Basophils 0 %    % Immature Granulocytes 1 %    NRBCs per 100 WBC 0 <1 /100    Absolute Neutrophils 16.0 (H) 1.6 - 8.3 10e3/uL    Absolute Lymphocytes 0.5 (L) 0.8 - 5.3 10e3/uL    Absolute Monocytes 1.3 0.0 - 1.3 10e3/uL    Absolute Eosinophils 0.5 0.0 - 0.7 10e3/uL    Absolute Basophils 0.0 0.0 - 0.2 10e3/uL    Absolute Immature Granulocytes 0.1 <=0.4 10e3/uL    Absolute NRBCs 0.0 10e3/uL   Blood gas venous   Result Value Ref Range    pH Venous 7.39 7.32 - 7.43    pCO2 Venous 46 40 - 50 mm Hg    pO2 Venous 46 25 - 47 mm Hg    Bicarbonate Venous 28 21 - 28 mmol/L    Base Excess/Deficit Venous 2.3 -3.0 - 3.0 mmol/L    FIO2 2     Oxyhemoglobin Venous 76 (H) 70 - 75 %    O2 Sat, Venous 77.3 (H) 70.0 - 75.0 %    Narrative    In healthy individuals, oxyhemoglobin (O2Hb) and oxygen saturation (SO2) are approximately equal. In the presence of dyshemoglobins, oxyhemoglobin can be considerably lower than oxygen saturation.   Glucose by meter   Result Value Ref Range    GLUCOSE BY METER POCT 85 70 - 99 mg/dL   D dimer quantitative   Result Value Ref Range    D-Dimer Quantitative 4.68 (H) 0.00 - 0.50 ug/mL FEU    Narrative    This D-dimer assay is intended for use in conjunction with a clinical pretest probability assessment model to exclude pulmonary embolism (PE) and deep venous thrombosis (DVT) in outpatients suspected of PE or DVT. The cut-off value is 0.50 ug/mL FEU.    For patients 50 years of age or older, the application of age-adjusted cut-off values for D-Dimer may increase the specificity without significant effect on sensitivity. The literature suggested calculation age  adjusted cut-off in ug/L = age in years x 10 ug/L. The results in this laboratory are reported as ug/mL rather than ug/L. The calculation for age adjusted cut off in ug/mL= age in years x 0.01 ug/mL. For example, the cut off for a 76 year old male is 78 x 0.01 ug/mL = 0.76 ug/mL (760 ug/L).    M Radha et al. Age adjusted D-dimer cut-off levels to rule out pulmonary embolism: The ADJUST-PE Study. EDOUARD 2014;311:1583-4343.; HJ Ze et al. Diagnostic accuracy of conventional or age adjusted D-dimer cutoff values in older patients with suspected venous thromboembolism. Systemic review and meta-analysis. BMJ 2013:346:f2492.   Glucose by meter   Result Value Ref Range    GLUCOSE BY METER POCT 96 70 - 99 mg/dL         Medical Decision Making       Please see A&P for additional details of medical decision making.  MANAGEMENT DISCUSSED with the following over the past 24 hours: hospitalist, nursing staff, case management, patient and family   NOTE(S)/MEDICAL RECORDS REVIEWED over the past 24 hours: interval progress notes  Tests REVIEWED in the past 24 hours:  - See lab/imaging results included in the data section of the note  SUPPLEMENTAL HISTORY, in addition to the patient's history, over the past 24 hours obtained from:   - Spouse or significant other  Medical complexity over the past 24 hours:  - family meeting, decision making regarding minor procedures.  130  MINUTES SPENT BY ME on the date of service doing chart review, history, exam, documentation & further activities per the note.

## 2025-05-16 NOTE — PLAN OF CARE
"Goal Outcome Evaluation:      Plan of Care Reviewed With: patient    Overall Patient Progress: improvingOverall Patient Progress: improving    Outcome Evaluation: A&O to self and place. Disoriented to time and situation. Assist x 2 with gait belt and walker. O2 Sat 95% on oxygen, 2 LPM via NC. WING. Denies pain.      Problem: Adult Inpatient Plan of Care  Goal: Plan of Care Review  Description: The Plan of Care Review/Shift note should be completed every shift.  The Outcome Evaluation is a brief statement about your assessment that the patient is improving, declining, or no change.  This information will be displayed automatically on your shiftnote.  Outcome: Progressing  Flowsheets (Taken 5/16/2025 1834)  Outcome Evaluation: A&O to self and place. Disoriented to time and situation. Assist x 2 with gait belt and walker. O2 Sat 95% on oxygen, 2 LPM via NC. WING. Denies pain.  Plan of Care Reviewed With: patient  Overall Patient Progress: improving  Goal: Patient-Specific Goal (Individualized)  Description: You can add care plan individualizations to a care plan. Examples of Individualization might be:  \"Parent requests to be called daily at 9am for status\", \"I have a hard time hearing out of my right ear\", or \"Do not touch me to wake me up as it startlesme\".  Outcome: Progressing  Goal: Absence of Hospital-Acquired Illness or Injury  Outcome: Progressing  Intervention: Identify and Manage Fall Risk  Recent Flowsheet Documentation  Taken 5/16/2025 1645 by Ashwini Calderon, RN  Safety Promotion/Fall Prevention:   activity supervised   safety round/check completed  Taken 5/16/2025 0937 by Ashwini Calderon, RN  Safety Promotion/Fall Prevention:   activity supervised   safety round/check completed  Intervention: Prevent Skin Injury  Recent Flowsheet Documentation  Taken 5/16/2025 1700 by Ashwini Calderon, RN  Body Position:   legs elevated   side-lying  Taken 5/16/2025 1645 by Ashwini Calderon, RN  Body Position:   supine, head " elevated   supine, legs elevated  Taken 5/16/2025 0937 by Ashwini Calderon RN  Body Position:   supine, head elevated   supine, legs elevated  Intervention: Prevent and Manage VTE (Venous Thromboembolism) Risk  Recent Flowsheet Documentation  Taken 5/16/2025 1645 by Ashwnii Calderon RN  VTE Prevention/Management: SCDs off (sequential compression devices)  Taken 5/16/2025 0937 by Ashwini Calderon RN  VTE Prevention/Management: SCDs off (sequential compression devices)  Intervention: Prevent Infection  Recent Flowsheet Documentation  Taken 5/16/2025 1645 by Ashwini Calderon RN  Infection Prevention:   hand hygiene promoted   single patient room provided  Taken 5/16/2025 0937 by Ashwini Calderon RN  Infection Prevention:   hand hygiene promoted   single patient room provided  Goal: Optimal Comfort and Wellbeing  Outcome: Progressing  Intervention: Provide Person-Centered Care  Recent Flowsheet Documentation  Taken 5/16/2025 1645 by Ashwini Calderon RN  Trust Relationship/Rapport: care explained  Taken 5/16/2025 0937 by Ashwini Calderon RN  Trust Relationship/Rapport: care explained  Goal: Readiness for Transition of Care  Outcome: Progressing

## 2025-05-16 NOTE — PLAN OF CARE
"Goal Outcome Evaluation:           Overall Patient Progress: improvingOverall Patient Progress: improving    Outcome Evaluation: A/O3. 2L of oxygen via oxymask. ACHS, q2h repositioned indepdently or with encouragment. SR- on Tele. safety maintained.        Problem: Adult Inpatient Plan of Care  Goal: Plan of Care Review  Description: The Plan of Care Review/Shift note should be completed every shift.  The Outcome Evaluation is a brief statement about your assessment that the patient is improving, declining, or no change.  This information will be displayed automatically on your shiftnote.  Outcome: Progressing  Flowsheets (Taken 5/16/2025 0554)  Outcome Evaluation: A/O3. 2L of oxygen via oxymask. ACHS, q2h repositioned indepdently or with encouragment. SR- on Tele. safety maintained.  Overall Patient Progress: improving  Goal: Patient-Specific Goal (Individualized)  Description: You can add care plan individualizations to a care plan. Examples of Individualization might be:  \"Parent requests to be called daily at 9am for status\", \"I have a hard time hearing out of my right ear\", or \"Do not touch me to wake me up as it startlesme\".  Outcome: Progressing  Goal: Absence of Hospital-Acquired Illness or Injury  Outcome: Progressing  Intervention: Identify and Manage Fall Risk  Recent Flowsheet Documentation  Taken 5/16/2025 0312 by Guido Razo RN  Safety Promotion/Fall Prevention:   safety round/check completed   lighting adjusted   patient and family education  Taken 5/15/2025 2053 by Guido Razo RN  Safety Promotion/Fall Prevention:   safety round/check completed   lighting adjusted   patient and family education  Intervention: Prevent Skin Injury  Recent Flowsheet Documentation  Taken 5/16/2025 0312 by Guido Razo, RN  Body Position:   turned   right   heels elevated  Skin Protection:   adhesive use limited   tubing/devices free from skin contact   transparent dressing maintained  Taken 5/15/2025 2252 " by Guido Razo RN  Body Position:   turned   right   heels elevated  Taken 5/15/2025 2053 by Guido Razo RN  Body Position:   turned   right   heels elevated  Skin Protection:   adhesive use limited   tubing/devices free from skin contact   transparent dressing maintained  Intervention: Prevent and Manage VTE (Venous Thromboembolism) Risk  Recent Flowsheet Documentation  Taken 5/16/2025 0312 by Guido Razo RN  VTE Prevention/Management: SCDs off (sequential compression devices)  Taken 5/15/2025 2053 by Guido Razo RN  VTE Prevention/Management: SCDs off (sequential compression devices)  Intervention: Prevent Infection  Recent Flowsheet Documentation  Taken 5/16/2025 0312 by Guido Razo RN  Infection Prevention:   hand hygiene promoted   single patient room provided  Taken 5/15/2025 2053 by Guido Razo RN  Infection Prevention:   hand hygiene promoted   single patient room provided  Goal: Optimal Comfort and Wellbeing  Outcome: Progressing  Intervention: Provide Person-Centered Care  Recent Flowsheet Documentation  Taken 5/16/2025 0312 by Guido Razo RN  Trust Relationship/Rapport: care explained  Taken 5/15/2025 2053 by Guido Razo RN  Trust Relationship/Rapport: care explained  Goal: Readiness for Transition of Care  Outcome: Progressing     Problem: Delirium  Goal: Improved Attention and Thought Clarity  Outcome: Progressing  Intervention: Maximize Cognitive Function  Recent Flowsheet Documentation  Taken 5/16/2025 0312 by Guido Razo RN  Sensory Stimulation Regulation:   care clustered   lighting decreased   quiet environment promoted  Reorientation Measures: clock in view  Taken 5/15/2025 2053 by Guido Razo RN  Sensory Stimulation Regulation:   care clustered   lighting decreased   quiet environment promoted  Reorientation Measures: clock in view     Problem: Fall Injury Risk  Goal: Absence of Fall and Fall-Related Injury  Outcome:  Progressing  Intervention: Identify and Manage Contributors  Recent Flowsheet Documentation  Taken 5/16/2025 0312 by Guido Razo, RN  Medication Review/Management: medications reviewed  Taken 5/15/2025 2053 by Guido Razo RN  Medication Review/Management: medications reviewed  Intervention: Promote Injury-Free Environment  Recent Flowsheet Documentation  Taken 5/16/2025 0312 by Guido Razo, RN  Safety Promotion/Fall Prevention:   safety round/check completed   lighting adjusted   patient and family education  Taken 5/15/2025 2053 by Guido Razo, RN  Safety Promotion/Fall Prevention:   safety round/check completed   lighting adjusted   patient and family education

## 2025-05-17 LAB
ALBUMIN SERPL BCG-MCNC: 2.4 G/DL (ref 3.5–5.2)
ALP SERPL-CCNC: 76 U/L (ref 40–150)
ALT SERPL W P-5'-P-CCNC: 8 U/L (ref 0–50)
ANION GAP SERPL CALCULATED.3IONS-SCNC: 9 MMOL/L (ref 7–15)
AST SERPL W P-5'-P-CCNC: 42 U/L (ref 0–45)
BACTERIA PLR CULT: NO GROWTH
BILIRUB SERPL-MCNC: 0.7 MG/DL
BUN SERPL-MCNC: 15.7 MG/DL (ref 8–23)
CALCIUM SERPL-MCNC: 8 MG/DL (ref 8.8–10.4)
CHLORIDE SERPL-SCNC: 104 MMOL/L (ref 98–107)
CREAT SERPL-MCNC: 0.66 MG/DL (ref 0.51–0.95)
EGFRCR SERPLBLD CKD-EPI 2021: 87 ML/MIN/1.73M2
GLUCOSE BLDC GLUCOMTR-MCNC: 120 MG/DL (ref 70–99)
GLUCOSE BLDC GLUCOMTR-MCNC: 127 MG/DL (ref 70–99)
GLUCOSE BLDC GLUCOMTR-MCNC: 128 MG/DL (ref 70–99)
GLUCOSE BLDC GLUCOMTR-MCNC: 142 MG/DL (ref 70–99)
GLUCOSE BLDC GLUCOMTR-MCNC: 148 MG/DL (ref 70–99)
GLUCOSE SERPL-MCNC: 167 MG/DL (ref 70–99)
GRAM STAIN RESULT: NORMAL
GRAM STAIN RESULT: NORMAL
HCO3 SERPL-SCNC: 25 MMOL/L (ref 22–29)
INR PPP: 1.01 (ref 0.85–1.15)
MAGNESIUM SERPL-MCNC: 1.9 MG/DL (ref 1.7–2.3)
PHOSPHATE SERPL-MCNC: 3.1 MG/DL (ref 2.5–4.5)
POTASSIUM SERPL-SCNC: 4.5 MMOL/L (ref 3.4–5.3)
POTASSIUM SERPL-SCNC: 5 MMOL/L (ref 3.4–5.3)
PROCALCITONIN SERPL IA-MCNC: 0.11 NG/ML
PROT SERPL-MCNC: 4.8 G/DL (ref 6.4–8.3)
PROTHROMBIN TIME: 13.4 SECONDS (ref 11.8–14.8)
SODIUM SERPL-SCNC: 138 MMOL/L (ref 135–145)

## 2025-05-17 PROCEDURE — 250N000011 HC RX IP 250 OP 636: Mod: JZ | Performed by: STUDENT IN AN ORGANIZED HEALTH CARE EDUCATION/TRAINING PROGRAM

## 2025-05-17 PROCEDURE — 83735 ASSAY OF MAGNESIUM: CPT | Performed by: HOSPITALIST

## 2025-05-17 PROCEDURE — 84145 PROCALCITONIN (PCT): CPT | Performed by: HOSPITALIST

## 2025-05-17 PROCEDURE — 250N000013 HC RX MED GY IP 250 OP 250 PS 637: Performed by: INTERNAL MEDICINE

## 2025-05-17 PROCEDURE — 36415 COLL VENOUS BLD VENIPUNCTURE: CPT | Performed by: HOSPITALIST

## 2025-05-17 PROCEDURE — 84132 ASSAY OF SERUM POTASSIUM: CPT | Performed by: HOSPITALIST

## 2025-05-17 PROCEDURE — 120N000001 HC R&B MED SURG/OB

## 2025-05-17 PROCEDURE — 250N000011 HC RX IP 250 OP 636: Mod: JW | Performed by: STUDENT IN AN ORGANIZED HEALTH CARE EDUCATION/TRAINING PROGRAM

## 2025-05-17 PROCEDURE — 84100 ASSAY OF PHOSPHORUS: CPT | Performed by: HOSPITALIST

## 2025-05-17 PROCEDURE — 250N000011 HC RX IP 250 OP 636: Performed by: HOSPITALIST

## 2025-05-17 PROCEDURE — 250N000013 HC RX MED GY IP 250 OP 250 PS 637: Performed by: HOSPITALIST

## 2025-05-17 PROCEDURE — 82565 ASSAY OF CREATININE: CPT | Performed by: HOSPITALIST

## 2025-05-17 PROCEDURE — 85610 PROTHROMBIN TIME: CPT | Performed by: HOSPITALIST

## 2025-05-17 PROCEDURE — 250N000011 HC RX IP 250 OP 636: Performed by: NURSE PRACTITIONER

## 2025-05-17 PROCEDURE — 999N000127 HC STATISTIC PERIPHERAL IV START W US GUIDANCE

## 2025-05-17 PROCEDURE — 99233 SBSQ HOSP IP/OBS HIGH 50: CPT | Performed by: HOSPITALIST

## 2025-05-17 RX ORDER — CYANOCOBALAMIN 1000 UG/ML
1000 INJECTION, SOLUTION INTRAMUSCULAR; SUBCUTANEOUS ONCE
Status: COMPLETED | OUTPATIENT
Start: 2025-05-17 | End: 2025-05-17

## 2025-05-17 RX ORDER — METHYLPREDNISOLONE SODIUM SUCCINATE 40 MG/ML
30 INJECTION INTRAMUSCULAR; INTRAVENOUS ONCE
Status: COMPLETED | OUTPATIENT
Start: 2025-05-17 | End: 2025-05-17

## 2025-05-17 RX ORDER — IOPAMIDOL 755 MG/ML
64 INJECTION, SOLUTION INTRAVASCULAR ONCE
Status: COMPLETED | OUTPATIENT
Start: 2025-05-17 | End: 2025-05-17

## 2025-05-17 RX ADMIN — Medication 1 SPRAY: at 07:46

## 2025-05-17 RX ADMIN — CEFEPIME 2 G: 2 INJECTION, POWDER, FOR SOLUTION INTRAVENOUS at 18:13

## 2025-05-17 RX ADMIN — CARBIDOPA AND LEVODOPA 2 TABLET: 25; 100 TABLET ORAL at 21:41

## 2025-05-17 RX ADMIN — INSULIN ASPART 1 UNITS: 100 INJECTION, SOLUTION INTRAVENOUS; SUBCUTANEOUS at 16:35

## 2025-05-17 RX ADMIN — IOPAMIDOL 64 ML: 755 INJECTION, SOLUTION INTRAVENOUS at 09:16

## 2025-05-17 RX ADMIN — Medication 125 MCG: at 07:37

## 2025-05-17 RX ADMIN — CEFEPIME 2 G: 2 INJECTION, POWDER, FOR SOLUTION INTRAVENOUS at 06:45

## 2025-05-17 RX ADMIN — Medication 1 SPRAY: at 12:50

## 2025-05-17 RX ADMIN — FOLIC ACID 1 MG: 1 TABLET ORAL at 07:37

## 2025-05-17 RX ADMIN — METHYLPREDNISOLONE SODIUM SUCCINATE 30 MG: 40 INJECTION, POWDER, FOR SOLUTION INTRAMUSCULAR; INTRAVENOUS at 06:45

## 2025-05-17 RX ADMIN — CARBIDOPA AND LEVODOPA 2 TABLET: 25; 100 TABLET ORAL at 07:37

## 2025-05-17 RX ADMIN — SPIRONOLACTONE 25 MG: 25 TABLET, FILM COATED ORAL at 07:38

## 2025-05-17 RX ADMIN — FOLIC ACID 1 MG: 1 TABLET ORAL at 16:29

## 2025-05-17 RX ADMIN — VIBEGRON 75 MG: 75 TABLET, FILM COATED ORAL at 21:42

## 2025-05-17 RX ADMIN — FOLIC ACID 1 MG: 1 TABLET ORAL at 11:54

## 2025-05-17 RX ADMIN — GABAPENTIN 600 MG: 300 CAPSULE ORAL at 21:40

## 2025-05-17 RX ADMIN — BUSPIRONE HYDROCHLORIDE 5 MG: 5 TABLET ORAL at 21:41

## 2025-05-17 RX ADMIN — ASPIRIN 325 MG: 325 TABLET, COATED ORAL at 07:36

## 2025-05-17 RX ADMIN — Medication 1 SPRAY: at 16:37

## 2025-05-17 RX ADMIN — CYANOCOBALAMIN 1000 MCG: 1000 INJECTION, SOLUTION INTRAMUSCULAR at 13:45

## 2025-05-17 RX ADMIN — DIPHENHYDRAMINE HYDROCHLORIDE 25 MG: 50 INJECTION, SOLUTION INTRAMUSCULAR; INTRAVENOUS at 05:09

## 2025-05-17 RX ADMIN — BUSPIRONE HYDROCHLORIDE 5 MG: 5 TABLET ORAL at 07:38

## 2025-05-17 RX ADMIN — TAMSULOSIN HYDROCHLORIDE 0.4 MG: 0.4 CAPSULE ORAL at 07:38

## 2025-05-17 RX ADMIN — Medication 1 TABLET: at 11:54

## 2025-05-17 RX ADMIN — BUSPIRONE HYDROCHLORIDE 5 MG: 5 TABLET ORAL at 15:49

## 2025-05-17 RX ADMIN — ATORVASTATIN CALCIUM 40 MG: 40 TABLET, FILM COATED ORAL at 07:38

## 2025-05-17 RX ADMIN — CARBIDOPA AND LEVODOPA 2 TABLET: 25; 100 TABLET ORAL at 15:49

## 2025-05-17 RX ADMIN — CETIRIZINE HYDROCHLORIDE 10 MG: 10 TABLET, FILM COATED ORAL at 07:38

## 2025-05-17 RX ADMIN — PANTOPRAZOLE SODIUM 40 MG: 40 INJECTION, POWDER, FOR SOLUTION INTRAVENOUS at 07:39

## 2025-05-17 RX ADMIN — Medication 1 SPRAY: at 21:43

## 2025-05-17 RX ADMIN — CITALOPRAM HYDROBROMIDE 20 MG: 20 TABLET ORAL at 07:37

## 2025-05-17 ASSESSMENT — ACTIVITIES OF DAILY LIVING (ADL)
ADLS_ACUITY_SCORE: 60
ADLS_ACUITY_SCORE: 55
ADLS_ACUITY_SCORE: 60
ADLS_ACUITY_SCORE: 55
ADLS_ACUITY_SCORE: 55
ADLS_ACUITY_SCORE: 60
ADLS_ACUITY_SCORE: 55
ADLS_ACUITY_SCORE: 55
ADLS_ACUITY_SCORE: 60
ADLS_ACUITY_SCORE: 60
ADLS_ACUITY_SCORE: 55
ADLS_ACUITY_SCORE: 60
ADLS_ACUITY_SCORE: 55
ADLS_ACUITY_SCORE: 60

## 2025-05-17 NOTE — PLAN OF CARE
[id number]    VSS on 2L O2 via NC x mild HTN sbp 143. A/O x self, place, time (x exact date, knows it's May of 2025). Purewick in place, minimal UOP this shift, bladder scanned for 203 mLs. . Not OOB this shift. Bed weight inaccurate, pt refusing OOB at this time r/t being tired -- will need standing weight this AM. Plan for chest CT, additional dose of solumedrol given this AM at 0645 with plan to scan 2 hrs after. Plan for possible EGD on Monday. Plan for discharge likely to [facility] per care management note.     Goal Outcome Evaluation:      Plan of Care Reviewed With: patient    Overall Patient Progress: no changeOverall Patient Progress: no change    Outcome Evaluation: On 2L O2, desats to mid 80s on RA.    Problem: Adult Inpatient Plan of Care  Goal: Plan of Care Review  Description: The Plan of Care Review/Shift note should be completed every shift.  The Outcome Evaluation is a brief statement about your assessment that the patient is improving, declining, or no change.  This information will be displayed automatically on your shiftnote.  Outcome: Not Progressing  Flowsheets (Taken 5/17/2025 0610)  Outcome Evaluation: On 2L O2, desats to mid 80s on RA.  Plan of Care Reviewed With: patient  Overall Patient Progress: no change  Goal: Absence of Hospital-Acquired Illness or Injury  Outcome: Not Progressing  Intervention: Prevent and Manage VTE (Venous Thromboembolism) Risk  Recent Flowsheet Documentation  Taken 5/16/2025 2346 by Marcelina Spencer, RN  VTE Prevention/Management:   patient refused intervention   SCDs off (sequential compression devices)  Goal: Optimal Comfort and Wellbeing  Outcome: Not Progressing  Goal: Readiness for Transition of Care  Outcome: Not Progressing     Problem: Delirium  Goal: Improved Attention and Thought Clarity  Outcome: Not Progressing  Intervention: Maximize Cognitive Function  Recent Flowsheet Documentation  Taken 5/16/2025 2346 by Marcelina Spencer,  RN  Reorientation Measures: (encouraged pt to rest at this time, lights off & TV off)   clock in view   reorientation provided     Problem: Fall Injury Risk  Goal: Absence of Fall and Fall-Related Injury  Outcome: Not Progressing

## 2025-05-17 NOTE — PROGRESS NOTES
[facility]    Medicine Progress Note - Hospitalist Service    Date of Admission:  5/11/2025    Assessment & Plan   Alma Rosa Fishman is a 84-year-old female with a past history of Parkinson's Disease, a recurrent large right pleural effusion and HFpEF presents with syncopal episode, weakness and again another right-sided pleural effusion     Right pleural effusion, recurrent.      Acute hypoxic respiratory failure    Leukocytosis, infiltrates suspicious for infection     She last had this drained in April  and in February.  This is thought to be secondary to her heart failure.  She is proven to have difficulty tolerating diuretics due to or orthostasis and falls.  - On 5/12 she had 1 L removed via ultrasound-guided thoracentesis.  Laboratory studies pending on this fluid analysis but initial cell counts are suggestive of transudative effusion.  - If frequency of recurrent accumulation dictates, she may need scheduled thoracentesis.  - She has been maintained on her PTA spironolactone  - PTA Lasix 20 mg IV daily   -Echocardiogram shows a dilated RV but no new change in ejection fraction.  -Large R pl effusion (re-accumulation) in Chest CT 5/17. This needs therapeutic thoracentesis   2.  Syncopal episode: Not likely cardiogenic.  Most likely vasovagal or in the context of orthostasis.  She does have a history of Parkinson's disease hence a mechanical fall not excluded.  - PT and OT consult  3. Parkinsons disease: Stable and at her baseline.  - Continue PTA Sinemet  4.  Type 2 diabetes: Controlled.  Her last hemoglobin A1c was 4.8 on 5/11.  Sugars have been stable.  - Continue PTA Jardiance   - On sliding scale  5.  Depression: At baseline.  Continue with PTA BuSpar Celexa  6.  Malnutrition Diagnosis: Moderate malnutrition in the context of chronic illness   7.  Mild to moderate pHTN  9.  N/V. Complicated h/o gastric stappling, poss liver cirrhosis, Parkinson's and polypharmacy.   [facility]  "Consulted  10. Small iatrogenic R pneumothorax. Just follow up  11. Palliative consult for goal of cares.  Appreciate assistance    12. Elevated D dimer and lung infiltrates. CTA for PE 5/17 negative  CT PE protocol. Given remote hx of allergy, Solumedrol and Benadryl ordered following Radiology protocol   13. Abnormal liver US 4/15/25 at [place].      \"Liver: Coarsened, heterogeneous and nodular liver parenchyma with overall increased echogenicity. Findings suggestive of underlying chronic parenchymal disease and hepatic steatosis. Several indeterminate areas of mild hypoechogenicity within the liver   could be related to overall parenchymal nodularity or focal fatty sparing, although the possibility of liver masses cannot be excluded. The largest indeterminant area measures 1.4 x 1.5 x 1.9 cm in the inferior subcapsular right hepatic lobe.     Ultrasound shear wave elastography was attempted but was technically unsuccessful.\"      Disposition:  likely to home.     Diet: Cardiac diet   DVT Prophylaxis: Pneumatic Compression Devices  Quiroz Catheter: Not present  Lines: None     Cardiac Monitoring: ACTIVE order. Indication: Acute decompensated heart failure (48 hours)  Code Status: No CPR- Do NOT Intubate      Clinically Significant Risk Factors          # Hyperchloremia: Highest Cl = 110 mmol/L in last 2 days, will monitor as appropriate          # Hypoalbuminemia: Lowest albumin = 2.8 g/dL at 5/11/2025 11:32 AM, will monitor as appropriate   # Thrombocytopenia: Lowest platelets = 105 in last 2 days, will monitor for bleeding   # Hypertension: Noted on problem list    # Acute heart failure with preserved ejection fraction: heart failure noted on problem list, last echo with EF >50%, and receiving IV diuretics           # Obesity: Estimated body mass index is 34.52 kg/m  as calculated from the following:    Height as of this encounter: 1.575 m (5' 2\").    Weight as of this encounter: 85.6 kg (188 lb 11.4 oz).   # " Moderate Malnutrition: based on nutrition assessment and treatment provided per dietitian's recommendations.    # Asthma: noted on problem list        Social Drivers of Health    Physical Activity: Inactive (5/22/2024)    Received from [facility]    Exercise Vital Sign     Days of Exercise per Week: 0 days     Minutes of Exercise per Session: 0 min   Stress: Stress Concern Present (3/1/2023)    Received from [facility]    [place] [employer] - Occupational Stress Questionnaire     Feeling of Stress : To some extent   Social Connections: Moderately Isolated (3/1/2023)    Received from [facility]    Social Connection and Isolation Panel [NHANES]     Frequency of Communication with Friends and Family: More than three times a week     Frequency of Social Gatherings with Friends and Family: Patient declined     Attends Denominational Services: Never     Active Member of Clubs or Organizations: No     Attends Club or Organization Meetings: Never     Marital Status:           Disposition Plan     Medically Ready for Discharge: Anticipated Tomorrow    Rodo George MD  Hospitalist Service  [facility]  Securely message with [employer] (more info)  Text page via [employer] Paging/Directory   ______________________________________________________________________    Interval History   She improved hypoxemia overnight, 1 - 2 LNC, pneumonia is suspected though no fever and normal PCT. On Cefepime.  EGD re-scheduled for Monday, unclear wether she tolerates but thoracentesis has to be performed prior to EGD.  Family met palliative team. Code status changed.  Hospital and patient still expressed hope to be going to TCU.  Apparently her most in charge of B12 was last done in January of this year, perhaps some contribution to symptoms.  I reordered B12 IM once.  She is keeping normal saturation only on 1 L per nasal cannula (97-98) but she drops to mid 80s on room air, suspect this is mainly  because of the pleural effusion.    Physical Exam   Vital Signs: Temp: 98.3  F (36.8  C) Temp src: Oral BP: (!) 162/58 Pulse: 69   Resp: 16 SpO2: 92 % O2 Device: Nasal cannula Oxygen Delivery: 1 LPM  Weight: 188 lbs 11.42 oz    GEN:  Alert, cooperative, appears comfortable, NAD.  HEENT:  Normocephalic/atraumatic, no scleral icterus, no nasal discharge, mouth moist.  CV:  Regular rate and rhythm, no murmur or JVD.  S1 + S2 noted, no S3 or S4.  LUNGS: Coarse to auscultation bilaterally with bibasilar crackles.  Symmetric chest rise on inhalation noted.  ABD:  Active bowel sounds, soft, non-tender/non-distended.  No rebound/guarding/rigidity.  EXT: Bilateral LE edema, 1+.  No cyanosis.  No joint synovitis noted.  SKIN:  Dry to touch, no exanthems noted in the visualized areas.         Medical Decision Making       50 MINUTES SPENT BY ME on the date of service doing chart review, history, exam, documentation & further activities per the note.      Data     I have personally reviewed the following data over the past 24 hrs:    N/A  \   N/A   / N/A     N/A N/A N/A /  128 (H)   4.5 N/A N/A \     Procal: N/A CRP: N/A Lactic Acid: 0.7       INR:  N/A PTT:  N/A   D-dimer:  4.68 (H) Fibrinogen:  N/A       Imaging results reviewed over the past 24 hrs:   No results found for this or any previous visit (from the past 24 hours).

## 2025-05-17 NOTE — PLAN OF CARE
"Right pleural effusion, recurrent.      Acute hypoxic respiratory failure    Leukocytosis, infiltrates suspicious for infection  Patient is alert and oriented, but forgetful. Patient stated,  \"That baby is sure being good.\" When asked about the baby patient would state the baby is right there and then realize that there is no baby in the room. Lung sound have some expiratory wheezing (messaged MD regarding nebs)  Frequent dry/unproductive cough. Tried to wean patient off 2 liters O2 NC, but then sats drop to mid/low 80's (patient on 1 liter O2 NC). Dependent edema noted on hips/thigh. Pitting edema in LE's. Generalized bruising noted. Denied pain. SL. Patient continues on cefepime. Tele SA with PAs. All meds given with applesauce. 1 assist with walker and gb when up. Patient able to ambulate to the bathroom.  CT results   1.  No evidence of acute pulmonary embolism.  2.  Interval development of multiple patchy areas of groundglass infiltrate in both lungs. The patchy distribution favors an infectious or inflammatory process although edema cannot be absolutely excluded.  3.  A large right pleural effusion is unchanged. Complete collapse of the right lower lobe and partial collapse of the right middle lobe is unchanged.  4.  A tiny left pleural effusion has developed.  5.  Mild dilatation of the central pulmonary arteries is unchanged and may indicate pulmonary arterial hypertension.T completed:   BS was 128 and no correction needed for breakfast. Monday B12 injection given.   Plan for EGD on Monday.   K-4.5 no replacement needed.   Mag- 1.9 no replacement needed.  Phos-1.3 no replacement needed.  BP (!) 162/58 (BP Location: Right arm, Patient Position: Semi-Zendejas's, Cuff Size: Adult Regular)   Pulse 69   Temp 98.3  F (36.8  C) (Oral)   Resp 16   Ht 1.575 m (5' 2\")   Wt 85.6 kg (188 lb 11.4 oz)   SpO2 92%   BMI 34.52 kg/m      Problem: Adult Inpatient Plan of Care  Goal: Plan of Care Review  Description: The " "Plan of Care Review/Shift note should be completed every shift.  The Outcome Evaluation is a brief statement about your assessment that the patient is improving, declining, or no change.  This information will be displayed automatically on your shiftnote.  Outcome: Progressing  Flowsheets (Taken 5/17/2025 0955)  Plan of Care Reviewed With:   patient   child   family  Overall Patient Progress: no change  Goal: Patient-Specific Goal (Individualized)  Description: You can add care plan individualizations to a care plan. Examples of Individualization might be:  \"Parent requests to be called daily at 9am for status\", \"I have a hard time hearing out of my right ear\", or \"Do not touch me to wake me up as it startlesme\".  Outcome: Progressing  Goal: Absence of Hospital-Acquired Illness or Injury  Outcome: Progressing  Intervention: Prevent Skin Injury  Recent Flowsheet Documentation  Taken 5/17/2025 0730 by Lynn Rodriguez RN  Body Position: position maintained  Intervention: Prevent and Manage VTE (Venous Thromboembolism) Risk  Recent Flowsheet Documentation  Taken 5/17/2025 0730 by Lynn Rodriguez RN  VTE Prevention/Management: patient refused intervention  Goal: Optimal Comfort and Wellbeing  Outcome: Progressing  Intervention: Provide Person-Centered Care  Recent Flowsheet Documentation  Taken 5/17/2025 0730 by Lynn Rodriguez RN  Trust Relationship/Rapport: care explained  Goal: Readiness for Transition of Care  Outcome: Progressing     Problem: Delirium  Goal: Improved Attention and Thought Clarity  Outcome: Progressing     Problem: Fall Injury Risk  Goal: Absence of Fall and Fall-Related Injury  Outcome: Progressing   Goal Outcome Evaluation:      Plan of Care Reviewed With: patient, child, family    Overall Patient Progress: no changeOverall Patient Progress: no change               "

## 2025-05-17 NOTE — PROGRESS NOTES
[facility]    Medicine Progress Note - Hospitalist Service    Date of Admission:  5/11/2025    Assessment & Plan   Alma Rosa Fishman is a 84-year-old female with a past history of Parkinson's Disease, a recurrent large right pleural effusion and HFpEF presents with syncopal episode, weakness and again another right-sided pleural effusion     Right pleural effusion, recurrent.      Acute hypoxic respiratory failure    Leukocytosis, infiltrates suspicious for infection     She last had this drained in April  and in February.  This is thought to be secondary to her heart failure.  She is proven to have difficulty tolerating diuretics due to or orthostasis and falls.  - On 5/12 she had 1 L removed via ultrasound-guided thoracentesis.  Laboratory studies pending on this fluid analysis but initial cell counts are suggestive of transudative effusion.  - If frequency of recurrent accumulation dictates, she may need scheduled thoracentesis.  - She has been maintained on her PTA spironolactone  - PTA Lasix 20 mg IV daily   -Echocardiogram shows a dilated RV but no new change in ejection fraction.  2.  Syncopal episode: Not likely cardiogenic.  Most likely vasovagal or in the context of orthostasis.  She does have a history of Parkinson's disease hence a mechanical fall not excluded.  - PT and OT consult  3. Parkinsons disease: Stable and at her baseline.  - Continue PTA Sinemet  4.  Type 2 diabetes: Controlled.  Her last hemoglobin A1c was 4.8 on 5/11.  Sugars have been stable.  - Continue PTA Jardiance   - On sliding scale  5.  Depression: At baseline.  Continue with PTA BuSpar, Celexa  6.  Malnutrition Diagnosis: Moderate malnutrition in the context of chronic illness   7.  Mild to moderate pHTN  9.  N/V. Complicated h/o gastric stappling, poss liver cirrhosis, Parkinson's and polypharmacy.   [facility] Consulted  10. Small iatrogenic R pneumothorax. Just follow up  11. Palliative consult for goal of  "cares.  Appreciate assistance    12. Elevated D dimer and lung infiltrates.  CT PE protocol. Given remote hx of allergy, So;umedrol and Benadryl ordered following Radiology protocol     Disposition:  likely to home.     Diet: Cardiac diet   DVT Prophylaxis: Pneumatic Compression Devices  Quiroz Catheter: Not present  Lines: None     Cardiac Monitoring: ACTIVE order. Indication: Acute decompensated heart failure (48 hours)  Code Status: No CPR- Do NOT Intubate      Clinically Significant Risk Factors          # Hyperchloremia: Highest Cl = 110 mmol/L in last 2 days, will monitor as appropriate          # Hypoalbuminemia: Lowest albumin = 2.8 g/dL at 5/11/2025 11:32 AM, will monitor as appropriate   # Thrombocytopenia: Lowest platelets = 105 in last 2 days, will monitor for bleeding   # Hypertension: Noted on problem list    # Acute heart failure with preserved ejection fraction: heart failure noted on problem list, last echo with EF >50%, and receiving IV diuretics           # Obesity: Estimated body mass index is 34.52 kg/m  as calculated from the following:    Height as of this encounter: 1.575 m (5' 2\").    Weight as of this encounter: 85.6 kg (188 lb 11.4 oz).   # Moderate Malnutrition: based on nutrition assessment and treatment provided per dietitian's recommendations.    # Asthma: noted on problem list        Social Drivers of Health    Physical Activity: Inactive (5/22/2024)    Received from [facility]    Exercise Vital Sign     Days of Exercise per Week: 0 days     Minutes of Exercise per Session: 0 min   Stress: Stress Concern Present (3/1/2023)    Received from [facility]    [employer] [place] [employer] - Occupational Stress Questionnaire     Feeling of Stress : To some extent   Social Connections: Moderately Isolated (3/1/2023)    Received from [facility]    Social Connection and Isolation Panel [NHANES]     Frequency of Communication with Friends and Family: More than three times a week     " Frequency of Social Gatherings with Friends and Family: Patient declined     Attends Taoist Services: Never     Active Member of Clubs or Organizations: No     Attends Club or Organization Meetings: Never     Marital Status:           Disposition Plan     Medically Ready for Discharge: Anticipated Tomorrow    Rodo George MD  Hospitalist Service  [facility]  Securely message with [employer] (more info)  Text page via [facility] Paging/Directory   ______________________________________________________________________    Interval History   She improved hypoxemia overnight, 1 - 2 LNC, pneumonia is suspected though no fever and normal PCT. On Cefepime.  EGD re-schedule.    Daughter updated. Family met palliative team. Code status changed.  I met , 4 daughters and granddaughter.  Patient is interested to know prognosis in her current situation.  I have explained to her that she has many chronic comorbidities overlapping, and progressing.  There is no restorative treatment for her hence advised to consider focusing on quality of life, minimizing the medications as possible and going through painful procedures and hospital readmissions.        Physical Exam   Vital Signs: Temp: 98.6  F (37  C) Temp src: Oral BP: 139/59 Pulse: 84   Resp: 18 SpO2: 96 % O2 Device: Nasal cannula with humidification Oxygen Delivery: 2 LPM  Weight: 188 lbs 11.42 oz    GEN:  Alert, cooperative, appears comfortable, NAD.  HEENT:  Normocephalic/atraumatic, no scleral icterus, no nasal discharge, mouth moist.  CV:  Regular rate and rhythm, no murmur or JVD.  S1 + S2 noted, no S3 or S4.  LUNGS: Coarse to auscultation bilaterally with bibasilar crackles.  Symmetric chest rise on inhalation noted.  ABD:  Active bowel sounds, soft, non-tender/non-distended.  No rebound/guarding/rigidity.  EXT: Bilateral LE edema, 1+.  No cyanosis.  No joint synovitis noted.  SKIN:  Dry to touch, no exanthems noted in the visualized  areas.         Medical Decision Making       50 MINUTES SPENT BY ME on the date of service doing chart review, history, exam, documentation & further activities per the note.      Data     I have personally reviewed the following data over the past 24 hrs:    18.4 (H)  \   7.9 (L)   / 105 (L)     142 110 (H) 12.8 /  119 (H)   3.7; 3.8 24 0.70 \     INR:  N/A PTT:  N/A   D-dimer:  4.68 (H) Fibrinogen:  N/A       Imaging results reviewed over the past 24 hrs:   No results found for this or any previous visit (from the past 24 hours).

## 2025-05-17 NOTE — CARE PLAN
Thoracentesis will be done tomorrow per MD and US.    Tazorac Pregnancy And Lactation Text: This medication is not safe during pregnancy. It is unknown if this medication is excreted in breast milk.

## 2025-05-18 ENCOUNTER — APPOINTMENT (OUTPATIENT)
Dept: ULTRASOUND IMAGING | Facility: CLINIC | Age: 85
DRG: 291 | End: 2025-05-18
Attending: HOSPITALIST
Payer: MEDICARE

## 2025-05-18 LAB
% LINING CELLS, BODY FLUID: 1 %
ANION GAP SERPL CALCULATED.3IONS-SCNC: 7 MMOL/L (ref 7–15)
APPEARANCE FLD: ABNORMAL
BASOPHILS # BLD AUTO: 0 10E3/UL (ref 0–0.2)
BASOPHILS NFR BLD AUTO: 0 %
BASOPHILS NFR FLD MANUAL: 1 %
BUN SERPL-MCNC: 17 MG/DL (ref 8–23)
CALCIUM SERPL-MCNC: 7.9 MG/DL (ref 8.8–10.4)
CHLORIDE SERPL-SCNC: 105 MMOL/L (ref 98–107)
COLOR FLD: YELLOW
CREAT SERPL-MCNC: 0.69 MG/DL (ref 0.51–0.95)
EGFRCR SERPLBLD CKD-EPI 2021: 86 ML/MIN/1.73M2
ELLIPTOCYTES BLD QL SMEAR: SLIGHT
EOSINOPHIL # BLD AUTO: 0.3 10E3/UL (ref 0–0.7)
EOSINOPHIL NFR BLD AUTO: 1 %
ERYTHROCYTE [DISTWIDTH] IN BLOOD BY AUTOMATED COUNT: 16 % (ref 10–15)
FRAGMENTS BLD QL SMEAR: ABNORMAL
GLUCOSE BLDC GLUCOMTR-MCNC: 117 MG/DL (ref 70–99)
GLUCOSE BLDC GLUCOMTR-MCNC: 133 MG/DL (ref 70–99)
GLUCOSE BLDC GLUCOMTR-MCNC: 80 MG/DL (ref 70–99)
GLUCOSE BLDC GLUCOMTR-MCNC: 83 MG/DL (ref 70–99)
GLUCOSE BLDC GLUCOMTR-MCNC: 95 MG/DL (ref 70–99)
GLUCOSE SERPL-MCNC: 100 MG/DL (ref 70–99)
HCO3 SERPL-SCNC: 25 MMOL/L (ref 22–29)
HCT VFR BLD AUTO: 30.8 % (ref 35–47)
HGB BLD-MCNC: 10.3 G/DL (ref 11.7–15.7)
IMM GRANULOCYTES # BLD: 0.2 10E3/UL
IMM GRANULOCYTES NFR BLD: 1 %
LYMPHOCYTES # BLD AUTO: 0.6 10E3/UL (ref 0.8–5.3)
LYMPHOCYTES NFR BLD AUTO: 3 %
LYMPHOCYTES NFR FLD MANUAL: 17 %
MAGNESIUM SERPL-MCNC: 1.9 MG/DL (ref 1.7–2.3)
MCH RBC QN AUTO: 35.5 PG (ref 26.5–33)
MCHC RBC AUTO-ENTMCNC: 33.4 G/DL (ref 31.5–36.5)
MCV RBC AUTO: 106 FL (ref 78–100)
MONOCYTES # BLD AUTO: 2.3 10E3/UL (ref 0–1.3)
MONOCYTES NFR BLD AUTO: 10 %
MONOS+MACROS NFR FLD MANUAL: 58 %
NEUTROPHILS # BLD AUTO: 18.2 10E3/UL (ref 1.6–8.3)
NEUTROPHILS NFR BLD AUTO: 85 %
NEUTS BAND NFR FLD MANUAL: 23 %
NRBC # BLD AUTO: 0 10E3/UL
NRBC BLD AUTO-RTO: 0 /100
PHOSPHATE SERPL-MCNC: 2.3 MG/DL (ref 2.5–4.5)
PLAT MORPH BLD: ABNORMAL
PLATELET # BLD AUTO: 147 10E3/UL (ref 150–450)
POTASSIUM SERPL-SCNC: 4.6 MMOL/L (ref 3.4–5.3)
RBC # BLD AUTO: 2.9 10E6/UL (ref 3.8–5.2)
RBC MORPH BLD: ABNORMAL
SODIUM SERPL-SCNC: 137 MMOL/L (ref 135–145)
SPECIMEN SOURCE FLD: ABNORMAL
WBC # BLD AUTO: 21.6 10E3/UL (ref 4–11)
WBC # FLD AUTO: 178 /UL

## 2025-05-18 PROCEDURE — 250N000013 HC RX MED GY IP 250 OP 250 PS 637: Performed by: HOSPITALIST

## 2025-05-18 PROCEDURE — 85025 COMPLETE CBC W/AUTO DIFF WBC: CPT | Performed by: HOSPITALIST

## 2025-05-18 PROCEDURE — 0W993ZZ DRAINAGE OF RIGHT PLEURAL CAVITY, PERCUTANEOUS APPROACH: ICD-10-PCS | Performed by: RADIOLOGY

## 2025-05-18 PROCEDURE — 250N000013 HC RX MED GY IP 250 OP 250 PS 637: Performed by: INTERNAL MEDICINE

## 2025-05-18 PROCEDURE — 83735 ASSAY OF MAGNESIUM: CPT | Performed by: INTERNAL MEDICINE

## 2025-05-18 PROCEDURE — 84100 ASSAY OF PHOSPHORUS: CPT | Performed by: INTERNAL MEDICINE

## 2025-05-18 PROCEDURE — 99233 SBSQ HOSP IP/OBS HIGH 50: CPT | Performed by: INTERNAL MEDICINE

## 2025-05-18 PROCEDURE — 89050 BODY FLUID CELL COUNT: CPT | Performed by: HOSPITALIST

## 2025-05-18 PROCEDURE — 250N000011 HC RX IP 250 OP 636: Mod: JZ | Performed by: STUDENT IN AN ORGANIZED HEALTH CARE EDUCATION/TRAINING PROGRAM

## 2025-05-18 PROCEDURE — 36415 COLL VENOUS BLD VENIPUNCTURE: CPT | Performed by: HOSPITALIST

## 2025-05-18 PROCEDURE — 250N000009 HC RX 250: Performed by: HOSPITALIST

## 2025-05-18 PROCEDURE — 32555 ASPIRATE PLEURA W/ IMAGING: CPT

## 2025-05-18 PROCEDURE — 250N000011 HC RX IP 250 OP 636: Performed by: NURSE PRACTITIONER

## 2025-05-18 PROCEDURE — 87070 CULTURE OTHR SPECIMN AEROBIC: CPT | Performed by: HOSPITALIST

## 2025-05-18 PROCEDURE — 120N000001 HC R&B MED SURG/OB

## 2025-05-18 PROCEDURE — 80048 BASIC METABOLIC PNL TOTAL CA: CPT | Performed by: HOSPITALIST

## 2025-05-18 RX ADMIN — TAMSULOSIN HYDROCHLORIDE 0.4 MG: 0.4 CAPSULE ORAL at 08:52

## 2025-05-18 RX ADMIN — BUSPIRONE HYDROCHLORIDE 5 MG: 5 TABLET ORAL at 17:29

## 2025-05-18 RX ADMIN — CEFEPIME 2 G: 2 INJECTION, POWDER, FOR SOLUTION INTRAVENOUS at 07:41

## 2025-05-18 RX ADMIN — FOLIC ACID 1 MG: 1 TABLET ORAL at 08:51

## 2025-05-18 RX ADMIN — GABAPENTIN 600 MG: 300 CAPSULE ORAL at 22:33

## 2025-05-18 RX ADMIN — ATORVASTATIN CALCIUM 40 MG: 40 TABLET, FILM COATED ORAL at 08:51

## 2025-05-18 RX ADMIN — POTASSIUM & SODIUM PHOSPHATES POWDER PACK 280-160-250 MG 1 PACKET: 280-160-250 PACK at 17:29

## 2025-05-18 RX ADMIN — Medication 1 SPRAY: at 22:54

## 2025-05-18 RX ADMIN — CITALOPRAM HYDROBROMIDE 20 MG: 20 TABLET ORAL at 08:51

## 2025-05-18 RX ADMIN — Medication 1 SPRAY: at 13:30

## 2025-05-18 RX ADMIN — Medication 125 MCG: at 08:51

## 2025-05-18 RX ADMIN — Medication 1 SPRAY: at 17:48

## 2025-05-18 RX ADMIN — BUSPIRONE HYDROCHLORIDE 5 MG: 5 TABLET ORAL at 22:33

## 2025-05-18 RX ADMIN — LIDOCAINE HYDROCHLORIDE 10 ML: 10 INJECTION, SOLUTION INFILTRATION; PERINEURAL at 09:47

## 2025-05-18 RX ADMIN — Medication 1 TABLET: at 13:30

## 2025-05-18 RX ADMIN — ASPIRIN 325 MG: 325 TABLET, COATED ORAL at 08:51

## 2025-05-18 RX ADMIN — CARBIDOPA AND LEVODOPA 2 TABLET: 25; 100 TABLET ORAL at 22:33

## 2025-05-18 RX ADMIN — CEFEPIME 2 G: 2 INJECTION, POWDER, FOR SOLUTION INTRAVENOUS at 19:01

## 2025-05-18 RX ADMIN — CETIRIZINE HYDROCHLORIDE 10 MG: 10 TABLET, FILM COATED ORAL at 08:51

## 2025-05-18 RX ADMIN — FOLIC ACID 1 MG: 1 TABLET ORAL at 13:30

## 2025-05-18 RX ADMIN — VIBEGRON 75 MG: 75 TABLET, FILM COATED ORAL at 22:55

## 2025-05-18 RX ADMIN — POTASSIUM & SODIUM PHOSPHATES POWDER PACK 280-160-250 MG 1 PACKET: 280-160-250 PACK at 22:33

## 2025-05-18 RX ADMIN — CARBIDOPA AND LEVODOPA 2 TABLET: 25; 100 TABLET ORAL at 17:29

## 2025-05-18 RX ADMIN — SPIRONOLACTONE 25 MG: 25 TABLET, FILM COATED ORAL at 08:51

## 2025-05-18 RX ADMIN — BUSPIRONE HYDROCHLORIDE 5 MG: 5 TABLET ORAL at 08:51

## 2025-05-18 RX ADMIN — Medication 1 SPRAY: at 08:59

## 2025-05-18 RX ADMIN — CARBIDOPA AND LEVODOPA 2 TABLET: 25; 100 TABLET ORAL at 08:51

## 2025-05-18 RX ADMIN — FOLIC ACID 1 MG: 1 TABLET ORAL at 17:29

## 2025-05-18 RX ADMIN — PANTOPRAZOLE SODIUM 40 MG: 40 INJECTION, POWDER, FOR SOLUTION INTRAVENOUS at 08:52

## 2025-05-18 ASSESSMENT — ACTIVITIES OF DAILY LIVING (ADL)
ADLS_ACUITY_SCORE: 55
ADLS_ACUITY_SCORE: 60
ADLS_ACUITY_SCORE: 55
ADLS_ACUITY_SCORE: 60
ADLS_ACUITY_SCORE: 55
ADLS_ACUITY_SCORE: 56
ADLS_ACUITY_SCORE: 60
ADLS_ACUITY_SCORE: 55
ADLS_ACUITY_SCORE: 60
ADLS_ACUITY_SCORE: 60
ADLS_ACUITY_SCORE: 56
ADLS_ACUITY_SCORE: 60
ADLS_ACUITY_SCORE: 55
ADLS_ACUITY_SCORE: 55

## 2025-05-18 NOTE — CARE PLAN
05/18/25 0950   Fall Event   Patient Assessed By nurse;other  (dr daniel garay, primary RN Lindsay Stoll with pt)   Name of Provider Notified daniel

## 2025-05-18 NOTE — PLAN OF CARE
Goal Outcome Evaluation:      Plan of Care Reviewed With: patient    Overall Patient Progress: no changeOverall Patient Progress: no change    Outcome Evaluation: A&Ox3, up Ax2, tolerating diet, currently on 2L o2, heart sounds- WNL, lungs- diminished, bowels- active, BM this shift, incontinent of urine, thoracentesis done this am, plan for EGD tomorrow afternoon.        Problem: Fall Injury Risk  Goal: Absence of Fall and Fall-Related Injury  Outcome: Not Progressing  Intervention: Identify and Manage Contributors  Recent Flowsheet Documentation  Taken 5/18/2025 0950 by Lindsay Stoll RN  Medication Review/Management: medications reviewed  Intervention: Promote Injury-Free Environment  Recent Flowsheet Documentation  Taken 5/18/2025 0950 by Lindsay Stoll, RN  Safety Promotion/Fall Prevention: safety round/check completed  Taken 5/18/2025 0800 by Lindsay Stoll, RN  Safety Promotion/Fall Prevention:   activity supervised   assistive device/personal items within reach   clutter free environment maintained   lighting adjusted   nonskid shoes/slippers when out of bed   patient and family education   room door open   room organization consistent   safety round/check completed   supervised activity

## 2025-05-18 NOTE — PLAN OF CARE
"[id number]    VSS on 1L O2 via NC x HTN. A/O x self, place. Pleasant. Denies pain, CP, SOB. New PIV in L hand. IV abx. Plan for thoracentesis today, EGD tomorrow. Plan for [facility] eventually. Up A1 GB W to chair. Bgs stable, eating breakfast after BG 83 this am. Continue w/ POC.     BP (!) 164/66 (BP Location: Right arm)   Pulse 67   Temp 97.6  F (36.4  C) (Oral)   Resp 18   Ht 1.575 m (5' 2\")   Wt 83.8 kg (184 lb 12.8 oz)   SpO2 97%   BMI 33.80 kg/m      Goal Outcome Evaluation:      Plan of Care Reviewed With: patient    Overall Patient Progress: no changeOverall Patient Progress: no change    Outcome Evaluation: on 1L O2    Problem: Adult Inpatient Plan of Care  Goal: Plan of Care Review  Description: The Plan of Care Review/Shift note should be completed every shift.  The Outcome Evaluation is a brief statement about your assessment that the patient is improving, declining, or no change.  This information will be displayed automatically on your shiftnote.  Outcome: Not Progressing  Flowsheets (Taken 5/18/2025 0754)  Outcome Evaluation: on 1L O2  Plan of Care Reviewed With: patient  Overall Patient Progress: no change  Goal: Absence of Hospital-Acquired Illness or Injury  Outcome: Not Progressing  Intervention: Identify and Manage Fall Risk  Recent Flowsheet Documentation  Taken 5/17/2025 2153 by Marcelina Spencer, RN  Safety Promotion/Fall Prevention:   activity supervised   assistive device/personal items within reach   clutter free environment maintained   lighting adjusted   nonskid shoes/slippers when out of bed   patient and family education   room door open   room organization consistent   safety round/check completed   supervised activity  Intervention: Prevent Skin Injury  Recent Flowsheet Documentation  Taken 5/18/2025 0300 by Marcelina Spencer, RN  Body Position:   side-lying   right   position changed independently  Taken 5/17/2025 2153 by Marcelina Spencer, RN  Body Position:   heels " elevated   supine   log-rolled   weight shifting  Intervention: Prevent Infection  Recent Flowsheet Documentation  Taken 5/17/2025 2153 by Marcelina Spencer, RN  Infection Prevention: rest/sleep promoted  Goal: Optimal Comfort and Wellbeing  Outcome: Not Progressing  Goal: Readiness for Transition of Care  Outcome: Not Progressing     Problem: Delirium  Goal: Improved Attention and Thought Clarity  Outcome: Not Progressing     Problem: Fall Injury Risk  Goal: Absence of Fall and Fall-Related Injury  Outcome: Not Progressing  Intervention: Identify and Manage Contributors  Recent Flowsheet Documentation  Taken 5/17/2025 2153 by Marcelina Spencer, RN  Medication Review/Management: medications reviewed  Intervention: Promote Injury-Free Environment  Recent Flowsheet Documentation  Taken 5/17/2025 2153 by Marcelina Spencer, RN  Safety Promotion/Fall Prevention:   activity supervised   assistive device/personal items within reach   clutter free environment maintained   lighting adjusted   nonskid shoes/slippers when out of bed   patient and family education   room door open   room organization consistent   safety round/check completed   supervised activity

## 2025-05-18 NOTE — PROGRESS NOTES
Patient had a fall event after returning from thoracentesis when ambulating from cart back to bed. Patient lowered to the ground by staff after legs buckled, patient did not sustain any injuries. Provider and family notified.

## 2025-05-18 NOTE — PROGRESS NOTES
[facility]    Medicine Progress Note - Hospitalist Service    Date of Admission:  5/11/2025    Assessment & Plan   Alma Rosa Fishman is a 84-year-old female with a past history of Parkinson's Disease, a recurrent large right pleural effusion and HFpEF presents with syncopal episode, weakness and found to have recurrent right-sided pleural effusion admitted on 5/11     Recurrent right pleural effusion.    Acute hypoxic respiratory failure  Suspected pneumonia  Leukocytosis.  -She has ongoing right-sided pleural effusion, had thoracentesis in February and again in April.  - This is thought to be secondary to heart failure.  - Patient has difficulty tolerating diuretics due to or orthostasis and falls.  - On 5/12 she had 1 L removed via ultrasound-guided thoracentesis.  Laboratory studies pending on this fluid analysis but initial cell counts are suggestive of transudative effusion.  -5/18, 1 L pleural fluid removed.  - Continue to monitor, will get chest x-ray PA and lateral on 5/20.  - If it is reaccumulating again, will consider options with patient, family and discussed with IR  - Continue PTA spironolactone  -Echocardiogram shows a dilated RV but no new change in ejection fraction.    Syncopal episode:   - This is possibly orthostatic.  -She does have a history of Parkinson's disease hence a mechanical fall not excluded.  -PT and OT consult.    Goals of care: Palliative care consulted, patient is DNR/DNI, life-prolonging with limited.  Patient stated she is okay with having EGD to rule out any correctable condition.    I had a long discussion with patient, daughters, son-in-law and granddaughter.       Other chronic medical conditions  3. Parkinsons disease: Stable and at her baseline.  - Continue PTA Sinemet  4.  DM type II: Controlled.  Changed her diet to regular per patient request. A1c was 4.8 on 5/11.  Sugars have been stable.  - Continue PTA Jardiance   - On sliding scale  5.   "Depression: At baseline.  Continue with PTA BuSpar, Celexa  6.  Malnutrition Diagnosis: Moderate malnutrition in the context of chronic illness   7.  Mild to moderate pHTN  9.  N/V. Complicated h/o gastric stappling, poss liver cirrhosis, Parkinson's and polypharmacy.   [facility] Consulted  10. Small iatrogenic R pneumothorax. Just follow up  11. Palliative consult for goal of cares.  Appreciate assistance    12. Elevated D dimer and lung infiltrates. CTA for PE 5/17 negative  CT PE protocol. Given remote hx of allergy, Solumedrol and Benadryl ordered following Radiology protocol   13. Abnormal liver US 4/15/25 at [place].    Disposition:  likely to home, pending improvement.     Diet: Regular diet, started per patient request   DVT Prophylaxis: Pneumatic Compression Devices  Quiroz Catheter: Not present  Lines: None     Cardiac Monitoring: None  Code Status: No CPR- Do NOT Intubate      Clinically Significant Risk Factors               # Hypoalbuminemia: Lowest albumin = 2.4 g/dL at 5/17/2025  1:33 PM, will monitor as appropriate     # Hypertension: Noted on problem list    # Chronic heart failure with preserved ejection fraction: heart failure noted on problem list and last echo with EF >50%           # Obesity: Estimated body mass index is 33.8 kg/m  as calculated from the following:    Height as of this encounter: 1.575 m (5' 2\").    Weight as of this encounter: 83.8 kg (184 lb 12.8 oz).   # Moderate Malnutrition: based on nutrition assessment and treatment provided per dietitian's recommendations.    # Asthma: noted on problem list        Social Drivers of Health    Physical Activity: Inactive (5/22/2024)    Received from [facility]    Exercise Vital Sign     Days of Exercise per Week: 0 days     Minutes of Exercise per Session: 0 min   Stress: Stress Concern Present (3/1/2023)    Received from [facility]    [employer] [place] [employer] - Occupational Stress Questionnaire     Feeling of Stress : To some " extent   Social Connections: Moderately Isolated (3/1/2023)    Received from [facility]    Social Connection and Isolation Panel [NHANES]     Frequency of Communication with Friends and Family: More than three times a week     Frequency of Social Gatherings with Friends and Family: Patient declined     Attends Sabianist Services: Never     Active Member of Clubs or Organizations: No     Attends Club or Organization Meetings: Never     Marital Status:           Disposition Plan     Medically Ready for Discharge: Anticipated in 2-4 Days    Denny Duarte MD  Hospitalist Service  [facility]  Securely message with [employer] (more info)  Text page via [facility] Paging/Directory   ______________________________________________________________________    Interval History   Patient seen and examined, assumed care today, chart, medications, labs, overnight events reviewed.  She is just back from thoracentesis, 1 L pleural fluid removed.while transferring patient felt dizzy, and fell down by nursing staff, she did not hit her head, did not lose conscious.  Currently she is on 1 L of oxygen, saturating 98%    EGD re-scheduled for Monday, unclear wether she tolerates but thoracentesis performed prior to EGD.  Patient in agreement to go with EGD       Physical Exam   Vital Signs: Temp: 98.1  F (36.7  C) Temp src: Oral BP: 137/66 Pulse: 66   Resp: 18 SpO2: 98 % O2 Device: Nasal cannula Oxygen Delivery: 2 LPM  Weight: 184 lbs 12.8 oz    GEN:  Alert, cooperative, appears comfortable, NAD.  HEENT:  Normocephalic/atraumatic, no scleral icterus, no nasal discharge, mouth moist.  CV:  Regular rate and rhythm, no murmur or JVD.  S1 + S2 noted, no S3 or S4.  LUNGS: Coarse to auscultation bilaterally with bibasilar crackles.  Symmetric chest rise on inhalation noted.  ABD:  Active bowel sounds, soft, non-tender/non-distended.  No rebound/guarding/rigidity.  EXT: Bilateral LE edema, 1+.  No cyanosis.  No  joint synovitis noted.  SKIN:  Dry to touch, no exanthems noted in the visualized areas.         Medical Decision Making       55 MINUTES SPENT BY ME on the date of service doing chart review, history, exam, documentation & further activities per the note.      Recent Labs   Lab 05/16/25  0608 05/15/25  0622 05/12/25  0700   WBC 18.4* 23.8* 9.1   HGB 7.9* 9.0* 10.0*   HCT 22.8* 26.7* 29.3*   * 109* 107*   * 125* 183     Recent Labs   Lab 05/18/25  1304 05/18/25  0648 05/18/25 0225 05/17/25  1633 05/17/25  1333 05/17/25  0702 05/17/25  0653 05/16/25  0806 05/16/25  0608 05/12/25  1031 05/12/25  0700   NA  --   --   --   --  138  --   --   --  142  --  139   POTASSIUM  --   --   --   --  5.0  --  4.5  --  3.8  3.7   < > 5.2   CHLORIDE  --   --   --   --  104  --   --   --  110*  --  107   CO2  --   --   --   --  25  --   --   --  24  --  22   ANIONGAP  --   --   --   --  9  --   --   --  8  --  10   GLC 80 83 133*   < > 167*   < >  --    < > 86   < > 126*   BUN  --   --   --   --  15.7  --   --   --  12.8  --  14.7   CR  --   --   --   --  0.66  --   --   --  0.70  --  0.85   GFRESTIMATED  --   --   --   --  87  --   --   --  86  --  68   ELAN  --   --   --   --  8.0*  --   --   --  7.4*  --  7.4*    < > = values in this interval not displayed.     Recent Labs   Lab 05/18/25  1304 05/18/25  0648 05/18/25  0225 05/17/25  2113 05/17/25  1633   GLC 80 83 133* 142* 148*       Imaging results reviewed over the past 24 hrs:   Recent Results (from the past 24 hours)   US Thoracentesis    Narrative    EXAM:   1. RIGHT THORACENTESIS  2. ULTRASOUND GUIDANCE  LOCATION: [facility]  DATE: 5/18/2025    INDICATION: Pleural effusion.    PROCEDURE: Informed consent obtained. Time out performed. The chest was prepped and draped in sterile fashion. 10 mL of 1 % lidocaine was infused into the local soft tissues. Under direct ultrasound guidance, a 5 Arabic catheter system was placed into   the  pleural effusion.     1 liters of clear yellow fluid were removed and sent to lab, if requested.    Patient tolerated procedure well.    Ultrasound imaging was obtained and placed in the patient's permanent medical record.      Impression    IMPRESSION:  Status post right ultrasound-guided thoracentesis.

## 2025-05-19 ENCOUNTER — APPOINTMENT (OUTPATIENT)
Dept: GENERAL RADIOLOGY | Facility: CLINIC | Age: 85
DRG: 291 | End: 2025-05-19
Attending: INTERNAL MEDICINE
Payer: MEDICARE

## 2025-05-19 LAB
ANION GAP SERPL CALCULATED.3IONS-SCNC: 10 MMOL/L (ref 7–15)
BUN SERPL-MCNC: 15.9 MG/DL (ref 8–23)
CALCIUM SERPL-MCNC: 7.7 MG/DL (ref 8.8–10.4)
CHLORIDE SERPL-SCNC: 108 MMOL/L (ref 98–107)
CREAT SERPL-MCNC: 0.74 MG/DL (ref 0.51–0.95)
EGFRCR SERPLBLD CKD-EPI 2021: 80 ML/MIN/1.73M2
ERYTHROCYTE [DISTWIDTH] IN BLOOD BY AUTOMATED COUNT: 16.3 % (ref 10–15)
GLUCOSE BLDC GLUCOMTR-MCNC: 105 MG/DL (ref 70–99)
GLUCOSE BLDC GLUCOMTR-MCNC: 63 MG/DL (ref 70–99)
GLUCOSE BLDC GLUCOMTR-MCNC: 75 MG/DL (ref 70–99)
GLUCOSE BLDC GLUCOMTR-MCNC: 75 MG/DL (ref 70–99)
GLUCOSE BLDC GLUCOMTR-MCNC: 76 MG/DL (ref 70–99)
GLUCOSE BLDC GLUCOMTR-MCNC: 82 MG/DL (ref 70–99)
GLUCOSE BLDC GLUCOMTR-MCNC: 84 MG/DL (ref 70–99)
GLUCOSE BLDC GLUCOMTR-MCNC: 91 MG/DL (ref 70–99)
GLUCOSE BLDC GLUCOMTR-MCNC: 92 MG/DL (ref 70–99)
GLUCOSE SERPL-MCNC: 77 MG/DL (ref 70–99)
HCO3 SERPL-SCNC: 21 MMOL/L (ref 22–29)
HCT VFR BLD AUTO: 27 % (ref 35–47)
HGB BLD-MCNC: 9.2 G/DL (ref 11.7–15.7)
HOLD SPECIMEN: NORMAL
MAGNESIUM SERPL-MCNC: 2 MG/DL (ref 1.7–2.3)
MCH RBC QN AUTO: 35.9 PG (ref 26.5–33)
MCHC RBC AUTO-ENTMCNC: 34.1 G/DL (ref 31.5–36.5)
MCV RBC AUTO: 106 FL (ref 78–100)
PHOSPHATE SERPL-MCNC: 3 MG/DL (ref 2.5–4.5)
PLATELET # BLD AUTO: 137 10E3/UL (ref 150–450)
POTASSIUM SERPL-SCNC: 4.5 MMOL/L (ref 3.4–5.3)
POTASSIUM SERPL-SCNC: 4.5 MMOL/L (ref 3.4–5.3)
RBC # BLD AUTO: 2.56 10E6/UL (ref 3.8–5.2)
SODIUM SERPL-SCNC: 139 MMOL/L (ref 135–145)
WBC # BLD AUTO: 12.9 10E3/UL (ref 4–11)

## 2025-05-19 PROCEDURE — 80048 BASIC METABOLIC PNL TOTAL CA: CPT | Performed by: INTERNAL MEDICINE

## 2025-05-19 PROCEDURE — 71045 X-RAY EXAM CHEST 1 VIEW: CPT

## 2025-05-19 PROCEDURE — 250N000011 HC RX IP 250 OP 636: Performed by: INTERNAL MEDICINE

## 2025-05-19 PROCEDURE — 272N000748 HC KIT, CATH 3FR OR 4FR SINGLE LUMEN POWERMIDLINE

## 2025-05-19 PROCEDURE — 99232 SBSQ HOSP IP/OBS MODERATE 35: CPT | Performed by: INTERNAL MEDICINE

## 2025-05-19 PROCEDURE — 258N000001 HC RX 258: Performed by: INTERNAL MEDICINE

## 2025-05-19 PROCEDURE — 120N000001 HC R&B MED SURG/OB

## 2025-05-19 PROCEDURE — 84132 ASSAY OF SERUM POTASSIUM: CPT | Performed by: INTERNAL MEDICINE

## 2025-05-19 PROCEDURE — 85014 HEMATOCRIT: CPT | Performed by: INTERNAL MEDICINE

## 2025-05-19 PROCEDURE — 36569 INSJ PICC 5 YR+ W/O IMAGING: CPT

## 2025-05-19 PROCEDURE — 250N000011 HC RX IP 250 OP 636: Mod: JZ | Performed by: STUDENT IN AN ORGANIZED HEALTH CARE EDUCATION/TRAINING PROGRAM

## 2025-05-19 PROCEDURE — 83735 ASSAY OF MAGNESIUM: CPT | Performed by: INTERNAL MEDICINE

## 2025-05-19 PROCEDURE — 84100 ASSAY OF PHOSPHORUS: CPT | Performed by: INTERNAL MEDICINE

## 2025-05-19 PROCEDURE — 36415 COLL VENOUS BLD VENIPUNCTURE: CPT | Performed by: INTERNAL MEDICINE

## 2025-05-19 PROCEDURE — 250N000013 HC RX MED GY IP 250 OP 250 PS 637: Performed by: INTERNAL MEDICINE

## 2025-05-19 RX ORDER — LIDOCAINE 40 MG/G
CREAM TOPICAL
Status: DISCONTINUED | OUTPATIENT
Start: 2025-05-19 | End: 2025-05-21

## 2025-05-19 RX ORDER — CEFTRIAXONE 2 G/1
2 INJECTION, POWDER, FOR SOLUTION INTRAMUSCULAR; INTRAVENOUS EVERY 24 HOURS
Status: DISCONTINUED | OUTPATIENT
Start: 2025-05-19 | End: 2025-05-23 | Stop reason: HOSPADM

## 2025-05-19 RX ORDER — LORAZEPAM 2 MG/ML
0.5 INJECTION INTRAMUSCULAR ONCE
Status: COMPLETED | OUTPATIENT
Start: 2025-05-19 | End: 2025-05-19

## 2025-05-19 RX ORDER — GABAPENTIN 300 MG/1
300 CAPSULE ORAL AT BEDTIME
Status: DISCONTINUED | OUTPATIENT
Start: 2025-05-19 | End: 2025-05-23 | Stop reason: HOSPADM

## 2025-05-19 RX ADMIN — POTASSIUM & SODIUM PHOSPHATES POWDER PACK 280-160-250 MG 1 PACKET: 280-160-250 PACK at 02:53

## 2025-05-19 RX ADMIN — LORAZEPAM 0.5 MG: 2 INJECTION INTRAMUSCULAR; INTRAVENOUS at 22:16

## 2025-05-19 RX ADMIN — CEFTRIAXONE 2 G: 2 INJECTION, POWDER, FOR SOLUTION INTRAMUSCULAR; INTRAVENOUS at 16:58

## 2025-05-19 RX ADMIN — CEFEPIME 2 G: 2 INJECTION, POWDER, FOR SOLUTION INTRAVENOUS at 06:42

## 2025-05-19 RX ADMIN — Medication 1 SPRAY: at 22:15

## 2025-05-19 RX ADMIN — DEXTROSE MONOHYDRATE 50 ML: 25 INJECTION, SOLUTION INTRAVENOUS at 18:34

## 2025-05-19 ASSESSMENT — ACTIVITIES OF DAILY LIVING (ADL)
ADLS_ACUITY_SCORE: 64
ADLS_ACUITY_SCORE: 60
ADLS_ACUITY_SCORE: 64
ADLS_ACUITY_SCORE: 60
ADLS_ACUITY_SCORE: 60
ADLS_ACUITY_SCORE: 64
ADLS_ACUITY_SCORE: 60
ADLS_ACUITY_SCORE: 60
ADLS_ACUITY_SCORE: 64
ADLS_ACUITY_SCORE: 60
ADLS_ACUITY_SCORE: 64
ADLS_ACUITY_SCORE: 60

## 2025-05-19 NOTE — PROVIDER NOTIFICATION
0853: Dr. Duarte paged to inform of pt's somnolence and inability to take PO meds. Also informed of coarse lung sounds and charted black BM overnight.     Addendum: MD acknowledged and ordering labs and coming to assess pt.

## 2025-05-19 NOTE — PLAN OF CARE
[id number]    VSS on 1L O2 x HTN sbp 163. A/O x self, place. Purewick. Not OOB this shift, had been A1-2 GB W. NPO since 0000, plan for EGD today. [facility] site WDL. LS dim. Phos replaced, am checks in for K, Mag, Phos. Slept well between cares.     Goal Outcome Evaluation:      Plan of Care Reviewed With: patient    Overall Patient Progress: no changeOverall Patient Progress: no change    Outcome Evaluation: Remains on 1L O2, EGD today    Problem: Adult Inpatient Plan of Care  Goal: Plan of Care Review  Description: The Plan of Care Review/Shift note should be completed every shift.  The Outcome Evaluation is a brief statement about your assessment that the patient is improving, declining, or no change.  This information will be displayed automatically on your shiftnote.  Outcome: Not Progressing  Flowsheets (Taken 5/19/2025 0240)  Outcome Evaluation: Remains on 1L O2, EGD today  Plan of Care Reviewed With: patient  Overall Patient Progress: no change  Goal: Absence of Hospital-Acquired Illness or Injury  Outcome: Not Progressing  Intervention: Prevent and Manage VTE (Venous Thromboembolism) Risk  Recent Flowsheet Documentation  Taken 5/18/2025 2306 by Marcelina Spencer RN  VTE Prevention/Management: SCDs on (sequential compression devices)  Goal: Optimal Comfort and Wellbeing  Outcome: Not Progressing  Goal: Readiness for Transition of Care  Outcome: Not Progressing     Problem: Delirium  Goal: Improved Attention and Thought Clarity  Outcome: Not Progressing     Problem: Fall Injury Risk  Goal: Absence of Fall and Fall-Related Injury  Outcome: Not Progressing

## 2025-05-19 NOTE — PROGRESS NOTES
PACHECO Beyer updated about patient's mental change. He is okay with hospitalist checking out patient prior to patient coming down for EGD.    Christine Boyer RN on 5/19/2025 at 11:50 AM    Hospitalist did see patient earlier this morning and said the EGD procedure is up the GI doctor per floor RN.  Due to the sudden change with the patient's arousability, anesthesiologist Mart and GI Dr. Redd verbally said that they will not perform the EGD today. OR notified.  Christine Boyer RN on 5/19/2025 at 12:06 PM

## 2025-05-19 NOTE — PROGRESS NOTES
GI Chart Update    Dr. Redd not able to perform EGD today due to mental status. Patient still lethargic and not able to follow commands on my exam. Getting IV line placed by vascular nurse. Reviewed Dr. Duarte note from today. Change in mental status possibly medication related. Cefepime and tamsulosin were stopped, gabapentin reduced, Zyrtec held. One black stool charted overnight. None reported on day shift today. HGB 10.3 to 9.2 today.     Mainly being managed in the hospital for CHF exacerbation with right pleural effusion requiring drainage. Has had intermittent nausea, vomiting and intermittent dysphagia for several months prior to admission. Had increased nausea with emesis late last week but EGD not able to be performed due to increased O2 requirements. No mention of melena, hematochezia, hematemesis on prior documentation. Palliative following. Patient DNR/DNI but wants EGD.     Will reassess mental status tomorrow for appropriateness of EGD. NPO at midnight.     Anna Claire, PAC  [facility] ([facility])

## 2025-05-19 NOTE — PLAN OF CARE
Shift summary ([id number])    Pt somnolent w/ poor command following, does arouse to repeated stimuli but quickly falls back to sleep and does not answer questions. VSS except elevated BP on 1L O2 per NC. Unable to perform orthostatic BP's d/t somnolence and poor command following.  No reported pain, no e/o pain. RUE midline placed by PICC STAT RN this afternoon, SL. Purewick in place for incontinence, critic aid paste applied to inner thighs for reddened skin and breakdown prevention. Small skin tear to coccyx, asked MD for WOC consult, barrier cream applied. PIP education provided to family. Per family report, pt had recent right shoulder sx  and is in need to left rotator cuff sx, verbalized concerns for possible pain w/ complete side-lying positions so freq weight shifting provided as pt tolerates. Potential EGD 5/20 pending mentation/alertness. MD aware of pt inability to take anything PO (food, liquid, meds) this shift.     Goal Outcome Evaluation:      Plan of Care Reviewed With: patient, family    Overall Patient Progress: decliningOverall Patient Progress: declining    Outcome Evaluation: Pt somnolent this shift. Arouses to repeated stimuli but quickly falls back asleep, does not answer questions. No overts signs of bleeding, respiratory distress, or pain.    Heart Failure Care Map  GOALS TO BE MET BEFORE DISCHARGE:    1. Decrease congestion and/or edema with diuretic therapy to achieve near optimal volume status.     Dyspnea improved: No, further care required to meet this goal. Please explain EVAN   Edema improved: No, further care required to meet this goal. Please explain BLEs        Last 24 hour I/O:   Intake/Output Summary (Last 24 hours) at 5/19/2025 1822  Last data filed at 5/19/2025 1751  Gross per 24 hour   Intake 140 ml   Output 875 ml   Net -735 ml           Net I/O and Weights since admission:   04/19 2300 - 05/19 2259  In: 5517.5 [P.O.:4020; I.V.:1497.5]  Out: 6720 [Urine:6620]  Net:  -1202.5     Vitals:    05/11/25 1733 05/12/25 0644 05/13/25 0525 05/14/25 0700   Weight: 81.9 kg (180 lb 8.9 oz) 81.4 kg (179 lb 8 oz) 82.7 kg (182 lb 4.8 oz) 90.4 kg (199 lb 4.7 oz)    05/15/25 0428 05/16/25 0649 05/18/25 0628   Weight: 84.9 kg (187 lb 2.7 oz) 85.6 kg (188 lb 11.4 oz) 83.8 kg (184 lb 12.8 oz)       2.  O2 sats > 90% on room air, or at prior home O2 therapy level.      Able to wean O2 this shift to keep sats above 90%?: No, further care required to meet this goal. Please explain 1L   Does patient use Home O2? No          Current oxygenation status:   SpO2: 92 %     O2 Device: Nasal cannula with humidification, Oxygen Delivery: 1 LPM    3.  Tolerates ambulation and mobility near baseline.     Ambulation: No, further care required to meet this goal. Please explain somnolent this shift, EVAN   Times patient ambulated with staff this shift: 0    Please review the Heart Failure Care Map for additional HF goal outcomes.    Leah Lee RN  5/19/2025       Problem: Adult Inpatient Plan of Care  Goal: Plan of Care Review  Description: The Plan of Care Review/Shift note should be completed every shift.  The Outcome Evaluation is a brief statement about your assessment that the patient is improving, declining, or no change.  This information will be displayed automatically on your shiftnote.  5/19/2025 1821 by Leah Lee RN  Outcome: Not Progressing  Flowsheets (Taken 5/19/2025 1821)  Outcome Evaluation: Pt somnolent this shift. Arouses to repeated stimuli but quickly falls back asleep, does not answer questions. No overts signs of bleeding, respiratory distress, or pain.  Plan of Care Reviewed With:   patient   family  Overall Patient Progress: declining  5/19/2025 1821 by Leah Lee RN  Outcome: Progressing  Flowsheets (Taken 5/19/2025 1821)  Outcome Evaluation: Pt somnolent this shift. Arouses to repeated stimuli but quickly falls back asleep, does not answer questions. No  "overts signs of bleeding, respiratory distress, or pain.  Plan of Care Reviewed With:   patient   family  Overall Patient Progress: declining  Goal: Patient-Specific Goal (Individualized)  Description: You can add care plan individualizations to a care plan. Examples of Individualization might be:  \"Parent requests to be called daily at 9am for status\", \"I have a hard time hearing out of my right ear\", or \"Do not touch me to wake me up as it startlesme\".  5/19/2025 1821 by Leah Lee RN  Outcome: Not Progressing  5/19/2025 1821 by Leah Lee RN  Outcome: Progressing  Goal: Absence of Hospital-Acquired Illness or Injury  5/19/2025 1821 by Leah Lee RN  Outcome: Not Progressing  5/19/2025 1821 by Leah Lee RN  Outcome: Progressing  Intervention: Identify and Manage Fall Risk  Recent Flowsheet Documentation  Taken 5/19/2025 1701 by Leah Lee RN  Safety Promotion/Fall Prevention: safety round/check completed  Taken 5/19/2025 1540 by Leah Lee RN  Safety Promotion/Fall Prevention: safety round/check completed  Taken 5/19/2025 1417 by Leah Lee RN  Safety Promotion/Fall Prevention: safety round/check completed  Taken 5/19/2025 1257 by Leah Lee RN  Safety Promotion/Fall Prevention: safety round/check completed  Taken 5/19/2025 1225 by Leah Lee RN  Safety Promotion/Fall Prevention: (spouse at bedside) safety round/check completed  Taken 5/19/2025 1120 by Leah Lee RN  Safety Promotion/Fall Prevention: safety round/check completed  Taken 5/19/2025 1041 by Leah Lee RN  Safety Promotion/Fall Prevention: safety round/check completed  Taken 5/19/2025 0927 by Leah Lee RN  Safety Promotion/Fall Prevention: (MD at bedside) safety round/check completed  Taken 5/19/2025 0854 by Leah Lee RN  Safety Promotion/Fall Prevention:   activity supervised   assistive " device/personal items within reach   clutter free environment maintained   increased rounding and observation   increase visualization of patient   lighting adjusted   mobility aid in reach   nonskid shoes/slippers when out of bed   patient and family education   room door open   room near nurse's station   room organization consistent   safety round/check completed   supervised activity   treat reversible contributory factors   treat underlying cause  Taken 5/19/2025 0731 by Leah Lee RN  Safety Promotion/Fall Prevention: safety round/check completed  Intervention: Prevent Skin Injury  Recent Flowsheet Documentation  Taken 5/19/2025 1752 by Leah Lee RN  Body Position:   weight shifting   position changed independently   side-lying   left  Taken 5/19/2025 1540 by Leah Lee RN  Body Position: (pt shifting around in bed)   position changed independently   weight shifting   heels elevated  Taken 5/19/2025 1417 by Leah Lee RN  Body Position: (pillows removed from behind pt per family request for reports of pt needing rotator cuff sx to left shoulder and recent sx to right shoulder. PIP education provided, family agreeable to shifting off coccyx as pt allows/tolerates) --  Taken 5/19/2025 1340 by Leah Lee RN  Body Position:   weight shifting   turned   side-lying   left  Taken 5/19/2025 0854 by Leah Lee RN  Body Position:   supine, head elevated   heels elevated  Skin Protection:   adhesive use limited   incontinence pads utilized   tubing/devices free from skin contact  Intervention: Prevent and Manage VTE (Venous Thromboembolism) Risk  Recent Flowsheet Documentation  Taken 5/19/2025 1540 by Leah Lee RN  VTE Prevention/Management: SCDs on (sequential compression devices)  Taken 5/19/2025 0854 by Leah Lee RN  VTE Prevention/Management: SCDs on (sequential compression devices)  Intervention: Prevent  Infection  Recent Flowsheet Documentation  Taken 5/19/2025 0854 by Leah Lee RN  Infection Prevention:   equipment surfaces disinfected   hand hygiene promoted   personal protective equipment utilized   rest/sleep promoted   single patient room provided  Goal: Optimal Comfort and Wellbeing  5/19/2025 1821 by Leah Lee RN  Outcome: Not Progressing  5/19/2025 1821 by Leah Lee RN  Outcome: Progressing  Goal: Readiness for Transition of Care  5/19/2025 1821 by Leah Lee RN  Outcome: Not Progressing  5/19/2025 1821 by Leah Lee RN  Outcome: Progressing

## 2025-05-19 NOTE — PROGRESS NOTES
[facility]    Medicine Progress Note - Hospitalist Service    Date of Admission:  5/11/2025    Assessment & Plan   Alma Rosa Fishman is a 84-year-old female with a past history of Parkinson's Disease, a recurrent large right pleural effusion and HFpEF presents with syncopal episode, weakness and found to have recurrent right-sided pleural effusion admitted on 5/11.     Recurrent right pleural effusion.    Acute hypoxic respiratory failure.  Moderate pulmonary hypertension.  Possible pneumonia.  Leukocytosis.  Intermittent cough.  Dysphagia patient with underlying Parkinson's disease  -She has ongoing right-sided pleural effusion, had thoracentesis in February and again in April.  - This is thought to be secondary to heart failure.  - Patient has difficulty tolerating diuretics due to or orthostasis and falls.  - On 5/12 she had 1 L removed via ultrasound-guided thoracentesis.  Laboratory studies pending on this fluid analysis but initial cell counts are suggestive of transudative effusion.  -5/18, 1 L pleural fluid removed.  - Continue to monitor, will get chest x-ray PA and lateral on 5/20.  - If it is reaccumulating again, will consider options with patient, family and discussed with IR  - Continue PTA spironolactone  -Echocardiogram shows a dilated RV but no new change in ejection fraction.  -Chest x-ray done today showed central pulmonary venous congestion with perihilar interstitial edema, small right sided pleural effusion.  -Continue to hold Lasix, currently she is not on IV fluids or oral intake.  - EGD was planned for today, however was not done due to patient's change in mental status.    Encephalopathy.  Toxic versus metabolic  Suspect due to medications including cefepime.  Overall has decreased mental status today.  She has been mostly sleepy, is able to wake up but quickly back to sleep.  Suspect possibly mainly due to cefepime, and is discontinue.  Currently has no IV line awaiting  vascular RN to place midline.  Will hold off NG tube at this time per my discussion with family.  Monitor mental status.  -Decrease gabapentin dose to 300 mg at bedtime from 600 mg.  - Hold Zyrtec, discontinue tamsulosin  -At risk of fall and also at risk of aspiration.  -Needs close monitoring.    Today I had discussion with 3 of her daughters as well as her  at bedside. At this time goal is restorative with limits, without any aggressive measures .  If status changes and patient deteriorates overnight, call family member/ and daughter before escalating any care.      Syncopal episode:   - This is possibly orthostatic.  -She does have a history of Parkinson's disease hence a mechanical fall not excluded.  -PT and OT consult.    Goals of care: Palliative care consulted, patient is DNR/DNI, life-prolonging with limited.  Patient stated she is okay with having EGD to rule out any correctable condition.          Other chronic medical conditions  3. Parkinsons disease: Stable and at her baseline.  - Continue PTA Sinemet  4.  DM type II: Controlled.  Changed her diet to regular per patient request. A1c was 4.8 on 5/11.  Sugars have been stable.  - Continue PTA Jardiance   - On sliding scale  5.  Depression: At baseline.  Continue with PTA BuSpar, Celexa  6.  Malnutrition Diagnosis: Moderate malnutrition in the context of chronic illness   7.  Mild to moderate pHTN  9.  N/V. Complicated h/o gastric stappling, poss liver cirrhosis, Parkinson's and polypharmacy.   [facility] Consulted  10. Small iatrogenic R pneumothorax. Just follow up  11. Palliative consult for goal of cares.  Appreciate assistance    12. Elevated D dimer and lung infiltrates. CTA for PE 5/17 negative  CT PE protocol. Given remote hx of allergy, Solumedrol and Benadryl ordered following Radiology protocol   13. Abnormal liver US 4/15/25 at [place].    Disposition:  likely to home, pending improvement.     Diet: Regular diet, started per patient  "request   DVT Prophylaxis: Pneumatic Compression Devices  Quiroz Catheter: Not present  Lines: None     Cardiac Monitoring: None  Code Status: No CPR- Do NOT Intubate      Clinically Significant Risk Factors          # Hyperchloremia: Highest Cl = 108 mmol/L in last 2 days, will monitor as appropriate          # Hypoalbuminemia: Lowest albumin = 2.4 g/dL at 5/17/2025  1:33 PM, will monitor as appropriate     # Hypertension: Noted on problem list    # Chronic heart failure with preserved ejection fraction: heart failure noted on problem list and last echo with EF >50%           # Obesity: Estimated body mass index is 33.8 kg/m  as calculated from the following:    Height as of this encounter: 1.575 m (5' 2\").    Weight as of this encounter: 83.8 kg (184 lb 12.8 oz).   # Moderate Malnutrition: based on nutrition assessment and treatment provided per dietitian's recommendations.    # Asthma: noted on problem list        Social Drivers of Health    Tobacco Use: Medium Risk (5/19/2025)    Patient History     Smoking Tobacco Use: Former     Smokeless Tobacco Use: Never   Physical Activity: Inactive (5/22/2024)    Received from [facility]    Exercise Vital Sign     Days of Exercise per Week: 0 days     Minutes of Exercise per Session: 0 min   Stress: Stress Concern Present (3/1/2023)    Received from [facility]    [employer] [place] [employer] - Occupational Stress Questionnaire     Feeling of Stress : To some extent   Social Connections: Moderately Isolated (3/1/2023)    Received from [facility]    Social Connection and Isolation Panel [NHANES]     Frequency of Communication with Friends and Family: More than three times a week     Frequency of Social Gatherings with Friends and Family: Patient declined     Attends Mandaen Services: Never     Active Member of Clubs or Organizations: No     Attends Club or Organization Meetings: Never     Marital Status:           Disposition Plan     Medically Ready " for Discharge: Anticipated in 2-4 Days    Denny Duarte MD  Hospitalist Service  [facility]  Securely message with [employer] (more info)  Text page via [facility] Paging/Directory   ______________________________________________________________________    Interval History   Patient seen and examined, chart, medications, labs reviewed.  She is more somnolent, encephalopathic today, with a change from yesterday.  She denied any new symptoms but was not giving any details as she was mostly sleeping.  Reevaluated the patient this afternoon, family were at the bedside, discussed at length with Currently she is on 1 L of oxygen, saturating 96%      Physical Exam   Vital Signs: Temp: 98.2  F (36.8  C) Temp src: Oral BP: (!) 145/58 Pulse: 65   Resp: 20 SpO2: 94 % O2 Device: Nasal cannula with humidification Oxygen Delivery: 1 LPM  Weight: 184 lbs 12.8 oz    GEN: Somnolent, sleepy, no agitation, was not able to give any details today.  HEENT:  Normocephalic/atraumatic, no scleral icterus, no nasal discharge, mouth moist.  CV:  Regular rate and rhythm, no murmur or JVD.  S1 + S2 noted, no S3 or S4.  LUNGS: Coarse to auscultation bilaterally with bibasilar crackles.  Symmetric chest rise on inhalation noted.  ABD:  Active bowel sounds, soft, non-tender/non-distended.  No rebound/guarding/rigidity.  EXT: Bilateral LE edema, 1+.  No cyanosis.  No joint synovitis noted.  SKIN:  Dry to touch, no exanthems noted in the visualized areas.         Medical Decision Making       45 MINUTES SPENT BY ME on the date of service doing chart review, history, exam, documentation & further activities per the note.      Recent Labs   Lab 05/19/25  0638 05/18/25  1332 05/16/25  0608   WBC 12.9* 21.6* 18.4*   HGB 9.2* 10.3* 7.9*   HCT 27.0* 30.8* 22.8*   * 106* 107*   * 147* 105*     Recent Labs   Lab 05/19/25  1422 05/19/25  1305 05/19/25  1204 05/19/25  0902 05/19/25  0638 05/18/25  1750 05/18/25  1332  05/17/25  1633 05/17/25  1333   NA  --   --   --   --  139  --  137  --  138   POTASSIUM  --   --   --   --  4.5  4.5  --  4.6  --  5.0   CHLORIDE  --   --   --   --  108*  --  105  --  104   CO2  --   --   --   --  21*  --  25  --  25   ANIONGAP  --   --   --   --  10  --  7  --  9   GLC 75 84 76   < > 77   < > 100*   < > 167*   BUN  --   --   --   --  15.9  --  17.0  --  15.7   CR  --   --   --   --  0.74  --  0.69  --  0.66   GFRESTIMATED  --   --   --   --  80  --  86  --  87   ELAN  --   --   --   --  7.7*  --  7.9*  --  8.0*    < > = values in this interval not displayed.     Recent Labs   Lab 05/19/25  1422 05/19/25  1305 05/19/25  1204 05/19/25  0902 05/19/25  0638   GLC 75 84 76 82 77       Imaging results reviewed over the past 24 hrs:   Recent Results (from the past 24 hours)   XR Chest Port 1 View    Narrative    EXAM: XR CHEST PORT 1 VIEW  LOCATION: [facility]  DATE: 5/19/2025    INDICATION: Cough, SOB  COMPARISON: None.      Impression    IMPRESSION: The heart is enlarged. There is central pulmonary venous congestion with perihilar interstitial edema. There is a small right-sided pleural effusion. Surgical clips are seen in the upper abdomen.

## 2025-05-19 NOTE — PROGRESS NOTES
CLINICAL NUTRITION SERVICES - REASSESSMENT NOTE     RECOMMENDATIONS FOR MDs/PROVIDERS TO ORDER:  None at this time    Registered Dietitian Interventions:  Continue scheduled micronutrients    Will continue Ensure Max and SF Gel20 between meals for now -- encourage intake    Future/Additional Recommendations:  Oral intake encouragement appreciated -- goal of >/= 3 intakes daily (meals + supplements)     INFORMATION OBTAINED  Information obtained from chart d/t noted somnolence.     CURRENT NUTRITION ORDERS  Diet: NPO for planned EGD; Regular otherwise (changed yesterday per pt request)  Snacks/Supplements: Ensure Max daily and Gelatein 20 daily      CURRENT INTAKE/TOLERANCE  Intakes per flowsheets:  - 5/18: 25% x1  - 5/17: 50% x3    Pt is receiving meals TID + scheduled supplements BID per [facility]. Daily meal tray orders including ONS = 4818-2288 kcal and  grams pro.     NEW FINDINGS  -- Palliative (5/16): GOC = DNR/DNI, life-prolonging with limits.   -- GI following: planned for EGD today to rule out esophagitis, gastritis, duodenitis, stricture, EOE, H pylori, although d/t somnolent state will not perform EGD today.    GI symptoms: Stooling 1-2x daily    Skin/wounds: Trace 1+ BLE edema, 2+ ankle      Nutrition-relevant labs: Reviewed  Nutrition-relevant medications: Lipitor, sinemet TID, vitamin D3, jardiance - held, folic acid TID, lasix - held (not given since 5/13), MSSI, MVI/M, protonix, spironolactone    Weight: Standing weights indicate wt is trending up (5# in 1 wk)  05/18/25 0628 83.8 kg (184 lb 12.8 oz) Standing scale   05/16/25 0649 85.6 kg (188 lb 11.4 oz) Bed scale   05/15/25 0428 84.9 kg (187 lb 2.7 oz) Bed scale   05/14/25 0700 90.4 kg (199 lb 4.7 oz) Bed scale   05/13/25 0525 82.7 kg (182 lb 4.8 oz) Standing scale   05/12/25 0644 81.4 kg (179 lb 8 oz) Standing scale   05/11/25 1733 81.9 kg (180 lb 8.9 oz) Bed scale     ASSESSED NUTRITION NEEDS  Dosing Weight: 82.7 kg, based on actual  wt  Estimated Energy Needs: 1499+ kcals/day ([place] Jeor w/ AF 1.2+)  Justification: Repletion  Estimated Protein Needs: 66.2-82.7 grams protein/day (0.8 - 1 grams of pro/kg)  Justification: Very minimum for maintenance, Pt on Sinemet  Estimated Fluid Needs: 1 ml/kcal or per provider    MALNUTRITION  % Intake: </=75% for >/= 1 month (severe)  % Weight Loss: Weight loss does not meet criteria   Subcutaneous Fat Loss: Orbital: Mild and Buccal: Mild  Muscle Loss: Temples (temporalis muscle): Moderate, Clavicles (pectoralis and deltoids): Moderate, Shoulders (deltoids): Moderate, Interosseous muscles: Moderate, and Scapula (latissimus dorsi, trapezious, deltoids): Moderate  Fluid Accumulation/Edema: Does not meet criteria - 1-2+ but attributable to CHF, therefore, not used as diagnostic criterion for malnutrition as cannot say it is a true indicator of nutritional status.  Malnutrition Diagnosis: Moderate malnutrition in the context of chronic illness (5/13)  Malnutrition Present on Admission: Yes    EVALUATION OF THE PROGRESS TOWARD GOALS   Previous Goals  Patient to consume % of nutritionally adequate meal trays TID, or the equivalent with supplements/snacks.  Evaluation: Not progressing    Previous Nutrition Diagnosis  Inadequate oral intake related to difficulty with self feeding d/t parkinsonism and poor appetite as evidenced by reported intakes, muscle/fat wasting, and meets malnutrition criteria.  Evaluation: No change    NUTRITION DIAGNOSIS  Inadequate oral intake related to difficulty with self feeding d/t parkinsonism and poor appetite as evidenced by reported intakes, muscle/fat wasting, and meets malnutrition criteria.    INTERVENTIONS  See nutrition interventions above    GOALS  Patient to consume at least 50% meals and supplements consistently, >/= 3x daily     MONITORING/EVALUATION  Progress toward goals will be monitored and evaluated per policy.    Alcira Dietz RD, LD  Available on  [place]

## 2025-05-19 NOTE — PROVIDER NOTIFICATION
Dr. Duarte paged to inform of downward trending BG (currently 76), still waiting for IV access by PICC STAT, and no EGD per GI for somnolent state.

## 2025-05-19 NOTE — PROGRESS NOTES
Procedure/Surgery Information    [facility]     Midline placement   Procedure Note   Date of Service (when I performed the procedure): 05/19/2025    Diagnosis or Indication: access    Procedure: Midline insertion   Pause for the cause: Consent for catheter placement procedure signed  Time out completed  Patient ID's verified using two distinct indicators  All necessary equipment is present  Site marked if extremity to be used has been predetermined   Type of line to be used: Midline catheter   Full barrier precautions used: Yes   Skin preparation: Chloraprep   Date of insertion: May 19, 2025, 4:02 PM   Device type: Single lumen, non-valved, 4.0   Catheter brand: LookTracker   Lot number: [id number]   Insertion location: Right brachial vein (medial)   Method of placement: Ultrasound   Number of attempts: With ultrasound: 1      Difficulty threading: No   Midline IV device: Dressing dry and intact  Transparent semmipermeable dressing applied  Chlorhexidine patch  Catheter securement device   Arm circumference: Adults 10 cm   Midline extremity circumference: 30 cm   Internal length: 16 cm   Midline visible catheter length: 0 cm   Total catheter length: 16 cm   Tip termination: Axilla (midline)   Method of verification: Not applicable   Midline patency post placement: Flushes without difficulty   Line flush: Line flush documented on the [id number]   Placement verified by: Registered Nurse   Catheter placed by: Linda Guevara RN   Discontinuation form initiated: No   Patient tolerance: Tolerated well needed a hill to remember to keep her arm still      Summary:  This procedure was performed without difficulty and she tolerated the procedure well with no immediate complications. Midline good too use.      Linda Guevara RN

## 2025-05-20 ENCOUNTER — APPOINTMENT (OUTPATIENT)
Dept: CT IMAGING | Facility: CLINIC | Age: 85
DRG: 291 | End: 2025-05-20
Attending: INTERNAL MEDICINE
Payer: MEDICARE

## 2025-05-20 ENCOUNTER — APPOINTMENT (OUTPATIENT)
Dept: OCCUPATIONAL THERAPY | Facility: CLINIC | Age: 85
DRG: 291 | End: 2025-05-20
Payer: MEDICARE

## 2025-05-20 ENCOUNTER — APPOINTMENT (OUTPATIENT)
Dept: PHYSICAL THERAPY | Facility: CLINIC | Age: 85
DRG: 291 | End: 2025-05-20
Payer: MEDICARE

## 2025-05-20 LAB
ANION GAP SERPL CALCULATED.3IONS-SCNC: 9 MMOL/L (ref 7–15)
BUN SERPL-MCNC: 11.8 MG/DL (ref 8–23)
CALCIUM SERPL-MCNC: 7.6 MG/DL (ref 8.8–10.4)
CHLORIDE SERPL-SCNC: 110 MMOL/L (ref 98–107)
CREAT SERPL-MCNC: 0.69 MG/DL (ref 0.51–0.95)
EGFRCR SERPLBLD CKD-EPI 2021: 86 ML/MIN/1.73M2
ERYTHROCYTE [DISTWIDTH] IN BLOOD BY AUTOMATED COUNT: 16.4 % (ref 10–15)
GLUCOSE BLDC GLUCOMTR-MCNC: 108 MG/DL (ref 70–99)
GLUCOSE BLDC GLUCOMTR-MCNC: 117 MG/DL (ref 70–99)
GLUCOSE BLDC GLUCOMTR-MCNC: 62 MG/DL (ref 70–99)
GLUCOSE BLDC GLUCOMTR-MCNC: 77 MG/DL (ref 70–99)
GLUCOSE BLDC GLUCOMTR-MCNC: 78 MG/DL (ref 70–99)
GLUCOSE BLDC GLUCOMTR-MCNC: 80 MG/DL (ref 70–99)
GLUCOSE BLDC GLUCOMTR-MCNC: 83 MG/DL (ref 70–99)
GLUCOSE BLDC GLUCOMTR-MCNC: 83 MG/DL (ref 70–99)
GLUCOSE BLDC GLUCOMTR-MCNC: 84 MG/DL (ref 70–99)
GLUCOSE BLDC GLUCOMTR-MCNC: 87 MG/DL (ref 70–99)
GLUCOSE BLDC GLUCOMTR-MCNC: 93 MG/DL (ref 70–99)
GLUCOSE SERPL-MCNC: 91 MG/DL (ref 70–99)
HCO3 SERPL-SCNC: 23 MMOL/L (ref 22–29)
HCT VFR BLD AUTO: 25.5 % (ref 35–47)
HGB BLD-MCNC: 8.6 G/DL (ref 11.7–15.7)
MAGNESIUM SERPL-MCNC: 1.8 MG/DL (ref 1.7–2.3)
MCH RBC QN AUTO: 35.4 PG (ref 26.5–33)
MCHC RBC AUTO-ENTMCNC: 33.7 G/DL (ref 31.5–36.5)
MCV RBC AUTO: 105 FL (ref 78–100)
PHOSPHATE SERPL-MCNC: 2.8 MG/DL (ref 2.5–4.5)
PLAT MORPH BLD: NORMAL
PLATELET # BLD AUTO: 163 10E3/UL (ref 150–450)
POTASSIUM SERPL-SCNC: 4.1 MMOL/L (ref 3.4–5.3)
PROCALCITONIN SERPL IA-MCNC: 0.11 NG/ML
RBC # BLD AUTO: 2.43 10E6/UL (ref 3.8–5.2)
RBC MORPH BLD: NORMAL
SODIUM SERPL-SCNC: 142 MMOL/L (ref 135–145)
WBC # BLD AUTO: 13.2 10E3/UL (ref 4–11)

## 2025-05-20 PROCEDURE — 120N000001 HC R&B MED SURG/OB

## 2025-05-20 PROCEDURE — 258N000001 HC RX 258: Performed by: INTERNAL MEDICINE

## 2025-05-20 PROCEDURE — 250N000013 HC RX MED GY IP 250 OP 250 PS 637: Performed by: HOSPITALIST

## 2025-05-20 PROCEDURE — 70486 CT MAXILLOFACIAL W/O DYE: CPT

## 2025-05-20 PROCEDURE — 84520 ASSAY OF UREA NITROGEN: CPT | Performed by: INTERNAL MEDICINE

## 2025-05-20 PROCEDURE — 84145 PROCALCITONIN (PCT): CPT | Performed by: INTERNAL MEDICINE

## 2025-05-20 PROCEDURE — 99232 SBSQ HOSP IP/OBS MODERATE 35: CPT | Performed by: INTERNAL MEDICINE

## 2025-05-20 PROCEDURE — 97530 THERAPEUTIC ACTIVITIES: CPT | Mod: GP | Performed by: PHYSICAL THERAPIST

## 2025-05-20 PROCEDURE — 97116 GAIT TRAINING THERAPY: CPT | Mod: GP | Performed by: PHYSICAL THERAPIST

## 2025-05-20 PROCEDURE — 250N000013 HC RX MED GY IP 250 OP 250 PS 637: Performed by: INTERNAL MEDICINE

## 2025-05-20 PROCEDURE — 83735 ASSAY OF MAGNESIUM: CPT | Performed by: INTERNAL MEDICINE

## 2025-05-20 PROCEDURE — 99233 SBSQ HOSP IP/OBS HIGH 50: CPT | Performed by: NURSE PRACTITIONER

## 2025-05-20 PROCEDURE — 250N000011 HC RX IP 250 OP 636: Performed by: INTERNAL MEDICINE

## 2025-05-20 PROCEDURE — 250N000011 HC RX IP 250 OP 636: Performed by: NURSE PRACTITIONER

## 2025-05-20 PROCEDURE — 85014 HEMATOCRIT: CPT | Performed by: INTERNAL MEDICINE

## 2025-05-20 PROCEDURE — 36415 COLL VENOUS BLD VENIPUNCTURE: CPT | Performed by: INTERNAL MEDICINE

## 2025-05-20 PROCEDURE — 84100 ASSAY OF PHOSPHORUS: CPT | Performed by: INTERNAL MEDICINE

## 2025-05-20 PROCEDURE — 97535 SELF CARE MNGMENT TRAINING: CPT | Mod: GO | Performed by: OCCUPATIONAL THERAPIST

## 2025-05-20 PROCEDURE — 250N000013 HC RX MED GY IP 250 OP 250 PS 637: Performed by: NURSE PRACTITIONER

## 2025-05-20 RX ORDER — DIPHENHYDRAMINE HYDROCHLORIDE AND LIDOCAINE HYDROCHLORIDE AND ALUMINUM HYDROXIDE AND MAGNESIUM HYDRO
10 KIT EVERY 6 HOURS PRN
Status: DISCONTINUED | OUTPATIENT
Start: 2025-05-20 | End: 2025-05-23 | Stop reason: HOSPADM

## 2025-05-20 RX ORDER — NALOXONE HYDROCHLORIDE 0.4 MG/ML
0.4 INJECTION, SOLUTION INTRAMUSCULAR; INTRAVENOUS; SUBCUTANEOUS
Status: DISCONTINUED | OUTPATIENT
Start: 2025-05-20 | End: 2025-05-23 | Stop reason: HOSPADM

## 2025-05-20 RX ORDER — HYDROMORPHONE HCL IN WATER/PF 6 MG/30 ML
0.2 PATIENT CONTROLLED ANALGESIA SYRINGE INTRAVENOUS EVERY 4 HOURS PRN
Status: DISCONTINUED | OUTPATIENT
Start: 2025-05-20 | End: 2025-05-23 | Stop reason: HOSPADM

## 2025-05-20 RX ORDER — NALOXONE HYDROCHLORIDE 0.4 MG/ML
0.2 INJECTION, SOLUTION INTRAMUSCULAR; INTRAVENOUS; SUBCUTANEOUS
Status: DISCONTINUED | OUTPATIENT
Start: 2025-05-20 | End: 2025-05-23 | Stop reason: HOSPADM

## 2025-05-20 RX ORDER — LIDOCAINE 4 G/G
1-3 PATCH TOPICAL
Status: DISCONTINUED | OUTPATIENT
Start: 2025-05-21 | End: 2025-05-23 | Stop reason: HOSPADM

## 2025-05-20 RX ORDER — CLINDAMYCIN PALMITATE HYDROCHLORIDE 75 MG/5ML
300 SOLUTION ORAL 4 TIMES DAILY
Status: DISCONTINUED | OUTPATIENT
Start: 2025-05-20 | End: 2025-05-23 | Stop reason: HOSPADM

## 2025-05-20 RX ADMIN — BUSPIRONE HYDROCHLORIDE 5 MG: 5 TABLET ORAL at 16:42

## 2025-05-20 RX ADMIN — DIPHENHYDRAMINE HYDROCHLORIDE AND LIDOCAINE HYDROCHLORIDE AND ALUMINUM HYDROXIDE AND MAGNESIUM HYDRO 10 ML: KIT at 17:39

## 2025-05-20 RX ADMIN — CARBIDOPA AND LEVODOPA 2 TABLET: 25; 100 TABLET ORAL at 16:43

## 2025-05-20 RX ADMIN — IBUPROFEN 600 MG: 600 TABLET ORAL at 22:37

## 2025-05-20 RX ADMIN — VIBEGRON 75 MG: 75 TABLET, FILM COATED ORAL at 22:37

## 2025-05-20 RX ADMIN — Medication 1 SPRAY: at 17:39

## 2025-05-20 RX ADMIN — DEXTROSE MONOHYDRATE 25 ML: 25 INJECTION, SOLUTION INTRAVENOUS at 04:32

## 2025-05-20 RX ADMIN — BUSPIRONE HYDROCHLORIDE 5 MG: 5 TABLET ORAL at 22:37

## 2025-05-20 RX ADMIN — CEFTRIAXONE 2 G: 2 INJECTION, POWDER, FOR SOLUTION INTRAMUSCULAR; INTRAVENOUS at 16:42

## 2025-05-20 RX ADMIN — DEXTROSE MONOHYDRATE 50 ML: 25 INJECTION, SOLUTION INTRAVENOUS at 12:35

## 2025-05-20 RX ADMIN — FOLIC ACID 1 MG: 1 TABLET ORAL at 16:42

## 2025-05-20 RX ADMIN — GABAPENTIN 300 MG: 300 CAPSULE ORAL at 22:37

## 2025-05-20 RX ADMIN — CARBIDOPA AND LEVODOPA 2 TABLET: 25; 100 TABLET ORAL at 22:37

## 2025-05-20 RX ADMIN — DICLOFENAC SODIUM 4 G: 10 GEL TOPICAL at 09:40

## 2025-05-20 RX ADMIN — CLINDAMYCIN PALMITATE HYDROCHLORIDE 300 MG: 75 GRANULE, FOR SOLUTION ORAL at 22:39

## 2025-05-20 RX ADMIN — FOLIC ACID 1 MG: 1 TABLET ORAL at 12:12

## 2025-05-20 RX ADMIN — IBUPROFEN 600 MG: 600 TABLET ORAL at 12:12

## 2025-05-20 RX ADMIN — Medication 1 SPRAY: at 22:38

## 2025-05-20 RX ADMIN — Medication 1 TABLET: at 12:12

## 2025-05-20 RX ADMIN — PANTOPRAZOLE SODIUM 40 MG: 40 INJECTION, POWDER, FOR SOLUTION INTRAVENOUS at 12:13

## 2025-05-20 RX ADMIN — Medication 1 SPRAY: at 09:40

## 2025-05-20 RX ADMIN — CLINDAMYCIN PALMITATE HYDROCHLORIDE 300 MG: 75 GRANULE, FOR SOLUTION ORAL at 17:38

## 2025-05-20 ASSESSMENT — ACTIVITIES OF DAILY LIVING (ADL)
ADLS_ACUITY_SCORE: 68
ADLS_ACUITY_SCORE: 63
ADLS_ACUITY_SCORE: 68
ADLS_ACUITY_SCORE: 63
ADLS_ACUITY_SCORE: 68
ADLS_ACUITY_SCORE: 63
ADLS_ACUITY_SCORE: 68
ADLS_ACUITY_SCORE: 68
ADLS_ACUITY_SCORE: 63
ADLS_ACUITY_SCORE: 68

## 2025-05-20 NOTE — PLAN OF CARE
"Goal Outcome Evaluation:      Perked up at end of shift, alert to self. On 2L NC, LS diminished, has a good productive congested cough. On CLD, tolerating well, had 1 episode of hypoglycemia during shift and was treated with 25mg D50. Inct of urine. Redness on bottom, using purwik to keep skin dry. Ambulated in room x2, sat in chair x1, R midline SL, patient complaining of pain and soreness below line. Redness appears to be from where \"no-no band was rubbing\". On k, mag, phos protocol. Started oral abx for suspected tooth abscess. Palliative following.     Plan of Care Reviewed With: patient, family    Overall Patient Progress: improvingOverall Patient Progress: improving    Outcome Evaluation: Lethargic at beginning of shift, then perked up and was able to have conversations. Mouth pain, hyposlygimic treated x1 25mg dextrose. on CLD. Inct of urine. Had face CT for assess for mouth absess        Problem: Adult Inpatient Plan of Care  Goal: Plan of Care Review  Description: The Plan of Care Review/Shift note should be completed every shift.  The Outcome Evaluation is a brief statement about your assessment that the patient is improving, declining, or no change.  This information will be displayed automatically on your shiftnote.  Outcome: Progressing  Flowsheets (Taken 5/20/2025 1838)  Outcome Evaluation: Lethargic at beginning of shift, then perked up and was able to have conversations. Mouth pain, hyposlygimic treated x1 25mg dextrose. on CLD. Inct of urine. Had face CT for assess for mouth absess  Plan of Care Reviewed With:   patient   family  Overall Patient Progress: improving  Goal: Patient-Specific Goal (Individualized)  Description: You can add care plan individualizations to a care plan. Examples of Individualization might be:  \"Parent requests to be called daily at 9am for status\", \"I have a hard time hearing out of my right ear\", or \"Do not touch me to wake me up as it startlesme\".  Outcome: " Progressing  Goal: Absence of Hospital-Acquired Illness or Injury  Outcome: Progressing  Intervention: Prevent Skin Injury  Recent Flowsheet Documentation  Taken 5/20/2025 0808 by Denisha Saucedo RN  Body Position:   weight shifting   position changed independently   side-lying   left  Skin Protection:   adhesive use limited   incontinence pads utilized   tubing/devices free from skin contact  Intervention: Prevent Infection  Recent Flowsheet Documentation  Taken 5/20/2025 0808 by Denisha Saucedo RN  Infection Prevention:   equipment surfaces disinfected   hand hygiene promoted   personal protective equipment utilized   rest/sleep promoted   single patient room provided  Goal: Optimal Comfort and Wellbeing  Outcome: Progressing  Intervention: Monitor Pain and Promote Comfort  Recent Flowsheet Documentation  Taken 5/20/2025 1641 by Denisha Saucedo RN  Pain Management Interventions: medication (see MAR)  Intervention: Provide Person-Centered Care  Recent Flowsheet Documentation  Taken 5/20/2025 0808 by Denisha Saucedo RN  Trust Relationship/Rapport: care explained  Goal: Readiness for Transition of Care  Outcome: Progressing     Problem: Delirium  Goal: Improved Attention and Thought Clarity  Outcome: Progressing  Intervention: Maximize Cognitive Function  Recent Flowsheet Documentation  Taken 5/20/2025 0808 by Denisha Saucedo RN  Sensory Stimulation Regulation:   care clustered   lighting decreased   quiet environment promoted  Reorientation Measures:   clock in view   reorientation provided     Problem: Fall Injury Risk  Goal: Absence of Fall and Fall-Related Injury  Outcome: Progressing  Intervention: Identify and Manage Contributors  Recent Flowsheet Documentation  Taken 5/20/2025 0808 by Denisha Saucedo RN  Medication Review/Management: medications reviewed

## 2025-05-20 NOTE — PROGRESS NOTES
Care Management Follow Up    Length of Stay (days): 9    Expected Discharge Date: 05/22/2025     Concerns to be Addressed: discharge planning     Patient plan of care discussed at interdisciplinary rounds: Yes    Anticipated Discharge Disposition:  TCU     Additional Information:  Patient is not medically ready for discharge today. Message sent to [facility] regarding delay in discharge. Will follow up with facility tomorrow with further updates.     Next Steps: Coordinate discharge to TCU when patient is medically ready for discharge.       Gayle Salazar RN  Care Coordinator  [facility]  [phone]

## 2025-05-20 NOTE — PROGRESS NOTES
[facility]    Medicine Progress Note - Hospitalist Service    Date of Admission:  5/11/2025    Assessment & Plan   Alma Rosa Fishman is a 84-year-old female with a past history of Parkinson's Disease, a recurrent large right pleural effusion and HFpEF presents with syncopal episode, weakness and found to have recurrent right-sided pleural effusion admitted on 5/11.     Recurrent right pleural effusion.    Acute hypoxic respiratory failure.  Moderate pulmonary hypertension.  Possible pneumonia.  Leukocytosis.  Intermittent cough.  Dysphagia patient with underlying Parkinson's disease  -She has ongoing right-sided pleural effusion, had thoracentesis in February and again in April.  - This is thought to be secondary to heart failure.  - Patient has difficulty tolerating diuretics due to or orthostasis and falls.  - On 5/12 she had 1 L removed via ultrasound-guided thoracentesis.  Laboratory studies pending on this fluid analysis but initial cell counts are suggestive of transudative effusion.  -5/18, 1 L pleural fluid removed.  - Continue to monitor, will get chest x-ray PA and lateral on 5/20.  - If it is reaccumulating again, will consider options with patient, family and discussed with IR  - Continue PTA spironolactone  -Echocardiogram shows a dilated RV but no new change in ejection fraction.  -Chest x-ray done today showed central pulmonary venous congestion with perihilar interstitial edema, small right sided pleural effusion.  -Continue to hold Lasix, currently she is not on IV fluids or oral intake.  - EGD was planned for 5/19, postponed to 5/28 and not done due to her change in mental status.    Encephalopathy.  Toxic versus metabolic  Suspect due to medications including cefepime.  Overall has decreased mental status today.  She has been mostly sleepy, is able to wake up but quickly back to sleep.  Suspect possibly mainly due to cefepime, and also other medications.  IV line placed.  At  this time will not place NG tube.  Monitor mental status.  - Continue gabapentin decreased dose to 300 mg at bedtime from 600 mg.  - Hold Zyrtec, discontinue tamsulosin  -Mental status is slightly better today.  She is able to open her eyes, respond quickly to question and goes back to sleep..    Mild hypoglycemia  -This is due to decreased oral intake.  -Blood glucose was low at 62  - Hypoglycemia protocol.  - Started on clears, will advance as tolerated as mental status allows.    CKD stage II:   -Stable continue to monitor renal function.  - Encourage oral intake.  - Avoid nephrotoxic agent    I discussed with her daughter at bedside today, all her questions and concerns addressed.  So overall prognosis is guarded given her underlying significant encephalopathy and overall deterioration.  At this time goal is restorative with limits.  If status changes and patient deteriorates overnight, call family member/ and daughter before escalating any care.      Syncopal episode:   - This is possibly orthostatic.  -She does have a history of Parkinson's disease hence a mechanical fall not excluded.  -PT and OT consult.    Goals of care: Palliative care consulted, patient is DNR/DNI, life-prolonging with limited.  Patient stated she is okay with having EGD to rule out any correctable condition.          Other chronic medical conditions  3. Parkinsons disease: Stable and at her baseline.  - Continue PTA Sinemet  4.  DM type II: Controlled.  Changed her diet to regular per patient request. A1c was 4.8 on 5/11.  Sugars have been stable.  - Continue PTA Jardiance   - On sliding scale  5.  Depression: At baseline.  Continue with PTA BuSpar, Celexa  6.  Malnutrition Diagnosis: Moderate malnutrition in the context of chronic illness   7.  Mild to moderate pHTN  9.  N/V. Complicated h/o gastric stappling, poss liver cirrhosis, Parkinson's and polypharmacy.   [facility] Consulted  10. Small iatrogenic R pneumothorax. Just follow  "up  11. Palliative consult for goal of cares.  Appreciate assistance    12. Elevated D dimer and lung infiltrates. CTA for PE 5/17 negative  CT PE protocol. Given remote hx of allergy, Solumedrol and Benadryl ordered following Radiology protocol   13. Abnormal liver US 4/15/25 at [place].    Disposition:  likely to home, pending improvement.     Diet: Regular diet, started per patient request   DVT Prophylaxis: Pneumatic Compression Devices  Quiroz Catheter: Not present  Lines: None     Cardiac Monitoring: None  Code Status: No CPR- Do NOT Intubate      Clinically Significant Risk Factors          # Hyperchloremia: Highest Cl = 110 mmol/L in last 2 days, will monitor as appropriate          # Hypoalbuminemia: Lowest albumin = 2.4 g/dL at 5/17/2025  1:33 PM, will monitor as appropriate     # Hypertension: Noted on problem list    # Chronic heart failure with preserved ejection fraction: heart failure noted on problem list and last echo with EF >50%           # Obesity: Estimated body mass index is 33.8 kg/m  as calculated from the following:    Height as of this encounter: 1.575 m (5' 2\").    Weight as of this encounter: 83.8 kg (184 lb 12.8 oz).   # Moderate Malnutrition: based on nutrition assessment and treatment provided per dietitian's recommendations.    # Asthma: noted on problem list        Social Drivers of Health    Tobacco Use: Medium Risk (5/19/2025)    Patient History     Smoking Tobacco Use: Former     Smokeless Tobacco Use: Never   Physical Activity: Inactive (5/22/2024)    Received from [facility]    Exercise Vital Sign     Days of Exercise per Week: 0 days     Minutes of Exercise per Session: 0 min   Stress: Stress Concern Present (3/1/2023)    Received from [facility]    [employer] [place] [employer] - Occupational Stress Questionnaire     Feeling of Stress : To some extent   Social Connections: Moderately Isolated (3/1/2023)    Received from [facility]    Social Connection and Isolation " Panel [NHANES]     Frequency of Communication with Friends and Family: More than three times a week     Frequency of Social Gatherings with Friends and Family: Patient declined     Attends Hindu Services: Never     Active Member of Clubs or Organizations: No     Attends Club or Organization Meetings: Never     Marital Status:           Disposition Plan     Medically Ready for Discharge: Anticipated in 2-4 Days    Denny Duarte MD  Hospitalist Service  [facility]  Securely message with [employer] (more info)  Text page via [employer] Paging/Directory   ______________________________________________________________________    Interval History   Patient seen and examined, chart, medications, labs reviewed.  She remains somnolent, encephalopathic but able to wake up to tactile stimuli and respond to questions briefly.  She denied any new symptoms but was not giving any details as she was mostly sleeping.  Reevaluated the patient this afternoon, family were at the bedside, discussed at length with Currently she is on 1.5 L of oxygen, saturating 96%      Physical Exam   Vital Signs: Temp: 98  F (36.7  C) Temp src: Axillary BP: 126/59 Pulse: 68   Resp: 20 SpO2: 98 % O2 Device: Nasal cannula Oxygen Delivery: 1.5 LPM  Weight: 184 lbs 12.8 oz    GEN: Somnolent, sleepy, no agitation, was not able to give any details today.  HEENT:  Normocephalic/atraumatic, no scleral icterus, no nasal discharge, mouth moist.  CV:  Regular rate and rhythm, no murmur or JVD.  S1 + S2 noted, no S3 or S4.  LUNGS: Coarse to auscultation bilaterally with bibasilar crackles.  Symmetric chest rise on inhalation noted.  ABD:  Active bowel sounds, soft, non-tender/non-distended.  No rebound/guarding/rigidity.  EXT: Bilateral LE edema, 1+.  No cyanosis.  No joint synovitis noted.  SKIN:  Dry to touch, no exanthems noted in the visualized areas.         Medical Decision Making       40 MINUTES SPENT BY ME on the date of  service doing chart review, history, exam, documentation & further activities per the note.      Recent Labs   Lab 05/20/25 0632 05/19/25 0638 05/18/25  1332   WBC 13.2* 12.9* 21.6*   HGB 8.6* 9.2* 10.3*   HCT 25.5* 27.0* 30.8*   * 106* 106*    137* 147*     Recent Labs   Lab 05/20/25 1316 05/20/25  1254 05/20/25  1231 05/20/25  0817 05/20/25  0632 05/19/25  0902 05/19/25  0638 05/18/25  1750 05/18/25  1332   NA  --   --   --   --  142  --  139  --  137   POTASSIUM  --   --   --   --  4.1  --  4.5  4.5  --  4.6   CHLORIDE  --   --   --   --  110*  --  108*  --  105   CO2  --   --   --   --  23  --  21*  --  25   ANIONGAP  --   --   --   --  9  --  10  --  7   GLC 93 87 62*   < > 91   < > 77   < > 100*   BUN  --   --   --   --  11.8  --  15.9  --  17.0   CR  --   --   --   --  0.69  --  0.74  --  0.69   GFRESTIMATED  --   --   --   --  86  --  80  --  86   ELAN  --   --   --   --  7.6*  --  7.7*  --  7.9*    < > = values in this interval not displayed.     Recent Labs   Lab 05/20/25 1316 05/20/25 1254 05/20/25  1231 05/20/25  0817 05/20/25  0632   GLC 93 87 62* 83 91       Imaging results reviewed over the past 24 hrs:   No results found for this or any previous visit (from the past 24 hours).

## 2025-05-20 NOTE — PLAN OF CARE
"Goal Outcome Evaluation:      Plan of Care Reviewed With: patient    Overall Patient Progress: no changeOverall Patient Progress: no change  Outcome Evaluation: unable to assesss orientation but Pt Solmnolent through the shift. 1x PRN ativan given for restlessness andpain. q2h repositioned. POCT of 70, 25 mg time dextrose given. Not talking or taking med whole.    Problem: Adult Inpatient Plan of Care  Goal: Plan of Care Review  Description: The Plan of Care Review/Shift note should be completed every shift.  The Outcome Evaluation is a brief statement about your assessment that the patient is improving, declining, or no change.  This information will be displayed automatically on your shiftnote.  Outcome: Progressing  Flowsheets (Taken 5/20/2025 0541)  Outcome Evaluation: unable to assesss orientation but Pt Solmnolent through the shift. 1x PRN ativan given for restlessness andpain. q2h repositioned. POCT of 70, 25 mg time dextrose given. Not talking or taking med whole.  Plan of Care Reviewed With: patient  Overall Patient Progress: no change  Goal: Patient-Specific Goal (Individualized)  Description: You can add care plan individualizations to a care plan. Examples of Individualization might be:  \"Parent requests to be called daily at 9am for status\", \"I have a hard time hearing out of my right ear\", or \"Do not touch me to wake me up as it startlesme\".  Outcome: Progressing  Goal: Absence of Hospital-Acquired Illness or Injury  Outcome: Progressing  Intervention: Identify and Manage Fall Risk  Recent Flowsheet Documentation  Taken 5/20/2025 0044 by Guido Razo, RN  Safety Promotion/Fall Prevention:   safety round/check completed   clutter free environment maintained  Taken 5/19/2025 2141 by Guido Razo, RN  Safety Promotion/Fall Prevention:   safety round/check completed   clutter free environment maintained  Intervention: Prevent Skin Injury  Recent Flowsheet Documentation  Taken 5/20/2025 0044 by " Guido Razo RN  Body Position:   weight shifting   position changed independently   side-lying   left  Skin Protection:   adhesive use limited   incontinence pads utilized   tubing/devices free from skin contact  Taken 5/19/2025 2141 by Guido Razo RN  Body Position:   weight shifting   position changed independently   side-lying   left  Skin Protection:   adhesive use limited   incontinence pads utilized   tubing/devices free from skin contact  Intervention: Prevent Infection  Recent Flowsheet Documentation  Taken 5/20/2025 0044 by Guido Razo RN  Infection Prevention:   equipment surfaces disinfected   hand hygiene promoted   personal protective equipment utilized   rest/sleep promoted   single patient room provided  Taken 5/19/2025 2141 by Guido Razo RN  Infection Prevention:   equipment surfaces disinfected   hand hygiene promoted   personal protective equipment utilized   rest/sleep promoted   single patient room provided  Goal: Optimal Comfort and Wellbeing  Outcome: Progressing  Intervention: Provide Person-Centered Care  Recent Flowsheet Documentation  Taken 5/20/2025 0044 by Guido Razo RN  Trust Relationship/Rapport: care explained  Taken 5/19/2025 2141 by Guido Razo RN  Trust Relationship/Rapport: care explained  Goal: Readiness for Transition of Care  Outcome: Progressing     Problem: Delirium  Goal: Improved Attention and Thought Clarity  Outcome: Progressing  Intervention: Maximize Cognitive Function  Recent Flowsheet Documentation  Taken 5/20/2025 0044 by Guido Razo RN  Sensory Stimulation Regulation:   care clustered   lighting decreased   quiet environment promoted  Reorientation Measures:   clock in view   reorientation provided  Taken 5/19/2025 2141 by Guido Razo RN  Sensory Stimulation Regulation:   care clustered   lighting decreased   quiet environment promoted  Reorientation Measures:   clock in view   reorientation provided     Problem: Fall  Injury Risk  Goal: Absence of Fall and Fall-Related Injury  Outcome: Progressing  Intervention: Identify and Manage Contributors  Recent Flowsheet Documentation  Taken 5/20/2025 0044 by Guido Razo RN  Medication Review/Management: medications reviewed  Taken 5/19/2025 2141 by Guido Razo RN  Medication Review/Management: medications reviewed  Intervention: Promote Injury-Free Environment  Recent Flowsheet Documentation  Taken 5/20/2025 0044 by Guido Razo, RN  Safety Promotion/Fall Prevention:   safety round/check completed   clutter free environment maintained  Taken 5/19/2025 2141 by Guido Razo RN  Safety Promotion/Fall Prevention:   safety round/check completed   clutter free environment maintained

## 2025-05-20 NOTE — PROGRESS NOTES
PALLIATIVE CARE PROGRESS NOTE  [facility]     Patient Name: Alma Rosa Fishman  Date of Admission: 5/11/2025   Today the patient was seen for: Follow-up support      Recommendations & Counseling     GOALS OF CARE:   Life-prolonging with limits  - DNR, DNI  Alma Rosa was awake and alert during my visit. She had minimal verbal responses, but was able to answer most questions appropriately and follow commands  We reviewed her current care, concern for worsening encephalopathy, and need for EGD that is now on hold.   Shared that with any treatment, there will be risks and benefits that may affect overall quality of life. We discussed that if/when disease treatments are no longer effective or when their quality of life is too negatively impacted, patients may elect to focus more solely on quality-of-life measures and stop disease treatment.   Discussed that while continuing to aim for recovery, shared the concern that that her body not allow for the recover that we hoped.   Provided empathetic emotional support as she and family continues to accept nursing and medical cares as deemed necessary, while also recognizing that each person has a threshold for what they are willing to put their body through in order to ideally have more time.   Asked Alma Rosa if she still accepting of her current care including the need for vital signs, labs, and nursing care. She continues to assent to ongoing medical care.   Discussed ongoing dysphagia concerns and need for artifical nutrition for recovery. Specifically shared that artifical nutrition via NG/NJ/PEG does NOT change aspiration risk. She will continue to be at risk for an aspiration event.  She remains disinclined for artifical nutrition      ADVANCE CARE PLANNING:  Advance Directive has been requested.  There is no POLST form on file, defer to patient and/or next of kin for decisions   Code status: No CPR- Do NOT Intubate     MEDICAL MANAGEMENT:   C/o mouth/dental  pain- Biotene ordered. Will add magic mouth wash PRN  Chronic shoulder and low back pain- Tylenol, ice, heat, mobility. Add lidocaine patch     PSYCHOSOCIAL/SPIRITUAL:  Family - 4 adult daughters: Savi, Rachele, Anna, Nan, spouse Vadim.  [place] community: [employer]   Appreciate spiritual care support    Palliative Care will continue to follow. Thank you for the consult and allowing us to aid in the care of Alma Rosa Fishman.      These recommendations have been discussed with primary and bedside teams.    Richmond Begum NP  Securely message with Vocera (more info)  Text page via [facility] Paging/Directory       Chart documentation was completed, in part, with [employer] voice-recognition software. Even though reviewed, some grammatical, spelling, and word errors may remain.         Interval History:     Course reviewed.  Worsening encephalopathy over the last 24 to 48 hours concern that it is medication induced.  Hospital medicine has been adjusting medications and de-escalating antibiotics.  Met with her,  Vadim, and 2 daughters this morning. She is complaining mouth/dental pain and MSK pain. Noted expiratory wheeze but denies dyspnea      Assessment          Alma Rosa Fishman is a 84 year old female with a past medical history of PD, HFpEF, recurrent right pleural effusions, recently diagnosed HFpEF, CKD3, hypertension, hyperlipidemia, frequent falls, s/p loop recorder ([place]) who presented on 5/11/2025 with generalized weakness and fall. Patient also has recent dx of liver mass of unknown etiology, frequent falls (monthly x 10 months), ongoing shortness of breath.     Recent hospital presentations in March and April for similar fall events and dyspnea.     Today, the patient was seen for:  Goals of care  Patient and family discussion                  Review of Systems:     EVAN               Physical Exam:   Temp:  [98  F (36.7  C)-99.7  F (37.6  C)] 98  F (36.7  C)  Pulse:  [68-84] 68  Resp:  [20] 20  BP:  "(126-154)/(54-64) 126/59  SpO2:  [92 %-100 %] 98 %  184 lbs 12.8 oz    Physical Exam  Vitals and nursing note reviewed.   Constitutional:       General: She is sleeping.   HENT:      Mouth/Throat:      Mouth: Mucous membranes are moist.   Pulmonary:      Effort: Pulmonary effort is normal. No respiratory distress.      Breath sounds: Wheezing present.   Abdominal:      General: There is no distension.      Tenderness: There is no abdominal tenderness.   Skin:     General: Skin is warm and dry.   Neurological:      Mental Status: She is lethargic.      Cranial Nerves: No cranial nerve deficit.   Psychiatric:         Mood and Affect: Mood normal.                        Current Problem List:   Active Problems:    Pleural effusion on right    Acute congestive heart failure, unspecified heart failure type (H)    History of UTI      Allergies   Allergen Reactions    Contrast Dye Rash, Anaphylaxis, Hives and Swelling     Tolerated CT chest with contrast 6/2021 after pre-treatment with benadryl and solumedrol  Hives / throat swelling    Doxycycline Anaphylaxis    Acetaminophen      Vicodin/Darvocet/Swelling throat    Albuterol      \"Half my tongue swelled.\"   \"Half my tongue swelled.\"       Atenolol Unknown and Other (See Comments)     PN: LW Reaction: swelling of the tongue  (see [facility] records scanned 3/11/2014)  (see [facility] records scanned 3/11/2014)      Darvocet [Propoxyphene N-Apap]     Fluticasone-Salmeterol      PN: tongue swelling      Hydrocodone-Acetaminophen Swelling     throat      Lisinopril      Tongue Swelling    Metformin Hydrochloride      Glucophage caused severe diarrhea    Morphine      MISAEL    Penicillins      Swelling throat    Percocet [Oxycodone-Acetaminophen]     Neomycin-Bacitracin-Polymyxin Hives and Rash            Data Reviewed:     Results for orders placed or performed during the hospital encounter of 05/11/25 (from the past 24 hours)   Glucose by meter   Result Value Ref Range    GLUCOSE " BY METER POCT 76 70 - 99 mg/dL   Glucose by meter   Result Value Ref Range    GLUCOSE BY METER POCT 84 70 - 99 mg/dL   XR Chest Port 1 View    Narrative    EXAM: XR CHEST PORT 1 VIEW  LOCATION: [facility]  DATE: 5/19/2025    INDICATION: Cough, SOB  COMPARISON: None.      Impression    IMPRESSION: The heart is enlarged. There is central pulmonary venous congestion with perihilar interstitial edema. There is a small right-sided pleural effusion. Surgical clips are seen in the upper abdomen.   Glucose by meter   Result Value Ref Range    GLUCOSE BY METER POCT 75 70 - 99 mg/dL   Glucose by meter   Result Value Ref Range    GLUCOSE BY METER POCT 75 70 - 99 mg/dL   Glucose by meter   Result Value Ref Range    GLUCOSE BY METER POCT 63 (L) 70 - 99 mg/dL   Glucose by meter   Result Value Ref Range    GLUCOSE BY METER POCT 105 (H) 70 - 99 mg/dL   Glucose by meter   Result Value Ref Range    GLUCOSE BY METER POCT 92 70 - 99 mg/dL   Glucose by meter   Result Value Ref Range    GLUCOSE BY METER POCT 83 70 - 99 mg/dL   Glucose by meter   Result Value Ref Range    GLUCOSE BY METER POCT 77 70 - 99 mg/dL   Glucose by meter   Result Value Ref Range    GLUCOSE BY METER POCT 80 70 - 99 mg/dL   Glucose by meter   Result Value Ref Range    GLUCOSE BY METER POCT 78 70 - 99 mg/dL   Glucose by meter   Result Value Ref Range    GLUCOSE BY METER POCT 108 (H) 70 - 99 mg/dL   Magnesium   Result Value Ref Range    Magnesium 1.8 1.7 - 2.3 mg/dL   Phosphorus   Result Value Ref Range    Phosphorus 2.8 2.5 - 4.5 mg/dL   CBC with platelets   Result Value Ref Range    WBC Count 13.2 (H) 4.0 - 11.0 10e3/uL    RBC Count 2.43 (L) 3.80 - 5.20 10e6/uL    Hemoglobin 8.6 (L) 11.7 - 15.7 g/dL    Hematocrit 25.5 (L) 35.0 - 47.0 %     (H) 78 - 100 fL    MCH 35.4 (H) 26.5 - 33.0 pg    MCHC 33.7 31.5 - 36.5 g/dL    RDW 16.4 (H) 10.0 - 15.0 %    Platelet Count 163 150 - 450 10e3/uL   Basic metabolic panel   Result Value Ref Range     Sodium 142 135 - 145 mmol/L    Potassium 4.1 3.4 - 5.3 mmol/L    Chloride 110 (H) 98 - 107 mmol/L    Carbon Dioxide (CO2) 23 22 - 29 mmol/L    Anion Gap 9 7 - 15 mmol/L    Urea Nitrogen 11.8 8.0 - 23.0 mg/dL    Creatinine 0.69 0.51 - 0.95 mg/dL    GFR Estimate 86 >60 mL/min/1.73m2    Calcium 7.6 (L) 8.8 - 10.4 mg/dL    Glucose 91 70 - 99 mg/dL   RBC and Platelet Morphology   Result Value Ref Range    RBC Morphology Confirmed RBC Indices     Platelet Assessment  Automated Count Confirmed. Platelet morphology is normal.     Automated Count Confirmed. Platelet morphology is normal.   Procalcitonin   Result Value Ref Range    Procalcitonin 0.11 <0.50 ng/mL   Glucose by meter   Result Value Ref Range    GLUCOSE BY METER POCT 83 70 - 99 mg/dL         Medical Decision Making       52 MINUTES SPENT BY ME on the date of service doing chart review, history, exam, documentation & further activities per the note.

## 2025-05-20 NOTE — PROGRESS NOTES
"GASTROENTEROLOGY PROGRESS NOTE       SUBJECTIVE:  Pt remains somnolent and does not wake to stimuli.     OBJECTIVE:  /59 (BP Location: Left arm)   Pulse 68   Temp 98  F (36.7  C) (Axillary)   Resp 20   Ht 1.575 m (5' 2\")   Wt 83.8 kg (184 lb 12.8 oz)   SpO2 98%   BMI 33.80 kg/m    Temp (24hrs), Av.5  F (36.9  C), Min:98  F (36.7  C), Max:99.7  F (37.6  C)    Patient Vitals for the past 72 hrs:   Weight   25 0628 83.8 kg (184 lb 12.8 oz)       Intake/Output Summary (Last 24 hours) at 2025 0856  Last data filed at 2025 1837  Gross per 24 hour   Intake 40 ml   Output 525 ml   Net -485 ml        PHYSICAL EXAM     Cardiovascular: Normal  Respiratory: Normal  Gastrointestinal: Abdomen soft, NT        Recent Labs   Lab Test 25  0632 25  0638 25  1332 25  1333 25  0700 25  1628   WBC 13.2* 12.9* 21.6*  --    < >  --    HGB 8.6* 9.2* 10.3*  --    < >  --    * 106* 106*  --    < >  --     137* 147*  --    < >  --    INR  --   --   --  1.01  --  0.97    < > = values in this interval not displayed.     Recent Labs   Lab Test 25  0632 25  0638 25  1332   POTASSIUM 4.1 4.5  4.5 4.6   CHLORIDE 110* 108* 105   CO2 23 * 25   BUN 11.8 15.9 17.0   ANIONGAP 9 10 7     Recent Labs   Lab Test 25  1333 25  1527 25  1132 25  1422 25  1245 25  1554 24  1120 23  1446 21  2210 21  2027 21  1855 21  0005 21  1420   ALBUMIN 2.4*  --  2.8*  --  3.1*   < >  --   --  3.1*   < > 2.9*  --  3.1*   BILITOTAL 0.7  --  0.9  --  0.7   < >  --   --  0.5   < > 0.7  --  1.0   ALT 8  --  7  --  10   < >  --   --  19   < > 37  --  37   AST 42  --  25  --  57*  --   --   --  23   < > 23  --  29   PROTEIN  --  Negative  --  Negative  --   --  Negative   < >  --    < >  --    < >  --    LIPASE  --   --   --   --   --   --   --   --  127  --  279  --  84    < > = values in this " interval not displayed.           Active Problems:  Dysphagia    Assessment: given current mental status, unable to proceed with EGD today. Likely the problems swallowing are related to motility problems. If she improves, another option is an esophagram to exclude stricture.             Catrachito Redd MD  [facility]  Office:  [phone]

## 2025-05-21 ENCOUNTER — APPOINTMENT (OUTPATIENT)
Dept: PHYSICAL THERAPY | Facility: CLINIC | Age: 85
DRG: 291 | End: 2025-05-21
Payer: MEDICARE

## 2025-05-21 LAB
ANION GAP SERPL CALCULATED.3IONS-SCNC: 13 MMOL/L (ref 7–15)
BUN SERPL-MCNC: 11.1 MG/DL (ref 8–23)
BURR CELLS BLD QL SMEAR: ABNORMAL
CALCIUM SERPL-MCNC: 7.6 MG/DL (ref 8.8–10.4)
CHLORIDE SERPL-SCNC: 108 MMOL/L (ref 98–107)
CREAT SERPL-MCNC: 0.63 MG/DL (ref 0.51–0.95)
EGFRCR SERPLBLD CKD-EPI 2021: 88 ML/MIN/1.73M2
ERYTHROCYTE [DISTWIDTH] IN BLOOD BY AUTOMATED COUNT: 16.4 % (ref 10–15)
GLUCOSE BLDC GLUCOMTR-MCNC: 114 MG/DL (ref 70–99)
GLUCOSE BLDC GLUCOMTR-MCNC: 137 MG/DL (ref 70–99)
GLUCOSE BLDC GLUCOMTR-MCNC: 75 MG/DL (ref 70–99)
GLUCOSE BLDC GLUCOMTR-MCNC: 94 MG/DL (ref 70–99)
GLUCOSE SERPL-MCNC: 88 MG/DL (ref 70–99)
HCO3 SERPL-SCNC: 21 MMOL/L (ref 22–29)
HCT VFR BLD AUTO: 28.2 % (ref 35–47)
HGB BLD-MCNC: 9.5 G/DL (ref 11.7–15.7)
HOLD SPECIMEN: NORMAL
MAGNESIUM SERPL-MCNC: 2 MG/DL (ref 1.7–2.3)
MCH RBC QN AUTO: 35.4 PG (ref 26.5–33)
MCHC RBC AUTO-ENTMCNC: 33.7 G/DL (ref 31.5–36.5)
MCV RBC AUTO: 105 FL (ref 78–100)
PHOSPHATE SERPL-MCNC: 2.6 MG/DL (ref 2.5–4.5)
PLAT MORPH BLD: ABNORMAL
PLATELET # BLD AUTO: 181 10E3/UL (ref 150–450)
POTASSIUM SERPL-SCNC: 4.2 MMOL/L (ref 3.4–5.3)
POTASSIUM SERPL-SCNC: 4.2 MMOL/L (ref 3.4–5.3)
RBC # BLD AUTO: 2.68 10E6/UL (ref 3.8–5.2)
RBC MORPH BLD: ABNORMAL
SODIUM SERPL-SCNC: 142 MMOL/L (ref 135–145)
WBC # BLD AUTO: 12.4 10E3/UL (ref 4–11)

## 2025-05-21 PROCEDURE — 250N000013 HC RX MED GY IP 250 OP 250 PS 637: Performed by: INTERNAL MEDICINE

## 2025-05-21 PROCEDURE — 83735 ASSAY OF MAGNESIUM: CPT | Performed by: INTERNAL MEDICINE

## 2025-05-21 PROCEDURE — 999N000040 HC STATISTIC CONSULT NO CHARGE VASC ACCESS

## 2025-05-21 PROCEDURE — 82435 ASSAY OF BLOOD CHLORIDE: CPT | Performed by: INTERNAL MEDICINE

## 2025-05-21 PROCEDURE — 250N000013 HC RX MED GY IP 250 OP 250 PS 637: Performed by: HOSPITALIST

## 2025-05-21 PROCEDURE — 36415 COLL VENOUS BLD VENIPUNCTURE: CPT | Performed by: INTERNAL MEDICINE

## 2025-05-21 PROCEDURE — 250N000013 HC RX MED GY IP 250 OP 250 PS 637: Performed by: NURSE PRACTITIONER

## 2025-05-21 PROCEDURE — 120N000001 HC R&B MED SURG/OB

## 2025-05-21 PROCEDURE — 97530 THERAPEUTIC ACTIVITIES: CPT | Mod: GP | Performed by: PHYSICAL THERAPIST

## 2025-05-21 PROCEDURE — 250N000011 HC RX IP 250 OP 636: Performed by: NURSE PRACTITIONER

## 2025-05-21 PROCEDURE — 99232 SBSQ HOSP IP/OBS MODERATE 35: CPT | Performed by: INTERNAL MEDICINE

## 2025-05-21 PROCEDURE — 84132 ASSAY OF SERUM POTASSIUM: CPT | Performed by: INTERNAL MEDICINE

## 2025-05-21 PROCEDURE — 84100 ASSAY OF PHOSPHORUS: CPT | Performed by: INTERNAL MEDICINE

## 2025-05-21 PROCEDURE — 99232 SBSQ HOSP IP/OBS MODERATE 35: CPT | Performed by: NURSE PRACTITIONER

## 2025-05-21 PROCEDURE — 250N000011 HC RX IP 250 OP 636: Performed by: INTERNAL MEDICINE

## 2025-05-21 PROCEDURE — 85027 COMPLETE CBC AUTOMATED: CPT | Performed by: INTERNAL MEDICINE

## 2025-05-21 RX ADMIN — IBUPROFEN 600 MG: 600 TABLET ORAL at 13:29

## 2025-05-21 RX ADMIN — Medication 1 SPRAY: at 22:13

## 2025-05-21 RX ADMIN — PANTOPRAZOLE SODIUM 40 MG: 40 INJECTION, POWDER, FOR SOLUTION INTRAVENOUS at 10:33

## 2025-05-21 RX ADMIN — Medication 1 SPRAY: at 10:35

## 2025-05-21 RX ADMIN — CEFTRIAXONE 2 G: 2 INJECTION, POWDER, FOR SOLUTION INTRAMUSCULAR; INTRAVENOUS at 18:20

## 2025-05-21 RX ADMIN — BUSPIRONE HYDROCHLORIDE 5 MG: 5 TABLET ORAL at 18:27

## 2025-05-21 RX ADMIN — Medication 1 SPRAY: at 18:17

## 2025-05-21 RX ADMIN — CARBIDOPA AND LEVODOPA 2 TABLET: 25; 100 TABLET ORAL at 10:34

## 2025-05-21 RX ADMIN — Medication 125 MCG: at 10:33

## 2025-05-21 RX ADMIN — BUSPIRONE HYDROCHLORIDE 5 MG: 5 TABLET ORAL at 21:51

## 2025-05-21 RX ADMIN — GABAPENTIN 300 MG: 300 CAPSULE ORAL at 21:51

## 2025-05-21 RX ADMIN — Medication 1 TABLET: at 12:37

## 2025-05-21 RX ADMIN — BUSPIRONE HYDROCHLORIDE 5 MG: 5 TABLET ORAL at 10:34

## 2025-05-21 RX ADMIN — SPIRONOLACTONE 25 MG: 25 TABLET, FILM COATED ORAL at 10:34

## 2025-05-21 RX ADMIN — CLINDAMYCIN PALMITATE HYDROCHLORIDE 300 MG: 75 GRANULE, FOR SOLUTION ORAL at 18:28

## 2025-05-21 RX ADMIN — CLINDAMYCIN PALMITATE HYDROCHLORIDE 300 MG: 75 GRANULE, FOR SOLUTION ORAL at 12:34

## 2025-05-21 RX ADMIN — CITALOPRAM HYDROBROMIDE 20 MG: 20 TABLET ORAL at 10:34

## 2025-05-21 RX ADMIN — LIDOCAINE 1 PATCH: 4 PATCH TOPICAL at 10:42

## 2025-05-21 RX ADMIN — ATORVASTATIN CALCIUM 40 MG: 40 TABLET, FILM COATED ORAL at 10:33

## 2025-05-21 RX ADMIN — CLINDAMYCIN PALMITATE HYDROCHLORIDE 300 MG: 75 GRANULE, FOR SOLUTION ORAL at 21:51

## 2025-05-21 RX ADMIN — ASPIRIN 325 MG: 325 TABLET, COATED ORAL at 10:34

## 2025-05-21 RX ADMIN — Medication 1 SPRAY: at 12:34

## 2025-05-21 RX ADMIN — CARBIDOPA AND LEVODOPA 2 TABLET: 25; 100 TABLET ORAL at 21:51

## 2025-05-21 RX ADMIN — CARBIDOPA AND LEVODOPA 2 TABLET: 25; 100 TABLET ORAL at 18:20

## 2025-05-21 RX ADMIN — VIBEGRON 75 MG: 75 TABLET, FILM COATED ORAL at 21:51

## 2025-05-21 RX ADMIN — CLINDAMYCIN PALMITATE HYDROCHLORIDE 300 MG: 75 GRANULE, FOR SOLUTION ORAL at 10:41

## 2025-05-21 ASSESSMENT — ACTIVITIES OF DAILY LIVING (ADL)
ADLS_ACUITY_SCORE: 63
ADLS_ACUITY_SCORE: 59
ADLS_ACUITY_SCORE: 63
ADLS_ACUITY_SCORE: 59
ADLS_ACUITY_SCORE: 63
ADLS_ACUITY_SCORE: 59
ADLS_ACUITY_SCORE: 63
ADLS_ACUITY_SCORE: 63
ADLS_ACUITY_SCORE: 59
ADLS_ACUITY_SCORE: 63
ADLS_ACUITY_SCORE: 59
ADLS_ACUITY_SCORE: 63
ADLS_ACUITY_SCORE: 59
ADLS_ACUITY_SCORE: 63

## 2025-05-21 NOTE — PROGRESS NOTES
Care Management Follow Up    Length of Stay (days): 10    Expected Discharge Date: 05/22/2025     Concerns to be Addressed: discharge planning     Patient plan of care discussed at interdisciplinary rounds: Yes    Anticipated Discharge Disposition:  TCU    Patient/family educated on Medicare website which has current facility and service quality ratings:  yes  Education Provided on the Discharge Plan:  yes  Patient/Family in Agreement with the Plan:  yes    Referrals Placed by CM/SW:  TCU  Private pay costs discussed: Not applicable    Additional Information:  Patient should be ready for discharge likely tomorrow 5/22/25. Palliative care is following and pt's spouse is not ready for hospice cares. Palliative will continue to follow patient as outpatient. [facility] for patient tomorrow. Waiting to hear back from facility still whether they will allow patient to use home purewick system at TCU. Will update patient/spouse when this is known. Spouse plans to provide transportation to TCU. TCU would like patient to arrive between 11-3 on day of admit. Senior linkage line (PAS) updated with new potential admit date of 5/19.      Next Steps: Coordinate discharge to TCU when ready for discharge.          ADDENDUM 1300: Writer met with patient, spouse Vadim, and dtr at bedside to discuss discharge plan. Patient/family are still on board with discharge tomorrow to [facility] TCU. Patient was at this facility before, had a great experience, and is excited about returning. Spouse did explore other TCU options today ( [facility], [facility], [facility]) per family's request to be closer but ultimately decided to stick with [facility] for TCU stay.     Spouse is unsure about his ability to transport patient to TCU tomorrow. Spouse agrees to set up [facility] w/c ride today and to cancel it if he feels like he can drive patient himself tomorrow. Reviewed out  of pocket cost for [facility] transport, $99.20 base and $6.38 per mile to the destination. Patient and spouse Vadim expressed understanding and are agreeable to this. [facility] w/c ride has been arranged for  tomorrow 5/22 between [id number]. This can be canceled if Vadim decided to transport.     Update from TCU, the patient will not be able to bring and use home purewick device for use at facility. Spouse Vadim updated.         Gayle Salazar, RN  Care Coordinator  [facility]   [facility]  [phone]

## 2025-05-21 NOTE — PROGRESS NOTES
PALLIATIVE CARE PROGRESS NOTE  [facility]     Patient Name: Alma Rosa Fishman  Date of Admission: 5/11/2025   Today the patient was seen for: Follow-up support      Recommendations & Counseling     GOALS OF CARE:   Life-prolonging with limits  - DNR, DNI  Alma Rosa was awake and alert during my visit. She had minimal verbal responses, but was able to answer most questions appropriately and follow commands  We reviewed her current care, concern for worsening encephalopathy, and need for EGD that is now on hold.   Shared that with any treatment, there will be risks and benefits that may affect overall quality of life. We discussed that if/when disease treatments are no longer effective or when their quality of life is too negatively impacted, patients may elect to focus more solely on quality-of-life measures and stop disease treatment.   Discussed that while continuing to aim for recovery, shared the concern that that her body not allow for the recover that we hoped.   Provided empathetic emotional support as she and family continues to accept nursing and medical cares as deemed necessary, while also recognizing that each person has a threshold for what they are willing to put their body through in order to ideally have more time.   Asked Alma Rosa if she still accepting of her current care including the need for vital signs, labs, and nursing care. She continues to assent to ongoing medical care.   Discussed ongoing dysphagia concerns and need for artifical nutrition for recovery. Specifically shared that artifical nutrition via NG/NJ/PEG does NOT change aspiration risk. She will continue to be at risk for an aspiration event.  She remains disinclined for artifical nutrition      ADVANCE CARE PLANNING:  Advance Directive has been requested.  There is no POLST form on file, defer to patient and/or next of kin for decisions   Code status: No CPR- Do NOT Intubate     MEDICAL MANAGEMENT:   C/o mouth/dental  pain- Biotene ordered. Will add magic mouth wash PRN  Chronic shoulder and low back pain- Tylenol, ice, heat, mobility. Add lidocaine patch     PSYCHOSOCIAL/SPIRITUAL:  Family - 4 adult daughters: Rachele Hou, Anna, Nan, spouse Vadim.  [place] community: [facility]   Appreciate spiritual care support    Palliative Care will continue to follow. Thank you for the consult and allowing us to aid in the care of Alma Rosa Fishman.      These recommendations have been discussed with primary and bedside teams.    Richmond Begum NP  Securely message with Vocera (more info)  Text page via [facility] Paging/Directory       Chart documentation was completed, in part, with [employer] voice-recognition software. Even though reviewed, some grammatical, spelling, and word errors may remain.         Interval History:     Course reviewed.  Mentation improved. Met with daughter (#4) and Alma Rosa in her room. No acute concerns. Feels that she is close to who she normally is. Likely discharge to TCU tomorrow if bed available and she continues to improve.      Assessment          Alma Rosa Fishman is a 84 year old female with a past medical history of PD, HFpEF, recurrent right pleural effusions, recently diagnosed HFpEF, CKD3, hypertension, hyperlipidemia, frequent falls, s/p loop recorder ([place]) who presented on 5/11/2025 with generalized weakness and fall. Patient also has recent dx of liver mass of unknown etiology, frequent falls (monthly x 10 months), ongoing shortness of breath.     Recent hospital presentations in March and April for similar fall events and dyspnea.     Today, the patient was seen for:  Goals of care  Patient and family discussion                  Review of Systems:     ROS reviewed. No acute concerns                Physical Exam:   Temp:  [97.8  F (36.6  C)-98.3  F (36.8  C)] 98.3  F (36.8  C)  Pulse:  [64-71] 64  Resp:  [16-18] 18  BP: (124-167)/(61-79) 161/61  SpO2:  [94 %-99 %] 97 %  177 lbs 12.8 oz    Physical  "Exam  Vitals and nursing note reviewed.   Constitutional:       General: She is sleeping.   HENT:      Mouth/Throat:      Mouth: Mucous membranes are moist.   Pulmonary:      Effort: Pulmonary effort is normal. No respiratory distress.      Breath sounds: No wheezing.   Abdominal:      General: There is no distension.      Tenderness: There is no abdominal tenderness.   Skin:     General: Skin is warm and dry.   Neurological:      Mental Status: She is lethargic.      Cranial Nerves: No cranial nerve deficit.   Psychiatric:         Mood and Affect: Mood normal.         Thought Content: Thought content normal.                        Current Problem List:   Active Problems:    Pleural effusion on right    Acute congestive heart failure, unspecified heart failure type (H)    History of UTI      Allergies   Allergen Reactions    Contrast Dye Rash, Anaphylaxis, Hives and Swelling     Tolerated CT chest with contrast 6/2021 after pre-treatment with benadryl and solumedrol  Hives / throat swelling    Doxycycline Anaphylaxis    Acetaminophen      Vicodin/Darvocet/Swelling throat    Albuterol      \"Half my tongue swelled.\"   \"Half my tongue swelled.\"       Atenolol Unknown and Other (See Comments)     PN: LW Reaction: swelling of the tongue  (see [facility] records scanned 3/11/2014)  (see [facility] records scanned 3/11/2014)      Darvocet [Propoxyphene N-Apap]     Fluticasone-Salmeterol      PN: tongue swelling      Hydrocodone-Acetaminophen Swelling     throat      Lisinopril      Tongue Swelling    Metformin Hydrochloride      Glucophage caused severe diarrhea    Morphine      MISAEL    Penicillins      Swelling throat    Percocet [Oxycodone-Acetaminophen]     Neomycin-Bacitracin-Polymyxin Hives and Rash            Data Reviewed:     Results for orders placed or performed during the hospital encounter of 05/11/25 (from the past 24 hours)   Glucose by meter   Result Value Ref Range    GLUCOSE BY METER POCT 62 (L) 70 - 99 mg/dL "   Glucose by meter   Result Value Ref Range    GLUCOSE BY METER POCT 87 70 - 99 mg/dL   Glucose by meter   Result Value Ref Range    GLUCOSE BY METER POCT 93 70 - 99 mg/dL   CT Facial Bones without Contrast    Narrative    EXAM: CT FACIAL BONES WITHOUT CONTRAST  LOCATION: [facility]  DATE: 5/20/2025    INDICATION: facial pain  COMPARISON: None.  TECHNIQUE: Routine CT Maxillofacial without IV contrast. Multiplanar reformats. Dose reduction techniques were used.     FINDINGS:  Multifocal dental carious disease. Nasal bones are intact. Orbital rims are intact. Zygomatic arch complexes are preserved. Nasal bones are intact. Paranasal sinuses are without air-fluid levels. No acute fracture. Mandible and mandibular condyles are   intact. Multilevel degenerative changes in the upper cervical spine.      Impression    IMPRESSION:   1.  No acute facial fracture. Scattered mucosal sinus disease in the paranasal sinuses. No fluid levels.     Glucose by meter   Result Value Ref Range    GLUCOSE BY METER POCT 84 70 - 99 mg/dL   Glucose by meter   Result Value Ref Range    GLUCOSE BY METER POCT 117 (H) 70 - 99 mg/dL   Glucose by meter   Result Value Ref Range    GLUCOSE BY METER POCT 114 (H) 70 - 99 mg/dL   Potassium   Result Value Ref Range    Potassium 4.2 3.4 - 5.3 mmol/L   Magnesium   Result Value Ref Range    Magnesium 2.0 1.7 - 2.3 mg/dL   Phosphorus   Result Value Ref Range    Phosphorus 2.6 2.5 - 4.5 mg/dL   Extra Purple Top EDTA (LAB USE ONLY)   Result Value Ref Range    Hold Specimen [facility]    Basic metabolic panel   Result Value Ref Range    Sodium 142 135 - 145 mmol/L    Potassium 4.2 3.4 - 5.3 mmol/L    Chloride 108 (H) 98 - 107 mmol/L    Carbon Dioxide (CO2) 21 (L) 22 - 29 mmol/L    Anion Gap 13 7 - 15 mmol/L    Urea Nitrogen 11.1 8.0 - 23.0 mg/dL    Creatinine 0.63 0.51 - 0.95 mg/dL    GFR Estimate 88 >60 mL/min/1.73m2    Calcium 7.6 (L) 8.8 - 10.4 mg/dL    Glucose 88 70 - 99 mg/dL   CBC with  platelets   Result Value Ref Range    WBC Count 12.4 (H) 4.0 - 11.0 10e3/uL    RBC Count 2.68 (L) 3.80 - 5.20 10e6/uL    Hemoglobin 9.5 (L) 11.7 - 15.7 g/dL    Hematocrit 28.2 (L) 35.0 - 47.0 %     (H) 78 - 100 fL    MCH 35.4 (H) 26.5 - 33.0 pg    MCHC 33.7 31.5 - 36.5 g/dL    RDW 16.4 (H) 10.0 - 15.0 %    Platelet Count 181 150 - 450 10e3/uL   Glucose by meter   Result Value Ref Range    GLUCOSE BY METER POCT 75 70 - 99 mg/dL         Medical Decision Making       36 MINUTES SPENT BY ME on the date of service doing chart review, history, exam, documentation & further activities per the note.

## 2025-05-21 NOTE — PROGRESS NOTES
[facility]    Medicine Progress Note - Hospitalist Service    Date of Admission:  5/11/2025    Assessment & Plan   Alma Rosa Fishman is a 84-year-old female with a past history of Parkinson's Disease, a recurrent large right pleural effusion and HFpEF presents with syncopal episode, weakness and found to have recurrent right-sided pleural effusion admitted on 5/11.     Recurrent right pleural effusion.    Acute hypoxic respiratory failure.  Moderate pulmonary hypertension.  Possible pneumonia.  Leukocytosis.  Intermittent cough.  Dysphagia patient with underlying Parkinson's disease  -She has ongoing right-sided pleural effusion, had thoracentesis in February and again in April.  - This is thought to be secondary to heart failure.  - Patient has difficulty tolerating diuretics due to or orthostasis and falls.  - On 5/12 she had 1 L removed via ultrasound-guided thoracentesis.  Laboratory studies pending on this fluid analysis but initial cell counts are suggestive of transudative effusion.  -5/18, 1 L pleural fluid removed.  - Continue to monitor, will get chest x-ray PA and lateral on 5/20.  - If it is reaccumulating again, will consider options with patient, family and discussed with IR  - Continue PTA spironolactone  -Echocardiogram shows a dilated RV but no new change in ejection fraction.  -Chest x-ray done today showed central pulmonary venous congestion with perihilar interstitial edema, small right sided pleural effusion.  -Continue to hold Lasix, currently she is not on IV fluids or oral intake.  - EGD was planned for 5/19, postponed to 5/28 and not done due to her change in mental status and completely canceled.  - If she has recurrent pleural effusion, will consider pleurodesis and will discuss with IR regarding this if she needs another thoracentesis.    Toxic versus metabolic encephalopathy.  Improving  Is likely due to medications including cefepime.  Her mental status improved  after stopping cefepime and decreasing doses of gabapentin as well as stopping Zyrtec's and tamsulosin .  She is more awake and alert today, tolerating liquid diet.  Suspect possibly mainly due to cefepime, and also other medications.  IV line placed.  Continue to monitor mental status.  -Continue gabapentin at decreased dose to 300 mg at bedtime from 600 mg.  -Held Zyrtec, discontinued tamsulosin  -Mental status is slightly better today.  She is able to open her eyes, respond quickly to question and goes back to sleep..    Mild hypoglycemia  - Initially suspected due to decreased oral intake, patient started liquid diet .  - Advance diet as tolerated to mechanical soft dental  - Hypoglycemia protocol.      CKD stage II: Stable  -Stable continue to monitor renal function.  - Encourage oral intake.  - Avoid nephrotoxic agent.    Dental caries  Aphthous ulcer  Oral pain  -History of dental caries, CT scan done there is no sign of abscess.  - Magic mouthwash.  - Pain medications.  - Will need dental follow-up as an outpatient.    Syncopal episode:   - This is possibly orthostatic.  -She does have a history of Parkinson's disease hence a mechanical fall not excluded.  -PT and OT consult.    Goals of care: Palliative care consulted, patient is DNR/DNI, life-prolonging with limited.  Discussed with palliative team.      I discussed with her daughter at bedside at length, I also discussed with patient as she is more awake and alert and concerns addressed.  She seemed to be better today, due to her underlying chronic medical conditions the prognosis remains guarded.  At this time goal is restorative with limits.  If her status changes and patient deteriorates, call family member/ and daughter before escalating any care.        Other chronic medical conditions  3. Parkinsons disease: Stable and at her baseline.  - Continue PTA Sinemet  4.  DM type II: Controlled.  Changed her diet to regular per patient request. A1c  "was 4.8 on 5/11.  Sugars have been stable.  - Continue PTA Jardiance   - On sliding scale  5.  Depression: At baseline.  Continue with PTA BuSpar, Celexa  6.  Malnutrition Diagnosis: Moderate malnutrition in the context of chronic illness   7.  Mild to moderate pHTN  9.  N/V. Complicated h/o gastric stappling, poss liver cirrhosis, Parkinson's and polypharmacy.   [facility] Consulted  10. Small iatrogenic R pneumothorax. Just follow up  11. Palliative consult for goal of cares.  Appreciate assistance    12. Elevated D dimer and lung infiltrates. CTA for PE 5/17 negative  CT PE protocol. Given remote hx of allergy, Solumedrol and Benadryl ordered following Radiology protocol   13. Abnormal liver US 4/15/25 at [facility].    Disposition:  likely to home, pending improvement.     Diet: Regular diet, started per patient request   DVT Prophylaxis: Pneumatic Compression Devices  Quiroz Catheter: Not present  Lines: None     Cardiac Monitoring: None  Code Status: No CPR- Do NOT Intubate      Clinically Significant Risk Factors          # Hyperchloremia: Highest Cl = 110 mmol/L in last 2 days, will monitor as appropriate          # Hypoalbuminemia: Lowest albumin = 2.4 g/dL at 5/17/2025  1:33 PM, will monitor as appropriate     # Hypertension: Noted on problem list    # Chronic heart failure with preserved ejection fraction: heart failure noted on problem list and last echo with EF >50%           # Obesity: Estimated body mass index is 32.52 kg/m  as calculated from the following:    Height as of this encounter: 1.575 m (5' 2\").    Weight as of this encounter: 80.6 kg (177 lb 12.8 oz).   # Moderate Malnutrition: based on nutrition assessment and treatment provided per dietitian's recommendations.    # Asthma: noted on problem list        Social Drivers of Health    Tobacco Use: Medium Risk (5/19/2025)    Patient History     Smoking Tobacco Use: Former     Smokeless Tobacco Use: Never   Physical Activity: Inactive (5/22/2024)    Received " from [facility]    Exercise Vital Sign     Days of Exercise per Week: 0 days     Minutes of Exercise per Session: 0 min   Stress: Stress Concern Present (3/1/2023)    Received from [facility]    [employer] [place] [employer] - Occupational Stress Questionnaire     Feeling of Stress : To some extent   Social Connections: Moderately Isolated (3/1/2023)    Received from [facility]    Social Connection and Isolation Panel [NHANES]     Frequency of Communication with Friends and Family: More than three times a week     Frequency of Social Gatherings with Friends and Family: Patient declined     Attends Orthodoxy Services: Never     Active Member of Clubs or Organizations: No     Attends Club or Organization Meetings: Never     Marital Status:           Disposition Plan     Medically Ready for Discharge: Anticipated in 2-4 Days    Denny Duarte MD  Hospitalist Service  [facility]  Securely message with [employer] (more info)  Text page via [facility] Paging/Directory   ______________________________________________________________________    Interval History   Patient seen and examined, chart, medications, labs reviewed.  She is more awake and alert, mental status is clearing, her daughter at bedside.  Patient tolerating liquid diet, advancing her diet slowly as tolerated to mechanical soft diet.  She has oral discomfort otherwise no other complaints.  Currently on 2 L of oxygen saturating 97%.      Physical Exam   Vital Signs: Temp: 98.3  F (36.8  C) Temp src: Oral BP: (!) 161/61 Pulse: 64   Resp: 18 SpO2: 97 % O2 Device: Nasal cannula Oxygen Delivery: 2 LPM  Weight: 177 lbs 12.8 oz    GEN: Somnolent, sleepy, no agitation, was not able to give any details today.  HEENT:  Normocephalic/atraumatic, no scleral icterus, no nasal discharge, mouth moist.  CV:  Regular rate and rhythm, no murmur or JVD.  S1 + S2 noted, no S3 or S4.  LUNGS: Coarse to auscultation bilaterally with  bibasilar crackles.  Symmetric chest rise on inhalation noted.  ABD:  Active bowel sounds, soft, non-tender/non-distended.  No rebound/guarding/rigidity.  EXT: Bilateral LE edema, 1+.  No cyanosis.  No joint synovitis noted.  SKIN:  Dry to touch, no exanthems noted in the visualized areas.         Medical Decision Making       40 MINUTES SPENT BY ME on the date of service doing chart review, history, exam, documentation & further activities per the note.      Recent Labs   Lab 05/21/25  0708 05/20/25  0632 05/19/25  0638   WBC 12.4* 13.2* 12.9*   HGB 9.5* 8.6* 9.2*   HCT 28.2* 25.5* 27.0*   * 105* 106*    163 137*     Recent Labs   Lab 05/21/25  0956 05/21/25  0708 05/21/25 0210 05/20/25  0817 05/20/25  0632 05/19/25  0902 05/19/25  0638   NA  --  142  --   --  142  --  139   POTASSIUM  --  4.2  4.2  --   --  4.1  --  4.5  4.5   CHLORIDE  --  108*  --   --  110*  --  108*   CO2  --  21*  --   --  23  --  21*   ANIONGAP  --  13  --   --  9  --  10   GLC 75 88 114*   < > 91   < > 77   BUN  --  11.1  --   --  11.8  --  15.9   CR  --  0.63  --   --  0.69  --  0.74   GFRESTIMATED  --  88  --   --  86  --  80   ELAN  --  7.6*  --   --  7.6*  --  7.7*    < > = values in this interval not displayed.     Recent Labs   Lab 05/21/25  0956 05/21/25  0708 05/21/25  0210 05/20/25  2230 05/20/25  1742   GLC 75 88 114* 117* 84       Imaging results reviewed over the past 24 hrs:   Recent Results (from the past 24 hours)   CT Facial Bones without Contrast    Narrative    EXAM: CT FACIAL BONES WITHOUT CONTRAST  LOCATION: [facility]  DATE: 5/20/2025    INDICATION: facial pain  COMPARISON: None.  TECHNIQUE: Routine CT Maxillofacial without IV contrast. Multiplanar reformats. Dose reduction techniques were used.     FINDINGS:  Multifocal dental carious disease. Nasal bones are intact. Orbital rims are intact. Zygomatic arch complexes are preserved. Nasal bones are intact. Paranasal sinuses are  without air-fluid levels. No acute fracture. Mandible and mandibular condyles are   intact. Multilevel degenerative changes in the upper cervical spine.      Impression    IMPRESSION:   1.  No acute facial fracture. Scattered mucosal sinus disease in the paranasal sinuses. No fluid levels.

## 2025-05-21 NOTE — PLAN OF CARE
"Goal Outcome Evaluation:      Plan of Care Reviewed With: patient, family    Overall Patient Progress: improvingOverall Patient Progress: improving    Outcome Evaluation: Alert and oriented to self. tolerated P.O clear liquid diet and P.O meds. remains on oxygen overnight.        Problem: Adult Inpatient Plan of Care  Goal: Plan of Care Review  Description: The Plan of Care Review/Shift note should be completed every shift.  The Outcome Evaluation is a brief statement about your assessment that the patient is improving, declining, or no change.  This information will be displayed automatically on your shiftnote.  Outcome: Progressing  Flowsheets (Taken 5/21/2025 0757)  Outcome Evaluation: Alert and oriented to self. tolerated P.O clear liquid diet and P.O meds. remains on oxygen overnight.  Plan of Care Reviewed With:   patient   family  Overall Patient Progress: improving  Goal: Patient-Specific Goal (Individualized)  Description: You can add care plan individualizations to a care plan. Examples of Individualization might be:  \"Parent requests to be called daily at 9am for status\", \"I have a hard time hearing out of my right ear\", or \"Do not touch me to wake me up as it startlesme\".  Outcome: Progressing  Goal: Absence of Hospital-Acquired Illness or Injury  Outcome: Progressing  Intervention: Identify and Manage Fall Risk  Recent Flowsheet Documentation  Taken 5/21/2025 0147 by Guido Razo RN  Safety Promotion/Fall Prevention: safety round/check completed  Taken 5/20/2025 2233 by Guido Razo RN  Safety Promotion/Fall Prevention: safety round/check completed  Intervention: Prevent Skin Injury  Recent Flowsheet Documentation  Taken 5/21/2025 0147 by Guido Razo RN  Body Position:   turned   heels elevated   weight shifting   left  Skin Protection:   adhesive use limited   incontinence pads utilized   tubing/devices free from skin contact  Taken 5/20/2025 2233 by Guido Razo RN  Body " Position:   turned   heels elevated   weight shifting   left  Skin Protection:   adhesive use limited   incontinence pads utilized   tubing/devices free from skin contact  Intervention: Prevent and Manage VTE (Venous Thromboembolism) Risk  Recent Flowsheet Documentation  Taken 5/21/2025 0147 by Guido Razo RN  VTE Prevention/Management: SCDs on (sequential compression devices)  Taken 5/20/2025 2233 by Guido Razo RN  VTE Prevention/Management: SCDs on (sequential compression devices)  Intervention: Prevent Infection  Recent Flowsheet Documentation  Taken 5/21/2025 0147 by Guido Razo RN  Infection Prevention:   equipment surfaces disinfected   hand hygiene promoted   personal protective equipment utilized   rest/sleep promoted   single patient room provided  Taken 5/20/2025 2233 by Guido Razo RN  Infection Prevention:   equipment surfaces disinfected   hand hygiene promoted   personal protective equipment utilized   rest/sleep promoted   single patient room provided  Goal: Optimal Comfort and Wellbeing  Outcome: Progressing  Intervention: Provide Person-Centered Care  Recent Flowsheet Documentation  Taken 5/21/2025 0147 by Guido Razo RN  Trust Relationship/Rapport: care explained  Taken 5/20/2025 2233 by Guido Razo RN  Trust Relationship/Rapport: care explained  Goal: Readiness for Transition of Care  Outcome: Progressing     Problem: Delirium  Goal: Improved Attention and Thought Clarity  Outcome: Progressing     Problem: Fall Injury Risk  Goal: Absence of Fall and Fall-Related Injury  Outcome: Progressing  Intervention: Identify and Manage Contributors  Recent Flowsheet Documentation  Taken 5/21/2025 0147 by Guido Razo RN  Medication Review/Management: medications reviewed  Taken 5/20/2025 2233 by Guido Razo RN  Medication Review/Management: medications reviewed  Intervention: Promote Injury-Free Environment  Recent Flowsheet Documentation  Taken 5/21/2025 0147  by Guido Razo, RN  Safety Promotion/Fall Prevention: safety round/check completed  Taken 5/20/2025 2233 by Guido Razo, RN  Safety Promotion/Fall Prevention: safety round/check completed

## 2025-05-22 ENCOUNTER — APPOINTMENT (OUTPATIENT)
Dept: PHYSICAL THERAPY | Facility: CLINIC | Age: 85
DRG: 291 | End: 2025-05-22
Payer: MEDICARE

## 2025-05-22 ENCOUNTER — APPOINTMENT (OUTPATIENT)
Dept: ULTRASOUND IMAGING | Facility: CLINIC | Age: 85
DRG: 291 | End: 2025-05-22
Attending: INTERNAL MEDICINE
Payer: MEDICARE

## 2025-05-22 ENCOUNTER — APPOINTMENT (OUTPATIENT)
Dept: GENERAL RADIOLOGY | Facility: CLINIC | Age: 85
DRG: 291 | End: 2025-05-22
Attending: INTERNAL MEDICINE
Payer: MEDICARE

## 2025-05-22 VITALS
HEART RATE: 78 BPM | TEMPERATURE: 98.3 F | HEIGHT: 62 IN | BODY MASS INDEX: 32.33 KG/M2 | SYSTOLIC BLOOD PRESSURE: 133 MMHG | OXYGEN SATURATION: 92 % | RESPIRATION RATE: 18 BRPM | WEIGHT: 175.7 LBS | DIASTOLIC BLOOD PRESSURE: 46 MMHG

## 2025-05-22 LAB
BACTERIA PLR CULT: NORMAL
GLUCOSE BLDC GLUCOMTR-MCNC: 104 MG/DL (ref 70–99)
GLUCOSE BLDC GLUCOMTR-MCNC: 114 MG/DL (ref 70–99)
GLUCOSE BLDC GLUCOMTR-MCNC: 86 MG/DL (ref 70–99)
GRAM STAIN RESULT: NORMAL
GRAM STAIN RESULT: NORMAL
HOLD SPECIMEN: NORMAL
MAGNESIUM SERPL-MCNC: 2.1 MG/DL (ref 1.7–2.3)
PHOSPHATE SERPL-MCNC: 2.8 MG/DL (ref 2.5–4.5)
POTASSIUM SERPL-SCNC: 4.2 MMOL/L (ref 3.4–5.3)

## 2025-05-22 PROCEDURE — 99232 SBSQ HOSP IP/OBS MODERATE 35: CPT | Performed by: INTERNAL MEDICINE

## 2025-05-22 PROCEDURE — 250N000013 HC RX MED GY IP 250 OP 250 PS 637: Performed by: HOSPITALIST

## 2025-05-22 PROCEDURE — 250N000011 HC RX IP 250 OP 636: Performed by: NURSE PRACTITIONER

## 2025-05-22 PROCEDURE — 84132 ASSAY OF SERUM POTASSIUM: CPT | Performed by: INTERNAL MEDICINE

## 2025-05-22 PROCEDURE — 120N000001 HC R&B MED SURG/OB

## 2025-05-22 PROCEDURE — 250N000011 HC RX IP 250 OP 636: Performed by: INTERNAL MEDICINE

## 2025-05-22 PROCEDURE — 84100 ASSAY OF PHOSPHORUS: CPT | Performed by: INTERNAL MEDICINE

## 2025-05-22 PROCEDURE — 250N000013 HC RX MED GY IP 250 OP 250 PS 637: Performed by: NURSE PRACTITIONER

## 2025-05-22 PROCEDURE — 97530 THERAPEUTIC ACTIVITIES: CPT | Mod: GP | Performed by: PHYSICAL THERAPIST

## 2025-05-22 PROCEDURE — 0W993ZZ DRAINAGE OF RIGHT PLEURAL CAVITY, PERCUTANEOUS APPROACH: ICD-10-PCS | Performed by: RADIOLOGY

## 2025-05-22 PROCEDURE — 250N000013 HC RX MED GY IP 250 OP 250 PS 637: Performed by: INTERNAL MEDICINE

## 2025-05-22 PROCEDURE — 272N000706 US THORACENTESIS

## 2025-05-22 PROCEDURE — 71045 X-RAY EXAM CHEST 1 VIEW: CPT

## 2025-05-22 PROCEDURE — 36415 COLL VENOUS BLD VENIPUNCTURE: CPT | Performed by: INTERNAL MEDICINE

## 2025-05-22 PROCEDURE — 83735 ASSAY OF MAGNESIUM: CPT | Performed by: INTERNAL MEDICINE

## 2025-05-22 RX ORDER — LIDOCAINE HYDROCHLORIDE 10 MG/ML
1-30 INJECTION, SOLUTION EPIDURAL; INFILTRATION; INTRACAUDAL; PERINEURAL ONCE
Status: COMPLETED | OUTPATIENT
Start: 2025-05-22 | End: 2025-05-22

## 2025-05-22 RX ADMIN — LIDOCAINE 1 PATCH: 4 PATCH TOPICAL at 08:57

## 2025-05-22 RX ADMIN — Medication 1 SPRAY: at 13:41

## 2025-05-22 RX ADMIN — ATORVASTATIN CALCIUM 40 MG: 40 TABLET, FILM COATED ORAL at 08:58

## 2025-05-22 RX ADMIN — PANTOPRAZOLE SODIUM 40 MG: 40 INJECTION, POWDER, FOR SOLUTION INTRAVENOUS at 08:57

## 2025-05-22 RX ADMIN — FOLIC ACID 1 MG: 1 TABLET ORAL at 08:58

## 2025-05-22 RX ADMIN — FOLIC ACID 1 MG: 1 TABLET ORAL at 18:34

## 2025-05-22 RX ADMIN — Medication 1 SPRAY: at 22:15

## 2025-05-22 RX ADMIN — CITALOPRAM HYDROBROMIDE 20 MG: 20 TABLET ORAL at 08:58

## 2025-05-22 RX ADMIN — Medication 1 SPRAY: at 18:35

## 2025-05-22 RX ADMIN — BUSPIRONE HYDROCHLORIDE 5 MG: 5 TABLET ORAL at 18:34

## 2025-05-22 RX ADMIN — CLINDAMYCIN PALMITATE HYDROCHLORIDE 300 MG: 75 GRANULE, FOR SOLUTION ORAL at 13:39

## 2025-05-22 RX ADMIN — GABAPENTIN 300 MG: 300 CAPSULE ORAL at 22:09

## 2025-05-22 RX ADMIN — CARBIDOPA AND LEVODOPA 2 TABLET: 25; 100 TABLET ORAL at 18:34

## 2025-05-22 RX ADMIN — CLINDAMYCIN PALMITATE HYDROCHLORIDE 300 MG: 75 GRANULE, FOR SOLUTION ORAL at 18:35

## 2025-05-22 RX ADMIN — BUSPIRONE HYDROCHLORIDE 5 MG: 5 TABLET ORAL at 08:58

## 2025-05-22 RX ADMIN — CARBIDOPA AND LEVODOPA 2 TABLET: 25; 100 TABLET ORAL at 08:58

## 2025-05-22 RX ADMIN — CARBIDOPA AND LEVODOPA 2 TABLET: 25; 100 TABLET ORAL at 22:09

## 2025-05-22 RX ADMIN — Medication 1 SPRAY: at 10:59

## 2025-05-22 RX ADMIN — CEFTRIAXONE 2 G: 2 INJECTION, POWDER, FOR SOLUTION INTRAMUSCULAR; INTRAVENOUS at 18:36

## 2025-05-22 RX ADMIN — Medication 1 TABLET: at 13:39

## 2025-05-22 RX ADMIN — Medication 125 MCG: at 08:58

## 2025-05-22 RX ADMIN — DIPHENHYDRAMINE HYDROCHLORIDE AND LIDOCAINE HYDROCHLORIDE AND ALUMINUM HYDROXIDE AND MAGNESIUM HYDRO 10 ML: KIT at 10:59

## 2025-05-22 RX ADMIN — FOLIC ACID 1 MG: 1 TABLET ORAL at 13:39

## 2025-05-22 RX ADMIN — ASPIRIN 325 MG: 325 TABLET, COATED ORAL at 08:58

## 2025-05-22 RX ADMIN — CLINDAMYCIN PALMITATE HYDROCHLORIDE 300 MG: 75 GRANULE, FOR SOLUTION ORAL at 22:10

## 2025-05-22 RX ADMIN — LIDOCAINE HYDROCHLORIDE 10 ML: 10 INJECTION, SOLUTION EPIDURAL; INFILTRATION; INTRACAUDAL; PERINEURAL at 12:35

## 2025-05-22 RX ADMIN — SPIRONOLACTONE 25 MG: 25 TABLET, FILM COATED ORAL at 08:58

## 2025-05-22 RX ADMIN — BUSPIRONE HYDROCHLORIDE 5 MG: 5 TABLET ORAL at 22:09

## 2025-05-22 RX ADMIN — VIBEGRON 75 MG: 75 TABLET, FILM COATED ORAL at 22:10

## 2025-05-22 ASSESSMENT — ACTIVITIES OF DAILY LIVING (ADL)
ADLS_ACUITY_SCORE: 59
ADLS_ACUITY_SCORE: 63
ADLS_ACUITY_SCORE: 63
ADLS_ACUITY_SCORE: 59
ADLS_ACUITY_SCORE: 63
ADLS_ACUITY_SCORE: 59
ADLS_ACUITY_SCORE: 59
ADLS_ACUITY_SCORE: 63
ADLS_ACUITY_SCORE: 59
ADLS_ACUITY_SCORE: 63
ADLS_ACUITY_SCORE: 59
ADLS_ACUITY_SCORE: 63

## 2025-05-22 NOTE — PROGRESS NOTES
PALLIATIVE CARE PROGRESS NOTE  [facility]     Patient Name: Alma Rosa Fishman  Date of Admission: 5/11/2025   Today the patient was seen for: Follow-up support      Recommendations & Counseling     GOALS OF CARE:   Life-prolonging with limits  - DNR, DNI  Alma Rosa was awake and alert during my visit. She had minimal verbal responses, but was able to answer most questions appropriately and follow commands  We reviewed her current care, concern for worsening encephalopathy, and need for EGD that is now on hold.   Shared that with any treatment, there will be risks and benefits that may affect overall quality of life. We discussed that if/when disease treatments are no longer effective or when their quality of life is too negatively impacted, patients may elect to focus more solely on quality-of-life measures and stop disease treatment.   Discussed that while continuing to aim for recovery, shared the concern that that her body not allow for the recover that we hoped.   Provided empathetic emotional support as she and family continues to accept nursing and medical cares as deemed necessary, while also recognizing that each person has a threshold for what they are willing to put their body through in order to ideally have more time.   Asked Alma Rosa if she still accepting of her current care including the need for vital signs, labs, and nursing care. She continues to assent to ongoing medical care.   Discussed ongoing dysphagia concerns and need for artifical nutrition for recovery. Specifically shared that artifical nutrition via NG/NJ/PEG does NOT change aspiration risk. She will continue to be at risk for an aspiration event.  She remains disinclined for artifical nutrition      ADVANCE CARE PLANNING:  Advance Directive has been requested.  There is no POLST form on file, defer to patient and/or next of kin for decisions   Code status: No CPR- Do NOT Intubate     MEDICAL MANAGEMENT:   C/o mouth/dental  pain- Biotene ordered. Will add magic mouth wash PRN  Chronic shoulder and low back pain- Tylenol, ice, heat, mobility. Add lidocaine patch     PSYCHOSOCIAL/SPIRITUAL:  Family - 4 adult daughters: Rachele Hou, Anna, Nan, spouse Vadim.  [place] community: [employer]   Appreciate spiritual care support    Palliative Care will continue to follow. Thank you for the consult and allowing us to aid in the care of Alma Rosa Fishman.      These recommendations have been discussed with primary and bedside teams.    Richmond Begum NP  Securely message with Vocera (more info)  Text page via [facility] Paging/Directory       Chart documentation was completed, in part, with [employer] voice-recognition software. Even though reviewed, some grammatical, spelling, and word errors may remain.         Interval History:     Course reviewed.  No acute events noted. Sitting up in bed, eating breakfast, and watching TV. Met with Venancio and Concetta and spoke with their daughter over the phone. They are aware of possible discharge in the coming days;however, she is to have a thoracentesis today.  Venancio shared that there has been discussion of gluing or tacking her lung to her chest wall and wondering if it can be done here. Family feels that Venancio need more education on Parkinson's and its progression. Family is concerned that he is only aware of the tremors and not how it is affecting her while person. They asked if there is OP resources or support groups. Discussed OP palliative care medical provider and social work to aid in these discussions.      Assessment          Alma Rosa Fishman is a 84 year old female with a past medical history of PD, recurrent right pleural effusions, recently diagnosed HFpEF, CKD3, hypertension, hyperlipidemia, frequent falls, s/p loop recorder ([place]) who presented on 5/11/2025 with generalized weakness and fall. Patient also has recent dx of liver mass of unknown etiology, frequent falls (monthly x 10 months),  "ongoing shortness of breath.     Recent hospital presentations in March and April for similar fall events and dyspnea.     Today, the patient was seen for:  Goals of care  Patient and family discussion                  Review of Systems:     ROS reviewed. No acute concerns                Physical Exam:   Temp:  [98.1  F (36.7  C)-98.3  F (36.8  C)] 98.1  F (36.7  C)  Pulse:  [74-78] 77  Resp:  [16-18] 16  BP: (117-179)/(54-80) 146/54  SpO2:  [96 %-99 %] 96 %  175 lbs 11.2 oz    Physical Exam  Vitals and nursing note reviewed.   Constitutional:       General: She is sleeping.   HENT:      Mouth/Throat:      Mouth: Mucous membranes are moist.   Pulmonary:      Effort: Pulmonary effort is normal. No respiratory distress.      Breath sounds: No wheezing.   Abdominal:      General: There is no distension.      Tenderness: There is no abdominal tenderness.   Skin:     General: Skin is warm and dry.   Neurological:      Mental Status: She is lethargic.      Cranial Nerves: No cranial nerve deficit.   Psychiatric:         Mood and Affect: Mood normal.         Thought Content: Thought content normal.                        Current Problem List:   Active Problems:    Pleural effusion on right    Acute congestive heart failure, unspecified heart failure type (H)    History of UTI      Allergies   Allergen Reactions    Contrast Dye Rash, Anaphylaxis, Hives and Swelling     Tolerated CT chest with contrast 6/2021 after pre-treatment with benadryl and solumedrol  Hives / throat swelling    Doxycycline Anaphylaxis    Acetaminophen      Vicodin/Darvocet/Swelling throat    Albuterol      \"Half my tongue swelled.\"   \"Half my tongue swelled.\"       Atenolol Unknown and Other (See Comments)     PN: LW Reaction: swelling of the tongue  (see [facility] records scanned 3/11/2014)  (see [facility] records scanned 3/11/2014)      Darvocet [Propoxyphene N-Apap]     Fluticasone-Salmeterol      PN: tongue swelling      Hydrocodone-Acetaminophen " Swelling     throat      Lisinopril      Tongue Swelling    Metformin Hydrochloride      Glucophage caused severe diarrhea    Morphine      MISAEL    Penicillins      Swelling throat    Percocet [Oxycodone-Acetaminophen]     Neomycin-Bacitracin-Polymyxin Hives and Rash            Data Reviewed:     Results for orders placed or performed during the hospital encounter of 05/11/25 (from the past 24 hours)   Glucose by meter   Result Value Ref Range    GLUCOSE BY METER POCT 75 70 - 99 mg/dL   Glucose by meter   Result Value Ref Range    GLUCOSE BY METER POCT 94 70 - 99 mg/dL   Glucose by meter   Result Value Ref Range    GLUCOSE BY METER POCT 137 (H) 70 - 99 mg/dL   Glucose by meter   Result Value Ref Range    GLUCOSE BY METER POCT 114 (H) 70 - 99 mg/dL   Potassium   Result Value Ref Range    Potassium 4.2 3.4 - 5.3 mmol/L   Magnesium   Result Value Ref Range    Magnesium 2.1 1.7 - 2.3 mg/dL   Phosphorus   Result Value Ref Range    Phosphorus 2.8 2.5 - 4.5 mg/dL   Extra Purple Top EDTA (LAB USE ONLY)   Result Value Ref Range    Hold Specimen JIC    Glucose by meter   Result Value Ref Range    GLUCOSE BY METER POCT 86 70 - 99 mg/dL         Medical Decision Making       45 MINUTES SPENT BY ME on the date of service doing chart review, history, exam, documentation & further activities per the note.

## 2025-05-22 NOTE — PROGRESS NOTES
[facility]    Medicine Progress Note - Hospitalist Service    Date of Admission:  5/11/2025    Assessment & Plan   Alma Rosa Fishman is a 84-year-old female with a past history of Parkinson's Disease, a recurrent large right pleural effusion and HFpEF presents with syncopal episode, weakness and found to have recurrent right-sided pleural effusion admitted on 5/11.     Recurrent right pleural effusion.    Acute hypoxic respiratory failure.  Moderate pulmonary hypertension.  Possible pneumonia.  Leukocytosis.  Intermittent cough.  Dysphagia patient with underlying Parkinson's disease  -She has ongoing right-sided pleural effusion, had thoracentesis in February and again in April.  - This is thought to be secondary to heart failure.  - Patient has difficulty tolerating diuretics due to or orthostasis and falls.  - On 5/12 she had 1 L removed via ultrasound-guided thoracentesis.  Laboratory studies pending on this fluid analysis but initial cell counts are suggestive of transudative effusion.  -5/18, 1 L pleural fluid removed.  - Continue to monitor, will get chest x-ray PA and lateral on 5/20.  - If it is reaccumulating again, will consider options with patient, family and discussed with IR  - Continue PTA spironolactone  -Echocardiogram shows a dilated RV but no new change in ejection fraction.  -Chest x-ray done today showed central pulmonary venous congestion with perihilar interstitial edema, small right sided pleural effusion.  -Continue to hold Lasix, currently she is not on IV fluids or oral intake.  - EGD was planned for 5/19, postponed to 5/28 and not done due to her change in mental status and completely canceled.  - If she has recurrent pleural effusion, will consider pleurodesis and will discuss with IR regarding this if she needs another thoracentesis.    Today  Chest x-ray done showed moderate right pleural effusion.  Therapeutic thoracentesis ordered.  There was discussion with  patient's family regarding the recurrence of this pleural effusion and if pleurodesis is done.  This discussed with interventional radiologist here who stated it is done by cardiothoracic surgeon Guilherme.  Patient will be discharged tomorrow and will have outpatient follow-up with cardiothoracic surgery for consideration of pleurodesis.      Toxic versus metabolic encephalopathy.  Resolved, back to baseline.  -This is likely due to medications including cefepime.  -Her mental status improved 24 hours after  stopping cefepime and decreased  doses of gabapentin as well as stopping Zyrtec's   She is awake and alert today, back to her baseline, tolerating oral intake.  -It is likely that this is caused by Cefepime mainl.  -Continue gabapentin at decreased dose to 300 mg at bedtime from 600 mg.  -Held Zyrtec, discontinued tamsulosin  -Mental status is slightly better today.  She is able to open her eyes, respond quickly to question and goes back to sleep..    Mild hypoglycemia  - Initially suspected due to decreased oral intake, patient started liquid diet .  - Advance diet as tolerated to mechanical soft dental  - Hypoglycemia protocol.      CKD stage II: Stable  -Stable continue to monitor renal function.  - Encourage oral intake.  - Avoid nephrotoxic agent.    Dental caries  Aphthous ulcer  Oral pain  -History of dental caries, CT scan done there is no sign of abscess.  - Magic mouthwash.  - Pain medications.  - Will need dental follow-up as an outpatient.    Syncopal episode:   - This is possibly orthostatic.  -She does have a history of Parkinson's disease hence a mechanical fall not excluded.  -PT and OT consult.    Goals of care: Palliative care consulted, patient is DNR/DNI, life-prolonging with limited.  Discussed with palliative team.      I discussed with her daughter at bedside at length, I also discussed with patient as she is more awake and alert and concerns addressed.  She seemed to be better today, due  to her underlying chronic medical conditions the prognosis remains guarded.  At this time goal is restorative with limits.  If her status changes and patient deteriorates, call family member/ and daughter before escalating any care.        Other chronic medical conditions  3. Parkinsons disease: Stable and at her baseline.  - Continue PTA Sinemet  4.  DM type II: Controlled.  Changed her diet to regular per patient request. A1c was 4.8 on 5/11.  Sugars have been stable.  - Continue PTA Jardiance   - On sliding scale  5.  Depression: At baseline.  Continue with PTA BuSpar, Celexa  6.  Malnutrition Diagnosis: Moderate malnutrition in the context of chronic illness   7.  Mild to moderate pHTN  9.  N/V. Complicated h/o gastric stappling, poss liver cirrhosis, Parkinson's and polypharmacy.   [facility] Consulted  10. Small iatrogenic R pneumothorax. Just follow up  11. Palliative consult for goal of cares.  Appreciate assistance    12. Elevated D dimer and lung infiltrates. CTA for PE 5/17 negative  CT PE protocol. Given remote hx of allergy, Solumedrol and Benadryl ordered following Radiology protocol   13. Abnormal liver US 4/15/25 at [place].    Disposition:  likely to home, pending improvement.     Diet: Regular diet, started per patient request   DVT Prophylaxis: Pneumatic Compression Devices  Quiroz Catheter: Not present  Lines: None     Cardiac Monitoring: None  Code Status: No CPR- Do NOT Intubate      Clinically Significant Risk Factors          # Hyperchloremia: Highest Cl = 108 mmol/L in last 2 days, will monitor as appropriate          # Hypoalbuminemia: Lowest albumin = 2.4 g/dL at 5/17/2025  1:33 PM, will monitor as appropriate     # Hypertension: Noted on problem list    # Chronic heart failure with preserved ejection fraction: heart failure noted on problem list and last echo with EF >50%           # Obesity: Estimated body mass index is 32.14 kg/m  as calculated from the following:    Height as of this  "encounter: 1.575 m (5' 2\").    Weight as of this encounter: 79.7 kg (175 lb 11.2 oz).   # Moderate Malnutrition: based on nutrition assessment and treatment provided per dietitian's recommendations.    # Asthma: noted on problem list        Social Drivers of Health    Tobacco Use: Medium Risk (5/19/2025)    Patient History     Smoking Tobacco Use: Former     Smokeless Tobacco Use: Never   Physical Activity: Inactive (5/22/2024)    Received from [facility]    Exercise Vital Sign     Days of Exercise per Week: 0 days     Minutes of Exercise per Session: 0 min   Stress: Stress Concern Present (3/1/2023)    Received from [facility]    [place] [employer] - Occupational Stress Questionnaire     Feeling of Stress : To some extent   Social Connections: Moderately Isolated (3/1/2023)    Received from [facility]    Social Connection and Isolation Panel [NHANES]     Frequency of Communication with Friends and Family: More than three times a week     Frequency of Social Gatherings with Friends and Family: Patient declined     Attends Restorationism Services: Never     Active Member of Clubs or Organizations: No     Attends Club or Organization Meetings: Never     Marital Status:           Disposition Plan     Medically Ready for Discharge: Anticipated Tomorrow    Denny Duarte MD  Hospitalist Service  [facility]  Securely message with [employer]MeIn (more info)  Text page via [facility] Paging/Directory   ______________________________________________________________________    Interval History   Patient seen and examined, chart, medications, labs reviewed.  She is more awake and alert, mental status is clearing, her daughter at bedside.  Patient tolerating liquid diet, advancing her diet slowly as tolerated to mechanical soft diet.  She has oral discomfort otherwise no other complaints.  Currently on 2 L of oxygen saturating 97%.      Physical Exam   Vital Signs: Temp: 98.1  F " (36.7  C) Temp src: Oral BP: (!) 141/73 Pulse: 90   Resp: 18 SpO2: 95 % O2 Device: Nasal cannula Oxygen Delivery: 2 LPM  Weight: 175 lbs 11.2 oz    GEN: Somnolent, sleepy, no agitation, was not able to give any details today.  HEENT:  Normocephalic/atraumatic, no scleral icterus, no nasal discharge, mouth moist.  CV:  Regular rate and rhythm, no murmur or JVD.  S1 + S2 noted, no S3 or S4.  LUNGS: Coarse to auscultation bilaterally with bibasilar crackles.  Symmetric chest rise on inhalation noted.  ABD:  Active bowel sounds, soft, non-tender/non-distended.  No rebound/guarding/rigidity.  EXT: Bilateral LE edema, 1+.  No cyanosis.  No joint synovitis noted.  SKIN:  Dry to touch, no exanthems noted in the visualized areas.         Medical Decision Making       40 MINUTES SPENT BY ME on the date of service doing chart review, history, exam, documentation & further activities per the note.      Recent Labs   Lab 05/21/25  0708 05/20/25  0632 05/19/25  0638   WBC 12.4* 13.2* 12.9*   HGB 9.5* 8.6* 9.2*   HCT 28.2* 25.5* 27.0*   * 105* 106*    163 137*     Recent Labs   Lab 05/22/25  0742 05/22/25  0639 05/22/25  0202 05/21/25  2219 05/21/25  0956 05/21/25  0708 05/20/25  0817 05/20/25  0632 05/19/25  0902 05/19/25  0638   NA  --   --   --   --   --  142  --  142  --  139   POTASSIUM  --  4.2  --   --   --  4.2  4.2  --  4.1  --  4.5  4.5   CHLORIDE  --   --   --   --   --  108*  --  110*  --  108*   CO2  --   --   --   --   --  21*  --  23  --  21*   ANIONGAP  --   --   --   --   --  13  --  9  --  10   GLC 86  --  114* 137*   < > 88   < > 91   < > 77   BUN  --   --   --   --   --  11.1  --  11.8  --  15.9   CR  --   --   --   --   --  0.63  --  0.69  --  0.74   GFRESTIMATED  --   --   --   --   --  88  --  86  --  80   ELAN  --   --   --   --   --  7.6*  --  7.6*  --  7.7*    < > = values in this interval not displayed.     Recent Labs   Lab 05/22/25  0742 05/22/25  0202 05/21/25  2219 05/21/25  4435  05/21/25  0956   [facility] 86 114* 137* 94 75       Imaging results reviewed over the past 24 hrs:   Recent Results (from the past 24 hours)   XR Chest Port 1 View    Narrative    EXAM: XR CHEST PORT 1 VIEW  LOCATION: [facility]  DATE: 5/22/2025    INDICATION: Follow up pleural effusion.  COMPARISON: Chest x-ray 5/19/2025.      Impression    IMPRESSION: Increased moderate right pleural fluid. Increased opacification at the right lung base that may be atelectasis or airspace disease. Bilateral vascular congestion shows a mild degree of improvement. A few bilateral areas of patchy opacity also   appear mildly improved. Stable enlarged cardiac silhouette.

## 2025-05-22 NOTE — PROGRESS NOTES
Right thoracentesis performed by Dr. Bell using image guidance. Pt tolerated the procedure well, having coughing episodes towards the end. 1,600ml of min fluid drained. Vital signs stable throughout procedure. Patient left the department in stable condition with transport. Report called to floor RN.
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
## 2025-05-22 NOTE — PLAN OF CARE
Shift Summary [id number]  VSS  CXR and thoracentesis done today, 1.6L removed  Tolerating ambulation short distances and worked with therapy this afternoon  Plan for pleurodesis outpt and d\c to TCU 5/23  Large bm this shift        Goal Outcome Evaluation:      Plan of Care Reviewed With: patient, spouse    Overall Patient Progress: no change    Outcome Evaluation: Repeat CXR and thoracentesis done. Requiring 1L NC.          Problem: Adult Inpatient Plan of Care  Goal: Plan of Care Review  Description: The Plan of Care Review/Shift note should be completed every shift.  The Outcome Evaluation is a brief statement about your assessment that the patient is improving, declining, or no change.  This information will be displayed automatically on your shiftnote.  Outcome: Progressing  Flowsheets (Taken 5/22/2025 1501)  Outcome Evaluation: Repeat CXR and thoracentesis done. Requiring 1L NC.  Plan of Care Reviewed With:   patient   spouse  Overall Patient Progress: no change     Problem: Pulmonary Impairment  Goal: Improved Activity Tolerance  Outcome: Progressing  Goal: Effective Airway Clearance  Outcome: Progressing  Goal: Optimal Gas Exchange  Outcome: Progressing     Kaykay Paez RN on 5/22/2025 at 7:02 PM

## 2025-05-22 NOTE — PLAN OF CARE
"Shift: 8266-6555  A&Ox4  VSS on 2L NC  Takes pills whole with applesauce  Complained of headache today, ibuprofen given see MAR  Activity: A1-A2 pivot to chair and commode  Protocols: Mg, K+, phos rechecks in am  Consults: palliative   Plan to discharge to TCU after EGD      Goal Outcome Evaluation:      Plan of Care Reviewed With: patient, family    Overall Patient Progress: improvingOverall Patient Progress: improving    Outcome Evaluation: oriented x4. No EGD yet today.          Problem: Adult Inpatient Plan of Care  Goal: Plan of Care Review  Description: The Plan of Care Review/Shift note should be completed every shift.  The Outcome Evaluation is a brief statement about your assessment that the patient is improving, declining, or no change.  This information will be displayed automatically on your shiftnote.  Outcome: Progressing  Flowsheets (Taken 5/21/2025 1908)  Outcome Evaluation: oriented x4. No EGD yet today.  Plan of Care Reviewed With:   patient   family  Overall Patient Progress: improving  Goal: Patient-Specific Goal (Individualized)  Description: You can add care plan individualizations to a care plan. Examples of Individualization might be:  \"Parent requests to be called daily at 9am for status\", \"I have a hard time hearing out of my right ear\", or \"Do not touch me to wake me up as it startlesme\".  Outcome: Progressing  Goal: Absence of Hospital-Acquired Illness or Injury  Outcome: Progressing  Intervention: Identify and Manage Fall Risk  Recent Flowsheet Documentation  Taken 5/21/2025 1324 by Aleshia Mccray RN  Safety Promotion/Fall Prevention:   assistive device/personal items within reach   activity supervised   clutter free environment maintained   increased rounding and observation   lighting adjusted   increase visualization of patient   nonskid shoes/slippers when out of bed   patient and family education   room door open   room organization consistent   safety round/check completed   " supervised activity   treat underlying cause  Taken 5/21/2025 0737 by Aleshia Mccray RN  Safety Promotion/Fall Prevention: safety round/check completed  Intervention: Prevent Skin Injury  Recent Flowsheet Documentation  Taken 5/21/2025 1837 by Aleshia Mccray RN  Body Position: (boosted) weight shifting  Taken 5/21/2025 1630 by Aleshia Mccray RN  Body Position: (pillow placed behind right side for leaning)   supine, head elevated   weight shifting  Taken 5/21/2025 1229 by Aleshia Mccray RN  Body Position: supine, head elevated  Taken 5/21/2025 0737 by Aleshia Mccray RN  Body Position:   side-lying   right  Goal: Optimal Comfort and Wellbeing  Outcome: Progressing  Intervention: Monitor Pain and Promote Comfort  Recent Flowsheet Documentation  Taken 5/21/2025 1329 by Aleshia Mccray RN  Pain Management Interventions: medication (see MAR)  Goal: Readiness for Transition of Care  Outcome: Progressing     Problem: Delirium  Goal: Improved Attention and Thought Clarity  Outcome: Progressing     Problem: Fall Injury Risk  Goal: Absence of Fall and Fall-Related Injury  Outcome: Progressing  Intervention: Identify and Manage Contributors  Recent Flowsheet Documentation  Taken 5/21/2025 1329 by Aleshia Mccray RN  Medication Review/Management: medications reviewed  Intervention: Promote Injury-Free Environment  Recent Flowsheet Documentation  Taken 5/21/2025 1329 by Aleshia Mccray RN  Safety Promotion/Fall Prevention:   assistive device/personal items within reach   activity supervised   clutter free environment maintained   increased rounding and observation   lighting adjusted   increase visualization of patient   nonskid shoes/slippers when out of bed   patient and family education   room door open   room organization consistent   safety round/check completed   supervised activity   treat underlying cause  Taken 5/21/2025 0737 by Aleshia Mccray RN  Safety Promotion/Fall Prevention: safety  round/check completed

## 2025-05-22 NOTE — PROGRESS NOTES
Care Management Follow Up    Length of Stay (days): 11    Expected Discharge Date: 05/22/2025     Concerns to be Addressed: discharge planning     Patient plan of care discussed at interdisciplinary rounds: Yes    Anticipated Discharge Disposition:  Transitional Care at [facility]  Anticipated Discharge Services:    Anticipated Discharge DME:      Patient/family educated on Medicare website which has current facility and service quality ratings:  yes  Education Provided on the Discharge Plan:  yes  Patient/Family in Agreement with the Plan:  es    Referrals Placed by CM/SW:  Post Acute Facilitites  Private pay costs discussed: transportation costs    Discussed  Partnership in Safe Discharge Planning  document with patient/family: No     Additional Information:  Per hospitalist pt is no longer medically ready for discharge to TCU today.     [facility] was updated, they can still accept pt tomorrow if medically ready.     [facility] w oxygen for discharge transport was re-scheduled to 5/23 9420-4391 if needed.    Addendum 1316: Spoke w pt spouse, updated on transport time to TCU for tomorrow. He was in agreement w [facility] for discharge. He is aware of possible cost of this.    Next Steps: confirm med ready for discharge on 5/23, send discharge orders, confirm transport plan w spouse    Isabel Cevallos RN, BSN  Inpatient Care Coordination  [facility]  [phone]

## 2025-05-23 VITALS
WEIGHT: 176.5 LBS | RESPIRATION RATE: 18 BRPM | DIASTOLIC BLOOD PRESSURE: 60 MMHG | TEMPERATURE: 98.6 F | OXYGEN SATURATION: 92 % | HEIGHT: 62 IN | BODY MASS INDEX: 32.48 KG/M2 | HEART RATE: 76 BPM | SYSTOLIC BLOOD PRESSURE: 162 MMHG

## 2025-05-23 LAB
BACTERIA PLR CULT: NO GROWTH
GLUCOSE BLDC GLUCOMTR-MCNC: 102 MG/DL (ref 70–99)
GLUCOSE BLDC GLUCOMTR-MCNC: 115 MG/DL (ref 70–99)
GLUCOSE BLDC GLUCOMTR-MCNC: 44 MG/DL (ref 70–99)
GLUCOSE BLDC GLUCOMTR-MCNC: 57 MG/DL (ref 70–99)
GRAM STAIN RESULT: NORMAL
GRAM STAIN RESULT: NORMAL
MAGNESIUM SERPL-MCNC: 1.9 MG/DL (ref 1.7–2.3)
PHOSPHATE SERPL-MCNC: 2.7 MG/DL (ref 2.5–4.5)
POTASSIUM SERPL-SCNC: 5.1 MMOL/L (ref 3.4–5.3)

## 2025-05-23 PROCEDURE — 99232 SBSQ HOSP IP/OBS MODERATE 35: CPT | Performed by: CLINICAL NURSE SPECIALIST

## 2025-05-23 PROCEDURE — 250N000013 HC RX MED GY IP 250 OP 250 PS 637: Performed by: NURSE PRACTITIONER

## 2025-05-23 PROCEDURE — 99239 HOSP IP/OBS DSCHRG MGMT >30: CPT | Performed by: INTERNAL MEDICINE

## 2025-05-23 PROCEDURE — 84132 ASSAY OF SERUM POTASSIUM: CPT | Performed by: INTERNAL MEDICINE

## 2025-05-23 PROCEDURE — 250N000013 HC RX MED GY IP 250 OP 250 PS 637: Performed by: INTERNAL MEDICINE

## 2025-05-23 PROCEDURE — 36415 COLL VENOUS BLD VENIPUNCTURE: CPT | Performed by: INTERNAL MEDICINE

## 2025-05-23 PROCEDURE — 83735 ASSAY OF MAGNESIUM: CPT | Performed by: INTERNAL MEDICINE

## 2025-05-23 PROCEDURE — 250N000013 HC RX MED GY IP 250 OP 250 PS 637: Performed by: HOSPITALIST

## 2025-05-23 PROCEDURE — 84100 ASSAY OF PHOSPHORUS: CPT | Performed by: INTERNAL MEDICINE

## 2025-05-23 RX ORDER — GABAPENTIN 300 MG/1
300 CAPSULE ORAL AT BEDTIME
DISCHARGE
Start: 2025-05-23

## 2025-05-23 RX ADMIN — Medication 125 MCG: at 08:45

## 2025-05-23 RX ADMIN — CARBIDOPA AND LEVODOPA 2 TABLET: 25; 100 TABLET ORAL at 08:46

## 2025-05-23 RX ADMIN — ASPIRIN 325 MG: 325 TABLET, COATED ORAL at 08:46

## 2025-05-23 RX ADMIN — CLINDAMYCIN PALMITATE HYDROCHLORIDE 300 MG: 75 GRANULE, FOR SOLUTION ORAL at 12:55

## 2025-05-23 RX ADMIN — Medication 1 SPRAY: at 12:42

## 2025-05-23 RX ADMIN — BUSPIRONE HYDROCHLORIDE 5 MG: 5 TABLET ORAL at 08:45

## 2025-05-23 RX ADMIN — SPIRONOLACTONE 25 MG: 25 TABLET, FILM COATED ORAL at 08:45

## 2025-05-23 RX ADMIN — CLINDAMYCIN PALMITATE HYDROCHLORIDE 300 MG: 75 GRANULE, FOR SOLUTION ORAL at 09:02

## 2025-05-23 RX ADMIN — PANTOPRAZOLE SODIUM 20 MG: 20 TABLET, DELAYED RELEASE ORAL at 08:46

## 2025-05-23 RX ADMIN — FOLIC ACID 1 MG: 1 TABLET ORAL at 12:41

## 2025-05-23 RX ADMIN — LIDOCAINE 1 PATCH: 4 PATCH TOPICAL at 08:46

## 2025-05-23 RX ADMIN — Medication 1 SPRAY: at 08:34

## 2025-05-23 RX ADMIN — FOLIC ACID 1 MG: 1 TABLET ORAL at 08:46

## 2025-05-23 RX ADMIN — CITALOPRAM HYDROBROMIDE 20 MG: 20 TABLET ORAL at 08:46

## 2025-05-23 RX ADMIN — FUROSEMIDE 20 MG: 20 TABLET ORAL at 08:45

## 2025-05-23 RX ADMIN — DIPHENHYDRAMINE HYDROCHLORIDE AND LIDOCAINE HYDROCHLORIDE AND ALUMINUM HYDROXIDE AND MAGNESIUM HYDRO 10 ML: KIT at 12:59

## 2025-05-23 RX ADMIN — IBUPROFEN 600 MG: 600 TABLET ORAL at 09:02

## 2025-05-23 RX ADMIN — ATORVASTATIN CALCIUM 40 MG: 40 TABLET, FILM COATED ORAL at 08:46

## 2025-05-23 RX ADMIN — Medication 1 TABLET: at 12:41

## 2025-05-23 RX ADMIN — DEXTROSE 30 G: 15 GEL ORAL at 08:56

## 2025-05-23 ASSESSMENT — ACTIVITIES OF DAILY LIVING (ADL)
ADLS_ACUITY_SCORE: 59

## 2025-05-23 NOTE — PLAN OF CARE
"A&O with confusion needed to be redirected throughout the shift tried getting off bed insisted on getting ready and leaving for TCU.Plan is to discharge pt to [place] today at 1pm--2pm on RA             Goal Outcome Evaluation:      Plan of Care Reviewed With: patient    Overall Patient Progress: no changeOverall Patient Progress: no change         Problem: Adult Inpatient Plan of Care  Goal: Plan of Care Review  Description: The Plan of Care Review/Shift note should be completed every shift.  The Outcome Evaluation is a brief statement about your assessment that the patient is improving, declining, or no change.  This information will be displayed automatically on your shiftnote.  Outcome: Progressing  Flowsheets (Taken 5/23/2025 0454)  Plan of Care Reviewed With: patient  Overall Patient Progress: no change  Goal: Patient-Specific Goal (Individualized)  Description: You can add care plan individualizations to a care plan. Examples of Individualization might be:  \"Parent requests to be called daily at 9am for status\", \"I have a hard time hearing out of my right ear\", or \"Do not touch me to wake me up as it startlesme\".  Outcome: Progressing  Goal: Absence of Hospital-Acquired Illness or Injury  Outcome: Progressing  Intervention: Identify and Manage Fall Risk  Recent Flowsheet Documentation  Taken 5/22/2025 2220 by Kait Cabrera RN  Safety Promotion/Fall Prevention: safety round/check completed  Intervention: Prevent and Manage VTE (Venous Thromboembolism) Risk  Recent Flowsheet Documentation  Taken 5/22/2025 2220 by Kait Cabrera RN  VTE Prevention/Management: SCDs off (sequential compression devices)  Goal: Optimal Comfort and Wellbeing  Outcome: Progressing  Goal: Readiness for Transition of Care  Outcome: Progressing     Problem: Delirium  Goal: Improved Attention and Thought Clarity  Outcome: Progressing     Problem: Fall Injury Risk  Goal: Absence of Fall and Fall-Related Injury  Outcome: " Progressing  Intervention: Promote Injury-Free Environment  Recent Flowsheet Documentation  Taken 5/22/2025 2220 by Kait Cabrera, RN  Safety Promotion/Fall Prevention: safety round/check completed     Problem: Pulmonary Impairment  Goal: Improved Activity Tolerance  Outcome: Progressing  Goal: Effective Airway Clearance  Outcome: Progressing  Goal: Optimal Gas Exchange  Outcome: Progressing

## 2025-05-23 NOTE — PROGRESS NOTES
Care Management Discharge Note    Discharge Date: 05/23/2025       Discharge Disposition:  Transitional Care at [facility]  Discharge Transportation: family or friend will provide    Private pay costs discussed: transportation costs    Does the patient's insurance plan have a 3 day qualifying hospital stay waiver?  No    PAS Confirmation Code: [id number]  Patient/family educated on Medicare website which has current facility and service quality ratings:  yes    Education Provided on the Discharge Plan:  yes  Patient/Family in Agreement with the Plan:  yes      Additional Information:  Per hospitalist pt is medically ready for discharge to TCU today.     [facility] accepted pt for placement today, spouse and family are aware/agreeable. Discharge orders were sent to facility.    [facility] set up for discharge transport today 5/23 2309-3653, spouse is aware.     Addendum 1328: TCU requested updated discharge order that states okay to hold prolia injections while at TCU, hospitalist updated. Discharge orders updated and re sent to TCU.     Addendum 1432: TCU was updated that transport running behind schedule.    Isabel Cevallos RN, BSN  Inpatient Care Coordination  [facility]  [phone]

## 2025-05-23 NOTE — DISCHARGE SUMMARY
"[facility]  Hospitalist Discharge Summary      Date of Admission:  5/11/2025  Date of Discharge:  5/23/2025  Discharging Provider: Denny Duarte MD  Discharge Service: Hospitalist Service    Discharge Diagnoses   Please see hospital course below    Clinically Significant Risk Factors     # Obesity: Estimated body mass index is 32.28 kg/m  as calculated from the following:    Height as of this encounter: 1.575 m (5' 2\").    Weight as of this encounter: 80.1 kg (176 lb 8 oz).  # Moderate Malnutrition: based on nutrition assessment and treatment provided per dietitian's recommendations.      Follow-ups Needed After Discharge   Follow-up Appointments       Follow Up and recommended labs and tests      Follow up with shelter physician.  The following labs/tests are recommended: Chest x-ray in 1 week, patient has recurrent right-sided pleural effusion.                Unresulted Labs Ordered in the Past 30 Days of this Admission       No orders found from 4/11/2025 to 5/12/2025.          Discharge Disposition   Discharged to short-term care facility  Condition at discharge: [facility] Course   Alma Rosa Fishman is a 84-year-old female with a past history of Parkinson's Disease, a recurrent large right pleural effusion and HFpEF presents with syncopal episode, weakness and found to have recurrent right-sided pleural effusion admitted on 5/11.     Recurrent right pleural effusion.    Acute hypoxic respiratory failure.  Moderate pulmonary hypertension.  Possible pneumonia.  Leukocytosis.  -She has ongoing right-sided pleural effusion, had thoracentesis in February and again in April.  - This is thought to be secondary to heart failure.  - Patient has difficulty tolerating diuretics due to or orthostasis and falls.  -Continue PTA spironolactone and Lasix, monitor blood pressure  -Echocardiogram shows a dilated RV but no new change in ejection fraction.  -Chest x-ray done today showed " central pulmonary venous congestion with perihilar interstitial edema, small right sided pleural effusion.  -Continue to hold Lasix, currently she is not on IV fluids or oral intake  - On 5/12 she had 1 L removed via ultrasound-guided thoracentesis.  Laboratory studies pending on this fluid analysis but initial cell counts are suggestive of transudative effusion.  -5/18, 1 L pleural fluid removed.  5/22, she had and has not thoracentesis and 1.6 L of min fluid removed.  -She had thoracentesis 5 times so far and 3 times during this admission alone  - She is at risk of significant and frequent recurrence of right-sided pleural effusion.  - I think pleurodesis is indicated for this patient and I discussed this with our IR here who stated is not done here, but  by thoracic surgery.  - Thoracic surgery referral is done after discharge and she will go to [facility] for evaluation.  - She will have chest x-ray done as an outpatient in 5 days to see if there is significant reaccumulation.    Dysphagia in a patient with underlying Parkinson's disease.  Intermittent coughing  - EGD was planned for 5/19, postponed to 5/20 and not done due to her change in mental status and later on canceled as the risk outweighs the benefit and is only for diagnostic purposes at this time and medically there causes her underlying Parkinson's disease.  - She is tolerating oral intake.    Toxic versus metabolic encephalopathy.  Resolved, she is back to baseline.  -This is likely due to medications including Cefepime.  -Her mental status improved 24 hours after stopping Cefepime and decreased  doses of gabapentin as well as stopping Zyrtec's   -She she improved and remained alert and oriented, conversant and tolerating oral intake.  -She seemed to be back to her baseline.  -Also decreased her gabapentin dose to 300 mg at bedtime from 600 mg.  -Held Zyrtec, discontinued tamsulosin    Mild hypoglycemia  - Initially suspected due to decreased oral  intake, patient started liquid diet .  - Advance diet as tolerated to mechanical soft dental  - Hypoglycemia protocol.    CKD stage II: Stable  -Stable continue to monitor renal function.  - Encourage oral intake.  - Avoid nephrotoxic agent.    Dental caries  Aphthous ulcer  Oral pain  -History of dental caries, CT scan done there is no sign of abscess.  - Magic mouthwash.  - Pain medications.  - Will need dental follow-up as an outpatient.    Syncopal episode:   - This is possibly orthostatic.  -She does have a history of Parkinson's disease hence a mechanical fall not excluded.  -PT and OT consult.    Goals of care: Palliative care consulted, patient is DNR/DNI, life-prolonging with limited.  Discussed with palliative team.      I discussed with patient today with her  at bedside the plan of discharge and follow-up.  Also discussed regarding pleurodesis and need to follow-up with thoracic surgery at [facility] as an outpatient.   The goal of care is to remain restorative at this time with some limits like avoiding feeding tube.      Other chronic medical conditions  3. Parkinsons disease: Stable and at her baseline.  - Continue PTA Sinemet  4.  DM type II: Controlled.  Changed her diet to regular per patient request. A1c was 4.8 on 5/11.  Sugars have been stable.  - Continue PTA Jardiance   - On sliding scale  5.  Depression: At baseline.  Continue with PTA BuSpar, Celexa  6.  Malnutrition Diagnosis: Moderate malnutrition in the context of chronic illness   7.  Mild to moderate pHTN  9.  N/V. Complicated h/o gastric stappling, poss liver cirrhosis, Parkinson's and polypharmacy.   [facility] Consulted  10. Small iatrogenic R pneumothorax. Just follow up  11. Palliative consult for goal of cares.  Appreciate assistance    12. Elevated D dimer and lung infiltrates. CTA for PE 5/17 negative  CT PE protocol. Given remote hx of allergy, Solumedrol and Benadryl ordered following Radiology protocol   13. Abnormal liver US  4/15/25 at [place].    Disposition:  likely to home, pending improvement.     Diet: Regular diet, started per patient request   DVT Prophylaxis: Pneumatic Compression Devices  Quiroz Catheter: Not present  Lines: None     Cardiac Monitoring: None  Code Status: No CPR- Do NOT Intubate      Consultations This Hospital Stay   PHYSICAL THERAPY ADULT IP CONSULT  OCCUPATIONAL THERAPY ADULT IP CONSULT  CARE MANAGEMENT / SOCIAL WORK IP CONSULT  NUTRITION SERVICES ADULT IP CONSULT  OCCUPATIONAL THERAPY ADULT IP CONSULT  NURSING TO CONSULT FOR VASCULAR ACCESS CARE IP CONSULT  CORE CLINIC EVALUATION IP CONSULT  GASTROENTEROLOGY IP CONSULT  NURSING TO CONSULT FOR VASCULAR ACCESS CARE IP CONSULT  PALLIATIVE CARE ADULT IP CONSULT  NURSING TO CONSULT FOR VASCULAR ACCESS CARE IP CONSULT  NURSING TO CONSULT FOR VASCULAR ACCESS CARE IP CONSULT  NURSING TO CONSULT FOR VASCULAR ACCESS CARE IP CONSULT  VASCULAR ACCESS ADULT IP CONSULT  PHYSICAL THERAPY ADULT IP CONSULT  OCCUPATIONAL THERAPY ADULT IP CONSULT  SOCIAL WORK IP CONSULT    Code Status   No CPR- Do NOT Intubate    Time Spent on this Encounter   I, Denny Duarte MD, personally saw the patient today and spent greater than 30 minutes discharging this patient.       Denny Duarte MD  [place] [address] [facility]  [address] [place] [address]  Phone: [phone]  Fax: [phone]  ______________________________________________________________________    Physical Exam   Vital Signs: Temp: 98.6  F (37  C) Temp src: Oral BP: (!) 162/60 Pulse: 76   Resp: 18 SpO2: 92 % O2 Device: None (Room air) Oxygen Delivery: 1/2 LPM  Weight: 176 lbs 8 oz    GEN: Somnolent, sleepy, no agitation, was not able to give any details today.  HEENT:  Normocephalic/atraumatic, no scleral icterus, no nasal discharge, mouth moist.  CV:  Regular rate and rhythm, no murmur or JVD.  S1 + S2 noted, no S3 or S4.  LUNGS: Coarse to auscultation bilaterally with  bibasilar crackles.  Symmetric chest rise on inhalation noted.  ABD:  Active bowel sounds, soft, non-tender/non-distended.  No rebound/guarding/rigidity.  EXT: Bilateral LE edema, 1+.  No cyanosis.  No joint synovitis noted.  SKIN:  Dry to touch, no exanthems noted in the visualized areas.       Primary Care Physician   Joselin Tai    Discharge Orders      Adult Palliative Care  Referral      Thoracic Surgery  Referral      General info for SNF    Length of Stay Estimate: Short Term Care: Estimated # of Days <30  Condition at Discharge: Stable  Level of care:skilled   Rehabilitation Potential: Fair  Admission H&P remains valid and up-to-date: Yes  Recent Chemotherapy: N/A  Use Nursing Home Standing Orders: Yes     Mantoux instructions    Give two-step Mantoux (PPD) Per Facility Policy Yes     Follow Up and recommended labs and tests    Follow up with [facility] physician.  The following labs/tests are recommended: Chest x-ray in 1 week, patient has recurrent right-sided pleural effusion.     Reason for your hospital stay    Recurrent right pleural effusion.    Acute hypoxic respiratory failure.  Moderate pulmonary hypertension.  Possible pneumonia.  Leukocytosis.  Intermittent cough.  Dysphagia patient with underlying Parkinson's disease     Activity - Up with nursing assistance     Physical Therapy Adult Consult    Evaluate and treat as clinically indicated.    Reason: Weakness, deconditioning     Occupational Therapy Adult Consult    Evaluate and treat as clinically indicated.    Reason: Weakness, deconditioning     Fall precautions     Diet    Follow this diet upon discharge: Current Diet:Orders Placed This Encounter      Snacks/Supplements Adult: Other; SF Gel+ and Ensure Max; Between Meals      Advance Diet as Tolerated: Regular Diet Adult; Mechanical/Dental Soft Diet       Significant Results and Procedures   Most Recent 3 CBC's:  Recent Labs   Lab Test 05/21/25  0708  05/20/25  0632 05/19/25  0638   WBC 12.4* 13.2* 12.9*   HGB 9.5* 8.6* 9.2*   * 105* 106*    163 137*     Most Recent 3 BMP's:  Recent Labs   Lab Test 05/23/25  0716 05/23/25  0209 05/22/25  2123 05/22/25  0742 05/22/25  0639 05/21/25  0956 05/21/25  0708 05/20/25  0817 05/20/25  0632 05/19/25  0902 05/19/25  0638   NA  --   --   --   --   --   --  142  --  142  --  139   POTASSIUM 5.1  --   --   --  4.2  --  4.2  4.2  --  4.1  --  4.5  4.5   CHLORIDE  --   --   --   --   --   --  108*  --  110*  --  108*   CO2  --   --   --   --   --   --  21*  --  23  --  21*   BUN  --   --   --   --   --   --  11.1  --  11.8  --  15.9   CR  --   --   --   --   --   --  0.63  --  0.69  --  0.74   ANIONGAP  --   --   --   --   --   --  13  --  9  --  10   ELAN  --   --   --   --   --   --  7.6*  --  7.6*  --  7.7*   GLC  --  102* 104* 86  --    < > 88   < > 91   < > 77    < > = values in this interval not displayed.     Most Recent 2 LFT's:  Recent Labs   Lab Test 05/17/25  1333 05/11/25  1132   AST 42 25   ALT 8 7   ALKPHOS 76 92   BILITOTAL 0.7 0.9   ,   Results for orders placed or performed during the hospital encounter of 05/11/25   Chest XR,  PA & LAT    Narrative    EXAM: XR CHEST 2 VIEWS  LOCATION: [facility]  DATE: 5/11/2025    INDICATION: increased weakness  COMPARISON: 4/4/2025         Impression    IMPRESSION: Normal heart size and pulmonary vascularity. Medium sized right pleural effusion with compressive atelectasis right lower lung. This is increased from previous. Left lung clear. Cardiac recording device   US Thoracentesis    Narrative    EXAM:   1. RIGHT THORACENTESIS  2. ULTRASOUND GUIDANCE  LOCATION: [facility]  DATE: 5/12/2025    INDICATION: Pleural effusion.    PROCEDURE: Informed consent obtained. Time out performed. The chest was prepped and draped in sterile fashion. 10 mL of 1 % lidocaine was infused into the local soft tissues. Under direct  ultrasound guidance, a 5 Andorran catheter system was placed into   the pleural effusion.     1.1 liters of clear yellow fluid were removed and sent to lab, if requested.    Patient tolerated procedure well.    Ultrasound imaging was obtained and placed in the patient's permanent medical record.      Impression    IMPRESSION:  Status post right ultrasound-guided thoracentesis.  1.1 L removed.   CT Chest/Abdomen/Pelvis w Contrast    Narrative    EXAM: CT CHEST/ABDOMEN/PELVIS W CONTRAST  LOCATION: [facility]  DATE: 5/12/2025    INDICATION: Weight loss over the past 2 months, early satiety, sclerotic lesions in ribs noted on Chest CT from April.  COMPARISON: CTs, most recently chest CT dated 4/1/2025.  TECHNIQUE: CT scan of the chest, abdomen, and pelvis was performed following injection of IV contrast. Multiplanar reformats were obtained. Dose reduction techniques were used.   CONTRAST: 90mL Isovue 370    FINDINGS:   LUNGS AND PLEURA: Unchanged moderate right pleural effusion with overlying compressive atelectasis. The left lung is clear without effusion. No pneumothorax.    MEDIASTINUM/AXILLAE: Cardiac size is within normal limits. No pericardial effusion. Mitral annular calcification. Normal caliber thoracic aorta. No lymphadenopathy.    CORONARY ARTERY CALCIFICATION: Mild.    HEPATOBILIARY: Cholecystectomy. Stable mild biliary duct prominence, consistent with postcholecystectomy reservoir effect. No worrisome liver lesions.    PANCREAS: Surgically absent.    SPLEEN: Normal.    ADRENAL GLANDS: Normal.    KIDNEYS/BLADDER: Normal.    BOWEL: Postoperative changes of partial gastrectomy with gastrojejunostomy. The bowel is nondistended without inflammatory change. No evidence for appendicitis.    LYMPH NODES: Normal.    VASCULATURE: Atherosclerosis. 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 Normal caliber abdominal aorta.  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    PELVIC ORGANS: Hysterectomy. No adnexal masses. Trace pelvic free fluid.    MUSCULOSKELETAL: Anasarca. Reverse right shoulder arthroplasty. Severe degenerative changes of the right hip and moderate degenerative changes of the left hip. Mild degenerative changes elsewhere. Marked diffuse osteopenia, consistent with osteoporosis.   No aggressive osseous lesions. There are some areas of sclerosis in the ribs, which correlate with fracture deformities. These include multiple old healed fracture deformities. There are nondisplaced fractures of the left second through ninth ribs, with   increasing surrounding bony callus, consistent with healing fractures. Old healed right clavicular fracture deformity.      Impression    IMPRESSION:  1.  Atherosclerosis. 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 Correlate for mesenteric angina.    2.  No mass or lymphadenopathy in the chest, abdomen or pelvis.    3.  Osteoporosis. Rib sclerosis correlates with rib fractures. There are healing fractures of the left second through ninth ribs.    4.  Unchanged moderate right pleural effusion.    5.  Anasarca.    6.  Additional stable incidental findings, as detailed in the body of the report.     XR Chest 2 Views     Value    Radiologist flags Small right apical pneumothorax (AA)    Narrative    EXAM: XR CHEST 2 VIEWS  LOCATION: [facility]  DATE: 5/14/2025    INDICATION: Dyspnea. Right pleural effusion.  COMPARISON: 5/11/2025.      Impression    IMPRESSION: Small right pleural effusion has decreased in size, consistent with interval thoracentesis. There is a small right pneumothorax. Airspace opacity in the right perihilar and lower lung regions has increased in prominence, and may be related to   atelectasis, infection, or edema. The left lung is clear. Right shoulder  arthroplasty. There are multiple healing left rib fractures.    [Critical Result: Small right apical pneumothorax]    Finding was identified on 5/14/2025 9:10 AM CDT.     Dr. George was contacted by me on 5/14/2025 9:10 AM CDT and verbalized understanding of the critical result.   XR Chest Port 1 View    Narrative    EXAM: XR CHEST PORT 1 VIEW  LOCATION: [facility]  DATE: 5/15/2025    INDICATION: increasing o2 requirement  COMPARISON: 05/14/2025      Impression    IMPRESSION: Borderline enlarged cardiac silhouette. Small right apical pneumothorax measuring approximately 8 mm, not significantly changed from prior. Right mid and lower lung pulmonary opacities, likely combination of airspace disease and layering   pleural effusion, slightly improved from prior. Bilateral posterior rib fractures.   CT Chest Pulmonary Embolism w Contrast    Narrative    EXAM: CT CHEST PULMONARY EMBOLISM W CONTRAST  LOCATION: [facility]  DATE: 5/17/2025    INDICATION: Respiratory failure, pulmonary htn, elevated D dimer  COMPARISON: 5/12/2025, 4/1/2025, 2/10/2025  TECHNIQUE: CT chest pulmonary angiogram during arterial phase injection of IV contrast. Multiplanar reformats and MIP reconstructions were performed. Dose reduction techniques were used.   CONTRAST: 64mL isovue 370    FINDINGS:  ANGIOGRAM CHEST: No evidence of acute pulmonary embolism. Central pulmonary arteries are mildly dilated similar to prior studies and may indicate pulmonary arterial hypertension. Mild calcified plaque in the thoracic aorta. No evidence of aneurysm or   dissection.    LUNGS AND PLEURA: Since the recent CT of 5/12/2025, multiple patchy areas of groundglass infiltrate have developed in both lungs. There is associated mild septal thickening. The patchy distribution favors an infectious or inflammatory process although   edema cannot be absolutely excluded. A large right pleural effusion is unchanged. Complete  collapse of the right lower lobe and partial collapse of the right middle lobe is also unchanged and likely related to the large effusion. No central mucous   plugging. A tiny left pleural effusion has developed.    MEDIASTINUM/AXILLAE: Heart size is upper range of normal. No pericardial effusions. No adenopathy.    CORONARY ARTERY CALCIFICATION: Minimal    UPPER ABDOMEN: Cholecystectomy. Postoperative changes involving the stomach.    MUSCULOSKELETAL: Multiple bilateral healed and healing rib fractures are again noted. Partially visualized right shoulder replacement.      Impression    IMPRESSION:  1.  No evidence of acute pulmonary embolism.  2.  Interval development of multiple patchy areas of groundglass infiltrate in both lungs. The patchy distribution favors an infectious or inflammatory process although edema cannot be absolutely excluded.  3.  A large right pleural effusion is unchanged. Complete collapse of the right lower lobe and partial collapse of the right middle lobe is unchanged.  4.  A tiny left pleural effusion has developed.  5.  Mild dilatation of the central pulmonary arteries is unchanged and may indicate pulmonary arterial hypertension.     US Thoracentesis    Narrative    EXAM:   1. RIGHT THORACENTESIS  2. ULTRASOUND GUIDANCE  LOCATION: [facility]  DATE: 5/18/2025    INDICATION: Pleural effusion.    PROCEDURE: Informed consent obtained. Time out performed. The chest was prepped and draped in sterile fashion. 10 mL of 1 % lidocaine was infused into the local soft tissues. Under direct ultrasound guidance, a 5 Martiniquais catheter system was placed into   the pleural effusion.     1 liters of clear yellow fluid were removed and sent to lab, if requested.    Patient tolerated procedure well.    Ultrasound imaging was obtained and placed in the patient's permanent medical record.      Impression    IMPRESSION:  Status post right ultrasound-guided thoracentesis.   XR Chest Port 1  View    Narrative    EXAM: XR CHEST PORT 1 VIEW  LOCATION: [facility]  DATE: 5/19/2025    INDICATION: Cough, SOB  COMPARISON: None.      Impression    IMPRESSION: The heart is enlarged. There is central pulmonary venous congestion with perihilar interstitial edema. There is a small right-sided pleural effusion. Surgical clips are seen in the upper abdomen.   CT Facial Bones without Contrast    Narrative    EXAM: CT FACIAL BONES WITHOUT CONTRAST  LOCATION: [facility]  DATE: 5/20/2025    INDICATION: facial pain  COMPARISON: None.  TECHNIQUE: Routine CT Maxillofacial without IV contrast. Multiplanar reformats. Dose reduction techniques were used.     FINDINGS:  Multifocal dental carious disease. Nasal bones are intact. Orbital rims are intact. Zygomatic arch complexes are preserved. Nasal bones are intact. Paranasal sinuses are without air-fluid levels. No acute fracture. Mandible and mandibular condyles are   intact. Multilevel degenerative changes in the upper cervical spine.      Impression    IMPRESSION:   1.  No acute facial fracture. Scattered mucosal sinus disease in the paranasal sinuses. No fluid levels.     XR Chest Port 1 View    Narrative    EXAM: XR CHEST PORT 1 VIEW  LOCATION: [facility]  DATE: 5/22/2025    INDICATION: Follow up pleural effusion.  COMPARISON: Chest x-ray 5/19/2025.      Impression    IMPRESSION: Increased moderate right pleural fluid. Increased opacification at the right lung base that may be atelectasis or airspace disease. Bilateral vascular congestion shows a mild degree of improvement. A few bilateral areas of patchy opacity also   appear mildly improved. Stable enlarged cardiac silhouette.   Echocardiogram Limited     Value    LVEF  60-65%    Narrative    [id number]  [id number]  [id number]  [id number]^MART^MIKE^R     [facility]  Echocardiography Laboratory  [address] [place] [address]      Name: BRIAN JUNE  MRN: [id number]  : 1940  Study Date: 2025 11:15 AM  Age: 84 yrs  Gender: Female  Patient Location: [facility]  Reason For Study: CHF  Ordering Physician: MIKE CEVALLOS  Performed By: Nilsa Herrera     BSA: 1.8 m2  Height: 62 in  Weight: 179 lb  HR: 71  BP: 146/56 mmHg  ______________________________________________________________________________  Procedure  Limited Echocardiogram with two-dimensional, color and spectral Doppler.  ______________________________________________________________________________  Interpretation Summary     The visual ejection fraction is 60-65%.  Grade I or early diastolic dysfunction.  The left atrium is mild to moderately dilated.  Right ventricular systolic pressure is elevated, consistent with mild to  moderate pulmonary hypertension. Might be slightly lower compared with most  recent study.  ______________________________________________________________________________  Left Ventricle  The left ventricle is normal in size. There is normal left ventricular wall  thickness. A sigmoid septum is present. The visual ejection fraction is 60-  65%. Grade I or early diastolic dysfunction.     Right Ventricle  The right ventricle is normal in structure, function and size.     Atria  The left atrium is mild to moderately dilated. Right atrial size is normal.     Mitral Valve  The mitral valve leaflets appear thickened, but open well.     Tricuspid Valve  Normal tricuspid valve. There is mild (1+) tricuspid regurgitation. Right  ventricular systolic pressure is elevated, consistent with mild to moderate  pulmonary hypertension.     Aortic Valve  There is mild trileaflet aortic sclerosis.     Pulmonic Valve  Normal pulmonic valve.     Vessels  The aortic root is normal size.     Pericardium  There is no pericardial effusion.     Rhythm  Sinus rhythm was noted.  ______________________________________________________________________________  MMode/2D  Measurements & Calculations     IVSd: 0.92 cm  LVIDd: 5.3 cm  LVIDs: 3.3 cm  LVPWd: 1.00 cm  IVC diam: 1.0 cm  FS: 37.5 %  LV mass(C)d: 186.5 grams  LV mass(C)dI: 102.3 grams/m2  Ao root diam: 3.1 cm  LVOT diam: 2.1 cm  LVOT area: 3.6 cm2  Ao root diam index Ht(cm/m): 2.0  Ao root diam index BSA (cm/m2): 1.7  LA Volume (BP): 67.5 ml  LA Volume Index (BP): 37.1 ml/m2     RWT: 0.38  TAPSE: 2.4 cm     Doppler Measurements & Calculations  MV E max renzo: 73.4 cm/sec  MV A max renzo: 99.6 cm/sec  MV E/A: 0.74  MV max P.6 mmHg  MV mean P.7 mmHg  MV V2 VTI: 24.7 cm  MVA(VTI): 2.9 cm2  MV dec slope: 279.9 cm/sec2  MV dec time: 0.26 sec  Ao V2 max: 179.3 cm/sec  Ao max P.0 mmHg  Ao V2 mean: 121.9 cm/sec  Ao mean P.8 mmHg  Ao V2 VTI: 37.4 cm  ROSARIO(I,D): 1.9 cm2  ROSARIO(V,D): 1.9 cm2  LV V1 max PG: 3.8 mmHg  LV V1 max: 96.9 cm/sec  LV V1 VTI: 20.1 cm  SV(LVOT): 72.3 ml  SI(LVOT): 39.6 ml/m2  PA V2 max: 81.0 cm/sec  PA max P.6 mmHg  PA acc time: 0.08 sec  AV Renzo Ratio (DI): 0.54  ROSARIO Index (cm2/m2): 1.1  E/E' av.4  Lateral E/e': 14.8  Medial E/e': 14.0     RV S Renzo: 11.4 cm/sec     ______________________________________________________________________________  Report approved by: Roberto Joseph MD on 2025 12:56 PM             Discharge Medications   Current Discharge Medication List        CONTINUE these medications which have CHANGED    Details   gabapentin (NEURONTIN) 300 MG capsule Take 1 capsule (300 mg) by mouth at bedtime.    Associated Diagnoses: Neuropathy           CONTINUE these medications which have NOT CHANGED    Details   acetaminophen (TYLENOL) 500 MG tablet Take 1,000 mg by mouth 2 times daily.      albuterol (PROAIR HFA/PROVENTIL HFA/VENTOLIN HFA) 108 (90 Base) MCG/ACT inhaler Inhale 2 puffs into the lungs every 4 hours as needed for shortness of breath / dyspnea or wheezing  Qty: 6.7 g, Refills: 0    Comments: Pharmacy may dispense brand covered by insurance (Proair, or proventil or ventolin  or generic albuterol inhaler)  Associated Diagnoses: Acute respiratory failure with hypoxia (H)      aspirin (ASA) 325 MG EC tablet Take 325 mg by mouth daily.      atorvastatin (LIPITOR) 40 MG tablet Take 40 mg by mouth daily      busPIRone (BUSPAR) 5 MG tablet Take 5 mg by mouth 3 times daily.      Calcium Carb-Cholecalciferol 500-200 MG-UNIT PACK Take 1 tablet by mouth daily       carbidopa-levodopa (SINEMET)  MG tablet Take 2 tablets by mouth 3 times daily      cephALEXin (KEFLEX) 250 MG capsule Take 250 mg by mouth daily.      cholecalciferol (VITAMIN D3) 125 mcg (5000 units) capsule Take 125 mcg by mouth daily.      citalopram (CELEXA) 20 MG tablet Take 20 mg by mouth daily.      cyanocobalamin (CYANOCOBALAMIN) 1000 MCG/ML injection Inject 1 mL into the muscle every 30 days      denosumab (PROLIA) 60 MG/ML SOSY injection Inject 60 mg Subcutaneous every 6 months      diclofenac (VOLTAREN) 1 % topical gel Apply 4 g topically 4 times daily as needed for moderate pain Plus 2 g to hands tid      folic acid (FOLVITE) 1 MG tablet Take 1 mg by mouth. 3 times daily expect on Wednesday      furosemide (LASIX) 20 MG tablet Take 20 mg by mouth daily.      !! methotrexate 2.5 MG tablet Take 3 tablets by mouth every 7 days. Every Wednesday AM      !! methotrexate 2.5 MG tablet Take 4 tablets by mouth every 7 days. Every Wednesday PM      pantoprazole (PROTONIX) 20 MG EC tablet Take 20 mg by mouth daily      spironolactone (ALDACTONE) 25 MG tablet Take 25 mg by mouth daily.      vibegron (GEMTESA) 75 MG TABS tablet Take 75 mg by mouth at bedtime.       !! - Potential duplicate medications found. Please discuss with provider.        STOP taking these medications       cetirizine (ZYRTEC) 10 MG tablet Comments:   Reason for Stopping:         ciprofloxacin (CIPRO) 500 MG tablet Comments:   Reason for Stopping:         diazepam (VALIUM) 5 MG tablet Comments:   Reason for Stopping:         empagliflozin (JARDIANCE) 10 MG  "TABS tablet Comments:   Reason for Stopping:             Allergies   Allergies   Allergen Reactions    Contrast Dye Rash, Anaphylaxis, Hives and Swelling     Tolerated CT chest with contrast 6/2021 after pre-treatment with benadryl and solumedrol  Hives / throat swelling    Doxycycline Anaphylaxis    Acetaminophen      Vicodin/Darvocet/Swelling throat    Albuterol      \"Half my tongue swelled.\"   \"Half my tongue swelled.\"       Atenolol Unknown and Other (See Comments)     PN: LW Reaction: swelling of the tongue  (see [facility] records scanned 3/11/2014)  (see [facility] records scanned 3/11/2014)      Darvocet [Propoxyphene N-Apap]     Fluticasone-Salmeterol      PN: tongue swelling      Hydrocodone-Acetaminophen Swelling     throat      Lisinopril      Tongue Swelling    Metformin Hydrochloride      Glucophage caused severe diarrhea    Morphine      MISAEL    Penicillins      Swelling throat    Percocet [Oxycodone-Acetaminophen]     Neomycin-Bacitracin-Polymyxin Hives and Rash     "

## 2025-05-23 NOTE — PLAN OF CARE
Occupational Therapy Discharge Summary    Reason for therapy discharge:    Discharged to transitional care facility.    Progress towards therapy goal(s). See goals on Care Plan in [facility] electronic health record for goal details.  Goals not met.  Barriers to achieving goals:   discharge from facility.    Therapy recommendation(s):    Continued therapy is recommended.  Rationale/Recommendations:  Pt functioning below baseline, limited by dec act lashonda, dizziness, wkns, pain. Spouse assist pt w/ I/ADLs at baseline, reports unable to safely assist pt at home at this time d/t high falls risk. Rec TCU upon DC to progress activity lashonda for I/ADL ind and safety.

## 2025-05-23 NOTE — PLAN OF CARE
"Goal Outcome Evaluation:      Plan of Care Reviewed With: patient, spouse    Overall Patient Progress: no changeOverall Patient Progress: no change    Outcome Evaluation: Pt is A/O with intermittent confusion, anixous.   at bedside. Phos Mg K protocols. Voiding via purewick, pt has Parkinson's, dementia and depression      Pt is alert and orientated x4 with intermittent confusion. BG checks done, pt was low at 44 this am. NA gave pt apple juice until I was able to get her glucose gel, rechecked and pt was at 57. Then it came up to 115.   Pt on soft mechanical diet. Poor appetite. Assist of 2. Pills whole in apple sauce. Midline in right upper arm. Pt's bottom is red. Voiding via purewick dark min urine.      Problem: Adult Inpatient Plan of Care  Goal: Plan of Care Review  Description: The Plan of Care Review/Shift note should be completed every shift.  The Outcome Evaluation is a brief statement about your assessment that the patient is improving, declining, or no change.  This information will be displayed automatically on your shiftnote.  5/23/2025 1422 by Danilo Vick, RN  Outcome: Not Progressing  Flowsheets (Taken 5/23/2025 1422)  Outcome Evaluation: Pt is A/O with intermittent confusion, anixous.   at bedside. Phos Mg K protocols. Voiding via purewick, pt has Parkinson's, dementia and depression  Plan of Care Reviewed With:   patient   spouse  Overall Patient Progress: no change  5/23/2025 1419 by Danilo Vick, RN  Outcome: Not Progressing  Flowsheets (Taken 5/23/2025 1419)  Plan of Care Reviewed With: patient  Overall Patient Progress: no change  Goal: Patient-Specific Goal (Individualized)  Description: You can add care plan individualizations to a care plan. Examples of Individualization might be:  \"Parent requests to be called daily at 9am for status\", \"I have a hard time hearing out of my right ear\", or \"Do not touch me to wake me up as it startlesme\".  5/23/2025 1422 by Nicanor, " Danilo FITZGERALD RN  Outcome: Not Progressing  5/23/2025 1419 by Danilo Vick RN  Outcome: Not Progressing  Goal: Absence of Hospital-Acquired Illness or Injury  5/23/2025 1422 by Danilo Vick RN  Outcome: Not Progressing  5/23/2025 1419 by Danilo Vick RN  Outcome: Not Progressing  Intervention: Identify and Manage Fall Risk  Recent Flowsheet Documentation  Taken 5/23/2025 0843 by Danilo Vick RN  Safety Promotion/Fall Prevention: safety round/check completed  Intervention: Prevent Skin Injury  Recent Flowsheet Documentation  Taken 5/23/2025 0843 by Danilo Vick RN  Body Position: weight shifting  Intervention: Prevent and Manage VTE (Venous Thromboembolism) Risk  Recent Flowsheet Documentation  Taken 5/23/2025 0843 by Danilo Vick RN  VTE Prevention/Management: SCDs off (sequential compression devices)  Intervention: Prevent Infection  Recent Flowsheet Documentation  Taken 5/23/2025 0843 by Danilo Vick RN  Infection Prevention: rest/sleep promoted  Goal: Optimal Comfort and Wellbeing  5/23/2025 1422 by Danilo Vick RN  Outcome: Not Progressing  5/23/2025 1419 by Danilo Vick RN  Outcome: Not Progressing  Intervention: Provide Person-Centered Care  Recent Flowsheet Documentation  Taken 5/23/2025 0843 by Danilo Vick RN  Trust Relationship/Rapport: care explained  Goal: Readiness for Transition of Care  5/23/2025 1422 by Danilo Vick RN  Outcome: Not Progressing  5/23/2025 1419 by Danilo Vick RN  Outcome: Not Progressing     Problem: Delirium  Goal: Improved Attention and Thought Clarity  5/23/2025 1422 by Danilo Vick, RN  Outcome: Not Progressing  5/23/2025 1419 by Danilo Vick RN  Outcome: Not Progressing  Intervention: Maximize Cognitive Function  Recent Flowsheet Documentation  Taken 5/23/2025 0843 by Danilo Vick RN  Sensory Stimulation Regulation:   care clustered   lighting decreased   quiet environment promoted  Reorientation Measures:   clock in view    reorientation provided     Problem: Fall Injury Risk  Goal: Absence of Fall and Fall-Related Injury  5/23/2025 1422 by Danilo Vick, RN  Outcome: Not Progressing  5/23/2025 1419 by Danilo Vick, RN  Outcome: Not Progressing  Intervention: Promote Injury-Free Environment  Recent Flowsheet Documentation  Taken 5/23/2025 0843 by Danilo Vick, RN  Safety Promotion/Fall Prevention: safety round/check completed     Problem: Pulmonary Impairment  Goal: Improved Activity Tolerance  5/23/2025 1422 by Danilo Vick, RN  Outcome: Not Progressing  5/23/2025 1419 by Danilo Vick, RN  Outcome: Not Progressing  Goal: Effective Airway Clearance  5/23/2025 1422 by Danilo Vick, RN  Outcome: Not Progressing  5/23/2025 1419 by Danilo Vick, RN  Outcome: Not Progressing  Goal: Optimal Gas Exchange  5/23/2025 1422 by Danilo Vick, RN  Outcome: Not Progressing  5/23/2025 1419 by Danilo Vick, RN  Outcome: Not Progressing

## 2025-05-23 NOTE — PLAN OF CARE
Physical Therapy Discharge Summary    Reason for therapy discharge:    Discharged to transitional care facility.    Progress towards therapy goal(s). See goals on Care Plan in [facility] electronic health record for goal details.  Goals not met.  Barriers to achieving goals:   discharge from facility.    Therapy recommendation(s):    Continued therapy is recommended.  Rationale/Recommendations:  TCU recommended for further progression of strength and IND mobility.

## 2025-05-23 NOTE — PROGRESS NOTES
PALLIATIVE CARE PROGRESS NOTE  [facility]     Patient Name: Alma Rosa Fishman  Date of Admission: 5/11/2025   Today the patient was seen for: Emotional support     Recommendations & Counseling     GOALS OF CARE:   Life-prolonging with limits  - No CPR- Do NOT Intubate and would not want TUBE FEEDS.  Plan to dismiss to TCU later today.      ADVANCE CARE PLANNING:  Advance Directive has been requested.  There is no POLST form on file, defer to patient and/or next of kin for decisions   Code status: No CPR- Do NOT Intubate     MEDICAL MANAGEMENT:   C/O mouth/dental pain- Biotene ordered. Will add magic mouth wash PRN  Chronic shoulder and low back pain- Tylenol, ice, heat, mobility. Add lidocaine patch      PSYCHOSOCIAL/SPIRITUAL:  Family - 4 adult daughters: Rachele Hou, Anna, Nan, spouse Vadim.  [place] community: Judaism   Appreciate spiritual care support    Palliative Care will continue to follow. Thank you for the consult and allowing us to aid in the care of Alma Rosa Fishman.    These recommendations have been discussed with Hospitalist trinidad Duarte MD and bedside nurse MYAH Carrasco..    PRISCILLA Mueller CNS  [facility], Palliative Care  Securely message with the [employer] Web Console (learn more here) or  Text page via [facility] Paging/Directory        Assessment          Alma Rosa Fishman is a 84 year old female with a past medical history of PD, recurrent right pleural effusions, recently diagnosed HFpEF, CKD3, hypertension, hyperlipidemia, frequent falls, s/p loop recorder ([place]) who presented on 5/11/2025 with generalized weakness and fall. Patient also has recent dx of liver mass of unknown etiology, frequent falls (monthly x 10 months), ongoing shortness of breath.     Recent hospital presentations in March and April for similar fall events and dyspnea.     Today, the patient was seen for:  Goals of care  Patient and family discussion      Interval History:     Multidisciplinary  "collaboration:  Appreciate the nursing staff for letting me know that the patient's  had questions for Palliative Care.     Patient/family narrative  I phoned the patient's , Venancio at [phone]. He offered \"I think I have the answer now.\" He inquired about \"a gel foam pad for the pressure sore on my wife's bottom.\" He clarified as he was looking for a specialty mattress for when Alma Rosa dismisses to the TCU.  Will ask for [facility] Nurse input.       Review of Systems:     Besides above, ROS was reviewed and is unremarkable        Physical Exam:   Temp:  [97.3  F (36.3  C)-98.6  F (37  C)] 98.6  F (37  C)  Pulse:  [76-90] 76  Resp:  [18] 18  BP: ()/(46-79) 162/60  SpO2:  [92 %-95 %] 92 %  176 lbs 8 oz    Physical Exam  GEN:  Alert, oriented x 3, appears comfortable, no apparent distress.  RESP:  Symmetric chest rise on inhalation noted.  Normal respiratory effort.      Data Reviewed:     Results for orders placed or performed during the hospital encounter of 05/11/25 (from the past 24 hours)   Glucose by meter   Result Value Ref Range    GLUCOSE BY METER POCT 104 (H) 70 - 99 mg/dL   Glucose by meter   Result Value Ref Range    GLUCOSE BY METER POCT 102 (H) 70 - 99 mg/dL   Potassium   Result Value Ref Range    Potassium 5.1 3.4 - 5.3 mmol/L   Magnesium   Result Value Ref Range    Magnesium 1.9 1.7 - 2.3 mg/dL   Phosphorus   Result Value Ref Range    Phosphorus 2.7 2.5 - 4.5 mg/dL   Glucose by meter   Result Value Ref Range    GLUCOSE BY METER POCT 44 (LL) 70 - 99 mg/dL   Glucose by meter   Result Value Ref Range    GLUCOSE BY METER POCT 57 (L) 70 - 99 mg/dL   Glucose by meter   Result Value Ref Range    GLUCOSE BY METER POCT 115 (H) 70 - 99 mg/dL       MANAGEMENT DISCUSSED with the following over the past 24 hours: Hospitalist Denny Duarte MD and bedside nurse Brayan GLASGOW.   30 MINUTES SPENT BY ME on the date of service doing chart review, history, exam, documentation & further activities per the " note.

## 2025-05-24 ENCOUNTER — PATIENT OUTREACH (OUTPATIENT)
Dept: CARE COORDINATION | Facility: CLINIC | Age: 85
End: 2025-05-24
Payer: MEDICARE

## 2025-05-24 NOTE — PROGRESS NOTES
[facility]    Background: Transitional Care Management program identified per system criteria and reviewed by Connected Care Resource Center team for possible outreach.    Assessment: Upon chart review, CCRC Team member will not proceed with patient outreach related to this episode of Transitional Care Management program due to reason below:    Non-[facility] TCU: CCRC team member noted patient discharged to TCU/ARU/LTACH. Patient is not established with a [facility] Primary Care Clinic currently supported by Primary Care-Care Coordination therefore handoff to Primary Care-Care Coordination is not appropriate at this time.    Plan: Transitional Care Management episode addressed appropriately per reason noted above.      LAWANDA Petersen  [facility], [facility]    *Connected Care Resource Team does NOT follow patient ongoing. Referrals are identified based on internal discharge reports and the outreach is to ensure patient has an understanding of their discharge instructions.

## 2025-05-28 ENCOUNTER — LAB REQUISITION (OUTPATIENT)
Dept: LAB | Facility: CLINIC | Age: 85
End: 2025-05-28
Payer: MEDICARE

## 2025-05-28 DIAGNOSIS — I50.22 CHRONIC SYSTOLIC (CONGESTIVE) HEART FAILURE (H): ICD-10-CM

## 2025-05-29 LAB
ACANTHOCYTES BLD QL SMEAR: SLIGHT
ANION GAP SERPL CALCULATED.3IONS-SCNC: 11 MMOL/L (ref 7–15)
BUN SERPL-MCNC: 14.4 MG/DL (ref 8–23)
BURR CELLS BLD QL SMEAR: SLIGHT
CALCIUM SERPL-MCNC: 8.1 MG/DL (ref 8.8–10.4)
CHLORIDE SERPL-SCNC: 110 MMOL/L (ref 98–107)
CREAT SERPL-MCNC: 0.77 MG/DL (ref 0.51–0.95)
EGFRCR SERPLBLD CKD-EPI 2021: 77 ML/MIN/1.73M2
ERYTHROCYTE [DISTWIDTH] IN BLOOD BY AUTOMATED COUNT: 16.7 % (ref 10–15)
FRAGMENTS BLD QL SMEAR: SLIGHT
GLUCOSE SERPL-MCNC: 65 MG/DL (ref 70–99)
HCO3 SERPL-SCNC: 22 MMOL/L (ref 22–29)
HCT VFR BLD AUTO: 29.6 % (ref 35–47)
HGB BLD-MCNC: 9.6 G/DL (ref 11.7–15.7)
MCH RBC QN AUTO: 34.5 PG (ref 26.5–33)
MCHC RBC AUTO-ENTMCNC: 32.4 G/DL (ref 31.5–36.5)
MCV RBC AUTO: 107 FL (ref 78–100)
PLAT MORPH BLD: ABNORMAL
PLATELET # BLD AUTO: 320 10E3/UL (ref 150–450)
POTASSIUM SERPL-SCNC: 4.4 MMOL/L (ref 3.4–5.3)
RBC # BLD AUTO: 2.78 10E6/UL (ref 3.8–5.2)
RBC MORPH BLD: ABNORMAL
SODIUM SERPL-SCNC: 143 MMOL/L (ref 135–145)
TARGETS BLD QL SMEAR: SLIGHT
WBC # BLD AUTO: 9 10E3/UL (ref 4–11)

## 2025-05-29 PROCEDURE — 80048 BASIC METABOLIC PNL TOTAL CA: CPT | Mod: ORL | Performed by: NURSE PRACTITIONER

## 2025-05-29 PROCEDURE — P9603 ONE-WAY ALLOW PRORATED MILES: HCPCS | Mod: ORL | Performed by: NURSE PRACTITIONER

## 2025-05-29 PROCEDURE — 85027 COMPLETE CBC AUTOMATED: CPT | Mod: ORL | Performed by: NURSE PRACTITIONER

## 2025-05-29 PROCEDURE — 80048 BASIC METABOLIC PNL TOTAL CA: CPT | Performed by: NURSE PRACTITIONER

## 2025-05-29 PROCEDURE — 36415 COLL VENOUS BLD VENIPUNCTURE: CPT | Performed by: NURSE PRACTITIONER

## (undated) DEVICE — LINEN FULL SHEET 5511

## (undated) DEVICE — SUCTION MANIFOLD NEPTUNE 2 SYS 4 PORT 0702-020-000

## (undated) DEVICE — UNDERPAD 36X30 PREMIERPRO MAX ABS NS LF 676111

## (undated) DEVICE — LINEN HALF SHEET 5512

## (undated) DEVICE — SOLUTION WATER 1000ML BOTTLE R5000-01

## (undated) DEVICE — TUBING SUCTION 6"X3/16" N56A

## (undated) DEVICE — GOWN XLG DISP 9545

## (undated) DEVICE — KIT PROCEDURE W/CLEAN-A-SCOPE LINERS V2 200800